# Patient Record
Sex: FEMALE | Race: WHITE | NOT HISPANIC OR LATINO | Employment: OTHER | URBAN - NONMETROPOLITAN AREA
[De-identification: names, ages, dates, MRNs, and addresses within clinical notes are randomized per-mention and may not be internally consistent; named-entity substitution may affect disease eponyms.]

---

## 2018-01-01 ENCOUNTER — OUTSIDE FACILITY SERVICE (OUTPATIENT)
Dept: PULMONOLOGY | Facility: CLINIC | Age: 48
End: 2018-01-01

## 2018-01-01 ENCOUNTER — ANCILLARY PROCEDURE (OUTPATIENT)
Dept: SPEECH THERAPY | Facility: HOSPITAL | Age: 48
End: 2018-01-01
Attending: INTERNAL MEDICINE

## 2018-01-01 ENCOUNTER — ANESTHESIA EVENT (OUTPATIENT)
Dept: PERIOP | Facility: HOSPITAL | Age: 48
End: 2018-01-01

## 2018-01-01 ENCOUNTER — APPOINTMENT (OUTPATIENT)
Dept: GENERAL RADIOLOGY | Facility: HOSPITAL | Age: 48
End: 2018-01-01

## 2018-01-01 ENCOUNTER — APPOINTMENT (OUTPATIENT)
Dept: NEPHROLOGY | Facility: HOSPITAL | Age: 48
End: 2018-01-01
Attending: INTERNAL MEDICINE

## 2018-01-01 ENCOUNTER — DOCUMENTATION (OUTPATIENT)
Dept: PULMONOLOGY | Facility: CLINIC | Age: 48
End: 2018-01-01

## 2018-01-01 ENCOUNTER — HOSPITAL ENCOUNTER (INPATIENT)
Facility: HOSPITAL | Age: 48
LOS: 1 days | End: 2018-06-26
Attending: INTERNAL MEDICINE | Admitting: INTERNAL MEDICINE

## 2018-01-01 ENCOUNTER — APPOINTMENT (OUTPATIENT)
Dept: CT IMAGING | Facility: HOSPITAL | Age: 48
End: 2018-01-01

## 2018-01-01 ENCOUNTER — ANESTHESIA (OUTPATIENT)
Dept: PERIOP | Facility: HOSPITAL | Age: 48
End: 2018-01-01

## 2018-01-01 ENCOUNTER — APPOINTMENT (OUTPATIENT)
Dept: NEUROLOGY | Facility: HOSPITAL | Age: 48
End: 2018-01-01
Attending: PSYCHIATRY & NEUROLOGY

## 2018-01-01 ENCOUNTER — HOSPITAL ENCOUNTER (OUTPATIENT)
Facility: HOSPITAL | Age: 48
Discharge: NURSING, REHAB OR LONG-TERM CARE | End: 2018-06-08
Attending: INTERNAL MEDICINE | Admitting: INTERNAL MEDICINE

## 2018-01-01 ENCOUNTER — OUTSIDE FACILITY SERVICE (OUTPATIENT)
Dept: GASTROENTEROLOGY | Facility: CLINIC | Age: 48
End: 2018-01-01

## 2018-01-01 ENCOUNTER — APPOINTMENT (OUTPATIENT)
Dept: CARDIOLOGY | Facility: HOSPITAL | Age: 48
End: 2018-01-01
Attending: INTERNAL MEDICINE

## 2018-01-01 ENCOUNTER — HOSPITAL ENCOUNTER (INPATIENT)
Facility: HOSPITAL | Age: 48
LOS: 22 days | Discharge: NURSING, REHAB OR LONG-TERM CARE | End: 2018-05-04
Attending: INTERNAL MEDICINE | Admitting: INTERNAL MEDICINE

## 2018-01-01 ENCOUNTER — APPOINTMENT (OUTPATIENT)
Dept: MRI IMAGING | Facility: HOSPITAL | Age: 48
End: 2018-01-01

## 2018-01-01 ENCOUNTER — APPOINTMENT (OUTPATIENT)
Dept: CARDIOLOGY | Facility: HOSPITAL | Age: 48
End: 2018-01-01
Attending: PSYCHIATRY & NEUROLOGY

## 2018-01-01 ENCOUNTER — APPOINTMENT (OUTPATIENT)
Dept: CARDIOLOGY | Facility: HOSPITAL | Age: 48
End: 2018-01-01
Attending: NEUROLOGICAL SURGERY

## 2018-01-01 ENCOUNTER — APPOINTMENT (OUTPATIENT)
Dept: ULTRASOUND IMAGING | Facility: HOSPITAL | Age: 48
End: 2018-01-01

## 2018-01-01 ENCOUNTER — APPOINTMENT (OUTPATIENT)
Dept: ULTRASOUND IMAGING | Facility: HOSPITAL | Age: 48
End: 2018-01-01
Attending: INTERNAL MEDICINE

## 2018-01-01 ENCOUNTER — HOSPITAL ENCOUNTER (INPATIENT)
Facility: HOSPITAL | Age: 48
LOS: 17 days | End: 2018-06-25
Attending: INTERNAL MEDICINE | Admitting: INTERNAL MEDICINE

## 2018-01-01 VITALS
HEART RATE: 82 BPM | OXYGEN SATURATION: 94 % | RESPIRATION RATE: 16 BRPM | TEMPERATURE: 97.2 F | DIASTOLIC BLOOD PRESSURE: 55 MMHG | SYSTOLIC BLOOD PRESSURE: 98 MMHG

## 2018-01-01 VITALS
OXYGEN SATURATION: 95 % | WEIGHT: 134 LBS | HEIGHT: 62 IN | RESPIRATION RATE: 18 BRPM | DIASTOLIC BLOOD PRESSURE: 55 MMHG | TEMPERATURE: 98.1 F | HEART RATE: 66 BPM | BODY MASS INDEX: 24.66 KG/M2 | SYSTOLIC BLOOD PRESSURE: 73 MMHG

## 2018-01-01 VITALS
WEIGHT: 173.5 LBS | SYSTOLIC BLOOD PRESSURE: 121 MMHG | BODY MASS INDEX: 31.93 KG/M2 | OXYGEN SATURATION: 97 % | HEART RATE: 75 BPM | TEMPERATURE: 97.8 F | RESPIRATION RATE: 14 BRPM | DIASTOLIC BLOOD PRESSURE: 66 MMHG | HEIGHT: 62 IN

## 2018-01-01 VITALS
OXYGEN SATURATION: 97 % | HEART RATE: 98 BPM | RESPIRATION RATE: 18 BRPM | SYSTOLIC BLOOD PRESSURE: 113 MMHG | TEMPERATURE: 97.4 F | DIASTOLIC BLOOD PRESSURE: 66 MMHG

## 2018-01-01 DIAGNOSIS — N18.6 ESRD (END STAGE RENAL DISEASE) ON DIALYSIS (HCC): Primary | ICD-10-CM

## 2018-01-01 DIAGNOSIS — J96.01 ACUTE RESPIRATORY FAILURE WITH HYPOXIA (HCC): ICD-10-CM

## 2018-01-01 DIAGNOSIS — J98.11 ATELECTASIS OF LEFT LUNG: ICD-10-CM

## 2018-01-01 DIAGNOSIS — Z78.9 IMPAIRED MOBILITY AND ADLS: ICD-10-CM

## 2018-01-01 DIAGNOSIS — Z74.09 IMPAIRED MOBILITY AND ADLS: ICD-10-CM

## 2018-01-01 DIAGNOSIS — Z74.09 IMPAIRED FUNCTIONAL MOBILITY, BALANCE, GAIT, AND ENDURANCE: ICD-10-CM

## 2018-01-01 DIAGNOSIS — L08.9 WOUND INFECTION: ICD-10-CM

## 2018-01-01 DIAGNOSIS — L02.91 ABSCESS: ICD-10-CM

## 2018-01-01 DIAGNOSIS — J91.8 PARAPNEUMONIC EFFUSION: ICD-10-CM

## 2018-01-01 DIAGNOSIS — J96.00 ACUTE RESPIRATORY FAILURE, UNSPECIFIED WHETHER WITH HYPOXIA OR HYPERCAPNIA (HCC): ICD-10-CM

## 2018-01-01 DIAGNOSIS — J18.9 PNEUMONIA: ICD-10-CM

## 2018-01-01 DIAGNOSIS — Z99.2 ESRD (END STAGE RENAL DISEASE) ON DIALYSIS (HCC): Primary | ICD-10-CM

## 2018-01-01 DIAGNOSIS — J18.9 PNEUMONIA DUE TO INFECTIOUS ORGANISM, UNSPECIFIED LATERALITY, UNSPECIFIED PART OF LUNG: ICD-10-CM

## 2018-01-01 DIAGNOSIS — J96.90 RESPIRATORY FAILURE (HCC): ICD-10-CM

## 2018-01-01 DIAGNOSIS — R13.10 DYSPHAGIA, UNSPECIFIED TYPE: ICD-10-CM

## 2018-01-01 DIAGNOSIS — J90 PLEURAL EFFUSION ON LEFT: Primary | ICD-10-CM

## 2018-01-01 DIAGNOSIS — G83.9 PARALYSIS (HCC): ICD-10-CM

## 2018-01-01 DIAGNOSIS — T14.8XXA WOUND INFECTION: ICD-10-CM

## 2018-01-01 DIAGNOSIS — R06.03 RESPIRATORY DISTRESS: ICD-10-CM

## 2018-01-01 DIAGNOSIS — J18.9 PARAPNEUMONIC EFFUSION: ICD-10-CM

## 2018-01-01 DIAGNOSIS — G06.1 ABSCESS IN EPIDURAL SPACE OF CERVICAL SPINE: Primary | ICD-10-CM

## 2018-01-01 LAB
027 TOXIN: NORMAL
027 TOXIN: NORMAL
25(OH)D3 SERPL-MCNC: 15 NG/ML
A-A DO2: 113.9 MMHG (ref 0–300)
A-A DO2: 116.3 MMHG (ref 0–300)
A-A DO2: 146.2 MMHG (ref 0–300)
A-A DO2: 148.2 MMHG (ref 0–300)
A-A DO2: 152.6 MMHG (ref 0–300)
A-A DO2: 157 MMHG (ref 0–300)
A-A DO2: 160.5 MMHG (ref 0–300)
A-A DO2: 176.4 MMHG (ref 0–300)
A-A DO2: 180.9 MMHG (ref 0–300)
A-A DO2: 199.3 MMHG (ref 0–300)
A-A DO2: 66.9 MMHG (ref 0–300)
A-A DO2: >300 MMHG (ref 0–300)
ABO + RH BLD: NORMAL
ABO GROUP BLD: NORMAL
ALBUMIN SERPL-MCNC: 2.4 G/DL (ref 3.2–4.8)
ALBUMIN SERPL-MCNC: 2.8 G/DL (ref 3.2–4.8)
ALBUMIN SERPL-MCNC: 2.9 G/DL (ref 3.2–4.8)
ALBUMIN SERPL-MCNC: 3 G/DL (ref 3.2–4.8)
ALBUMIN SERPL-MCNC: 3.1 G/DL (ref 3.2–4.8)
ALBUMIN SERPL-MCNC: 3.1 G/DL (ref 3.2–4.8)
ALBUMIN SERPL-MCNC: 3.1 G/DL (ref 3.5–5)
ALBUMIN SERPL-MCNC: 3.2 G/DL (ref 3.2–4.8)
ALBUMIN SERPL-MCNC: 3.2 G/DL (ref 3.5–5)
ALBUMIN SERPL-MCNC: 3.26 G/DL (ref 3.2–4.8)
ALBUMIN SERPL-MCNC: 3.3 G/DL (ref 3.2–4.8)
ALBUMIN SERPL-MCNC: 3.3 G/DL (ref 3.5–5)
ALBUMIN SERPL-MCNC: 3.3 G/DL (ref 3.5–5)
ALBUMIN SERPL-MCNC: 3.35 G/DL (ref 3.2–4.8)
ALBUMIN SERPL-MCNC: 3.37 G/DL (ref 3.2–4.8)
ALBUMIN SERPL-MCNC: 3.4 G/DL (ref 3.2–4.8)
ALBUMIN SERPL-MCNC: 3.46 G/DL (ref 3.2–4.8)
ALBUMIN SERPL-MCNC: 3.5 G/DL (ref 3.5–5)
ALBUMIN SERPL-MCNC: 3.5 G/DL (ref 3.5–5)
ALBUMIN SERPL-MCNC: 3.53 G/DL (ref 3.2–4.8)
ALBUMIN SERPL-MCNC: 3.59 G/DL (ref 3.2–4.8)
ALBUMIN SERPL-MCNC: 3.71 G/DL (ref 3.2–4.8)
ALBUMIN SERPL-MCNC: 3.71 G/DL (ref 3.2–4.8)
ALBUMIN SERPL-MCNC: 3.8 G/DL (ref 3.2–4.8)
ALBUMIN SERPL-MCNC: 3.9 G/DL (ref 3.2–4.8)
ALBUMIN SERPL-MCNC: 3.99 G/DL (ref 3.2–4.8)
ALBUMIN SERPL-MCNC: 4 G/DL (ref 3.5–5)
ALBUMIN/GLOB SERPL: 0.9 G/DL (ref 1.5–2.5)
ALBUMIN/GLOB SERPL: 1 G/DL (ref 1.5–2.5)
ALBUMIN/GLOB SERPL: 1.1 G/DL (ref 1.5–2.5)
ALBUMIN/GLOB SERPL: 1.2 G/DL (ref 1.5–2.5)
ALBUMIN/GLOB SERPL: 1.3 G/DL (ref 1.5–2.5)
ALBUMIN/GLOB SERPL: 1.4 G/DL (ref 1.5–2.5)
ALBUMIN/GLOB SERPL: 1.5 G/DL (ref 1.5–2.5)
ALBUMIN/GLOB SERPL: 1.5 G/DL (ref 1.5–2.5)
ALP SERPL-CCNC: 104 U/L (ref 25–100)
ALP SERPL-CCNC: 117 U/L (ref 25–100)
ALP SERPL-CCNC: 128 U/L (ref 25–100)
ALP SERPL-CCNC: 139 U/L (ref 25–100)
ALP SERPL-CCNC: 53 U/L (ref 25–100)
ALP SERPL-CCNC: 54 U/L (ref 35–104)
ALP SERPL-CCNC: 64 U/L (ref 25–100)
ALP SERPL-CCNC: 66 U/L (ref 25–100)
ALP SERPL-CCNC: 66 U/L (ref 35–104)
ALP SERPL-CCNC: 69 U/L (ref 35–104)
ALP SERPL-CCNC: 73 U/L (ref 35–104)
ALP SERPL-CCNC: 73 U/L (ref 35–104)
ALP SERPL-CCNC: 74 U/L (ref 35–104)
ALP SERPL-CCNC: 75 U/L (ref 35–104)
ALP SERPL-CCNC: 76 U/L (ref 35–104)
ALP SERPL-CCNC: 78 U/L (ref 25–100)
ALP SERPL-CCNC: 78 U/L (ref 25–100)
ALP SERPL-CCNC: 79 U/L (ref 25–100)
ALP SERPL-CCNC: 80 U/L (ref 25–100)
ALP SERPL-CCNC: 81 U/L (ref 25–100)
ALP SERPL-CCNC: 83 U/L (ref 25–100)
ALP SERPL-CCNC: 83 U/L (ref 25–100)
ALP SERPL-CCNC: 85 U/L (ref 25–100)
ALP SERPL-CCNC: 91 U/L (ref 25–100)
ALP SERPL-CCNC: 92 U/L (ref 35–104)
ALP SERPL-CCNC: 93 U/L (ref 25–100)
ALP SERPL-CCNC: 93 U/L (ref 25–100)
ALP SERPL-CCNC: 96 U/L (ref 35–104)
ALP SERPL-CCNC: 97 U/L (ref 25–100)
ALP SERPL-CCNC: 98 U/L (ref 25–100)
ALP SERPL-CCNC: 99 U/L (ref 25–100)
ALT SERPL W P-5'-P-CCNC: 0 U/L (ref 7–40)
ALT SERPL W P-5'-P-CCNC: 1 U/L (ref 7–40)
ALT SERPL W P-5'-P-CCNC: 10 U/L (ref 10–36)
ALT SERPL W P-5'-P-CCNC: 117 U/L (ref 7–40)
ALT SERPL W P-5'-P-CCNC: 12 U/L (ref 10–36)
ALT SERPL W P-5'-P-CCNC: 14 U/L (ref 7–40)
ALT SERPL W P-5'-P-CCNC: 2 U/L (ref 7–40)
ALT SERPL W P-5'-P-CCNC: 3 U/L (ref 7–40)
ALT SERPL W P-5'-P-CCNC: 31 U/L (ref 7–40)
ALT SERPL W P-5'-P-CCNC: 40 U/L (ref 10–36)
ALT SERPL W P-5'-P-CCNC: 5 U/L (ref 10–36)
ALT SERPL W P-5'-P-CCNC: 5 U/L (ref 7–40)
ALT SERPL W P-5'-P-CCNC: 6 U/L (ref 7–40)
ALT SERPL W P-5'-P-CCNC: 6 U/L (ref 7–40)
ALT SERPL W P-5'-P-CCNC: 608 U/L (ref 7–40)
ALT SERPL W P-5'-P-CCNC: 7 U/L (ref 10–36)
ALT SERPL W P-5'-P-CCNC: 7 U/L (ref 7–40)
ALT SERPL W P-5'-P-CCNC: 748 U/L (ref 7–40)
ALT SERPL W P-5'-P-CCNC: 8 U/L (ref 10–36)
ALT SERPL W P-5'-P-CCNC: 8 U/L (ref 10–36)
ALT SERPL W P-5'-P-CCNC: 88 U/L (ref 7–40)
ALT SERPL W P-5'-P-CCNC: 9 U/L (ref 10–36)
ALT SERPL W P-5'-P-CCNC: 9 U/L (ref 7–40)
AMMONIA BLD-SCNC: 34 UMOL/L (ref 19–60)
AMMONIA BLD-SCNC: 41 UMOL/L (ref 11–51)
ANA SER QL: POSITIVE
ANION GAP SERPL CALCULATED.3IONS-SCNC: 10 MMOL/L (ref 3–11)
ANION GAP SERPL CALCULATED.3IONS-SCNC: 10.4 MMOL/L (ref 3.6–11.2)
ANION GAP SERPL CALCULATED.3IONS-SCNC: 10.6 MMOL/L (ref 3.6–11.2)
ANION GAP SERPL CALCULATED.3IONS-SCNC: 10.9 MMOL/L (ref 3.6–11.2)
ANION GAP SERPL CALCULATED.3IONS-SCNC: 11 MMOL/L (ref 3–11)
ANION GAP SERPL CALCULATED.3IONS-SCNC: 11.1 MMOL/L (ref 3.6–11.2)
ANION GAP SERPL CALCULATED.3IONS-SCNC: 11.2 MMOL/L (ref 3.6–11.2)
ANION GAP SERPL CALCULATED.3IONS-SCNC: 12 MMOL/L (ref 3–11)
ANION GAP SERPL CALCULATED.3IONS-SCNC: 12.6 MMOL/L (ref 3.6–11.2)
ANION GAP SERPL CALCULATED.3IONS-SCNC: 12.8 MMOL/L (ref 3.6–11.2)
ANION GAP SERPL CALCULATED.3IONS-SCNC: 13 MMOL/L (ref 3–11)
ANION GAP SERPL CALCULATED.3IONS-SCNC: 13 MMOL/L (ref 3–11)
ANION GAP SERPL CALCULATED.3IONS-SCNC: 13.1 MMOL/L (ref 3.6–11.2)
ANION GAP SERPL CALCULATED.3IONS-SCNC: 13.2 MMOL/L (ref 3.6–11.2)
ANION GAP SERPL CALCULATED.3IONS-SCNC: 13.3 MMOL/L (ref 3.6–11.2)
ANION GAP SERPL CALCULATED.3IONS-SCNC: 13.5 MMOL/L (ref 3.6–11.2)
ANION GAP SERPL CALCULATED.3IONS-SCNC: 13.6 MMOL/L (ref 3.6–11.2)
ANION GAP SERPL CALCULATED.3IONS-SCNC: 13.7 MMOL/L (ref 3.6–11.2)
ANION GAP SERPL CALCULATED.3IONS-SCNC: 13.8 MMOL/L (ref 3.6–11.2)
ANION GAP SERPL CALCULATED.3IONS-SCNC: 14 MMOL/L (ref 3–11)
ANION GAP SERPL CALCULATED.3IONS-SCNC: 14.1 MMOL/L (ref 3.6–11.2)
ANION GAP SERPL CALCULATED.3IONS-SCNC: 14.5 MMOL/L (ref 3.6–11.2)
ANION GAP SERPL CALCULATED.3IONS-SCNC: 14.6 MMOL/L (ref 3.6–11.2)
ANION GAP SERPL CALCULATED.3IONS-SCNC: 14.7 MMOL/L (ref 3.6–11.2)
ANION GAP SERPL CALCULATED.3IONS-SCNC: 15 MMOL/L (ref 3–11)
ANION GAP SERPL CALCULATED.3IONS-SCNC: 15 MMOL/L (ref 3–11)
ANION GAP SERPL CALCULATED.3IONS-SCNC: 15.1 MMOL/L (ref 3.6–11.2)
ANION GAP SERPL CALCULATED.3IONS-SCNC: 15.4 MMOL/L (ref 3.6–11.2)
ANION GAP SERPL CALCULATED.3IONS-SCNC: 15.7 MMOL/L (ref 3.6–11.2)
ANION GAP SERPL CALCULATED.3IONS-SCNC: 16 MMOL/L (ref 3.6–11.2)
ANION GAP SERPL CALCULATED.3IONS-SCNC: 16 MMOL/L (ref 3–11)
ANION GAP SERPL CALCULATED.3IONS-SCNC: 16.2 MMOL/L (ref 3.6–11.2)
ANION GAP SERPL CALCULATED.3IONS-SCNC: 16.4 MMOL/L (ref 3.6–11.2)
ANION GAP SERPL CALCULATED.3IONS-SCNC: 16.5 MMOL/L (ref 3.6–11.2)
ANION GAP SERPL CALCULATED.3IONS-SCNC: 16.5 MMOL/L (ref 3.6–11.2)
ANION GAP SERPL CALCULATED.3IONS-SCNC: 16.9 MMOL/L (ref 3.6–11.2)
ANION GAP SERPL CALCULATED.3IONS-SCNC: 17 MMOL/L (ref 3–11)
ANION GAP SERPL CALCULATED.3IONS-SCNC: 17 MMOL/L (ref 3–11)
ANION GAP SERPL CALCULATED.3IONS-SCNC: 17.4 MMOL/L (ref 3.6–11.2)
ANION GAP SERPL CALCULATED.3IONS-SCNC: 17.8 MMOL/L (ref 3.6–11.2)
ANION GAP SERPL CALCULATED.3IONS-SCNC: 18 MMOL/L (ref 3–11)
ANION GAP SERPL CALCULATED.3IONS-SCNC: 18 MMOL/L (ref 3–11)
ANION GAP SERPL CALCULATED.3IONS-SCNC: 18.8 MMOL/L (ref 3.6–11.2)
ANION GAP SERPL CALCULATED.3IONS-SCNC: 19 MMOL/L (ref 3.6–11.2)
ANION GAP SERPL CALCULATED.3IONS-SCNC: 19 MMOL/L (ref 3–11)
ANION GAP SERPL CALCULATED.3IONS-SCNC: 19 MMOL/L (ref 3–11)
ANION GAP SERPL CALCULATED.3IONS-SCNC: 19.8 MMOL/L (ref 3.6–11.2)
ANION GAP SERPL CALCULATED.3IONS-SCNC: 20 MMOL/L (ref 3.6–11.2)
ANION GAP SERPL CALCULATED.3IONS-SCNC: 7 MMOL/L (ref 3–11)
ANION GAP SERPL CALCULATED.3IONS-SCNC: 9 MMOL/L (ref 3–11)
ANION GAP SERPL CALCULATED.3IONS-SCNC: 9 MMOL/L (ref 3–11)
ANION GAP SERPL CALCULATED.3IONS-SCNC: 9.1 MMOL/L (ref 3.6–11.2)
ANION GAP SERPL CALCULATED.3IONS-SCNC: 9.6 MMOL/L (ref 3.6–11.2)
ANISOCYTOSIS BLD QL: NORMAL
APPEARANCE CSF: CLEAR
APPEARANCE FLD: ABNORMAL
APPEARANCE FLD: CLEAR
APTT PPP: 27 SECONDS (ref 24–31)
APTT PPP: 35.7 SECONDS (ref 24–31)
APTT PPP: 37.6 SECONDS (ref 24–31)
APTT PPP: 40 SECONDS (ref 24–31)
APTT PPP: 44.1 SECONDS (ref 24–31)
ARTERIAL PATENCY WRIST A: ABNORMAL
ARTERIAL PATENCY WRIST A: POSITIVE
AST SERPL-CCNC: 12 U/L (ref 0–33)
AST SERPL-CCNC: 128 U/L (ref 0–33)
AST SERPL-CCNC: 14 U/L (ref 0–33)
AST SERPL-CCNC: 14 U/L (ref 10–30)
AST SERPL-CCNC: 15 U/L (ref 0–33)
AST SERPL-CCNC: 17 U/L (ref 10–30)
AST SERPL-CCNC: 18 U/L (ref 0–33)
AST SERPL-CCNC: 20 U/L (ref 0–33)
AST SERPL-CCNC: 21 U/L (ref 0–33)
AST SERPL-CCNC: 22 U/L (ref 0–33)
AST SERPL-CCNC: 22 U/L (ref 0–33)
AST SERPL-CCNC: 23 U/L (ref 10–30)
AST SERPL-CCNC: 24 U/L (ref 10–30)
AST SERPL-CCNC: 25 U/L (ref 0–33)
AST SERPL-CCNC: 256 U/L (ref 0–33)
AST SERPL-CCNC: 256 U/L (ref 0–33)
AST SERPL-CCNC: 26 U/L (ref 0–33)
AST SERPL-CCNC: 26 U/L (ref 0–33)
AST SERPL-CCNC: 26 U/L (ref 10–30)
AST SERPL-CCNC: 27 U/L (ref 10–30)
AST SERPL-CCNC: 30 U/L (ref 10–30)
AST SERPL-CCNC: 30 U/L (ref 10–30)
AST SERPL-CCNC: 3091 U/L (ref 0–33)
AST SERPL-CCNC: 31 U/L (ref 10–30)
AST SERPL-CCNC: 36 U/L (ref 0–33)
AST SERPL-CCNC: 3796 U/L (ref 0–33)
AST SERPL-CCNC: 39 U/L (ref 10–30)
AST SERPL-CCNC: 50 U/L (ref 0–33)
AST SERPL-CCNC: 691 U/L (ref 0–33)
AST SERPL-CCNC: 71 U/L (ref 0–33)
AST SERPL-CCNC: 961 U/L (ref 0–33)
ATMOSPHERIC PRESS: 724 MMHG
ATMOSPHERIC PRESS: 725 MMHG
ATMOSPHERIC PRESS: 726 MMHG
ATMOSPHERIC PRESS: 728 MMHG
ATMOSPHERIC PRESS: 728 MMHG
ATMOSPHERIC PRESS: 729 MMHG
ATMOSPHERIC PRESS: 730 MMHG
ATMOSPHERIC PRESS: 731 MMHG
ATMOSPHERIC PRESS: 731 MMHG
ATMOSPHERIC PRESS: ABNORMAL MMHG
B DERMAT AB TITR SER: NEGATIVE {TITER}
BACTERIA FLD CULT: NORMAL
BACTERIA FLD CULT: NORMAL
BACTERIA SPEC AEROBE CULT: ABNORMAL
BACTERIA SPEC AEROBE CULT: NORMAL
BACTERIA SPEC ANAEROBE CULT: NORMAL
BACTERIA SPEC ANAEROBE CULT: NORMAL
BACTERIA SPEC RESP CULT: ABNORMAL
BACTERIA SPEC RESP CULT: NO GROWTH
BACTERIA SPEC RESP CULT: NORMAL
BACTERIA SPEC RESP CULT: NORMAL
BASE EXCESS BLDA CALC-SCNC: -0.2 MMOL/L
BASE EXCESS BLDA CALC-SCNC: -0.3 MMOL/L (ref 0–2)
BASE EXCESS BLDA CALC-SCNC: -0.9 MMOL/L (ref 0–2)
BASE EXCESS BLDA CALC-SCNC: -1.3 MMOL/L (ref 0–2)
BASE EXCESS BLDA CALC-SCNC: -1.6 MMOL/L (ref 0–2)
BASE EXCESS BLDA CALC-SCNC: -1.7 MMOL/L (ref 0–2)
BASE EXCESS BLDA CALC-SCNC: -2.5 MMOL/L (ref 0–2)
BASE EXCESS BLDA CALC-SCNC: -2.6 MMOL/L (ref 0–2)
BASE EXCESS BLDA CALC-SCNC: -2.7 MMOL/L (ref 0–2)
BASE EXCESS BLDA CALC-SCNC: -3.1 MMOL/L
BASE EXCESS BLDA CALC-SCNC: -3.3 MMOL/L (ref 0–2)
BASE EXCESS BLDA CALC-SCNC: -3.6 MMOL/L (ref 0–2)
BASE EXCESS BLDA CALC-SCNC: -4.3 MMOL/L (ref 0–2)
BASE EXCESS BLDA CALC-SCNC: -5.4 MMOL/L (ref 0–2)
BASE EXCESS BLDA CALC-SCNC: -8 MMOL/L (ref 0–2)
BASE EXCESS BLDA CALC-SCNC: 0.2 MMOL/L (ref 0–2)
BASE EXCESS BLDA CALC-SCNC: 0.5 MMOL/L
BASE EXCESS BLDA CALC-SCNC: 0.6 MMOL/L (ref 0–2)
BASE EXCESS BLDA CALC-SCNC: 0.8 MMOL/L (ref 0–2)
BASE EXCESS BLDA CALC-SCNC: 1 MMOL/L (ref 0–2)
BASE EXCESS BLDA CALC-SCNC: 1.3 MMOL/L (ref 0–2)
BASE EXCESS BLDA CALC-SCNC: 1.6 MMOL/L
BASE EXCESS BLDA CALC-SCNC: 2 MMOL/L (ref 0–2)
BASE EXCESS BLDA CALC-SCNC: 2.8 MMOL/L (ref 0–2)
BASE EXCESS BLDA CALC-SCNC: 2.9 MMOL/L
BASE EXCESS BLDA CALC-SCNC: 3.4 MMOL/L (ref 0–2)
BASE EXCESS BLDA CALC-SCNC: 3.8 MMOL/L
BASE EXCESS BLDA CALC-SCNC: 3.8 MMOL/L (ref 0–2)
BASE EXCESS BLDA CALC-SCNC: 4 MMOL/L (ref 0–2)
BASE EXCESS BLDA CALC-SCNC: 4.4 MMOL/L (ref 0–2)
BASE EXCESS BLDA CALC-SCNC: 5.6 MMOL/L (ref 0–2)
BASE EXCESS BLDV CALC-SCNC: -3 MMOL/L
BASE EXCESS BLDV CALC-SCNC: -4.2 MMOL/L
BASE EXCESS BLDV CALC-SCNC: 2 MMOL/L
BASE EXCESS BLDV CALC-SCNC: 2 MMOL/L
BASE EXCESS BLDV CALC-SCNC: 2.1 MMOL/L
BASE EXCESS BLDV CALC-SCNC: 2.3 MMOL/L
BASE EXCESS BLDV CALC-SCNC: 2.6 MMOL/L
BASE EXCESS BLDV CALC-SCNC: 5.4 MMOL/L
BASOPHILS # BLD AUTO: 0.01 10*3/MM3 (ref 0–0.2)
BASOPHILS # BLD AUTO: 0.02 10*3/MM3 (ref 0–0.2)
BASOPHILS # BLD AUTO: 0.02 10*3/MM3 (ref 0–0.3)
BASOPHILS # BLD AUTO: 0.03 10*3/MM3 (ref 0–0.2)
BASOPHILS # BLD AUTO: 0.03 10*3/MM3 (ref 0–0.3)
BASOPHILS # BLD AUTO: 0.03 10*3/MM3 (ref 0–0.3)
BASOPHILS # BLD AUTO: 0.04 10*3/MM3 (ref 0–0.2)
BASOPHILS # BLD AUTO: 0.05 10*3/MM3 (ref 0–0.2)
BASOPHILS # BLD AUTO: 0.05 10*3/MM3 (ref 0–0.3)
BASOPHILS # BLD AUTO: 0.06 10*3/MM3 (ref 0–0.2)
BASOPHILS # BLD AUTO: 0.06 10*3/MM3 (ref 0–0.3)
BASOPHILS # BLD AUTO: 0.07 10*3/MM3 (ref 0–0.2)
BASOPHILS # BLD AUTO: 0.07 10*3/MM3 (ref 0–0.2)
BASOPHILS # BLD AUTO: 0.07 10*3/MM3 (ref 0–0.3)
BASOPHILS # BLD AUTO: 0.08 10*3/MM3 (ref 0–0.3)
BASOPHILS # BLD AUTO: 0.09 10*3/MM3 (ref 0–0.3)
BASOPHILS # BLD AUTO: 0.1 10*3/MM3 (ref 0–0.2)
BASOPHILS # BLD AUTO: 0.16 10*3/MM3 (ref 0–0.2)
BASOPHILS # BLD AUTO: 0.22 10*3/MM3 (ref 0–0.2)
BASOPHILS # BLD AUTO: 0.43 10*3/MM3 (ref 0–0.2)
BASOPHILS # BLD MANUAL: 0 10*3/MM3 (ref 0–0.2)
BASOPHILS # BLD MANUAL: 0 10*3/MM3 (ref 0–0.2)
BASOPHILS # BLD MANUAL: 0.48 10*3/MM3 (ref 0–0.2)
BASOPHILS NFR BLD AUTO: 0 % (ref 0–1)
BASOPHILS NFR BLD AUTO: 0 % (ref 0–1)
BASOPHILS NFR BLD AUTO: 0.1 % (ref 0–1)
BASOPHILS NFR BLD AUTO: 0.2 % (ref 0–1)
BASOPHILS NFR BLD AUTO: 0.2 % (ref 0–2)
BASOPHILS NFR BLD AUTO: 0.3 % (ref 0–1)
BASOPHILS NFR BLD AUTO: 0.3 % (ref 0–2)
BASOPHILS NFR BLD AUTO: 0.3 % (ref 0–2)
BASOPHILS NFR BLD AUTO: 0.4 % (ref 0–1)
BASOPHILS NFR BLD AUTO: 0.4 % (ref 0–1)
BASOPHILS NFR BLD AUTO: 0.4 % (ref 0–2)
BASOPHILS NFR BLD AUTO: 0.4 % (ref 0–2)
BASOPHILS NFR BLD AUTO: 0.5 % (ref 0–1)
BASOPHILS NFR BLD AUTO: 0.5 % (ref 0–1)
BASOPHILS NFR BLD AUTO: 0.5 % (ref 0–2)
BASOPHILS NFR BLD AUTO: 0.6 % (ref 0–1)
BASOPHILS NFR BLD AUTO: 0.6 % (ref 0–2)
BASOPHILS NFR BLD AUTO: 0.6 % (ref 0–2)
BASOPHILS NFR BLD AUTO: 0.7 % (ref 0–1)
BASOPHILS NFR BLD AUTO: 0.7 % (ref 0–2)
BASOPHILS NFR BLD AUTO: 0.7 % (ref 0–2)
BASOPHILS NFR BLD AUTO: 0.8 % (ref 0–1)
BASOPHILS NFR BLD AUTO: 0.8 % (ref 0–2)
BASOPHILS NFR BLD AUTO: 0.8 % (ref 0–2)
BASOPHILS NFR BLD AUTO: 0.9 % (ref 0–1)
BASOPHILS NFR BLD AUTO: 1 % (ref 0–1)
BASOPHILS NFR BLD AUTO: 2 % (ref 0–1)
BASOPHILS NFR BLD AUTO: 2.9 % (ref 0–1)
BDY SITE: ABNORMAL
BDY SITE: NORMAL
BH BB BLOOD EXPIRATION DATE: NORMAL
BH BB BLOOD TYPE BARCODE: 600
BH BB BLOOD TYPE BARCODE: 600
BH BB BLOOD TYPE BARCODE: 6200
BH BB DISPENSE STATUS: NORMAL
BH BB PRODUCT CODE: NORMAL
BH BB UNIT NUMBER: NORMAL
BH CV ECHO MEAS - AO MAX PG (FULL): 0.49 MMHG
BH CV ECHO MEAS - AO MAX PG: 4.4 MMHG
BH CV ECHO MEAS - AO ROOT AREA (BSA CORRECTED): 2
BH CV ECHO MEAS - AO ROOT AREA: 8.3 CM^2
BH CV ECHO MEAS - AO ROOT DIAM: 3.3 CM
BH CV ECHO MEAS - AO V2 MAX: 104.7 CM/SEC
BH CV ECHO MEAS - AVA(V,A): 3.1 CM^2
BH CV ECHO MEAS - AVA(V,D): 3.1 CM^2
BH CV ECHO MEAS - BSA(HAYCOCK): 1.6 M^2
BH CV ECHO MEAS - BSA(HAYCOCK): 1.7 M^2
BH CV ECHO MEAS - BSA(HAYCOCK): 1.7 M^2
BH CV ECHO MEAS - BSA: 1.6 M^2
BH CV ECHO MEAS - BZI_BMI: 25.3 KILOGRAMS/M^2
BH CV ECHO MEAS - BZI_BMI: 27 KILOGRAMS/M^2
BH CV ECHO MEAS - BZI_BMI: 27 KILOGRAMS/M^2
BH CV ECHO MEAS - BZI_METRIC_HEIGHT: 154.9 CM
BH CV ECHO MEAS - BZI_METRIC_WEIGHT: 60.8 KG
BH CV ECHO MEAS - BZI_METRIC_WEIGHT: 64.9 KG
BH CV ECHO MEAS - BZI_METRIC_WEIGHT: 64.9 KG
BH CV ECHO MEAS - EDV(CUBED): 269.9 ML
BH CV ECHO MEAS - EDV(TEICH): 213.2 ML
BH CV ECHO MEAS - EF(CUBED): 52.3 %
BH CV ECHO MEAS - EF(TEICH): 43.2 %
BH CV ECHO MEAS - ESV(CUBED): 128.8 ML
BH CV ECHO MEAS - ESV(TEICH): 121 ML
BH CV ECHO MEAS - FS: 21.9 %
BH CV ECHO MEAS - IVS/LVPW: 0.85
BH CV ECHO MEAS - IVSD: 0.71 CM
BH CV ECHO MEAS - LA DIMENSION: 3.2 CM
BH CV ECHO MEAS - LA/AO: 0.98
BH CV ECHO MEAS - LV MASS(C)D: 202.1 GRAMS
BH CV ECHO MEAS - LV MASS(C)DI: 123.4 GRAMS/M^2
BH CV ECHO MEAS - LV MAX PG: 3.9 MMHG
BH CV ECHO MEAS - LV MEAN PG: 1.8 MMHG
BH CV ECHO MEAS - LV V1 MAX: 98.7 CM/SEC
BH CV ECHO MEAS - LV V1 MEAN: 60.8 CM/SEC
BH CV ECHO MEAS - LV V1 VTI: 16.3 CM
BH CV ECHO MEAS - LVIDD: 6.5 CM
BH CV ECHO MEAS - LVIDS: 5 CM
BH CV ECHO MEAS - LVOT AREA (M): 3.5 CM^2
BH CV ECHO MEAS - LVOT AREA: 3.3 CM^2
BH CV ECHO MEAS - LVOT DIAM: 2.1 CM
BH CV ECHO MEAS - LVPWD: 0.83 CM
BH CV ECHO MEAS - MR ALIAS VEL: 29.7 CM/SEC
BH CV ECHO MEAS - MR ERO: 0.7 CM^2
BH CV ECHO MEAS - MR FLOW RATE: 316 CM^3/SEC
BH CV ECHO MEAS - MR MAX PG: 80.8 MMHG
BH CV ECHO MEAS - MR MAX VEL: 449.4 CM/SEC
BH CV ECHO MEAS - MR MEAN PG: 61.5 MMHG
BH CV ECHO MEAS - MR MEAN VEL: 374 CM/SEC
BH CV ECHO MEAS - MR PISA RADIUS: 1.3 CM
BH CV ECHO MEAS - MR PISA: 10.6 CM^2
BH CV ECHO MEAS - MR VOLUME: 112.1 ML
BH CV ECHO MEAS - MR VTI: 159.3 CM
BH CV ECHO MEAS - MV A MAX VEL: 46.9 CM/SEC
BH CV ECHO MEAS - MV AREA (1 DIAM): 7.2 CM^2
BH CV ECHO MEAS - MV DEC TIME: 0.16 SEC
BH CV ECHO MEAS - MV DIAM: 3 CM
BH CV ECHO MEAS - MV E MAX VEL: 112 CM/SEC
BH CV ECHO MEAS - MV E/A: 2.4
BH CV ECHO MEAS - MV FLOW AREA(1DIAM): 7.2 CM^2
BH CV ECHO MEAS - MV MAX PG: 5.9 MMHG
BH CV ECHO MEAS - MV MEAN PG: 2.5 MMHG
BH CV ECHO MEAS - MV V2 MAX: 121.5 CM/SEC
BH CV ECHO MEAS - MV V2 MEAN: 73.8 CM/SEC
BH CV ECHO MEAS - MV V2 VTI: 26.6 CM
BH CV ECHO MEAS - MVA(VTI): 2 CM^2
BH CV ECHO MEAS - PA ACC SLOPE: 434.9 CM/SEC^2
BH CV ECHO MEAS - PA ACC TIME: 0.11 SEC
BH CV ECHO MEAS - PA MAX PG: 2 MMHG
BH CV ECHO MEAS - PA PR(ACCEL): 28.3 MMHG
BH CV ECHO MEAS - PA V2 MAX: 70.8 CM/SEC
BH CV ECHO MEAS - RAP SYSTOLE: 3 MMHG
BH CV ECHO MEAS - RF(MV,LVOT)(1DIAM): 0.72
BH CV ECHO MEAS - RVDD: 2.2 CM
BH CV ECHO MEAS - RVSP: 26 MMHG
BH CV ECHO MEAS - SI(CUBED): 86.1 ML/M^2
BH CV ECHO MEAS - SI(LVOT): 32.8 ML/M^2
BH CV ECHO MEAS - SI(MV 1 DIAM): 117.1 ML/M^2
BH CV ECHO MEAS - SI(TEICH): 56.3 ML/M^2
BH CV ECHO MEAS - SV(CUBED): 141.1 ML
BH CV ECHO MEAS - SV(LVOT): 53.8 ML
BH CV ECHO MEAS - SV(MV 1 DIAM): 191.8 ML
BH CV ECHO MEAS - SV(TEICH): 92.2 ML
BH CV ECHO MEAS - TR MAX V: 23 MMHG
BH CV ECHO MEAS - TR MAX VEL: 242 CM/SEC
BH CV LOW VAS LEFT COMMON FEMORAL SPONT: 1
BH CV LOW VAS LEFT DISTAL FEMORAL SPONT: 1
BH CV LOW VAS LEFT MID FEMORAL SPONT: 1
BH CV LOWER VASCULAR LEFT COMMON FEMORAL AUGMENT: NORMAL
BH CV LOWER VASCULAR LEFT COMMON FEMORAL COMPETENT: NORMAL
BH CV LOWER VASCULAR LEFT COMMON FEMORAL COMPRESS: NORMAL
BH CV LOWER VASCULAR LEFT COMMON FEMORAL PHASIC: NORMAL
BH CV LOWER VASCULAR LEFT COMMON FEMORAL SPONT: NORMAL
BH CV LOWER VASCULAR LEFT COMMON FEMORAL THROMBUS: NORMAL
BH CV LOWER VASCULAR LEFT DISTAL FEMORAL AUGMENT: NORMAL
BH CV LOWER VASCULAR LEFT DISTAL FEMORAL COMPETENT: NORMAL
BH CV LOWER VASCULAR LEFT DISTAL FEMORAL COMPRESS: NORMAL
BH CV LOWER VASCULAR LEFT DISTAL FEMORAL PHASIC: NORMAL
BH CV LOWER VASCULAR LEFT DISTAL FEMORAL SPONT: NORMAL
BH CV LOWER VASCULAR LEFT DISTAL FEMORAL THROMBUS: NORMAL
BH CV LOWER VASCULAR LEFT GASTRONEMIUS COMPRESS: NORMAL
BH CV LOWER VASCULAR LEFT GREATER SAPH AK COMPRESS: NORMAL
BH CV LOWER VASCULAR LEFT GREATER SAPH BK COMPRESS: NORMAL
BH CV LOWER VASCULAR LEFT LESSER SAPH COMPRESS: NORMAL
BH CV LOWER VASCULAR LEFT MID FEMORAL AUGMENT: NORMAL
BH CV LOWER VASCULAR LEFT MID FEMORAL COMPETENT: NORMAL
BH CV LOWER VASCULAR LEFT MID FEMORAL COMPRESS: NORMAL
BH CV LOWER VASCULAR LEFT MID FEMORAL PHASIC: NORMAL
BH CV LOWER VASCULAR LEFT MID FEMORAL SPONT: NORMAL
BH CV LOWER VASCULAR LEFT MID FEMORAL THROMBUS: NORMAL
BH CV LOWER VASCULAR LEFT PERONEAL COMPRESS: NORMAL
BH CV LOWER VASCULAR LEFT POPLITEAL AUGMENT: NORMAL
BH CV LOWER VASCULAR LEFT POPLITEAL COMPRESS: NORMAL
BH CV LOWER VASCULAR LEFT POPLITEAL PHASIC: NORMAL
BH CV LOWER VASCULAR LEFT POPLITEAL SPONT: NORMAL
BH CV LOWER VASCULAR LEFT POSTERIOR TIBIAL COMPRESS: NORMAL
BH CV LOWER VASCULAR LEFT PROFUNDA FEMORAL AUGMENT: NORMAL
BH CV LOWER VASCULAR LEFT PROFUNDA FEMORAL COMPRESS: NORMAL
BH CV LOWER VASCULAR LEFT PROFUNDA FEMORAL PHASIC: NORMAL
BH CV LOWER VASCULAR LEFT PROFUNDA FEMORAL SPONT: NORMAL
BH CV LOWER VASCULAR LEFT PROXIMAL FEMORAL AUGMENT: NORMAL
BH CV LOWER VASCULAR LEFT PROXIMAL FEMORAL COMPRESS: NORMAL
BH CV LOWER VASCULAR LEFT SAPHENOFEMORAL JUNCTION AUGMENT: NORMAL
BH CV LOWER VASCULAR LEFT SAPHENOFEMORAL JUNCTION COMPRESS: NORMAL
BH CV LOWER VASCULAR LEFT SAPHENOFEMORAL JUNCTION PHASIC: NORMAL
BH CV LOWER VASCULAR LEFT SAPHENOFEMORAL JUNCTION SPONT: NORMAL
BH CV LOWER VASCULAR RIGHT COMMON FEMORAL AUGMENT: NORMAL
BH CV LOWER VASCULAR RIGHT COMMON FEMORAL COMPRESS: NORMAL
BH CV LOWER VASCULAR RIGHT COMMON FEMORAL PHASIC: NORMAL
BH CV LOWER VASCULAR RIGHT COMMON FEMORAL SPONT: NORMAL
BH CV LOWER VASCULAR RIGHT DISTAL FEMORAL AUGMENT: NORMAL
BH CV LOWER VASCULAR RIGHT DISTAL FEMORAL COMPRESS: NORMAL
BH CV LOWER VASCULAR RIGHT GASTRONEMIUS COMPRESS: NORMAL
BH CV LOWER VASCULAR RIGHT GREATER SAPH AK COMPRESS: NORMAL
BH CV LOWER VASCULAR RIGHT GREATER SAPH BK COMPRESS: NORMAL
BH CV LOWER VASCULAR RIGHT LESSER SAPH COMPRESS: NORMAL
BH CV LOWER VASCULAR RIGHT MID FEMORAL AUGMENT: NORMAL
BH CV LOWER VASCULAR RIGHT MID FEMORAL COMPRESS: NORMAL
BH CV LOWER VASCULAR RIGHT MID FEMORAL PHASIC: NORMAL
BH CV LOWER VASCULAR RIGHT MID FEMORAL SPONT: NORMAL
BH CV LOWER VASCULAR RIGHT PERONEAL COMPRESS: NORMAL
BH CV LOWER VASCULAR RIGHT POPLITEAL AUGMENT: NORMAL
BH CV LOWER VASCULAR RIGHT POPLITEAL COMPRESS: NORMAL
BH CV LOWER VASCULAR RIGHT POPLITEAL PHASIC: NORMAL
BH CV LOWER VASCULAR RIGHT POPLITEAL SPONT: NORMAL
BH CV LOWER VASCULAR RIGHT POSTERIOR TIBIAL COMPRESS: NORMAL
BH CV LOWER VASCULAR RIGHT PROFUNDA FEMORAL AUGMENT: NORMAL
BH CV LOWER VASCULAR RIGHT PROFUNDA FEMORAL COMPRESS: NORMAL
BH CV LOWER VASCULAR RIGHT PROXIMAL FEMORAL AUGMENT: NORMAL
BH CV LOWER VASCULAR RIGHT PROXIMAL FEMORAL COMPRESS: NORMAL
BH CV LOWER VASCULAR RIGHT SAPHENOFEMORAL JUNCTION AUGMENT: NORMAL
BH CV LOWER VASCULAR RIGHT SAPHENOFEMORAL JUNCTION COMPRESS: NORMAL
BH CV LOWER VASCULAR RIGHT SAPHENOFEMORAL JUNCTION PHASIC: NORMAL
BH CV LOWER VASCULAR RIGHT SAPHENOFEMORAL JUNCTION SPONT: NORMAL
BH CV VAS BP LEFT ARM: NORMAL MMHG
BH CV VAS BP LEFT ARM: NORMAL MMHG
BH CV XLRA - RV BASE: 2.8 CM
BH CV XLRA - RV LENGTH: 7.3 CM
BH CV XLRA - RV MID: 2 CM
BH CV XLRA - TDI S': 7 CM/SEC
BH CV XLRA MEAS LEFT PROX CCA EDV: 20.7 CM/SEC
BH CV XLRA MEAS LEFT PROX CCA PSV: 86.7 CM/SEC
BH CV XLRA MEAS RIGHT CCA RATIO VEL: 64.7 CM/SEC
BH CV XLRA MEAS RIGHT DIST CCA EDV: 22 CM/SEC
BH CV XLRA MEAS RIGHT DIST CCA PSV: 55.3 CM/SEC
BH CV XLRA MEAS RIGHT DIST ICA EDV: 36.5 CM/SEC
BH CV XLRA MEAS RIGHT DIST ICA PSV: 83 CM/SEC
BH CV XLRA MEAS RIGHT ICA RATIO VEL: 89.3 CM/SEC
BH CV XLRA MEAS RIGHT ICA/CCA RATIO: 1.4
BH CV XLRA MEAS RIGHT MID CCA EDV: 19.5 CM/SEC
BH CV XLRA MEAS RIGHT MID CCA PSV: 65.4 CM/SEC
BH CV XLRA MEAS RIGHT MID ICA EDV: 33.3 CM/SEC
BH CV XLRA MEAS RIGHT MID ICA PSV: 89.9 CM/SEC
BH CV XLRA MEAS RIGHT PROX CCA EDV: 20.4 CM/SEC
BH CV XLRA MEAS RIGHT PROX CCA PSV: 72.3 CM/SEC
BH CV XLRA MEAS RIGHT PROX ECA PSV: 88.6 CM/SEC
BH CV XLRA MEAS RIGHT PROX ICA EDV: 35.2 CM/SEC
BH CV XLRA MEAS RIGHT PROX ICA PSV: 64.7 CM/SEC
BH CV XLRA MEAS RIGHT PROX SCLA PSV: 97.2 CM/SEC
BH CV XLRA MEAS RIGHT VERTEBRAL A PSV: 83 CM/SEC
BILIRUB SERPL-MCNC: 0.2 MG/DL (ref 0.3–1.2)
BILIRUB SERPL-MCNC: 0.3 MG/DL (ref 0.2–1.8)
BILIRUB SERPL-MCNC: 0.3 MG/DL (ref 0.3–1.2)
BILIRUB SERPL-MCNC: 0.3 MG/DL (ref 0.3–1.2)
BILIRUB SERPL-MCNC: 0.4 MG/DL (ref 0.2–1.8)
BILIRUB SERPL-MCNC: 0.5 MG/DL (ref 0.2–1.8)
BILIRUB SERPL-MCNC: 0.5 MG/DL (ref 0.2–1.8)
BILIRUB SERPL-MCNC: 0.5 MG/DL (ref 0.3–1.2)
BILIRUB SERPL-MCNC: 0.5 MG/DL (ref 0.3–1.2)
BILIRUB SERPL-MCNC: 0.6 MG/DL (ref 0.2–1.8)
BILIRUB SERPL-MCNC: 0.7 MG/DL (ref 0.3–1.2)
BILIRUB SERPL-MCNC: 0.7 MG/DL (ref 0.3–1.2)
BILIRUB SERPL-MCNC: 0.8 MG/DL (ref 0.3–1.2)
BILIRUB SERPL-MCNC: 0.9 MG/DL (ref 0.3–1.2)
BILIRUB SERPL-MCNC: 1 MG/DL (ref 0.3–1.2)
BILIRUB SERPL-MCNC: 1.1 MG/DL (ref 0.3–1.2)
BILIRUB SERPL-MCNC: 1.1 MG/DL (ref 0.3–1.2)
BILIRUB SERPL-MCNC: 1.5 MG/DL (ref 0.3–1.2)
BILIRUB SERPL-MCNC: 1.8 MG/DL (ref 0.3–1.2)
BILIRUB SERPL-MCNC: 2.5 MG/DL (ref 0.3–1.2)
BILIRUB SERPL-MCNC: 2.8 MG/DL (ref 0.2–1.8)
BILIRUB SERPL-MCNC: 3.2 MG/DL (ref 0.3–1.2)
BILIRUB SERPL-MCNC: 3.3 MG/DL (ref 0.3–1.2)
BILIRUB SERPL-MCNC: 3.5 MG/DL (ref 0.3–1.2)
BILIRUB SERPL-MCNC: 3.7 MG/DL (ref 0.3–1.2)
BLD GP AB SCN SERPL QL: NEGATIVE
BNP SERPL-MCNC: 2472 PG/ML (ref 0–100)
BODY TEMPERATURE: 98.6 C
BODY TEMPERATURE: 98.7 C
BODY TEMPERATURE: 98.9 C
BUN BLD-MCNC: 107 MG/DL (ref 9–23)
BUN BLD-MCNC: 16 MG/DL (ref 7–21)
BUN BLD-MCNC: 18 MG/DL (ref 7–21)
BUN BLD-MCNC: 20 MG/DL (ref 7–21)
BUN BLD-MCNC: 21 MG/DL (ref 7–21)
BUN BLD-MCNC: 22 MG/DL (ref 7–21)
BUN BLD-MCNC: 24 MG/DL (ref 9–23)
BUN BLD-MCNC: 26 MG/DL (ref 7–21)
BUN BLD-MCNC: 27 MG/DL (ref 7–21)
BUN BLD-MCNC: 29 MG/DL (ref 7–21)
BUN BLD-MCNC: 30 MG/DL (ref 7–21)
BUN BLD-MCNC: 31 MG/DL (ref 7–21)
BUN BLD-MCNC: 31 MG/DL (ref 9–23)
BUN BLD-MCNC: 33 MG/DL (ref 7–21)
BUN BLD-MCNC: 36 MG/DL (ref 7–21)
BUN BLD-MCNC: 36 MG/DL (ref 9–23)
BUN BLD-MCNC: 37 MG/DL (ref 7–21)
BUN BLD-MCNC: 37 MG/DL (ref 9–23)
BUN BLD-MCNC: 38 MG/DL (ref 9–23)
BUN BLD-MCNC: 39 MG/DL (ref 7–21)
BUN BLD-MCNC: 39 MG/DL (ref 7–21)
BUN BLD-MCNC: 39 MG/DL (ref 9–23)
BUN BLD-MCNC: 40 MG/DL (ref 9–23)
BUN BLD-MCNC: 41 MG/DL (ref 7–21)
BUN BLD-MCNC: 41 MG/DL (ref 9–23)
BUN BLD-MCNC: 43 MG/DL (ref 7–21)
BUN BLD-MCNC: 43 MG/DL (ref 9–23)
BUN BLD-MCNC: 43 MG/DL (ref 9–23)
BUN BLD-MCNC: 45 MG/DL (ref 7–21)
BUN BLD-MCNC: 46 MG/DL (ref 7–21)
BUN BLD-MCNC: 49 MG/DL (ref 7–21)
BUN BLD-MCNC: 49 MG/DL (ref 9–23)
BUN BLD-MCNC: 49 MG/DL (ref 9–23)
BUN BLD-MCNC: 50 MG/DL (ref 9–23)
BUN BLD-MCNC: 50 MG/DL (ref 9–23)
BUN BLD-MCNC: 53 MG/DL (ref 7–21)
BUN BLD-MCNC: 53 MG/DL (ref 7–21)
BUN BLD-MCNC: 53 MG/DL (ref 9–23)
BUN BLD-MCNC: 56 MG/DL (ref 7–21)
BUN BLD-MCNC: 56 MG/DL (ref 9–23)
BUN BLD-MCNC: 57 MG/DL (ref 9–23)
BUN BLD-MCNC: 58 MG/DL (ref 9–23)
BUN BLD-MCNC: 59 MG/DL (ref 9–23)
BUN BLD-MCNC: 60 MG/DL (ref 7–21)
BUN BLD-MCNC: 60 MG/DL (ref 7–21)
BUN BLD-MCNC: 61 MG/DL (ref 9–23)
BUN BLD-MCNC: 62 MG/DL (ref 7–21)
BUN BLD-MCNC: 62 MG/DL (ref 9–23)
BUN BLD-MCNC: 63 MG/DL (ref 9–23)
BUN BLD-MCNC: 64 MG/DL (ref 9–23)
BUN BLD-MCNC: 66 MG/DL (ref 7–21)
BUN BLD-MCNC: 66 MG/DL (ref 9–23)
BUN BLD-MCNC: 68 MG/DL (ref 9–23)
BUN BLD-MCNC: 72 MG/DL (ref 9–23)
BUN BLD-MCNC: 73 MG/DL (ref 7–21)
BUN BLD-MCNC: 73 MG/DL (ref 7–21)
BUN BLD-MCNC: 74 MG/DL (ref 9–23)
BUN BLD-MCNC: 76 MG/DL (ref 7–21)
BUN BLD-MCNC: 80 MG/DL (ref 9–23)
BUN BLD-MCNC: 82 MG/DL (ref 9–23)
BUN BLD-MCNC: 83 MG/DL (ref 9–23)
BUN BLD-MCNC: 86 MG/DL (ref 7–21)
BUN BLD-MCNC: 88 MG/DL (ref 9–23)
BUN BLD-MCNC: 92 MG/DL (ref 9–23)
BUN BLD-MCNC: 97 MG/DL (ref 9–23)
BUN BLD-MCNC: 98 MG/DL (ref 9–23)
BUN BLD-MCNC: 98 MG/DL (ref 9–23)
BUN/CREAT SERPL: 11 (ref 7–25)
BUN/CREAT SERPL: 11.6 (ref 7–25)
BUN/CREAT SERPL: 11.7 (ref 7–25)
BUN/CREAT SERPL: 11.8 (ref 7–25)
BUN/CREAT SERPL: 12 (ref 7–25)
BUN/CREAT SERPL: 12.6 (ref 7–25)
BUN/CREAT SERPL: 12.7 (ref 7–25)
BUN/CREAT SERPL: 12.8 (ref 7–25)
BUN/CREAT SERPL: 13 (ref 7–25)
BUN/CREAT SERPL: 13.4 (ref 7–25)
BUN/CREAT SERPL: 13.5 (ref 7–25)
BUN/CREAT SERPL: 13.7 (ref 7–25)
BUN/CREAT SERPL: 13.7 (ref 7–25)
BUN/CREAT SERPL: 13.8 (ref 7–25)
BUN/CREAT SERPL: 14.1 (ref 7–25)
BUN/CREAT SERPL: 14.3 (ref 7–25)
BUN/CREAT SERPL: 14.4 (ref 7–25)
BUN/CREAT SERPL: 14.7 (ref 7–25)
BUN/CREAT SERPL: 14.9 (ref 7–25)
BUN/CREAT SERPL: 15.2 (ref 7–25)
BUN/CREAT SERPL: 15.3 (ref 7–25)
BUN/CREAT SERPL: 15.9 (ref 7–25)
BUN/CREAT SERPL: 16.3 (ref 7–25)
BUN/CREAT SERPL: 16.3 (ref 7–25)
BUN/CREAT SERPL: 17 (ref 7–25)
BUN/CREAT SERPL: 17.2 (ref 7–25)
BUN/CREAT SERPL: 17.3 (ref 7–25)
BUN/CREAT SERPL: 17.4 (ref 7–25)
BUN/CREAT SERPL: 17.6 (ref 7–25)
BUN/CREAT SERPL: 17.9 (ref 7–25)
BUN/CREAT SERPL: 18.3 (ref 7–25)
BUN/CREAT SERPL: 18.9 (ref 7–25)
BUN/CREAT SERPL: 19.5 (ref 7–25)
BUN/CREAT SERPL: 19.7 (ref 7–25)
BUN/CREAT SERPL: 19.9 (ref 7–25)
BUN/CREAT SERPL: 20 (ref 7–25)
BUN/CREAT SERPL: 20.6 (ref 7–25)
BUN/CREAT SERPL: 20.7 (ref 7–25)
BUN/CREAT SERPL: 20.9 (ref 7–25)
BUN/CREAT SERPL: 20.9 (ref 7–25)
BUN/CREAT SERPL: 21.1 (ref 7–25)
BUN/CREAT SERPL: 21.4 (ref 7–25)
BUN/CREAT SERPL: 21.5 (ref 7–25)
BUN/CREAT SERPL: 21.7 (ref 7–25)
BUN/CREAT SERPL: 21.7 (ref 7–25)
BUN/CREAT SERPL: 21.9 (ref 7–25)
BUN/CREAT SERPL: 22.9 (ref 7–25)
BUN/CREAT SERPL: 22.9 (ref 7–25)
BUN/CREAT SERPL: 23.1 (ref 7–25)
BUN/CREAT SERPL: 23.2 (ref 7–25)
BUN/CREAT SERPL: 23.3 (ref 7–25)
BUN/CREAT SERPL: 23.5 (ref 7–25)
BUN/CREAT SERPL: 23.6 (ref 7–25)
BUN/CREAT SERPL: 24 (ref 7–25)
BUN/CREAT SERPL: 24.3 (ref 7–25)
BUN/CREAT SERPL: 25.2 (ref 7–25)
BUN/CREAT SERPL: 25.6 (ref 7–25)
BUN/CREAT SERPL: 26.7 (ref 7–25)
BUN/CREAT SERPL: 26.8 (ref 7–25)
BUN/CREAT SERPL: 29.9 (ref 7–25)
BUN/CREAT SERPL: 30 (ref 7–25)
BUN/CREAT SERPL: 30.1 (ref 7–25)
BUN/CREAT SERPL: 32.3 (ref 7–25)
BUN/CREAT SERPL: 32.4 (ref 7–25)
BUN/CREAT SERPL: 35.2 (ref 7–25)
C DIFF TOX GENS STL QL NAA+PROBE: NEGATIVE
C DIFF TOX GENS STL QL NAA+PROBE: NEGATIVE
CA-I BLDA-SCNC: 1.11 MMOL/L (ref 1.12–1.3)
CA-I SERPL ISE-MCNC: 1.06 MMOL/L (ref 1.12–1.32)
CA-I SERPL ISE-MCNC: 1.23 MMOL/L (ref 1.12–1.32)
CA-I SERPL ISE-MCNC: 1.25 MMOL/L (ref 1.12–1.32)
CA-I SERPL ISE-MCNC: 1.27 MMOL/L (ref 1.12–1.32)
CA-I SERPL ISE-MCNC: 1.28 MMOL/L (ref 1.12–1.32)
CA-I SERPL ISE-MCNC: 1.28 MMOL/L (ref 1.12–1.32)
CA-I SERPL ISE-MCNC: 1.29 MMOL/L (ref 1.12–1.32)
CA-I SERPL ISE-MCNC: 1.3 MMOL/L (ref 1.12–1.32)
CA-I SERPL ISE-MCNC: 1.31 MMOL/L (ref 1.12–1.32)
CALCIUM SPEC-SCNC: 7.6 MG/DL (ref 8.7–10.4)
CALCIUM SPEC-SCNC: 7.7 MG/DL (ref 8.7–10.4)
CALCIUM SPEC-SCNC: 7.7 MG/DL (ref 8.7–10.4)
CALCIUM SPEC-SCNC: 7.8 MG/DL (ref 8.7–10.4)
CALCIUM SPEC-SCNC: 7.9 MG/DL (ref 8.7–10.4)
CALCIUM SPEC-SCNC: 8 MG/DL (ref 7.7–10)
CALCIUM SPEC-SCNC: 8 MG/DL (ref 7.7–10)
CALCIUM SPEC-SCNC: 8 MG/DL (ref 8.7–10.4)
CALCIUM SPEC-SCNC: 8.1 MG/DL (ref 8.7–10.4)
CALCIUM SPEC-SCNC: 8.1 MG/DL (ref 8.7–10.4)
CALCIUM SPEC-SCNC: 8.2 MG/DL (ref 7.7–10)
CALCIUM SPEC-SCNC: 8.2 MG/DL (ref 7.7–10)
CALCIUM SPEC-SCNC: 8.2 MG/DL (ref 8.7–10.4)
CALCIUM SPEC-SCNC: 8.2 MG/DL (ref 8.7–10.4)
CALCIUM SPEC-SCNC: 8.3 MG/DL (ref 7.7–10)
CALCIUM SPEC-SCNC: 8.3 MG/DL (ref 7.7–10)
CALCIUM SPEC-SCNC: 8.3 MG/DL (ref 8.7–10.4)
CALCIUM SPEC-SCNC: 8.4 MG/DL (ref 7.7–10)
CALCIUM SPEC-SCNC: 8.4 MG/DL (ref 8.7–10.4)
CALCIUM SPEC-SCNC: 8.4 MG/DL (ref 8.7–10.4)
CALCIUM SPEC-SCNC: 8.5 MG/DL (ref 7.7–10)
CALCIUM SPEC-SCNC: 8.5 MG/DL (ref 8.7–10.4)
CALCIUM SPEC-SCNC: 8.6 MG/DL (ref 7.7–10)
CALCIUM SPEC-SCNC: 8.6 MG/DL (ref 8.7–10.4)
CALCIUM SPEC-SCNC: 8.7 MG/DL (ref 7.7–10)
CALCIUM SPEC-SCNC: 8.7 MG/DL (ref 8.7–10.4)
CALCIUM SPEC-SCNC: 8.8 MG/DL (ref 7.7–10)
CALCIUM SPEC-SCNC: 8.8 MG/DL (ref 8.7–10.4)
CALCIUM SPEC-SCNC: 8.9 MG/DL (ref 8.7–10.4)
CALCIUM SPEC-SCNC: 9 MG/DL (ref 8.7–10.4)
CALCIUM SPEC-SCNC: 9.1 MG/DL (ref 8.7–10.4)
CALCIUM SPEC-SCNC: 9.2 MG/DL (ref 7.7–10)
CALCIUM SPEC-SCNC: 9.2 MG/DL (ref 7.7–10)
CALCIUM SPEC-SCNC: 9.3 MG/DL (ref 7.7–10)
CALCIUM SPEC-SCNC: 9.4 MG/DL (ref 7.7–10)
CALCIUM SPEC-SCNC: 9.4 MG/DL (ref 8.7–10.4)
CALCIUM SPEC-SCNC: 9.5 MG/DL (ref 7.7–10)
CALCIUM SPEC-SCNC: 9.6 MG/DL (ref 7.7–10)
CATHETER CULTURE: NORMAL
CHLORIDE BLDA-SCNC: 103 MMOL/L (ref 98–105)
CHLORIDE SERPL-SCNC: 100 MMOL/L (ref 99–109)
CHLORIDE SERPL-SCNC: 101 MMOL/L (ref 99–109)
CHLORIDE SERPL-SCNC: 101 MMOL/L (ref 99–109)
CHLORIDE SERPL-SCNC: 102 MMOL/L (ref 99–109)
CHLORIDE SERPL-SCNC: 103 MMOL/L (ref 99–109)
CHLORIDE SERPL-SCNC: 103 MMOL/L (ref 99–112)
CHLORIDE SERPL-SCNC: 104 MMOL/L (ref 99–109)
CHLORIDE SERPL-SCNC: 104 MMOL/L (ref 99–112)
CHLORIDE SERPL-SCNC: 104 MMOL/L (ref 99–112)
CHLORIDE SERPL-SCNC: 105 MMOL/L (ref 99–109)
CHLORIDE SERPL-SCNC: 106 MMOL/L (ref 99–109)
CHLORIDE SERPL-SCNC: 90 MMOL/L (ref 99–112)
CHLORIDE SERPL-SCNC: 91 MMOL/L (ref 99–112)
CHLORIDE SERPL-SCNC: 92 MMOL/L (ref 99–112)
CHLORIDE SERPL-SCNC: 93 MMOL/L (ref 99–112)
CHLORIDE SERPL-SCNC: 94 MMOL/L (ref 99–112)
CHLORIDE SERPL-SCNC: 95 MMOL/L (ref 99–109)
CHLORIDE SERPL-SCNC: 95 MMOL/L (ref 99–112)
CHLORIDE SERPL-SCNC: 96 MMOL/L (ref 99–109)
CHLORIDE SERPL-SCNC: 96 MMOL/L (ref 99–112)
CHLORIDE SERPL-SCNC: 97 MMOL/L (ref 99–112)
CHLORIDE SERPL-SCNC: 98 MMOL/L (ref 99–109)
CHLORIDE SERPL-SCNC: 98 MMOL/L (ref 99–112)
CHLORIDE SERPL-SCNC: 99 MMOL/L (ref 99–109)
CHOLEST SERPL-MCNC: 105 MG/DL (ref 0–200)
CHOLEST SERPL-MCNC: 73 MG/DL (ref 0–200)
CHOLEST SERPL-MCNC: 74 MG/DL (ref 0–200)
CHOLEST SERPL-MCNC: 83 MG/DL (ref 0–200)
CHOLEST SERPL-MCNC: 93 MG/DL (ref 0–200)
CK SERPL-CCNC: 13 U/L (ref 24–173)
CK SERPL-CCNC: 13 U/L (ref 24–173)
CK SERPL-CCNC: 14 U/L (ref 24–173)
CK SERPL-CCNC: 14 U/L (ref 24–173)
CK SERPL-CCNC: 21 U/L (ref 24–173)
CK SERPL-CCNC: 25 U/L (ref 24–173)
CK SERPL-CCNC: 61 U/L (ref 24–173)
CK SERPL-CCNC: 70 U/L (ref 26–174)
CK SERPL-CCNC: 9 U/L (ref 24–173)
CK SERPL-CCNC: 9 U/L (ref 24–173)
CLUMPED PLATELETS: PRESENT
CLUMPED PLATELETS: PRESENT
CO2 BLDA-SCNC: 17.4 MMOL/L (ref 22–33)
CO2 BLDA-SCNC: 21 MMOL/L (ref 23–27)
CO2 BLDA-SCNC: 21.1 MMOL/L (ref 22–33)
CO2 BLDA-SCNC: 21.2 MMOL/L (ref 22–33)
CO2 BLDA-SCNC: 21.4 MMOL/L (ref 22–33)
CO2 BLDA-SCNC: 22 MMOL/L (ref 22–33)
CO2 BLDA-SCNC: 22.1 MMOL/L (ref 22–33)
CO2 BLDA-SCNC: 22.6 MMOL/L (ref 22–33)
CO2 BLDA-SCNC: 22.8 MMOL/L (ref 22–33)
CO2 BLDA-SCNC: 23.4 MMOL/L (ref 22–33)
CO2 BLDA-SCNC: 23.4 MMOL/L (ref 22–33)
CO2 BLDA-SCNC: 24.3 MMOL/L (ref 22–33)
CO2 BLDA-SCNC: 24.7 MMOL/L (ref 22–33)
CO2 BLDA-SCNC: 25.2 MMOL/L (ref 22–33)
CO2 BLDA-SCNC: 25.6 MMOL/L (ref 22–33)
CO2 BLDA-SCNC: 25.8 MMOL/L (ref 22–33)
CO2 BLDA-SCNC: 26.4 MMOL/L (ref 22–33)
CO2 BLDA-SCNC: 26.6 MMOL/L (ref 22–33)
CO2 BLDA-SCNC: 26.7 MMOL/L (ref 22–33)
CO2 BLDA-SCNC: 27.4 MMOL/L (ref 22–33)
CO2 BLDA-SCNC: 27.9 MMOL/L (ref 22–33)
CO2 BLDA-SCNC: 28.4 MMOL/L (ref 22–33)
CO2 BLDA-SCNC: 28.8 MMOL/L (ref 22–33)
CO2 BLDA-SCNC: 29 MMOL/L (ref 22–33)
CO2 BLDA-SCNC: 30.4 MMOL/L (ref 22–33)
CO2 BLDA-SCNC: ABNORMAL MMOL/L (ref 23–27)
CO2 SERPL-SCNC: 18 MMOL/L (ref 20–31)
CO2 SERPL-SCNC: 18 MMOL/L (ref 20–31)
CO2 SERPL-SCNC: 20 MMOL/L (ref 20–31)
CO2 SERPL-SCNC: 20.2 MMOL/L (ref 24.3–31.9)
CO2 SERPL-SCNC: 21 MMOL/L (ref 20–31)
CO2 SERPL-SCNC: 22 MMOL/L (ref 20–31)
CO2 SERPL-SCNC: 22.2 MMOL/L (ref 24.3–31.9)
CO2 SERPL-SCNC: 22.2 MMOL/L (ref 24.3–31.9)
CO2 SERPL-SCNC: 22.6 MMOL/L (ref 24.3–31.9)
CO2 SERPL-SCNC: 22.9 MMOL/L (ref 24.3–31.9)
CO2 SERPL-SCNC: 23 MMOL/L (ref 20–31)
CO2 SERPL-SCNC: 23.9 MMOL/L (ref 24.3–31.9)
CO2 SERPL-SCNC: 24 MMOL/L (ref 20–31)
CO2 SERPL-SCNC: 24 MMOL/L (ref 24.3–31.9)
CO2 SERPL-SCNC: 24 MMOL/L (ref 24.3–31.9)
CO2 SERPL-SCNC: 24.3 MMOL/L (ref 24.3–31.9)
CO2 SERPL-SCNC: 24.5 MMOL/L (ref 24.3–31.9)
CO2 SERPL-SCNC: 24.7 MMOL/L (ref 24.3–31.9)
CO2 SERPL-SCNC: 24.8 MMOL/L (ref 24.3–31.9)
CO2 SERPL-SCNC: 25 MMOL/L (ref 20–31)
CO2 SERPL-SCNC: 25.1 MMOL/L (ref 24.3–31.9)
CO2 SERPL-SCNC: 25.4 MMOL/L (ref 24.3–31.9)
CO2 SERPL-SCNC: 25.5 MMOL/L (ref 24.3–31.9)
CO2 SERPL-SCNC: 25.5 MMOL/L (ref 24.3–31.9)
CO2 SERPL-SCNC: 25.9 MMOL/L (ref 24.3–31.9)
CO2 SERPL-SCNC: 26 MMOL/L (ref 20–31)
CO2 SERPL-SCNC: 26.2 MMOL/L (ref 24.3–31.9)
CO2 SERPL-SCNC: 26.3 MMOL/L (ref 24.3–31.9)
CO2 SERPL-SCNC: 26.4 MMOL/L (ref 24.3–31.9)
CO2 SERPL-SCNC: 26.4 MMOL/L (ref 24.3–31.9)
CO2 SERPL-SCNC: 26.6 MMOL/L (ref 24.3–31.9)
CO2 SERPL-SCNC: 27 MMOL/L (ref 20–31)
CO2 SERPL-SCNC: 27 MMOL/L (ref 24.3–31.9)
CO2 SERPL-SCNC: 27.6 MMOL/L (ref 24.3–31.9)
CO2 SERPL-SCNC: 27.8 MMOL/L (ref 24.3–31.9)
CO2 SERPL-SCNC: 28 MMOL/L (ref 20–31)
CO2 SERPL-SCNC: 28 MMOL/L (ref 20–31)
CO2 SERPL-SCNC: 28.2 MMOL/L (ref 24.3–31.9)
CO2 SERPL-SCNC: 28.4 MMOL/L (ref 24.3–31.9)
CO2 SERPL-SCNC: 28.5 MMOL/L (ref 24.3–31.9)
CO2 SERPL-SCNC: 28.8 MMOL/L (ref 24.3–31.9)
CO2 SERPL-SCNC: 29 MMOL/L (ref 20–31)
CO2 SERPL-SCNC: 29.1 MMOL/L (ref 24.3–31.9)
CO2 SERPL-SCNC: 29.4 MMOL/L (ref 24.3–31.9)
CO2 SERPL-SCNC: 29.6 MMOL/L (ref 24.3–31.9)
CO2 SERPL-SCNC: 29.9 MMOL/L (ref 24.3–31.9)
CO2 SERPL-SCNC: 30.3 MMOL/L (ref 24.3–31.9)
CO2 SERPL-SCNC: 30.9 MMOL/L (ref 24.3–31.9)
COCCIDIOIDES IGG SER QL: 0.2 IV
COCCIDIOIDES IGM SER QL: 0 IV
COHGB MFR BLD: 0.6 % (ref 0–2)
COHGB MFR BLD: 1.2 % (ref 0–2)
COHGB MFR BLD: 1.3 % (ref 0–2)
COHGB MFR BLD: 1.4 % (ref 0–2)
COHGB MFR BLD: 1.4 % (ref 0–2)
COHGB MFR BLD: 1.5 % (ref 0–2)
COHGB MFR BLD: 1.5 % (ref 0–2)
COHGB MFR BLD: 1.6 % (ref 0–2)
COHGB MFR BLD: 1.6 % (ref 0–2)
COHGB MFR BLD: 1.7 % (ref 0–2)
COHGB MFR BLD: 1.8 % (ref 0–2)
COHGB MFR BLD: 1.8 % (ref 0–5)
COHGB MFR BLD: 1.9 % (ref 0–2)
COHGB MFR BLD: 1.9 % (ref 0–2)
COHGB MFR BLD: 1.9 % (ref 0–5)
COHGB MFR BLD: 1.9 % (ref 0–5)
COHGB MFR BLD: 2 % (ref 0–5)
COHGB MFR BLD: 2.1 % (ref 0–5)
COHGB MFR BLD: 2.3 % (ref 0–2)
COHGB MFR BLD: 2.3 % (ref 0–2)
COHGB MFR BLD: 2.5 % (ref 0–5)
COHGB MFR BLD: 2.7 % (ref 0–2)
COHGB MFR BLD: 2.7 % (ref 0–2)
COHGB MFR BLD: 2.9 % (ref 0–2)
COLOR CSF: ABNORMAL
COLOR FLD: ABNORMAL
COLOR FLD: YELLOW
COLOR SPUN CSF: ABNORMAL
CORTIS SERPL-MCNC: 39.9 MCG/DL
CREAT BLD-MCNC: 1.19 MG/DL (ref 0.43–1.29)
CREAT BLD-MCNC: 1.31 MG/DL (ref 0.6–1.3)
CREAT BLD-MCNC: 1.33 MG/DL (ref 0.6–1.3)
CREAT BLD-MCNC: 1.34 MG/DL (ref 0.6–1.3)
CREAT BLD-MCNC: 1.36 MG/DL (ref 0.6–1.3)
CREAT BLD-MCNC: 1.42 MG/DL (ref 0.6–1.3)
CREAT BLD-MCNC: 1.45 MG/DL (ref 0.43–1.29)
CREAT BLD-MCNC: 1.55 MG/DL (ref 0.6–1.3)
CREAT BLD-MCNC: 1.61 MG/DL (ref 0.43–1.29)
CREAT BLD-MCNC: 1.61 MG/DL (ref 0.43–1.29)
CREAT BLD-MCNC: 1.64 MG/DL (ref 0.43–1.29)
CREAT BLD-MCNC: 1.76 MG/DL (ref 0.6–1.3)
CREAT BLD-MCNC: 1.76 MG/DL (ref 0.6–1.3)
CREAT BLD-MCNC: 1.77 MG/DL (ref 0.43–1.29)
CREAT BLD-MCNC: 1.79 MG/DL (ref 0.6–1.3)
CREAT BLD-MCNC: 1.81 MG/DL (ref 0.43–1.29)
CREAT BLD-MCNC: 1.84 MG/DL (ref 0.43–1.29)
CREAT BLD-MCNC: 1.85 MG/DL (ref 0.43–1.29)
CREAT BLD-MCNC: 1.88 MG/DL (ref 0.43–1.29)
CREAT BLD-MCNC: 1.99 MG/DL (ref 0.43–1.29)
CREAT BLD-MCNC: 2.03 MG/DL (ref 0.6–1.3)
CREAT BLD-MCNC: 2.04 MG/DL (ref 0.43–1.29)
CREAT BLD-MCNC: 2.05 MG/DL (ref 0.43–1.29)
CREAT BLD-MCNC: 2.07 MG/DL (ref 0.43–1.29)
CREAT BLD-MCNC: 2.1 MG/DL (ref 0.6–1.3)
CREAT BLD-MCNC: 2.14 MG/DL (ref 0.6–1.3)
CREAT BLD-MCNC: 2.21 MG/DL (ref 0.43–1.29)
CREAT BLD-MCNC: 2.21 MG/DL (ref 0.43–1.29)
CREAT BLD-MCNC: 2.23 MG/DL (ref 0.6–1.3)
CREAT BLD-MCNC: 2.25 MG/DL (ref 0.43–1.29)
CREAT BLD-MCNC: 2.25 MG/DL (ref 0.6–1.3)
CREAT BLD-MCNC: 2.3 MG/DL (ref 0.6–1.3)
CREAT BLD-MCNC: 2.3 MG/DL (ref 0.6–1.3)
CREAT BLD-MCNC: 2.32 MG/DL (ref 0.43–1.29)
CREAT BLD-MCNC: 2.36 MG/DL (ref 0.6–1.3)
CREAT BLD-MCNC: 2.39 MG/DL (ref 0.43–1.29)
CREAT BLD-MCNC: 2.4 MG/DL (ref 0.6–1.3)
CREAT BLD-MCNC: 2.51 MG/DL (ref 0.43–1.29)
CREAT BLD-MCNC: 2.55 MG/DL (ref 0.43–1.29)
CREAT BLD-MCNC: 2.61 MG/DL (ref 0.43–1.29)
CREAT BLD-MCNC: 2.7 MG/DL (ref 0.43–1.29)
CREAT BLD-MCNC: 2.7 MG/DL (ref 0.6–1.3)
CREAT BLD-MCNC: 2.74 MG/DL (ref 0.43–1.29)
CREAT BLD-MCNC: 2.8 MG/DL (ref 0.6–1.3)
CREAT BLD-MCNC: 2.8 MG/DL (ref 0.6–1.3)
CREAT BLD-MCNC: 2.81 MG/DL (ref 0.43–1.29)
CREAT BLD-MCNC: 2.9 MG/DL (ref 0.43–1.29)
CREAT BLD-MCNC: 2.97 MG/DL (ref 0.43–1.29)
CREAT BLD-MCNC: 3.07 MG/DL (ref 0.43–1.29)
CREAT BLD-MCNC: 3.07 MG/DL (ref 0.43–1.29)
CREAT BLD-MCNC: 3.09 MG/DL (ref 0.43–1.29)
CREAT BLD-MCNC: 3.15 MG/DL (ref 0.43–1.29)
CREAT BLD-MCNC: 3.19 MG/DL (ref 0.43–1.29)
CREAT BLD-MCNC: 3.2 MG/DL (ref 0.6–1.3)
CREAT BLD-MCNC: 3.2 MG/DL (ref 0.6–1.3)
CREAT BLD-MCNC: 3.3 MG/DL (ref 0.6–1.3)
CREAT BLD-MCNC: 3.34 MG/DL (ref 0.43–1.29)
CREAT BLD-MCNC: 3.4 MG/DL (ref 0.6–1.3)
CREAT BLD-MCNC: 3.6 MG/DL (ref 0.6–1.3)
CREAT BLD-MCNC: 3.6 MG/DL (ref 0.6–1.3)
CREAT BLD-MCNC: 3.67 MG/DL (ref 0.43–1.29)
CREAT BLD-MCNC: 3.7 MG/DL (ref 0.6–1.3)
CREAT BLD-MCNC: 4.1 MG/DL (ref 0.6–1.3)
CREAT BLD-MCNC: 4.11 MG/DL (ref 0.43–1.29)
CREAT BLD-MCNC: 4.4 MG/DL (ref 0.6–1.3)
CREAT BLD-MCNC: 4.7 MG/DL (ref 0.6–1.3)
CREAT BLD-MCNC: 5.4 MG/DL (ref 0.6–1.3)
CREAT BLD-MCNC: 6.4 MG/DL (ref 0.6–1.3)
CREAT BLD-MCNC: 6.5 MG/DL (ref 0.6–1.3)
CRP SERPL-MCNC: 10.55 MG/DL (ref 0–0.99)
CRP SERPL-MCNC: 10.85 MG/DL (ref 0–0.99)
CRP SERPL-MCNC: 11.03 MG/DL (ref 0–0.99)
CRP SERPL-MCNC: 11.77 MG/DL (ref 0–0.99)
CRP SERPL-MCNC: 13.69 MG/DL (ref 0–1)
CRP SERPL-MCNC: 15.25 MG/DL (ref 0–0.99)
CRP SERPL-MCNC: 15.43 MG/DL (ref 0–0.99)
CRP SERPL-MCNC: 15.9 MG/DL (ref 0–0.99)
CRP SERPL-MCNC: 16.16 MG/DL (ref 0–0.99)
CRP SERPL-MCNC: 16.52 MG/DL (ref 0–1)
CRP SERPL-MCNC: 2.96 MG/DL (ref 0–0.99)
CRP SERPL-MCNC: 21.96 MG/DL (ref 0–1)
CRP SERPL-MCNC: 25.77 MG/DL (ref 0–0.99)
CRP SERPL-MCNC: 3.31 MG/DL (ref 0–0.99)
CRP SERPL-MCNC: 3.65 MG/DL (ref 0–0.99)
CRP SERPL-MCNC: 4.49 MG/DL (ref 0–0.99)
CRP SERPL-MCNC: 4.66 MG/DL (ref 0–1)
CRP SERPL-MCNC: 4.82 MG/DL (ref 0–1)
CRP SERPL-MCNC: 4.91 MG/DL (ref 0–0.99)
CRP SERPL-MCNC: 5.83 MG/DL (ref 0–0.99)
CRP SERPL-MCNC: 5.83 MG/DL (ref 0–0.99)
CRP SERPL-MCNC: 6.64 MG/DL (ref 0–0.99)
CRP SERPL-MCNC: 6.74 MG/DL (ref 0–0.99)
CRP SERPL-MCNC: 6.89 MG/DL (ref 0–1)
CRP SERPL-MCNC: 7.2 MG/DL (ref 0–0.99)
CRP SERPL-MCNC: 7.33 MG/DL (ref 0–1)
CRP SERPL-MCNC: 7.38 MG/DL (ref 0–0.99)
CRP SERPL-MCNC: 7.39 MG/DL (ref 0–0.99)
CRP SERPL-MCNC: 7.67 MG/DL (ref 0–0.99)
CRP SERPL-MCNC: 7.89 MG/DL (ref 0–0.99)
CRP SERPL-MCNC: 7.98 MG/DL (ref 0–0.99)
CRP SERPL-MCNC: 8.14 MG/DL (ref 0–0.99)
CRP SERPL-MCNC: 8.18 MG/DL (ref 0–0.99)
CRP SERPL-MCNC: 8.32 MG/DL (ref 0–0.99)
CRP SERPL-MCNC: 8.41 MG/DL (ref 0–0.99)
CRP SERPL-MCNC: 8.75 MG/DL (ref 0–0.99)
CRP SERPL-MCNC: 8.79 MG/DL (ref 0–0.99)
CYTO UR: NORMAL
D-LACTATE SERPL-SCNC: 0.5 MMOL/L (ref 0.5–2)
D-LACTATE SERPL-SCNC: 0.7 MMOL/L (ref 0.5–2)
D-LACTATE SERPL-SCNC: 0.9 MMOL/L (ref 0.5–2)
D-LACTATE SERPL-SCNC: 1 MMOL/L (ref 0.5–2)
D-LACTATE SERPL-SCNC: 1.1 MMOL/L (ref 0.5–2)
D-LACTATE SERPL-SCNC: 1.1 MMOL/L (ref 0.5–2)
DEPRECATED RDW RBC AUTO: 53.8 FL (ref 37–54)
DEPRECATED RDW RBC AUTO: 55 FL (ref 37–54)
DEPRECATED RDW RBC AUTO: 55 FL (ref 37–54)
DEPRECATED RDW RBC AUTO: 55.7 FL (ref 37–54)
DEPRECATED RDW RBC AUTO: 56.2 FL (ref 37–54)
DEPRECATED RDW RBC AUTO: 56.4 FL (ref 37–54)
DEPRECATED RDW RBC AUTO: 56.5 FL (ref 37–54)
DEPRECATED RDW RBC AUTO: 56.5 FL (ref 37–54)
DEPRECATED RDW RBC AUTO: 56.6 FL (ref 37–54)
DEPRECATED RDW RBC AUTO: 57.2 FL (ref 37–54)
DEPRECATED RDW RBC AUTO: 57.6 FL (ref 37–54)
DEPRECATED RDW RBC AUTO: 57.6 FL (ref 37–54)
DEPRECATED RDW RBC AUTO: 57.7 FL (ref 37–54)
DEPRECATED RDW RBC AUTO: 58 FL (ref 37–54)
DEPRECATED RDW RBC AUTO: 58.8 FL (ref 37–54)
DEPRECATED RDW RBC AUTO: 59.1 FL (ref 37–54)
DEPRECATED RDW RBC AUTO: 59.5 FL (ref 37–54)
DEPRECATED RDW RBC AUTO: 59.6 FL (ref 37–54)
DEPRECATED RDW RBC AUTO: 59.7 FL (ref 37–54)
DEPRECATED RDW RBC AUTO: 59.8 FL (ref 37–54)
DEPRECATED RDW RBC AUTO: 59.9 FL (ref 37–54)
DEPRECATED RDW RBC AUTO: 60.4 FL (ref 37–54)
DEPRECATED RDW RBC AUTO: 61.5 FL (ref 37–54)
DEPRECATED RDW RBC AUTO: 61.6 FL (ref 37–54)
DEPRECATED RDW RBC AUTO: 61.7 FL (ref 37–54)
DEPRECATED RDW RBC AUTO: 61.8 FL (ref 37–54)
DEPRECATED RDW RBC AUTO: 62.2 FL (ref 37–54)
DEPRECATED RDW RBC AUTO: 62.5 FL (ref 37–54)
DEPRECATED RDW RBC AUTO: 62.7 FL (ref 37–54)
DEPRECATED RDW RBC AUTO: 62.8 FL (ref 37–54)
DEPRECATED RDW RBC AUTO: 62.9 FL (ref 37–54)
DEPRECATED RDW RBC AUTO: 63.4 FL (ref 37–54)
DEPRECATED RDW RBC AUTO: 63.7 FL (ref 37–54)
DEPRECATED RDW RBC AUTO: 63.9 FL (ref 37–54)
DEPRECATED RDW RBC AUTO: 64.5 FL (ref 37–54)
DEPRECATED RDW RBC AUTO: 64.6 FL (ref 37–54)
DEPRECATED RDW RBC AUTO: 64.7 FL (ref 37–54)
DEPRECATED RDW RBC AUTO: 64.9 FL (ref 37–54)
DEPRECATED RDW RBC AUTO: 66.4 FL (ref 37–54)
DEPRECATED RDW RBC AUTO: 66.7 FL (ref 37–54)
DEPRECATED RDW RBC AUTO: 67.6 FL (ref 37–54)
DEPRECATED RDW RBC AUTO: 68 FL (ref 37–54)
DEPRECATED RDW RBC AUTO: 68.1 FL (ref 37–54)
DEPRECATED RDW RBC AUTO: 68.9 FL (ref 37–54)
DEPRECATED RDW RBC AUTO: 69.2 FL (ref 37–54)
DEPRECATED RDW RBC AUTO: 69.5 FL (ref 37–54)
DEPRECATED RDW RBC AUTO: 69.9 FL (ref 37–54)
DEPRECATED RDW RBC AUTO: 70.2 FL (ref 37–54)
DEPRECATED RDW RBC AUTO: 72.4 FL (ref 37–54)
DEPRECATED RDW RBC AUTO: 74.5 FL (ref 37–54)
DEPRECATED RDW RBC AUTO: 77.1 FL (ref 37–54)
DEPRECATED RDW RBC AUTO: 87.5 FL (ref 37–54)
DEPRECATED RDW RBC AUTO: 91.6 FL (ref 37–54)
DEPRECATED RDW RBC AUTO: 93 FL (ref 37–54)
EOSINOPHIL # BLD AUTO: 0 10*3/MM3 (ref 0–0.7)
EOSINOPHIL # BLD AUTO: 0.01 10*3/MM3 (ref 0–0.3)
EOSINOPHIL # BLD AUTO: 0.02 10*3/MM3 (ref 0–0.3)
EOSINOPHIL # BLD AUTO: 0.02 10*3/MM3 (ref 0–0.3)
EOSINOPHIL # BLD AUTO: 0.03 10*3/MM3 (ref 0–0.3)
EOSINOPHIL # BLD AUTO: 0.03 10*3/MM3 (ref 0–0.7)
EOSINOPHIL # BLD AUTO: 0.05 10*3/MM3 (ref 0–0.3)
EOSINOPHIL # BLD AUTO: 0.06 10*3/MM3 (ref 0–0.3)
EOSINOPHIL # BLD AUTO: 0.06 10*3/MM3 (ref 0–0.3)
EOSINOPHIL # BLD AUTO: 0.07 10*3/MM3 (ref 0–0.3)
EOSINOPHIL # BLD AUTO: 0.08 10*3/MM3 (ref 0–0.3)
EOSINOPHIL # BLD AUTO: 0.09 10*3/MM3 (ref 0–0.3)
EOSINOPHIL # BLD AUTO: 0.1 10*3/MM3 (ref 0–0.3)
EOSINOPHIL # BLD AUTO: 0.13 10*3/MM3 (ref 0–0.3)
EOSINOPHIL # BLD AUTO: 0.13 10*3/MM3 (ref 0–0.3)
EOSINOPHIL # BLD AUTO: 0.14 10*3/MM3 (ref 0–0.3)
EOSINOPHIL # BLD AUTO: 0.15 10*3/MM3 (ref 0–0.3)
EOSINOPHIL # BLD AUTO: 0.18 10*3/MM3 (ref 0–0.7)
EOSINOPHIL # BLD AUTO: 0.22 10*3/MM3 (ref 0–0.3)
EOSINOPHIL # BLD AUTO: 0.22 10*3/MM3 (ref 0–0.3)
EOSINOPHIL # BLD AUTO: 0.23 10*3/MM3 (ref 0–0.3)
EOSINOPHIL # BLD AUTO: 0.24 10*3/MM3 (ref 0–0.3)
EOSINOPHIL # BLD AUTO: 0.27 10*3/MM3 (ref 0–0.3)
EOSINOPHIL # BLD AUTO: 0.28 10*3/MM3 (ref 0–0.3)
EOSINOPHIL # BLD AUTO: 0.29 10*3/MM3 (ref 0–0.7)
EOSINOPHIL # BLD AUTO: 0.33 10*3/MM3 (ref 0–0.7)
EOSINOPHIL # BLD AUTO: 0.36 10*3/MM3 (ref 0–0.7)
EOSINOPHIL # BLD AUTO: 0.37 10*3/MM3 (ref 0–0.7)
EOSINOPHIL # BLD AUTO: 0.43 10*3/MM3 (ref 0–0.3)
EOSINOPHIL # BLD AUTO: 0.45 10*3/MM3 (ref 0–0.3)
EOSINOPHIL # BLD AUTO: 0.5 10*3/MM3 (ref 0–0.3)
EOSINOPHIL # BLD AUTO: 0.5 10*3/MM3 (ref 0–0.7)
EOSINOPHIL # BLD AUTO: 0.53 10*3/MM3 (ref 0–0.7)
EOSINOPHIL # BLD AUTO: 0.59 10*3/MM3 (ref 0–0.3)
EOSINOPHIL # BLD AUTO: 0.64 10*3/MM3 (ref 0–0.7)
EOSINOPHIL # BLD AUTO: 0.66 10*3/MM3 (ref 0–0.7)
EOSINOPHIL # BLD AUTO: 0.67 10*3/MM3 (ref 0–0.7)
EOSINOPHIL # BLD AUTO: 0.72 10*3/MM3 (ref 0–0.7)
EOSINOPHIL # BLD AUTO: 0.82 10*3/MM3 (ref 0–0.7)
EOSINOPHIL # BLD AUTO: 1.01 10*3/MM3 (ref 0–0.3)
EOSINOPHIL # BLD MANUAL: 0 10*3/MM3 (ref 0.1–0.3)
EOSINOPHIL # BLD MANUAL: 0.26 10*3/MM3 (ref 0.1–0.3)
EOSINOPHIL # BLD MANUAL: 0.48 10*3/MM3 (ref 0.1–0.3)
EOSINOPHIL NFR BLD AUTO: 0 % (ref 0–5)
EOSINOPHIL NFR BLD AUTO: 0.1 % (ref 0–3)
EOSINOPHIL NFR BLD AUTO: 0.2 % (ref 0–3)
EOSINOPHIL NFR BLD AUTO: 0.2 % (ref 0–5)
EOSINOPHIL NFR BLD AUTO: 0.5 % (ref 0–3)
EOSINOPHIL NFR BLD AUTO: 0.6 % (ref 0–3)
EOSINOPHIL NFR BLD AUTO: 0.6 % (ref 0–3)
EOSINOPHIL NFR BLD AUTO: 0.7 % (ref 0–3)
EOSINOPHIL NFR BLD AUTO: 0.8 % (ref 0–3)
EOSINOPHIL NFR BLD AUTO: 1.1 % (ref 0–3)
EOSINOPHIL NFR BLD AUTO: 1.1 % (ref 0–3)
EOSINOPHIL NFR BLD AUTO: 1.1 % (ref 0–5)
EOSINOPHIL NFR BLD AUTO: 1.2 % (ref 0–3)
EOSINOPHIL NFR BLD AUTO: 1.5 % (ref 0–3)
EOSINOPHIL NFR BLD AUTO: 1.6 % (ref 0–5)
EOSINOPHIL NFR BLD AUTO: 2.1 % (ref 0–3)
EOSINOPHIL NFR BLD AUTO: 2.2 % (ref 0–3)
EOSINOPHIL NFR BLD AUTO: 2.3 % (ref 0–3)
EOSINOPHIL NFR BLD AUTO: 2.3 % (ref 0–3)
EOSINOPHIL NFR BLD AUTO: 2.4 % (ref 0–3)
EOSINOPHIL NFR BLD AUTO: 2.7 % (ref 0–3)
EOSINOPHIL NFR BLD AUTO: 2.8 % (ref 0–5)
EOSINOPHIL NFR BLD AUTO: 3 % (ref 0–3)
EOSINOPHIL NFR BLD AUTO: 3.2 % (ref 0–5)
EOSINOPHIL NFR BLD AUTO: 3.8 % (ref 0–3)
EOSINOPHIL NFR BLD AUTO: 4.5 % (ref 0–3)
EOSINOPHIL NFR BLD AUTO: 4.5 % (ref 0–5)
EOSINOPHIL NFR BLD AUTO: 5.8 % (ref 0–5)
EOSINOPHIL NFR BLD AUTO: 6.1 % (ref 0–5)
EOSINOPHIL NFR BLD AUTO: 6.4 % (ref 0–5)
EOSINOPHIL NFR BLD AUTO: 6.4 % (ref 0–5)
EOSINOPHIL NFR BLD AUTO: 7.6 % (ref 0–5)
EOSINOPHIL NFR BLD AUTO: 7.7 % (ref 0–5)
EOSINOPHIL NFR BLD AUTO: 7.9 % (ref 0–5)
EOSINOPHIL NFR BLD MANUAL: 0 % (ref 0–3)
EOSINOPHIL NFR BLD MANUAL: 2 % (ref 0–3)
EOSINOPHIL NFR BLD MANUAL: 3 % (ref 0–3)
EOSINOPHIL NFR FLD MANUAL: 1 %
EOSINOPHIL NFR FLD MANUAL: 3 %
ERYTHROCYTE [DISTWIDTH] IN BLOOD BY AUTOMATED COUNT: 15.3 % (ref 11.5–14.5)
ERYTHROCYTE [DISTWIDTH] IN BLOOD BY AUTOMATED COUNT: 15.5 % (ref 11.5–14.5)
ERYTHROCYTE [DISTWIDTH] IN BLOOD BY AUTOMATED COUNT: 15.7 % (ref 11.5–14.5)
ERYTHROCYTE [DISTWIDTH] IN BLOOD BY AUTOMATED COUNT: 15.8 % (ref 11.3–14.5)
ERYTHROCYTE [DISTWIDTH] IN BLOOD BY AUTOMATED COUNT: 16.2 % (ref 11.3–14.5)
ERYTHROCYTE [DISTWIDTH] IN BLOOD BY AUTOMATED COUNT: 16.3 % (ref 11.3–14.5)
ERYTHROCYTE [DISTWIDTH] IN BLOOD BY AUTOMATED COUNT: 16.3 % (ref 11.3–14.5)
ERYTHROCYTE [DISTWIDTH] IN BLOOD BY AUTOMATED COUNT: 16.4 % (ref 11.5–14.5)
ERYTHROCYTE [DISTWIDTH] IN BLOOD BY AUTOMATED COUNT: 16.4 % (ref 11.5–14.5)
ERYTHROCYTE [DISTWIDTH] IN BLOOD BY AUTOMATED COUNT: 16.5 % (ref 11.3–14.5)
ERYTHROCYTE [DISTWIDTH] IN BLOOD BY AUTOMATED COUNT: 16.5 % (ref 11.5–14.5)
ERYTHROCYTE [DISTWIDTH] IN BLOOD BY AUTOMATED COUNT: 16.7 % (ref 11.3–14.5)
ERYTHROCYTE [DISTWIDTH] IN BLOOD BY AUTOMATED COUNT: 16.8 % (ref 11.3–14.5)
ERYTHROCYTE [DISTWIDTH] IN BLOOD BY AUTOMATED COUNT: 16.8 % (ref 11.3–14.5)
ERYTHROCYTE [DISTWIDTH] IN BLOOD BY AUTOMATED COUNT: 17 % (ref 11.3–14.5)
ERYTHROCYTE [DISTWIDTH] IN BLOOD BY AUTOMATED COUNT: 17 % (ref 11.3–14.5)
ERYTHROCYTE [DISTWIDTH] IN BLOOD BY AUTOMATED COUNT: 17.1 % (ref 11.3–14.5)
ERYTHROCYTE [DISTWIDTH] IN BLOOD BY AUTOMATED COUNT: 17.2 % (ref 11.3–14.5)
ERYTHROCYTE [DISTWIDTH] IN BLOOD BY AUTOMATED COUNT: 17.4 % (ref 11.3–14.5)
ERYTHROCYTE [DISTWIDTH] IN BLOOD BY AUTOMATED COUNT: 17.5 % (ref 11.3–14.5)
ERYTHROCYTE [DISTWIDTH] IN BLOOD BY AUTOMATED COUNT: 17.5 % (ref 11.5–14.5)
ERYTHROCYTE [DISTWIDTH] IN BLOOD BY AUTOMATED COUNT: 17.6 % (ref 11.3–14.5)
ERYTHROCYTE [DISTWIDTH] IN BLOOD BY AUTOMATED COUNT: 17.7 % (ref 11.3–14.5)
ERYTHROCYTE [DISTWIDTH] IN BLOOD BY AUTOMATED COUNT: 17.7 % (ref 11.3–14.5)
ERYTHROCYTE [DISTWIDTH] IN BLOOD BY AUTOMATED COUNT: 17.7 % (ref 11.5–14.5)
ERYTHROCYTE [DISTWIDTH] IN BLOOD BY AUTOMATED COUNT: 17.9 % (ref 11.3–14.5)
ERYTHROCYTE [DISTWIDTH] IN BLOOD BY AUTOMATED COUNT: 18 % (ref 11.3–14.5)
ERYTHROCYTE [DISTWIDTH] IN BLOOD BY AUTOMATED COUNT: 18.1 % (ref 11.3–14.5)
ERYTHROCYTE [DISTWIDTH] IN BLOOD BY AUTOMATED COUNT: 18.2 % (ref 11.3–14.5)
ERYTHROCYTE [DISTWIDTH] IN BLOOD BY AUTOMATED COUNT: 18.3 % (ref 11.3–14.5)
ERYTHROCYTE [DISTWIDTH] IN BLOOD BY AUTOMATED COUNT: 18.3 % (ref 11.3–14.5)
ERYTHROCYTE [DISTWIDTH] IN BLOOD BY AUTOMATED COUNT: 18.4 % (ref 11.3–14.5)
ERYTHROCYTE [DISTWIDTH] IN BLOOD BY AUTOMATED COUNT: 18.5 % (ref 11.3–14.5)
ERYTHROCYTE [DISTWIDTH] IN BLOOD BY AUTOMATED COUNT: 18.6 % (ref 11.3–14.5)
ERYTHROCYTE [DISTWIDTH] IN BLOOD BY AUTOMATED COUNT: 18.7 % (ref 11.5–14.5)
ERYTHROCYTE [DISTWIDTH] IN BLOOD BY AUTOMATED COUNT: 18.9 % (ref 11.3–14.5)
ERYTHROCYTE [DISTWIDTH] IN BLOOD BY AUTOMATED COUNT: 19.1 % (ref 11.3–14.5)
ERYTHROCYTE [DISTWIDTH] IN BLOOD BY AUTOMATED COUNT: 19.6 % (ref 11.5–14.5)
ERYTHROCYTE [DISTWIDTH] IN BLOOD BY AUTOMATED COUNT: 19.7 % (ref 11.5–14.5)
ERYTHROCYTE [DISTWIDTH] IN BLOOD BY AUTOMATED COUNT: 19.7 % (ref 11.5–14.5)
ERYTHROCYTE [DISTWIDTH] IN BLOOD BY AUTOMATED COUNT: 19.8 % (ref 11.5–14.5)
ERYTHROCYTE [DISTWIDTH] IN BLOOD BY AUTOMATED COUNT: 19.9 % (ref 11.5–14.5)
ERYTHROCYTE [DISTWIDTH] IN BLOOD BY AUTOMATED COUNT: 20.1 % (ref 11.5–14.5)
ERYTHROCYTE [DISTWIDTH] IN BLOOD BY AUTOMATED COUNT: 20.9 % (ref 11.5–14.5)
ERYTHROCYTE [DISTWIDTH] IN BLOOD BY AUTOMATED COUNT: 21.5 % (ref 11.5–14.5)
ERYTHROCYTE [DISTWIDTH] IN BLOOD BY AUTOMATED COUNT: 23.3 % (ref 11.5–14.5)
ERYTHROCYTE [DISTWIDTH] IN BLOOD BY AUTOMATED COUNT: 24.1 % (ref 11.5–14.5)
ERYTHROCYTE [DISTWIDTH] IN BLOOD BY AUTOMATED COUNT: 25.5 % (ref 11.5–14.5)
ERYTHROCYTE [DISTWIDTH] IN BLOOD BY AUTOMATED COUNT: 26.1 % (ref 11.5–14.5)
ERYTHROCYTE [DISTWIDTH] IN BLOOD BY AUTOMATED COUNT: 26.2 % (ref 11.5–14.5)
ERYTHROCYTE [SEDIMENTATION RATE] IN BLOOD: 7 MM/HR (ref 0–20)
FOLATE SERPL-MCNC: 21.98 NG/ML (ref 5.4–20)
FUNGUS SPEC CULT: NORMAL
FUNGUS SPEC FUNGUS STN: NORMAL
FUNGUS WND CULT: NORMAL
FUNGUS WND CULT: NORMAL
GALACTOMANNAN AG SPEC IA-ACNC: 0.15 INDEX (ref 0–0.49)
GFR SERPL CREATININE-BSD FRML MDRD: 10 ML/MIN/1.73
GFR SERPL CREATININE-BSD FRML MDRD: 11 ML/MIN/1.73
GFR SERPL CREATININE-BSD FRML MDRD: 12 ML/MIN/1.73
GFR SERPL CREATININE-BSD FRML MDRD: 13 ML/MIN/1.73
GFR SERPL CREATININE-BSD FRML MDRD: 13 ML/MIN/1.73
GFR SERPL CREATININE-BSD FRML MDRD: 14 ML/MIN/1.73
GFR SERPL CREATININE-BSD FRML MDRD: 15 ML/MIN/1.73
GFR SERPL CREATININE-BSD FRML MDRD: 16 ML/MIN/1.73
GFR SERPL CREATININE-BSD FRML MDRD: 17 ML/MIN/1.73
GFR SERPL CREATININE-BSD FRML MDRD: 18 ML/MIN/1.73
GFR SERPL CREATININE-BSD FRML MDRD: 19 ML/MIN/1.73
GFR SERPL CREATININE-BSD FRML MDRD: 20 ML/MIN/1.73
GFR SERPL CREATININE-BSD FRML MDRD: 20 ML/MIN/1.73
GFR SERPL CREATININE-BSD FRML MDRD: 21 ML/MIN/1.73
GFR SERPL CREATININE-BSD FRML MDRD: 22 ML/MIN/1.73
GFR SERPL CREATININE-BSD FRML MDRD: 23 ML/MIN/1.73
GFR SERPL CREATININE-BSD FRML MDRD: 24 ML/MIN/1.73
GFR SERPL CREATININE-BSD FRML MDRD: 25 ML/MIN/1.73
GFR SERPL CREATININE-BSD FRML MDRD: 25 ML/MIN/1.73
GFR SERPL CREATININE-BSD FRML MDRD: 26 ML/MIN/1.73
GFR SERPL CREATININE-BSD FRML MDRD: 27 ML/MIN/1.73
GFR SERPL CREATININE-BSD FRML MDRD: 29 ML/MIN/1.73
GFR SERPL CREATININE-BSD FRML MDRD: 29 ML/MIN/1.73
GFR SERPL CREATININE-BSD FRML MDRD: 30 ML/MIN/1.73
GFR SERPL CREATININE-BSD FRML MDRD: 30 ML/MIN/1.73
GFR SERPL CREATININE-BSD FRML MDRD: 31 ML/MIN/1.73
GFR SERPL CREATININE-BSD FRML MDRD: 34 ML/MIN/1.73
GFR SERPL CREATININE-BSD FRML MDRD: 35 ML/MIN/1.73
GFR SERPL CREATININE-BSD FRML MDRD: 35 ML/MIN/1.73
GFR SERPL CREATININE-BSD FRML MDRD: 36 ML/MIN/1.73
GFR SERPL CREATININE-BSD FRML MDRD: 39 ML/MIN/1.73
GFR SERPL CREATININE-BSD FRML MDRD: 40 ML/MIN/1.73
GFR SERPL CREATININE-BSD FRML MDRD: 42 ML/MIN/1.73
GFR SERPL CREATININE-BSD FRML MDRD: 43 ML/MIN/1.73
GFR SERPL CREATININE-BSD FRML MDRD: 43 ML/MIN/1.73
GFR SERPL CREATININE-BSD FRML MDRD: 44 ML/MIN/1.73
GFR SERPL CREATININE-BSD FRML MDRD: 49 ML/MIN/1.73
GFR SERPL CREATININE-BSD FRML MDRD: 7 ML/MIN/1.73
GFR SERPL CREATININE-BSD FRML MDRD: 9 ML/MIN/1.73
GIE STN SPEC: NORMAL
GLOBULIN UR ELPH-MCNC: 2.1 GM/DL
GLOBULIN UR ELPH-MCNC: 2.4 GM/DL
GLOBULIN UR ELPH-MCNC: 2.5 GM/DL
GLOBULIN UR ELPH-MCNC: 2.6 GM/DL
GLOBULIN UR ELPH-MCNC: 2.7 GM/DL
GLOBULIN UR ELPH-MCNC: 2.8 GM/DL
GLOBULIN UR ELPH-MCNC: 2.9 GM/DL
GLOBULIN UR ELPH-MCNC: 3 GM/DL
GLOBULIN UR ELPH-MCNC: 3.1 GM/DL
GLUCOSE BLD-MCNC: 104 MG/DL (ref 70–100)
GLUCOSE BLD-MCNC: 108 MG/DL (ref 70–100)
GLUCOSE BLD-MCNC: 108 MG/DL (ref 70–100)
GLUCOSE BLD-MCNC: 112 MG/DL (ref 70–100)
GLUCOSE BLD-MCNC: 112 MG/DL (ref 70–110)
GLUCOSE BLD-MCNC: 112 MG/DL (ref 70–110)
GLUCOSE BLD-MCNC: 114 MG/DL (ref 70–100)
GLUCOSE BLD-MCNC: 117 MG/DL (ref 70–110)
GLUCOSE BLD-MCNC: 118 MG/DL (ref 70–110)
GLUCOSE BLD-MCNC: 120 MG/DL (ref 70–110)
GLUCOSE BLD-MCNC: 123 MG/DL (ref 70–110)
GLUCOSE BLD-MCNC: 123 MG/DL (ref 70–110)
GLUCOSE BLD-MCNC: 124 MG/DL (ref 70–100)
GLUCOSE BLD-MCNC: 127 MG/DL (ref 70–110)
GLUCOSE BLD-MCNC: 129 MG/DL (ref 70–100)
GLUCOSE BLD-MCNC: 131 MG/DL (ref 70–100)
GLUCOSE BLD-MCNC: 135 MG/DL (ref 70–100)
GLUCOSE BLD-MCNC: 141 MG/DL (ref 70–100)
GLUCOSE BLD-MCNC: 144 MG/DL (ref 70–110)
GLUCOSE BLD-MCNC: 148 MG/DL (ref 70–110)
GLUCOSE BLD-MCNC: 150 MG/DL (ref 70–110)
GLUCOSE BLD-MCNC: 154 MG/DL (ref 70–100)
GLUCOSE BLD-MCNC: 155 MG/DL (ref 70–100)
GLUCOSE BLD-MCNC: 156 MG/DL (ref 70–110)
GLUCOSE BLD-MCNC: 162 MG/DL (ref 70–110)
GLUCOSE BLD-MCNC: 164 MG/DL (ref 70–100)
GLUCOSE BLD-MCNC: 167 MG/DL (ref 70–100)
GLUCOSE BLD-MCNC: 167 MG/DL (ref 70–100)
GLUCOSE BLD-MCNC: 171 MG/DL (ref 70–110)
GLUCOSE BLD-MCNC: 172 MG/DL (ref 70–110)
GLUCOSE BLD-MCNC: 174 MG/DL (ref 70–100)
GLUCOSE BLD-MCNC: 174 MG/DL (ref 70–110)
GLUCOSE BLD-MCNC: 175 MG/DL (ref 70–100)
GLUCOSE BLD-MCNC: 178 MG/DL (ref 70–100)
GLUCOSE BLD-MCNC: 182 MG/DL (ref 70–100)
GLUCOSE BLD-MCNC: 189 MG/DL (ref 70–110)
GLUCOSE BLD-MCNC: 194 MG/DL (ref 70–110)
GLUCOSE BLD-MCNC: 196 MG/DL (ref 70–110)
GLUCOSE BLD-MCNC: 202 MG/DL (ref 70–100)
GLUCOSE BLD-MCNC: 203 MG/DL (ref 70–100)
GLUCOSE BLD-MCNC: 204 MG/DL (ref 70–110)
GLUCOSE BLD-MCNC: 204 MG/DL (ref 70–110)
GLUCOSE BLD-MCNC: 208 MG/DL (ref 70–100)
GLUCOSE BLD-MCNC: 209 MG/DL (ref 70–100)
GLUCOSE BLD-MCNC: 219 MG/DL (ref 70–110)
GLUCOSE BLD-MCNC: 227 MG/DL (ref 70–110)
GLUCOSE BLD-MCNC: 232 MG/DL (ref 70–100)
GLUCOSE BLD-MCNC: 240 MG/DL (ref 70–110)
GLUCOSE BLD-MCNC: 242 MG/DL (ref 70–100)
GLUCOSE BLD-MCNC: 258 MG/DL (ref 70–110)
GLUCOSE BLD-MCNC: 274 MG/DL (ref 70–110)
GLUCOSE BLD-MCNC: 316 MG/DL (ref 70–110)
GLUCOSE BLD-MCNC: 320 MG/DL (ref 70–100)
GLUCOSE BLD-MCNC: 322 MG/DL (ref 70–100)
GLUCOSE BLD-MCNC: 324 MG/DL (ref 70–100)
GLUCOSE BLD-MCNC: 346 MG/DL (ref 70–100)
GLUCOSE BLD-MCNC: 352 MG/DL (ref 70–100)
GLUCOSE BLD-MCNC: 60 MG/DL (ref 70–110)
GLUCOSE BLD-MCNC: 71 MG/DL (ref 70–110)
GLUCOSE BLD-MCNC: 75 MG/DL (ref 70–110)
GLUCOSE BLD-MCNC: 80 MG/DL (ref 70–100)
GLUCOSE BLD-MCNC: 82 MG/DL (ref 70–110)
GLUCOSE BLD-MCNC: 85 MG/DL (ref 70–100)
GLUCOSE BLD-MCNC: 86 MG/DL (ref 70–100)
GLUCOSE BLD-MCNC: 87 MG/DL (ref 70–110)
GLUCOSE BLD-MCNC: 88 MG/DL (ref 70–100)
GLUCOSE BLD-MCNC: 88 MG/DL (ref 70–110)
GLUCOSE BLD-MCNC: 91 MG/DL (ref 70–110)
GLUCOSE BLD-MCNC: 95 MG/DL (ref 70–110)
GLUCOSE BLDA-MCNC: 186 MG/DL (ref 67–93)
GLUCOSE BLDC GLUCOMTR-MCNC: 100 MG/DL (ref 70–130)
GLUCOSE BLDC GLUCOMTR-MCNC: 100 MG/DL (ref 70–130)
GLUCOSE BLDC GLUCOMTR-MCNC: 102 MG/DL (ref 70–130)
GLUCOSE BLDC GLUCOMTR-MCNC: 104 MG/DL (ref 70–130)
GLUCOSE BLDC GLUCOMTR-MCNC: 104 MG/DL (ref 70–130)
GLUCOSE BLDC GLUCOMTR-MCNC: 105 MG/DL (ref 70–130)
GLUCOSE BLDC GLUCOMTR-MCNC: 106 MG/DL (ref 70–130)
GLUCOSE BLDC GLUCOMTR-MCNC: 107 MG/DL (ref 70–130)
GLUCOSE BLDC GLUCOMTR-MCNC: 107 MG/DL (ref 70–130)
GLUCOSE BLDC GLUCOMTR-MCNC: 108 MG/DL (ref 70–130)
GLUCOSE BLDC GLUCOMTR-MCNC: 108 MG/DL (ref 70–130)
GLUCOSE BLDC GLUCOMTR-MCNC: 109 MG/DL (ref 70–130)
GLUCOSE BLDC GLUCOMTR-MCNC: 111 MG/DL (ref 70–130)
GLUCOSE BLDC GLUCOMTR-MCNC: 111 MG/DL (ref 70–130)
GLUCOSE BLDC GLUCOMTR-MCNC: 112 MG/DL (ref 70–130)
GLUCOSE BLDC GLUCOMTR-MCNC: 112 MG/DL (ref 70–130)
GLUCOSE BLDC GLUCOMTR-MCNC: 113 MG/DL (ref 70–130)
GLUCOSE BLDC GLUCOMTR-MCNC: 114 MG/DL (ref 70–130)
GLUCOSE BLDC GLUCOMTR-MCNC: 114 MG/DL (ref 70–130)
GLUCOSE BLDC GLUCOMTR-MCNC: 115 MG/DL (ref 70–130)
GLUCOSE BLDC GLUCOMTR-MCNC: 116 MG/DL (ref 70–130)
GLUCOSE BLDC GLUCOMTR-MCNC: 117 MG/DL (ref 70–130)
GLUCOSE BLDC GLUCOMTR-MCNC: 117 MG/DL (ref 70–130)
GLUCOSE BLDC GLUCOMTR-MCNC: 118 MG/DL (ref 70–130)
GLUCOSE BLDC GLUCOMTR-MCNC: 119 MG/DL (ref 70–130)
GLUCOSE BLDC GLUCOMTR-MCNC: 119 MG/DL (ref 70–130)
GLUCOSE BLDC GLUCOMTR-MCNC: 120 MG/DL (ref 70–130)
GLUCOSE BLDC GLUCOMTR-MCNC: 121 MG/DL (ref 70–130)
GLUCOSE BLDC GLUCOMTR-MCNC: 122 MG/DL (ref 70–130)
GLUCOSE BLDC GLUCOMTR-MCNC: 123 MG/DL (ref 70–130)
GLUCOSE BLDC GLUCOMTR-MCNC: 124 MG/DL (ref 70–130)
GLUCOSE BLDC GLUCOMTR-MCNC: 125 MG/DL (ref 70–130)
GLUCOSE BLDC GLUCOMTR-MCNC: 126 MG/DL (ref 70–130)
GLUCOSE BLDC GLUCOMTR-MCNC: 127 MG/DL (ref 70–130)
GLUCOSE BLDC GLUCOMTR-MCNC: 128 MG/DL (ref 70–130)
GLUCOSE BLDC GLUCOMTR-MCNC: 129 MG/DL (ref 70–130)
GLUCOSE BLDC GLUCOMTR-MCNC: 130 MG/DL (ref 70–130)
GLUCOSE BLDC GLUCOMTR-MCNC: 130 MG/DL (ref 70–130)
GLUCOSE BLDC GLUCOMTR-MCNC: 131 MG/DL (ref 70–130)
GLUCOSE BLDC GLUCOMTR-MCNC: 132 MG/DL (ref 70–130)
GLUCOSE BLDC GLUCOMTR-MCNC: 133 MG/DL (ref 70–130)
GLUCOSE BLDC GLUCOMTR-MCNC: 135 MG/DL (ref 70–130)
GLUCOSE BLDC GLUCOMTR-MCNC: 136 MG/DL (ref 70–130)
GLUCOSE BLDC GLUCOMTR-MCNC: 137 MG/DL (ref 70–130)
GLUCOSE BLDC GLUCOMTR-MCNC: 138 MG/DL (ref 70–130)
GLUCOSE BLDC GLUCOMTR-MCNC: 138 MG/DL (ref 70–130)
GLUCOSE BLDC GLUCOMTR-MCNC: 139 MG/DL (ref 70–130)
GLUCOSE BLDC GLUCOMTR-MCNC: 140 MG/DL (ref 70–130)
GLUCOSE BLDC GLUCOMTR-MCNC: 140 MG/DL (ref 70–130)
GLUCOSE BLDC GLUCOMTR-MCNC: 141 MG/DL (ref 70–130)
GLUCOSE BLDC GLUCOMTR-MCNC: 142 MG/DL (ref 70–130)
GLUCOSE BLDC GLUCOMTR-MCNC: 142 MG/DL (ref 70–130)
GLUCOSE BLDC GLUCOMTR-MCNC: 143 MG/DL (ref 70–130)
GLUCOSE BLDC GLUCOMTR-MCNC: 144 MG/DL (ref 70–130)
GLUCOSE BLDC GLUCOMTR-MCNC: 145 MG/DL (ref 70–130)
GLUCOSE BLDC GLUCOMTR-MCNC: 145 MG/DL (ref 70–130)
GLUCOSE BLDC GLUCOMTR-MCNC: 146 MG/DL (ref 70–130)
GLUCOSE BLDC GLUCOMTR-MCNC: 147 MG/DL (ref 70–130)
GLUCOSE BLDC GLUCOMTR-MCNC: 148 MG/DL (ref 70–130)
GLUCOSE BLDC GLUCOMTR-MCNC: 149 MG/DL (ref 70–130)
GLUCOSE BLDC GLUCOMTR-MCNC: 150 MG/DL (ref 70–130)
GLUCOSE BLDC GLUCOMTR-MCNC: 151 MG/DL (ref 70–130)
GLUCOSE BLDC GLUCOMTR-MCNC: 152 MG/DL (ref 70–130)
GLUCOSE BLDC GLUCOMTR-MCNC: 154 MG/DL (ref 70–130)
GLUCOSE BLDC GLUCOMTR-MCNC: 154 MG/DL (ref 70–130)
GLUCOSE BLDC GLUCOMTR-MCNC: 155 MG/DL (ref 70–130)
GLUCOSE BLDC GLUCOMTR-MCNC: 156 MG/DL (ref 70–130)
GLUCOSE BLDC GLUCOMTR-MCNC: 157 MG/DL (ref 70–130)
GLUCOSE BLDC GLUCOMTR-MCNC: 158 MG/DL (ref 70–130)
GLUCOSE BLDC GLUCOMTR-MCNC: 160 MG/DL (ref 70–130)
GLUCOSE BLDC GLUCOMTR-MCNC: 161 MG/DL (ref 70–130)
GLUCOSE BLDC GLUCOMTR-MCNC: 162 MG/DL (ref 70–130)
GLUCOSE BLDC GLUCOMTR-MCNC: 163 MG/DL (ref 70–130)
GLUCOSE BLDC GLUCOMTR-MCNC: 164 MG/DL (ref 70–130)
GLUCOSE BLDC GLUCOMTR-MCNC: 166 MG/DL (ref 70–130)
GLUCOSE BLDC GLUCOMTR-MCNC: 167 MG/DL (ref 70–130)
GLUCOSE BLDC GLUCOMTR-MCNC: 167 MG/DL (ref 70–130)
GLUCOSE BLDC GLUCOMTR-MCNC: 169 MG/DL (ref 70–130)
GLUCOSE BLDC GLUCOMTR-MCNC: 170 MG/DL (ref 70–130)
GLUCOSE BLDC GLUCOMTR-MCNC: 170 MG/DL (ref 70–130)
GLUCOSE BLDC GLUCOMTR-MCNC: 172 MG/DL (ref 70–130)
GLUCOSE BLDC GLUCOMTR-MCNC: 174 MG/DL (ref 70–130)
GLUCOSE BLDC GLUCOMTR-MCNC: 174 MG/DL (ref 70–130)
GLUCOSE BLDC GLUCOMTR-MCNC: 175 MG/DL (ref 70–130)
GLUCOSE BLDC GLUCOMTR-MCNC: 178 MG/DL (ref 70–130)
GLUCOSE BLDC GLUCOMTR-MCNC: 179 MG/DL (ref 70–130)
GLUCOSE BLDC GLUCOMTR-MCNC: 181 MG/DL (ref 70–130)
GLUCOSE BLDC GLUCOMTR-MCNC: 182 MG/DL (ref 70–130)
GLUCOSE BLDC GLUCOMTR-MCNC: 183 MG/DL (ref 70–130)
GLUCOSE BLDC GLUCOMTR-MCNC: 183 MG/DL (ref 70–130)
GLUCOSE BLDC GLUCOMTR-MCNC: 185 MG/DL (ref 70–130)
GLUCOSE BLDC GLUCOMTR-MCNC: 186 MG/DL (ref 70–130)
GLUCOSE BLDC GLUCOMTR-MCNC: 188 MG/DL (ref 70–130)
GLUCOSE BLDC GLUCOMTR-MCNC: 191 MG/DL (ref 70–130)
GLUCOSE BLDC GLUCOMTR-MCNC: 191 MG/DL (ref 70–130)
GLUCOSE BLDC GLUCOMTR-MCNC: 192 MG/DL (ref 70–130)
GLUCOSE BLDC GLUCOMTR-MCNC: 193 MG/DL (ref 70–130)
GLUCOSE BLDC GLUCOMTR-MCNC: 193 MG/DL (ref 70–130)
GLUCOSE BLDC GLUCOMTR-MCNC: 195 MG/DL (ref 70–130)
GLUCOSE BLDC GLUCOMTR-MCNC: 195 MG/DL (ref 70–130)
GLUCOSE BLDC GLUCOMTR-MCNC: 196 MG/DL (ref 70–130)
GLUCOSE BLDC GLUCOMTR-MCNC: 197 MG/DL (ref 70–130)
GLUCOSE BLDC GLUCOMTR-MCNC: 198 MG/DL (ref 70–130)
GLUCOSE BLDC GLUCOMTR-MCNC: 199 MG/DL (ref 70–130)
GLUCOSE BLDC GLUCOMTR-MCNC: 199 MG/DL (ref 70–130)
GLUCOSE BLDC GLUCOMTR-MCNC: 202 MG/DL (ref 70–130)
GLUCOSE BLDC GLUCOMTR-MCNC: 203 MG/DL (ref 70–130)
GLUCOSE BLDC GLUCOMTR-MCNC: 204 MG/DL (ref 70–130)
GLUCOSE BLDC GLUCOMTR-MCNC: 205 MG/DL (ref 70–130)
GLUCOSE BLDC GLUCOMTR-MCNC: 206 MG/DL (ref 70–130)
GLUCOSE BLDC GLUCOMTR-MCNC: 208 MG/DL (ref 70–130)
GLUCOSE BLDC GLUCOMTR-MCNC: 210 MG/DL (ref 70–130)
GLUCOSE BLDC GLUCOMTR-MCNC: 212 MG/DL (ref 70–130)
GLUCOSE BLDC GLUCOMTR-MCNC: 214 MG/DL (ref 70–130)
GLUCOSE BLDC GLUCOMTR-MCNC: 214 MG/DL (ref 70–130)
GLUCOSE BLDC GLUCOMTR-MCNC: 216 MG/DL (ref 70–130)
GLUCOSE BLDC GLUCOMTR-MCNC: 218 MG/DL (ref 70–130)
GLUCOSE BLDC GLUCOMTR-MCNC: 219 MG/DL (ref 70–130)
GLUCOSE BLDC GLUCOMTR-MCNC: 220 MG/DL (ref 70–130)
GLUCOSE BLDC GLUCOMTR-MCNC: 222 MG/DL (ref 70–130)
GLUCOSE BLDC GLUCOMTR-MCNC: 223 MG/DL (ref 70–130)
GLUCOSE BLDC GLUCOMTR-MCNC: 224 MG/DL (ref 70–130)
GLUCOSE BLDC GLUCOMTR-MCNC: 226 MG/DL (ref 70–130)
GLUCOSE BLDC GLUCOMTR-MCNC: 227 MG/DL (ref 70–130)
GLUCOSE BLDC GLUCOMTR-MCNC: 228 MG/DL (ref 70–130)
GLUCOSE BLDC GLUCOMTR-MCNC: 230 MG/DL (ref 70–130)
GLUCOSE BLDC GLUCOMTR-MCNC: 231 MG/DL (ref 70–130)
GLUCOSE BLDC GLUCOMTR-MCNC: 232 MG/DL (ref 70–130)
GLUCOSE BLDC GLUCOMTR-MCNC: 235 MG/DL (ref 70–130)
GLUCOSE BLDC GLUCOMTR-MCNC: 236 MG/DL (ref 70–130)
GLUCOSE BLDC GLUCOMTR-MCNC: 237 MG/DL (ref 70–130)
GLUCOSE BLDC GLUCOMTR-MCNC: 240 MG/DL (ref 70–130)
GLUCOSE BLDC GLUCOMTR-MCNC: 245 MG/DL (ref 70–130)
GLUCOSE BLDC GLUCOMTR-MCNC: 245 MG/DL (ref 70–130)
GLUCOSE BLDC GLUCOMTR-MCNC: 246 MG/DL (ref 70–130)
GLUCOSE BLDC GLUCOMTR-MCNC: 247 MG/DL (ref 70–130)
GLUCOSE BLDC GLUCOMTR-MCNC: 250 MG/DL (ref 70–130)
GLUCOSE BLDC GLUCOMTR-MCNC: 252 MG/DL (ref 70–130)
GLUCOSE BLDC GLUCOMTR-MCNC: 255 MG/DL (ref 70–130)
GLUCOSE BLDC GLUCOMTR-MCNC: 259 MG/DL (ref 70–130)
GLUCOSE BLDC GLUCOMTR-MCNC: 260 MG/DL (ref 70–130)
GLUCOSE BLDC GLUCOMTR-MCNC: 261 MG/DL (ref 70–130)
GLUCOSE BLDC GLUCOMTR-MCNC: 262 MG/DL (ref 70–130)
GLUCOSE BLDC GLUCOMTR-MCNC: 263 MG/DL (ref 70–130)
GLUCOSE BLDC GLUCOMTR-MCNC: 264 MG/DL (ref 70–130)
GLUCOSE BLDC GLUCOMTR-MCNC: 264 MG/DL (ref 70–130)
GLUCOSE BLDC GLUCOMTR-MCNC: 266 MG/DL (ref 70–130)
GLUCOSE BLDC GLUCOMTR-MCNC: 266 MG/DL (ref 70–130)
GLUCOSE BLDC GLUCOMTR-MCNC: 269 MG/DL (ref 70–130)
GLUCOSE BLDC GLUCOMTR-MCNC: 281 MG/DL (ref 70–130)
GLUCOSE BLDC GLUCOMTR-MCNC: 282 MG/DL (ref 70–130)
GLUCOSE BLDC GLUCOMTR-MCNC: 284 MG/DL (ref 70–130)
GLUCOSE BLDC GLUCOMTR-MCNC: 297 MG/DL (ref 70–130)
GLUCOSE BLDC GLUCOMTR-MCNC: 299 MG/DL (ref 70–130)
GLUCOSE BLDC GLUCOMTR-MCNC: 303 MG/DL (ref 70–130)
GLUCOSE BLDC GLUCOMTR-MCNC: 312 MG/DL (ref 70–130)
GLUCOSE BLDC GLUCOMTR-MCNC: 314 MG/DL (ref 70–130)
GLUCOSE BLDC GLUCOMTR-MCNC: 315 MG/DL (ref 70–130)
GLUCOSE BLDC GLUCOMTR-MCNC: 320 MG/DL (ref 70–130)
GLUCOSE BLDC GLUCOMTR-MCNC: 323 MG/DL (ref 70–130)
GLUCOSE BLDC GLUCOMTR-MCNC: 324 MG/DL (ref 70–130)
GLUCOSE BLDC GLUCOMTR-MCNC: 324 MG/DL (ref 70–130)
GLUCOSE BLDC GLUCOMTR-MCNC: 333 MG/DL (ref 70–130)
GLUCOSE BLDC GLUCOMTR-MCNC: 336 MG/DL (ref 70–130)
GLUCOSE BLDC GLUCOMTR-MCNC: 337 MG/DL (ref 70–130)
GLUCOSE BLDC GLUCOMTR-MCNC: 338 MG/DL (ref 70–130)
GLUCOSE BLDC GLUCOMTR-MCNC: 343 MG/DL (ref 70–130)
GLUCOSE BLDC GLUCOMTR-MCNC: 343 MG/DL (ref 70–130)
GLUCOSE BLDC GLUCOMTR-MCNC: 346 MG/DL (ref 70–130)
GLUCOSE BLDC GLUCOMTR-MCNC: 348 MG/DL (ref 70–130)
GLUCOSE BLDC GLUCOMTR-MCNC: 349 MG/DL (ref 70–130)
GLUCOSE BLDC GLUCOMTR-MCNC: 363 MG/DL (ref 70–130)
GLUCOSE BLDC GLUCOMTR-MCNC: 399 MG/DL (ref 70–130)
GLUCOSE BLDC GLUCOMTR-MCNC: 431 MG/DL (ref 70–130)
GLUCOSE BLDC GLUCOMTR-MCNC: 431 MG/DL (ref 70–130)
GLUCOSE BLDC GLUCOMTR-MCNC: 64 MG/DL (ref 70–130)
GLUCOSE BLDC GLUCOMTR-MCNC: 66 MG/DL (ref 70–130)
GLUCOSE BLDC GLUCOMTR-MCNC: 67 MG/DL (ref 70–130)
GLUCOSE BLDC GLUCOMTR-MCNC: 70 MG/DL (ref 70–130)
GLUCOSE BLDC GLUCOMTR-MCNC: 71 MG/DL (ref 70–130)
GLUCOSE BLDC GLUCOMTR-MCNC: 71 MG/DL (ref 70–130)
GLUCOSE BLDC GLUCOMTR-MCNC: 72 MG/DL (ref 70–130)
GLUCOSE BLDC GLUCOMTR-MCNC: 73 MG/DL (ref 70–130)
GLUCOSE BLDC GLUCOMTR-MCNC: 73 MG/DL (ref 70–130)
GLUCOSE BLDC GLUCOMTR-MCNC: 75 MG/DL (ref 70–130)
GLUCOSE BLDC GLUCOMTR-MCNC: 76 MG/DL (ref 70–130)
GLUCOSE BLDC GLUCOMTR-MCNC: 76 MG/DL (ref 70–130)
GLUCOSE BLDC GLUCOMTR-MCNC: 77 MG/DL (ref 70–130)
GLUCOSE BLDC GLUCOMTR-MCNC: 78 MG/DL (ref 70–130)
GLUCOSE BLDC GLUCOMTR-MCNC: 79 MG/DL (ref 70–130)
GLUCOSE BLDC GLUCOMTR-MCNC: 80 MG/DL (ref 70–130)
GLUCOSE BLDC GLUCOMTR-MCNC: 80 MG/DL (ref 70–130)
GLUCOSE BLDC GLUCOMTR-MCNC: 81 MG/DL (ref 70–130)
GLUCOSE BLDC GLUCOMTR-MCNC: 82 MG/DL (ref 70–130)
GLUCOSE BLDC GLUCOMTR-MCNC: 83 MG/DL (ref 70–130)
GLUCOSE BLDC GLUCOMTR-MCNC: 84 MG/DL (ref 70–130)
GLUCOSE BLDC GLUCOMTR-MCNC: 84 MG/DL (ref 70–130)
GLUCOSE BLDC GLUCOMTR-MCNC: 85 MG/DL (ref 70–130)
GLUCOSE BLDC GLUCOMTR-MCNC: 85 MG/DL (ref 70–130)
GLUCOSE BLDC GLUCOMTR-MCNC: 87 MG/DL (ref 70–130)
GLUCOSE BLDC GLUCOMTR-MCNC: 88 MG/DL (ref 70–130)
GLUCOSE BLDC GLUCOMTR-MCNC: 89 MG/DL (ref 70–130)
GLUCOSE BLDC GLUCOMTR-MCNC: 89 MG/DL (ref 70–130)
GLUCOSE BLDC GLUCOMTR-MCNC: 90 MG/DL (ref 70–130)
GLUCOSE BLDC GLUCOMTR-MCNC: 91 MG/DL (ref 70–130)
GLUCOSE BLDC GLUCOMTR-MCNC: 92 MG/DL (ref 70–130)
GLUCOSE BLDC GLUCOMTR-MCNC: 93 MG/DL (ref 70–130)
GLUCOSE BLDC GLUCOMTR-MCNC: 94 MG/DL (ref 70–130)
GLUCOSE BLDC GLUCOMTR-MCNC: 94 MG/DL (ref 70–130)
GLUCOSE BLDC GLUCOMTR-MCNC: 95 MG/DL (ref 70–130)
GLUCOSE BLDC GLUCOMTR-MCNC: 96 MG/DL (ref 70–130)
GLUCOSE BLDC GLUCOMTR-MCNC: 97 MG/DL (ref 70–130)
GLUCOSE BLDC GLUCOMTR-MCNC: 97 MG/DL (ref 70–130)
GLUCOSE BLDC GLUCOMTR-MCNC: 98 MG/DL (ref 70–130)
GLUCOSE BLDC GLUCOMTR-MCNC: 99 MG/DL (ref 70–130)
GLUCOSE BLDC GLUCOMTR-MCNC: 99 MG/DL (ref 70–130)
GLUCOSE CSF-MCNC: 63 MG/DL (ref 40–70)
GLUCOSE FLD-MCNC: 191 MG/DL
GLUCOSE FLD-MCNC: 98 MG/DL
GRAM STN SPEC: ABNORMAL
GRAM STN SPEC: NORMAL
H CAPSUL AB TITR SER ID: NEGATIVE {TITER}
HAV IGM SERPL QL IA: NORMAL
HBA1C MFR BLD: 4.9 % (ref 4.8–5.6)
HBA1C MFR BLD: 5.4 % (ref 4.8–5.6)
HBV CORE IGM SERPL QL IA: NORMAL
HBV SURFACE AG SERPL QL IA: NORMAL
HCG INTACT+B SERPL-ACNC: 6 MIU/ML
HCO3 BLDA-SCNC: 16.5 MMOL/L (ref 20–26)
HCO3 BLDA-SCNC: 20 MMOL/L (ref 20–26)
HCO3 BLDA-SCNC: 20 MMOL/L (ref 20–26)
HCO3 BLDA-SCNC: 20.2 MMOL/L (ref 20–26)
HCO3 BLDA-SCNC: 20.4 MMOL/L (ref 20–26)
HCO3 BLDA-SCNC: 21 MMOL/L (ref 20–26)
HCO3 BLDA-SCNC: 21.1 MMOL/L (ref 20–26)
HCO3 BLDA-SCNC: 21.6 MMOL/L (ref 20–26)
HCO3 BLDA-SCNC: 21.8 MMOL/L (ref 20–26)
HCO3 BLDA-SCNC: 22.1 MMOL/L (ref 22–26)
HCO3 BLDA-SCNC: 22.4 MMOL/L (ref 20–26)
HCO3 BLDA-SCNC: 22.4 MMOL/L (ref 20–26)
HCO3 BLDA-SCNC: 23.2 MMOL/L (ref 20–26)
HCO3 BLDA-SCNC: 23.8 MMOL/L (ref 20–26)
HCO3 BLDA-SCNC: 24 MMOL/L (ref 20–26)
HCO3 BLDA-SCNC: 24 MMOL/L (ref 22–26)
HCO3 BLDA-SCNC: 24.3 MMOL/L (ref 20–26)
HCO3 BLDA-SCNC: 24.7 MMOL/L (ref 20–26)
HCO3 BLDA-SCNC: 24.7 MMOL/L (ref 20–26)
HCO3 BLDA-SCNC: 24.8 MMOL/L (ref 22–26)
HCO3 BLDA-SCNC: 25.3 MMOL/L (ref 20–26)
HCO3 BLDA-SCNC: 25.3 MMOL/L (ref 20–26)
HCO3 BLDA-SCNC: 25.4 MMOL/L (ref 20–26)
HCO3 BLDA-SCNC: 26.4 MMOL/L (ref 22–26)
HCO3 BLDA-SCNC: 26.5 MMOL/L (ref 20–26)
HCO3 BLDA-SCNC: 26.8 MMOL/L (ref 20–26)
HCO3 BLDA-SCNC: 27.4 MMOL/L (ref 20–26)
HCO3 BLDA-SCNC: 27.6 MMOL/L (ref 22–26)
HCO3 BLDA-SCNC: 27.8 MMOL/L (ref 20–26)
HCO3 BLDA-SCNC: 27.9 MMOL/L (ref 20–26)
HCO3 BLDA-SCNC: 28.7 MMOL/L (ref 22–26)
HCO3 BLDA-SCNC: 29 MMOL/L (ref 20–26)
HCO3 BLDV-SCNC: 21.5 MMOL/L
HCO3 BLDV-SCNC: 22 MMOL/L
HCO3 BLDV-SCNC: 23.4 MMOL/L
HCO3 BLDV-SCNC: 26.6 MMOL/L
HCO3 BLDV-SCNC: 26.8 MMOL/L
HCO3 BLDV-SCNC: 27.3 MMOL/L
HCO3 BLDV-SCNC: 27.5 MMOL/L
HCO3 BLDV-SCNC: 29.1 MMOL/L
HCT VFR BLD AUTO: 20.4 % (ref 34.5–44)
HCT VFR BLD AUTO: 22.6 % (ref 34.5–44)
HCT VFR BLD AUTO: 22.8 % (ref 34.5–44)
HCT VFR BLD AUTO: 23.3 % (ref 34.5–44)
HCT VFR BLD AUTO: 23.3 % (ref 34.5–44)
HCT VFR BLD AUTO: 23.5 % (ref 34.5–44)
HCT VFR BLD AUTO: 23.5 % (ref 37–47)
HCT VFR BLD AUTO: 23.7 % (ref 34.5–44)
HCT VFR BLD AUTO: 23.8 % (ref 34.5–44)
HCT VFR BLD AUTO: 24 % (ref 34.5–44)
HCT VFR BLD AUTO: 24 % (ref 37–47)
HCT VFR BLD AUTO: 24.2 % (ref 34.5–44)
HCT VFR BLD AUTO: 24.2 % (ref 37–47)
HCT VFR BLD AUTO: 24.3 % (ref 34.5–44)
HCT VFR BLD AUTO: 24.5 % (ref 37–47)
HCT VFR BLD AUTO: 24.6 % (ref 34.5–44)
HCT VFR BLD AUTO: 24.9 % (ref 34.5–44)
HCT VFR BLD AUTO: 25.2 % (ref 34.5–44)
HCT VFR BLD AUTO: 25.5 % (ref 34.5–44)
HCT VFR BLD AUTO: 25.6 % (ref 34.5–44)
HCT VFR BLD AUTO: 25.8 % (ref 37–47)
HCT VFR BLD AUTO: 26 % (ref 34.5–44)
HCT VFR BLD AUTO: 26 % (ref 34.5–44)
HCT VFR BLD AUTO: 26.1 % (ref 37–47)
HCT VFR BLD AUTO: 26.5 % (ref 34.5–44)
HCT VFR BLD AUTO: 26.6 % (ref 37–47)
HCT VFR BLD AUTO: 26.7 % (ref 37–47)
HCT VFR BLD AUTO: 26.9 % (ref 34.5–44)
HCT VFR BLD AUTO: 26.9 % (ref 34.5–44)
HCT VFR BLD AUTO: 27.1 % (ref 34.5–44)
HCT VFR BLD AUTO: 27.3 % (ref 34.5–44)
HCT VFR BLD AUTO: 27.3 % (ref 34.5–44)
HCT VFR BLD AUTO: 27.5 % (ref 37–47)
HCT VFR BLD AUTO: 27.7 % (ref 34.5–44)
HCT VFR BLD AUTO: 27.7 % (ref 34.5–44)
HCT VFR BLD AUTO: 27.7 % (ref 37–47)
HCT VFR BLD AUTO: 27.9 % (ref 37–47)
HCT VFR BLD AUTO: 28.1 % (ref 34.5–44)
HCT VFR BLD AUTO: 28.1 % (ref 37–47)
HCT VFR BLD AUTO: 28.3 % (ref 37–47)
HCT VFR BLD AUTO: 28.7 % (ref 37–47)
HCT VFR BLD AUTO: 28.8 % (ref 34.5–44)
HCT VFR BLD AUTO: 28.8 % (ref 37–47)
HCT VFR BLD AUTO: 28.8 % (ref 37–47)
HCT VFR BLD AUTO: 28.9 % (ref 34.5–44)
HCT VFR BLD AUTO: 28.9 % (ref 37–47)
HCT VFR BLD AUTO: 29.2 % (ref 34.5–44)
HCT VFR BLD AUTO: 29.3 % (ref 37–47)
HCT VFR BLD AUTO: 29.7 % (ref 37–47)
HCT VFR BLD AUTO: 30.5 % (ref 37–47)
HCT VFR BLD AUTO: 31.1 % (ref 34.5–44)
HCT VFR BLD AUTO: 31.5 % (ref 37–47)
HCT VFR BLD AUTO: 32.1 % (ref 37–47)
HCT VFR BLD AUTO: 32.5 % (ref 34.5–44)
HCT VFR BLD AUTO: 35.9 % (ref 34.5–44)
HCT VFR BLD CALC: 20.5 %
HCT VFR BLD CALC: 20.7 %
HCT VFR BLD CALC: 21 %
HCT VFR BLD CALC: 21.1 %
HCT VFR BLD CALC: 21.6 %
HCT VFR BLD CALC: 22.3 %
HCT VFR BLD CALC: 22.8 %
HCT VFR BLD CALC: 23 % (ref 37–47)
HCT VFR BLD CALC: 23.3 %
HCT VFR BLD CALC: 23.3 %
HCT VFR BLD CALC: 23.4 %
HCT VFR BLD CALC: 23.6 %
HCT VFR BLD CALC: 23.6 %
HCT VFR BLD CALC: 24.1 %
HCT VFR BLD CALC: 24.6 %
HCT VFR BLD CALC: 24.7 %
HCT VFR BLD CALC: 25 % (ref 37–47)
HCT VFR BLD CALC: 25.2 %
HCT VFR BLD CALC: 25.2 %
HCT VFR BLD CALC: 26.1 %
HCT VFR BLD CALC: 26.2 %
HCT VFR BLD CALC: 26.2 %
HCT VFR BLD CALC: 26.6 %
HCT VFR BLD CALC: 29.2 %
HCT VFR BLD CALC: 31 % (ref 37–47)
HCT VFR BLD CALC: 31 % (ref 37–47)
HCT VFR BLD CALC: 33.1 %
HCT VFR BLD CALC: 39.3 %
HCT VFR BLDA CALC: 30 % (ref 39–51)
HCV AB SER DONR QL: NORMAL
HDLC SERPL-MCNC: 10 MG/DL (ref 60–100)
HGB BLD-MCNC: 10.5 G/DL (ref 11.5–15.5)
HGB BLD-MCNC: 10.9 G/DL (ref 11.5–15.5)
HGB BLD-MCNC: 12 G/DL (ref 11.5–15.5)
HGB BLD-MCNC: 6.4 G/DL (ref 11.5–15.5)
HGB BLD-MCNC: 7 G/DL (ref 11.5–15.5)
HGB BLD-MCNC: 7.1 G/DL (ref 11.5–15.5)
HGB BLD-MCNC: 7.2 G/DL (ref 11.5–15.5)
HGB BLD-MCNC: 7.3 G/DL (ref 11.5–15.5)
HGB BLD-MCNC: 7.4 G/DL (ref 11.5–15.5)
HGB BLD-MCNC: 7.4 G/DL (ref 11.5–15.5)
HGB BLD-MCNC: 7.4 G/DL (ref 12–16)
HGB BLD-MCNC: 7.5 G/DL (ref 11.5–15.5)
HGB BLD-MCNC: 7.6 G/DL (ref 11.5–15.5)
HGB BLD-MCNC: 7.6 G/DL (ref 12–16)
HGB BLD-MCNC: 7.7 G/DL (ref 11.5–15.5)
HGB BLD-MCNC: 7.7 G/DL (ref 11.5–15.5)
HGB BLD-MCNC: 7.7 G/DL (ref 12–16)
HGB BLD-MCNC: 7.8 G/DL (ref 11.5–15.5)
HGB BLD-MCNC: 7.8 G/DL (ref 12–16)
HGB BLD-MCNC: 7.9 G/DL (ref 11.5–15.5)
HGB BLD-MCNC: 7.9 G/DL (ref 11.5–15.5)
HGB BLD-MCNC: 7.9 G/DL (ref 12–16)
HGB BLD-MCNC: 8 G/DL (ref 12–16)
HGB BLD-MCNC: 8 G/DL (ref 12–16)
HGB BLD-MCNC: 8.1 G/DL (ref 11.5–15.5)
HGB BLD-MCNC: 8.2 G/DL (ref 11.5–15.5)
HGB BLD-MCNC: 8.2 G/DL (ref 11.5–15.5)
HGB BLD-MCNC: 8.3 G/DL (ref 11.5–15.5)
HGB BLD-MCNC: 8.3 G/DL (ref 11.5–15.5)
HGB BLD-MCNC: 8.3 G/DL (ref 12–16)
HGB BLD-MCNC: 8.4 G/DL (ref 11.5–15.5)
HGB BLD-MCNC: 8.4 G/DL (ref 11.5–15.5)
HGB BLD-MCNC: 8.5 G/DL (ref 11.5–15.5)
HGB BLD-MCNC: 8.5 G/DL (ref 12–16)
HGB BLD-MCNC: 8.6 G/DL (ref 11.5–15.5)
HGB BLD-MCNC: 8.6 G/DL (ref 11.5–15.5)
HGB BLD-MCNC: 8.6 G/DL (ref 12–16)
HGB BLD-MCNC: 8.7 G/DL (ref 12–16)
HGB BLD-MCNC: 8.7 G/DL (ref 12–16)
HGB BLD-MCNC: 8.9 G/DL (ref 11.5–15.5)
HGB BLD-MCNC: 9 G/DL (ref 12–16)
HGB BLD-MCNC: 9.1 G/DL (ref 11.5–15.5)
HGB BLD-MCNC: 9.2 G/DL (ref 11.5–15.5)
HGB BLD-MCNC: 9.2 G/DL (ref 12–16)
HGB BLD-MCNC: 9.3 G/DL (ref 12–16)
HGB BLD-MCNC: 9.3 G/DL (ref 12–16)
HGB BLD-MCNC: 9.4 G/DL (ref 12–16)
HGB BLD-MCNC: 9.6 G/DL (ref 12–16)
HGB BLD-MCNC: 9.8 G/DL (ref 11.5–15.5)
HGB BLDA-MCNC: 10.2 G/DL (ref 10–16)
HGB BLDA-MCNC: 10.5 G/DL (ref 12–16)
HGB BLDA-MCNC: 10.5 G/DL (ref 12–16)
HGB BLDA-MCNC: 10.8 G/DL (ref 14–18)
HGB BLDA-MCNC: 12.6 G/DL (ref 12–16)
HGB BLDA-MCNC: 12.8 G/DL (ref 14–18)
HGB BLDA-MCNC: 6.7 G/DL (ref 14–18)
HGB BLDA-MCNC: 6.7 G/DL (ref 14–18)
HGB BLDA-MCNC: 6.8 G/DL (ref 14–18)
HGB BLDA-MCNC: 6.9 G/DL (ref 14–18)
HGB BLDA-MCNC: 7 G/DL (ref 14–18)
HGB BLDA-MCNC: 7.3 G/DL (ref 14–18)
HGB BLDA-MCNC: 7.5 G/DL (ref 14–18)
HGB BLDA-MCNC: 7.6 G/DL (ref 14–18)
HGB BLDA-MCNC: 7.7 G/DL (ref 12–16)
HGB BLDA-MCNC: 7.7 G/DL (ref 14–18)
HGB BLDA-MCNC: 7.7 G/DL (ref 14–18)
HGB BLDA-MCNC: 7.9 G/DL (ref 14–18)
HGB BLDA-MCNC: 8 G/DL (ref 14–18)
HGB BLDA-MCNC: 8 G/DL (ref 14–18)
HGB BLDA-MCNC: 8.2 G/DL (ref 14–18)
HGB BLDA-MCNC: 8.2 G/DL (ref 14–18)
HGB BLDA-MCNC: 8.3 G/DL (ref 12–16)
HGB BLDA-MCNC: 8.4 G/DL (ref 12–16)
HGB BLDA-MCNC: 8.4 G/DL (ref 12–16)
HGB BLDA-MCNC: 8.5 G/DL (ref 12–16)
HGB BLDA-MCNC: 8.5 G/DL (ref 14–18)
HGB BLDA-MCNC: 8.6 G/DL (ref 12–16)
HGB BLDA-MCNC: 8.6 G/DL (ref 12–16)
HGB BLDA-MCNC: 8.6 G/DL (ref 14–18)
HGB BLDA-MCNC: 8.6 G/DL (ref 14–18)
HGB BLDA-MCNC: 8.7 G/DL (ref 12–16)
HGB BLDA-MCNC: 8.7 G/DL (ref 14–18)
HGB BLDA-MCNC: 9.2 G/DL (ref 12–16)
HGB BLDA-MCNC: 9.2 G/DL (ref 12–16)
HGB BLDA-MCNC: 9.5 G/DL (ref 14–18)
HGB BLDA-MCNC: 9.9 G/DL (ref 12–16)
HOROWITZ INDEX BLD+IHG-RTO: 100 %
HOROWITZ INDEX BLD+IHG-RTO: 24 %
HOROWITZ INDEX BLD+IHG-RTO: 24 %
HOROWITZ INDEX BLD+IHG-RTO: 30 %
HOROWITZ INDEX BLD+IHG-RTO: 35 %
HOROWITZ INDEX BLD+IHG-RTO: 36 %
HOROWITZ INDEX BLD+IHG-RTO: 40 %
HOROWITZ INDEX BLD+IHG-RTO: 50 %
HOROWITZ INDEX BLD+IHG-RTO: 50 %
HOROWITZ INDEX BLD+IHG-RTO: 60 %
HOROWITZ INDEX BLD+IHG-RTO: 60 %
HYPOCHROMIA BLD QL: NORMAL
IMM GRANULOCYTES # BLD: 0.02 10*3/MM3 (ref 0–0.03)
IMM GRANULOCYTES # BLD: 0.02 10*3/MM3 (ref 0–0.03)
IMM GRANULOCYTES # BLD: 0.03 10*3/MM3 (ref 0–0.03)
IMM GRANULOCYTES # BLD: 0.03 10*3/MM3 (ref 0–0.03)
IMM GRANULOCYTES # BLD: 0.04 10*3/MM3 (ref 0–0.03)
IMM GRANULOCYTES # BLD: 0.05 10*3/MM3 (ref 0–0.03)
IMM GRANULOCYTES # BLD: 0.05 10*3/MM3 (ref 0–0.03)
IMM GRANULOCYTES # BLD: 0.06 10*3/MM3 (ref 0–0.03)
IMM GRANULOCYTES # BLD: 0.09 10*3/MM3 (ref 0–0.03)
IMM GRANULOCYTES # BLD: 0.1 10*3/MM3 (ref 0–0.03)
IMM GRANULOCYTES # BLD: 0.11 10*3/MM3 (ref 0–0.03)
IMM GRANULOCYTES # BLD: 0.11 10*3/MM3 (ref 0–0.03)
IMM GRANULOCYTES # BLD: 0.12 10*3/MM3 (ref 0–0.03)
IMM GRANULOCYTES # BLD: 0.13 10*3/MM3 (ref 0–0.03)
IMM GRANULOCYTES # BLD: 0.14 10*3/MM3 (ref 0–0.03)
IMM GRANULOCYTES # BLD: 0.15 10*3/MM3 (ref 0–0.03)
IMM GRANULOCYTES # BLD: 0.15 10*3/MM3 (ref 0–0.03)
IMM GRANULOCYTES # BLD: 0.18 10*3/MM3 (ref 0–0.03)
IMM GRANULOCYTES # BLD: 0.18 10*3/MM3 (ref 0–0.03)
IMM GRANULOCYTES # BLD: 0.2 10*3/MM3 (ref 0–0.03)
IMM GRANULOCYTES # BLD: 0.21 10*3/MM3 (ref 0–0.03)
IMM GRANULOCYTES # BLD: 0.21 10*3/MM3 (ref 0–0.03)
IMM GRANULOCYTES # BLD: 0.23 10*3/MM3 (ref 0–0.03)
IMM GRANULOCYTES # BLD: 0.24 10*3/MM3 (ref 0–0.03)
IMM GRANULOCYTES # BLD: 0.26 10*3/MM3 (ref 0–0.03)
IMM GRANULOCYTES # BLD: 0.27 10*3/MM3 (ref 0–0.03)
IMM GRANULOCYTES # BLD: 0.27 10*3/MM3 (ref 0–0.03)
IMM GRANULOCYTES # BLD: 0.29 10*3/MM3 (ref 0–0.03)
IMM GRANULOCYTES # BLD: 0.3 10*3/MM3 (ref 0–0.03)
IMM GRANULOCYTES # BLD: 0.31 10*3/MM3 (ref 0–0.03)
IMM GRANULOCYTES # BLD: 0.39 10*3/MM3 (ref 0–0.03)
IMM GRANULOCYTES # BLD: 0.4 10*3/MM3 (ref 0–0.03)
IMM GRANULOCYTES # BLD: 0.42 10*3/MM3 (ref 0–0.03)
IMM GRANULOCYTES # BLD: 0.47 10*3/MM3 (ref 0–0.03)
IMM GRANULOCYTES # BLD: 0.48 10*3/MM3 (ref 0–0.03)
IMM GRANULOCYTES # BLD: 0.54 10*3/MM3 (ref 0–0.03)
IMM GRANULOCYTES # BLD: 0.92 10*3/MM3 (ref 0–0.03)
IMM GRANULOCYTES # BLD: 1.32 10*3/MM3 (ref 0–0.03)
IMM GRANULOCYTES NFR BLD: 0.2 % (ref 0–0.5)
IMM GRANULOCYTES NFR BLD: 0.2 % (ref 0–0.6)
IMM GRANULOCYTES NFR BLD: 0.3 % (ref 0–0.5)
IMM GRANULOCYTES NFR BLD: 0.4 % (ref 0–0.5)
IMM GRANULOCYTES NFR BLD: 0.5 % (ref 0–0.6)
IMM GRANULOCYTES NFR BLD: 0.7 % (ref 0–0.5)
IMM GRANULOCYTES NFR BLD: 0.7 % (ref 0–0.6)
IMM GRANULOCYTES NFR BLD: 0.7 % (ref 0–0.6)
IMM GRANULOCYTES NFR BLD: 0.8 % (ref 0–0.6)
IMM GRANULOCYTES NFR BLD: 0.8 % (ref 0–0.6)
IMM GRANULOCYTES NFR BLD: 0.9 % (ref 0–0.6)
IMM GRANULOCYTES NFR BLD: 0.9 % (ref 0–0.6)
IMM GRANULOCYTES NFR BLD: 1 % (ref 0–0.5)
IMM GRANULOCYTES NFR BLD: 1 % (ref 0–0.6)
IMM GRANULOCYTES NFR BLD: 1.1 % (ref 0–0.5)
IMM GRANULOCYTES NFR BLD: 1.1 % (ref 0–0.6)
IMM GRANULOCYTES NFR BLD: 1.1 % (ref 0–0.6)
IMM GRANULOCYTES NFR BLD: 1.4 % (ref 0–0.5)
IMM GRANULOCYTES NFR BLD: 1.5 % (ref 0–0.6)
IMM GRANULOCYTES NFR BLD: 1.6 % (ref 0–0.5)
IMM GRANULOCYTES NFR BLD: 1.6 % (ref 0–0.6)
IMM GRANULOCYTES NFR BLD: 1.7 % (ref 0–0.6)
IMM GRANULOCYTES NFR BLD: 1.8 % (ref 0–0.6)
IMM GRANULOCYTES NFR BLD: 1.8 % (ref 0–0.6)
IMM GRANULOCYTES NFR BLD: 2 % (ref 0–0.6)
IMM GRANULOCYTES NFR BLD: 2 % (ref 0–0.6)
IMM GRANULOCYTES NFR BLD: 2.1 % (ref 0–0.6)
IMM GRANULOCYTES NFR BLD: 2.2 % (ref 0–0.6)
IMM GRANULOCYTES NFR BLD: 2.3 % (ref 0–0.6)
IMM GRANULOCYTES NFR BLD: 2.6 % (ref 0–0.6)
IMM GRANULOCYTES NFR BLD: 2.7 % (ref 0–0.5)
IMM GRANULOCYTES NFR BLD: 2.8 % (ref 0–0.6)
IMM GRANULOCYTES NFR BLD: 3.1 % (ref 0–0.6)
IMM GRANULOCYTES NFR BLD: 3.2 % (ref 0–0.5)
IMM GRANULOCYTES NFR BLD: 3.4 % (ref 0–0.6)
IMM GRANULOCYTES NFR BLD: 4.1 % (ref 0–0.6)
IMM GRANULOCYTES NFR BLD: 5 % (ref 0–0.6)
IMM GRANULOCYTES NFR BLD: 5.9 % (ref 0–0.6)
INR PPP: 1.14 (ref 0.91–1.09)
INR PPP: 1.16 (ref 0.91–1.09)
INR PPP: 1.26 (ref 0.9–1.1)
INR PPP: 1.31 (ref 0.91–1.09)
INR PPP: 1.5 (ref 0.91–1.09)
INR PPP: 1.74 (ref 0.91–1.09)
INR PPP: 1.9 (ref 0.91–1.09)
IRON 24H UR-MRATE: 39 MCG/DL (ref 50–175)
IRON SATN MFR SERPL: 9 % (ref 15–50)
LAB AP CASE REPORT: NORMAL
LAB AP CLINICAL INFORMATION: NORMAL
LDH FLD-CCNC: 106 U/L
LDH FLD-CCNC: 332 U/L
LDH SERPL-CCNC: 186 U/L (ref 120–246)
LDLC SERPL CALC-MCNC: 24 MG/DL (ref 0–100)
LDLC/HDLC SERPL: 2.44 {RATIO}
LEFT ARM BP: NORMAL MMHG
LEFT ATRIUM VOLUME INDEX: 38 ML/M2
LEFT ATRIUM VOLUME: 62 CM3
LV EF 2D ECHO EST: 35 %
LV EF 2D ECHO EST: 45 %
LYMPHOCYTES # BLD AUTO: 0.69 10*3/MM3 (ref 0.6–4.8)
LYMPHOCYTES # BLD AUTO: 0.79 10*3/MM3 (ref 0.6–4.8)
LYMPHOCYTES # BLD AUTO: 0.82 10*3/MM3 (ref 1–3)
LYMPHOCYTES # BLD AUTO: 0.96 10*3/MM3 (ref 1–3)
LYMPHOCYTES # BLD AUTO: 0.97 10*3/MM3 (ref 0.6–4.8)
LYMPHOCYTES # BLD AUTO: 0.98 10*3/MM3 (ref 0.6–4.8)
LYMPHOCYTES # BLD AUTO: 1.05 10*3/MM3 (ref 0.6–4.8)
LYMPHOCYTES # BLD AUTO: 1.08 10*3/MM3 (ref 0.6–4.8)
LYMPHOCYTES # BLD AUTO: 1.08 10*3/MM3 (ref 1–3)
LYMPHOCYTES # BLD AUTO: 1.13 10*3/MM3 (ref 1–3)
LYMPHOCYTES # BLD AUTO: 1.2 10*3/MM3 (ref 0.6–4.8)
LYMPHOCYTES # BLD AUTO: 1.27 10*3/MM3 (ref 0.6–4.8)
LYMPHOCYTES # BLD AUTO: 1.29 10*3/MM3 (ref 0.6–4.8)
LYMPHOCYTES # BLD AUTO: 1.32 10*3/MM3 (ref 0.6–4.8)
LYMPHOCYTES # BLD AUTO: 1.34 10*3/MM3 (ref 0.6–4.8)
LYMPHOCYTES # BLD AUTO: 1.37 10*3/MM3 (ref 0.6–4.8)
LYMPHOCYTES # BLD AUTO: 1.39 10*3/MM3 (ref 0.6–4.8)
LYMPHOCYTES # BLD AUTO: 1.41 10*3/MM3 (ref 0.6–4.8)
LYMPHOCYTES # BLD AUTO: 1.43 10*3/MM3 (ref 1–3)
LYMPHOCYTES # BLD AUTO: 1.44 10*3/MM3 (ref 0.6–4.8)
LYMPHOCYTES # BLD AUTO: 1.49 10*3/MM3 (ref 1–3)
LYMPHOCYTES # BLD AUTO: 1.54 10*3/MM3 (ref 0.6–4.8)
LYMPHOCYTES # BLD AUTO: 1.57 10*3/MM3 (ref 0.6–4.8)
LYMPHOCYTES # BLD AUTO: 1.58 10*3/MM3 (ref 0.6–4.8)
LYMPHOCYTES # BLD AUTO: 1.63 10*3/MM3 (ref 1–3)
LYMPHOCYTES # BLD AUTO: 1.77 10*3/MM3 (ref 0.6–4.8)
LYMPHOCYTES # BLD AUTO: 1.79 10*3/MM3 (ref 1–3)
LYMPHOCYTES # BLD AUTO: 1.82 10*3/MM3 (ref 0.6–4.8)
LYMPHOCYTES # BLD AUTO: 1.83 10*3/MM3 (ref 0.6–4.8)
LYMPHOCYTES # BLD AUTO: 1.83 10*3/MM3 (ref 0.6–4.8)
LYMPHOCYTES # BLD AUTO: 1.89 10*3/MM3 (ref 0.6–4.8)
LYMPHOCYTES # BLD AUTO: 1.96 10*3/MM3 (ref 0.6–4.8)
LYMPHOCYTES # BLD AUTO: 1.98 10*3/MM3 (ref 0.6–4.8)
LYMPHOCYTES # BLD AUTO: 1.98 10*3/MM3 (ref 0.6–4.8)
LYMPHOCYTES # BLD AUTO: 2.04 10*3/MM3 (ref 0.6–4.8)
LYMPHOCYTES # BLD AUTO: 2.09 10*3/MM3 (ref 1–3)
LYMPHOCYTES # BLD AUTO: 2.13 10*3/MM3 (ref 1–3)
LYMPHOCYTES # BLD AUTO: 2.14 10*3/MM3 (ref 0.6–4.8)
LYMPHOCYTES # BLD AUTO: 2.51 10*3/MM3 (ref 1–3)
LYMPHOCYTES # BLD AUTO: 2.52 10*3/MM3 (ref 1–3)
LYMPHOCYTES # BLD AUTO: 2.74 10*3/MM3 (ref 1–3)
LYMPHOCYTES # BLD AUTO: 3.21 10*3/MM3 (ref 1–3)
LYMPHOCYTES # BLD MANUAL: 1.05 10*3/MM3 (ref 0.6–4.8)
LYMPHOCYTES # BLD MANUAL: 1.69 10*3/MM3 (ref 0.6–4.8)
LYMPHOCYTES # BLD MANUAL: 1.85 10*3/MM3 (ref 0.6–4.8)
LYMPHOCYTES NFR BLD AUTO: 10.1 % (ref 21–51)
LYMPHOCYTES NFR BLD AUTO: 10.1 % (ref 21–51)
LYMPHOCYTES NFR BLD AUTO: 10.1 % (ref 24–44)
LYMPHOCYTES NFR BLD AUTO: 10.4 % (ref 21–51)
LYMPHOCYTES NFR BLD AUTO: 10.7 % (ref 21–51)
LYMPHOCYTES NFR BLD AUTO: 10.7 % (ref 24–44)
LYMPHOCYTES NFR BLD AUTO: 10.7 % (ref 24–44)
LYMPHOCYTES NFR BLD AUTO: 11 % (ref 24–44)
LYMPHOCYTES NFR BLD AUTO: 11.5 % (ref 21–51)
LYMPHOCYTES NFR BLD AUTO: 11.5 % (ref 24–44)
LYMPHOCYTES NFR BLD AUTO: 11.6 % (ref 24–44)
LYMPHOCYTES NFR BLD AUTO: 11.7 % (ref 24–44)
LYMPHOCYTES NFR BLD AUTO: 11.8 % (ref 24–44)
LYMPHOCYTES NFR BLD AUTO: 12.3 % (ref 24–44)
LYMPHOCYTES NFR BLD AUTO: 12.4 % (ref 24–44)
LYMPHOCYTES NFR BLD AUTO: 13 % (ref 24–44)
LYMPHOCYTES NFR BLD AUTO: 14.3 % (ref 24–44)
LYMPHOCYTES NFR BLD AUTO: 15.3 % (ref 24–44)
LYMPHOCYTES NFR BLD AUTO: 15.6 % (ref 24–44)
LYMPHOCYTES NFR BLD AUTO: 16.4 % (ref 24–44)
LYMPHOCYTES NFR BLD AUTO: 17.8 % (ref 24–44)
LYMPHOCYTES NFR BLD AUTO: 18.3 % (ref 24–44)
LYMPHOCYTES NFR BLD AUTO: 22.2 % (ref 21–51)
LYMPHOCYTES NFR BLD AUTO: 22.3 % (ref 21–51)
LYMPHOCYTES NFR BLD AUTO: 22.9 % (ref 21–51)
LYMPHOCYTES NFR BLD AUTO: 23.2 % (ref 21–51)
LYMPHOCYTES NFR BLD AUTO: 25.7 % (ref 21–51)
LYMPHOCYTES NFR BLD AUTO: 26.1 % (ref 21–51)
LYMPHOCYTES NFR BLD AUTO: 34.9 % (ref 21–51)
LYMPHOCYTES NFR BLD AUTO: 4.9 % (ref 24–44)
LYMPHOCYTES NFR BLD AUTO: 4.9 % (ref 24–44)
LYMPHOCYTES NFR BLD AUTO: 5.3 % (ref 24–44)
LYMPHOCYTES NFR BLD AUTO: 6 % (ref 24–44)
LYMPHOCYTES NFR BLD AUTO: 6.1 % (ref 24–44)
LYMPHOCYTES NFR BLD AUTO: 7.2 % (ref 21–51)
LYMPHOCYTES NFR BLD AUTO: 7.5 % (ref 24–44)
LYMPHOCYTES NFR BLD AUTO: 7.5 % (ref 24–44)
LYMPHOCYTES NFR BLD AUTO: 7.7 % (ref 21–51)
LYMPHOCYTES NFR BLD AUTO: 8.1 % (ref 24–44)
LYMPHOCYTES NFR BLD AUTO: 8.6 % (ref 24–44)
LYMPHOCYTES NFR BLD AUTO: 9.6 % (ref 24–44)
LYMPHOCYTES NFR BLD AUTO: 9.7 % (ref 24–44)
LYMPHOCYTES NFR BLD MANUAL: 10 % (ref 24–44)
LYMPHOCYTES NFR BLD MANUAL: 12 % (ref 24–44)
LYMPHOCYTES NFR BLD MANUAL: 4 % (ref 0–12)
LYMPHOCYTES NFR BLD MANUAL: 4 % (ref 0–12)
LYMPHOCYTES NFR BLD MANUAL: 7 % (ref 0–12)
LYMPHOCYTES NFR BLD MANUAL: 7 % (ref 24–44)
LYMPHOCYTES NFR CSF MANUAL: 26 %
LYMPHOCYTES NFR FLD MANUAL: 20 %
LYMPHOCYTES NFR FLD MANUAL: 58 %
Lab: NORMAL
MACROCYTES BLD QL SMEAR: NORMAL
MACROPHAGE FLUID: 28 %
MACROPHAGE FLUID: 39 %
MACROPHAGES NFR FLD: 2 %
MAGNESIUM SERPL-MCNC: 2 MG/DL (ref 1.3–2.7)
MAGNESIUM SERPL-MCNC: 2 MG/DL (ref 1.7–2.6)
MAGNESIUM SERPL-MCNC: 2 MG/DL (ref 1.7–2.6)
MAGNESIUM SERPL-MCNC: 2.1 MG/DL (ref 1.3–2.7)
MAGNESIUM SERPL-MCNC: 2.1 MG/DL (ref 1.3–2.7)
MAGNESIUM SERPL-MCNC: 2.1 MG/DL (ref 1.7–2.6)
MAGNESIUM SERPL-MCNC: 2.2 MG/DL (ref 1.3–2.7)
MAGNESIUM SERPL-MCNC: 2.2 MG/DL (ref 1.7–2.6)
MAGNESIUM SERPL-MCNC: 2.3 MG/DL (ref 1.3–2.7)
MAGNESIUM SERPL-MCNC: 2.3 MG/DL (ref 1.7–2.6)
MAGNESIUM SERPL-MCNC: 2.3 MG/DL (ref 1.7–2.6)
MAGNESIUM SERPL-MCNC: 2.4 MG/DL (ref 1.3–2.7)
MAGNESIUM SERPL-MCNC: 2.4 MG/DL (ref 1.7–2.6)
MAGNESIUM SERPL-MCNC: 2.7 MG/DL (ref 1.3–2.7)
MAGNESIUM SERPL-MCNC: 2.9 MG/DL (ref 1.3–2.7)
MAXIMAL PREDICTED HEART RATE: 175 BPM
MCH RBC QN AUTO: 27.6 PG (ref 27–33)
MCH RBC QN AUTO: 27.9 PG (ref 27–33)
MCH RBC QN AUTO: 28.4 PG (ref 27–33)
MCH RBC QN AUTO: 28.5 PG (ref 27–33)
MCH RBC QN AUTO: 28.6 PG (ref 27–31)
MCH RBC QN AUTO: 28.7 PG (ref 27–31)
MCH RBC QN AUTO: 28.7 PG (ref 27–31)
MCH RBC QN AUTO: 28.7 PG (ref 27–33)
MCH RBC QN AUTO: 28.8 PG (ref 27–31)
MCH RBC QN AUTO: 28.9 PG (ref 27–33)
MCH RBC QN AUTO: 29.2 PG (ref 27–33)
MCH RBC QN AUTO: 29.5 PG (ref 27–31)
MCH RBC QN AUTO: 29.5 PG (ref 27–31)
MCH RBC QN AUTO: 29.6 PG (ref 27–31)
MCH RBC QN AUTO: 29.6 PG (ref 27–31)
MCH RBC QN AUTO: 29.7 PG (ref 27–31)
MCH RBC QN AUTO: 29.7 PG (ref 27–31)
MCH RBC QN AUTO: 29.8 PG (ref 27–31)
MCH RBC QN AUTO: 29.8 PG (ref 27–33)
MCH RBC QN AUTO: 29.8 PG (ref 27–33)
MCH RBC QN AUTO: 29.9 PG (ref 27–31)
MCH RBC QN AUTO: 29.9 PG (ref 27–33)
MCH RBC QN AUTO: 30 PG (ref 27–31)
MCH RBC QN AUTO: 30.1 PG (ref 27–31)
MCH RBC QN AUTO: 30.2 PG (ref 27–31)
MCH RBC QN AUTO: 30.2 PG (ref 27–33)
MCH RBC QN AUTO: 30.4 PG (ref 27–31)
MCH RBC QN AUTO: 30.9 PG (ref 27–33)
MCH RBC QN AUTO: 31.1 PG (ref 27–31)
MCH RBC QN AUTO: 31.2 PG (ref 27–31)
MCH RBC QN AUTO: 31.3 PG (ref 27–31)
MCH RBC QN AUTO: 31.4 PG (ref 27–31)
MCH RBC QN AUTO: 31.5 PG (ref 27–31)
MCH RBC QN AUTO: 31.6 PG (ref 27–31)
MCH RBC QN AUTO: 32 PG (ref 27–31)
MCH RBC QN AUTO: 32 PG (ref 27–31)
MCH RBC QN AUTO: 32.1 PG (ref 27–31)
MCH RBC QN AUTO: 32.3 PG (ref 27–31)
MCH RBC QN AUTO: 32.5 PG (ref 27–31)
MCH RBC QN AUTO: 32.5 PG (ref 27–33)
MCH RBC QN AUTO: 32.5 PG (ref 27–33)
MCH RBC QN AUTO: 32.6 PG (ref 27–33)
MCH RBC QN AUTO: 32.7 PG (ref 27–33)
MCH RBC QN AUTO: 33 PG (ref 27–33)
MCH RBC QN AUTO: 33.2 PG (ref 27–33)
MCH RBC QN AUTO: 33.2 PG (ref 27–33)
MCH RBC QN AUTO: 33.9 PG (ref 27–33)
MCH RBC QN AUTO: 34.2 PG (ref 27–33)
MCH RBC QN AUTO: 34.9 PG (ref 27–33)
MCHC RBC AUTO-ENTMCNC: 28.7 G/DL (ref 33–37)
MCHC RBC AUTO-ENTMCNC: 28.8 G/DL (ref 32–36)
MCHC RBC AUTO-ENTMCNC: 29.3 G/DL (ref 33–37)
MCHC RBC AUTO-ENTMCNC: 29.5 G/DL (ref 33–37)
MCHC RBC AUTO-ENTMCNC: 29.8 G/DL (ref 33–37)
MCHC RBC AUTO-ENTMCNC: 29.9 G/DL (ref 32–36)
MCHC RBC AUTO-ENTMCNC: 29.9 G/DL (ref 33–37)
MCHC RBC AUTO-ENTMCNC: 30 G/DL (ref 32–36)
MCHC RBC AUTO-ENTMCNC: 30 G/DL (ref 32–36)
MCHC RBC AUTO-ENTMCNC: 30 G/DL (ref 33–37)
MCHC RBC AUTO-ENTMCNC: 30.1 G/DL (ref 32–36)
MCHC RBC AUTO-ENTMCNC: 30.2 G/DL (ref 32–36)
MCHC RBC AUTO-ENTMCNC: 30.2 G/DL (ref 33–37)
MCHC RBC AUTO-ENTMCNC: 30.2 G/DL (ref 33–37)
MCHC RBC AUTO-ENTMCNC: 30.3 G/DL (ref 32–36)
MCHC RBC AUTO-ENTMCNC: 30.3 G/DL (ref 33–37)
MCHC RBC AUTO-ENTMCNC: 30.4 G/DL (ref 32–36)
MCHC RBC AUTO-ENTMCNC: 30.5 G/DL (ref 33–37)
MCHC RBC AUTO-ENTMCNC: 30.5 G/DL (ref 33–37)
MCHC RBC AUTO-ENTMCNC: 30.7 G/DL (ref 32–36)
MCHC RBC AUTO-ENTMCNC: 30.7 G/DL (ref 32–36)
MCHC RBC AUTO-ENTMCNC: 30.7 G/DL (ref 33–37)
MCHC RBC AUTO-ENTMCNC: 30.8 G/DL (ref 32–36)
MCHC RBC AUTO-ENTMCNC: 30.9 G/DL (ref 32–36)
MCHC RBC AUTO-ENTMCNC: 30.9 G/DL (ref 33–37)
MCHC RBC AUTO-ENTMCNC: 31 G/DL (ref 32–36)
MCHC RBC AUTO-ENTMCNC: 31 G/DL (ref 32–36)
MCHC RBC AUTO-ENTMCNC: 31.1 G/DL (ref 33–37)
MCHC RBC AUTO-ENTMCNC: 31.3 G/DL (ref 32–36)
MCHC RBC AUTO-ENTMCNC: 31.3 G/DL (ref 32–36)
MCHC RBC AUTO-ENTMCNC: 31.4 G/DL (ref 32–36)
MCHC RBC AUTO-ENTMCNC: 31.4 G/DL (ref 32–36)
MCHC RBC AUTO-ENTMCNC: 31.4 G/DL (ref 33–37)
MCHC RBC AUTO-ENTMCNC: 31.5 G/DL (ref 32–36)
MCHC RBC AUTO-ENTMCNC: 31.6 G/DL (ref 32–36)
MCHC RBC AUTO-ENTMCNC: 31.6 G/DL (ref 32–36)
MCHC RBC AUTO-ENTMCNC: 31.7 G/DL (ref 32–36)
MCHC RBC AUTO-ENTMCNC: 31.7 G/DL (ref 33–37)
MCHC RBC AUTO-ENTMCNC: 31.8 G/DL (ref 32–36)
MCHC RBC AUTO-ENTMCNC: 31.8 G/DL (ref 33–37)
MCHC RBC AUTO-ENTMCNC: 32 G/DL (ref 32–36)
MCHC RBC AUTO-ENTMCNC: 32.2 G/DL (ref 33–37)
MCHC RBC AUTO-ENTMCNC: 32.2 G/DL (ref 33–37)
MCHC RBC AUTO-ENTMCNC: 32.3 G/DL (ref 32–36)
MCHC RBC AUTO-ENTMCNC: 32.6 G/DL (ref 32–36)
MCHC RBC AUTO-ENTMCNC: 33.4 G/DL (ref 32–36)
MCHC RBC AUTO-ENTMCNC: 33.5 G/DL (ref 32–36)
MCHC RBC AUTO-ENTMCNC: 33.8 G/DL (ref 32–36)
MCHC RBC AUTO-ENTMCNC: 34 G/DL (ref 33–37)
MCHC RBC AUTO-ENTMCNC: 34.9 G/DL (ref 32–36)
MCV RBC AUTO: 101.3 FL (ref 80–94)
MCV RBC AUTO: 101.5 FL (ref 80–99)
MCV RBC AUTO: 101.8 FL (ref 80–99)
MCV RBC AUTO: 102.5 FL (ref 80–99)
MCV RBC AUTO: 102.6 FL (ref 80–94)
MCV RBC AUTO: 103.2 FL (ref 80–99)
MCV RBC AUTO: 103.7 FL (ref 80–99)
MCV RBC AUTO: 103.9 FL (ref 80–94)
MCV RBC AUTO: 104.3 FL (ref 80–94)
MCV RBC AUTO: 104.3 FL (ref 80–99)
MCV RBC AUTO: 104.4 FL (ref 80–99)
MCV RBC AUTO: 104.7 FL (ref 80–94)
MCV RBC AUTO: 104.7 FL (ref 80–94)
MCV RBC AUTO: 105.3 FL (ref 80–99)
MCV RBC AUTO: 105.8 FL (ref 80–94)
MCV RBC AUTO: 105.9 FL (ref 80–99)
MCV RBC AUTO: 106.1 FL (ref 80–94)
MCV RBC AUTO: 106.6 FL (ref 80–94)
MCV RBC AUTO: 107.1 FL (ref 80–99)
MCV RBC AUTO: 108 FL (ref 80–94)
MCV RBC AUTO: 108 FL (ref 80–99)
MCV RBC AUTO: 108.2 FL (ref 80–94)
MCV RBC AUTO: 108.3 FL (ref 80–94)
MCV RBC AUTO: 85 FL (ref 80–99)
MCV RBC AUTO: 85.5 FL (ref 80–99)
MCV RBC AUTO: 86.1 FL (ref 80–99)
MCV RBC AUTO: 89.2 FL (ref 80–94)
MCV RBC AUTO: 91.8 FL (ref 80–99)
MCV RBC AUTO: 92.6 FL (ref 80–94)
MCV RBC AUTO: 93.2 FL (ref 80–99)
MCV RBC AUTO: 93.2 FL (ref 80–99)
MCV RBC AUTO: 93.3 FL (ref 80–99)
MCV RBC AUTO: 93.4 FL (ref 80–99)
MCV RBC AUTO: 94.2 FL (ref 80–99)
MCV RBC AUTO: 94.4 FL (ref 80–94)
MCV RBC AUTO: 94.5 FL (ref 80–99)
MCV RBC AUTO: 94.9 FL (ref 80–94)
MCV RBC AUTO: 95 FL (ref 80–94)
MCV RBC AUTO: 95.1 FL (ref 80–99)
MCV RBC AUTO: 95.8 FL (ref 80–99)
MCV RBC AUTO: 96 FL (ref 80–99)
MCV RBC AUTO: 96.2 FL (ref 80–99)
MCV RBC AUTO: 96.3 FL (ref 80–94)
MCV RBC AUTO: 96.4 FL (ref 80–99)
MCV RBC AUTO: 96.6 FL (ref 80–94)
MCV RBC AUTO: 96.8 FL (ref 80–99)
MCV RBC AUTO: 97.5 FL (ref 80–99)
MCV RBC AUTO: 97.8 FL (ref 80–94)
MCV RBC AUTO: 98.7 FL (ref 80–94)
MCV RBC AUTO: 99.7 FL (ref 80–94)
MESOTHL CELL NFR FLD MANUAL: 2 %
MESOTHL CELL NFR FLD MANUAL: 7 %
METAMYELOCYTES NFR BLD MANUAL: 1 % (ref 0–0)
METAMYELOCYTES NFR BLD MANUAL: 1 % (ref 0–0)
METAMYELOCYTES NFR BLD MANUAL: 5 % (ref 0–0)
METHGB BLD QL: 0 %
METHGB BLD QL: 0 %
METHGB BLD QL: 0.2 % (ref 0–1.5)
METHGB BLD QL: 0.2 % (ref 0–1.5)
METHGB BLD QL: 0.4 % (ref 0–3)
METHGB BLD QL: 0.5 % (ref 0–3)
METHGB BLD QL: 0.6 % (ref 0–1.5)
METHGB BLD QL: 0.7 % (ref 0–1.5)
METHGB BLD QL: 0.7 % (ref 0–1.5)
METHGB BLD QL: 0.8 % (ref 0–1.5)
METHGB BLD QL: 0.8 % (ref 0–3)
METHGB BLD QL: 0.9 % (ref 0–1.5)
METHGB BLD QL: 1 % (ref 0–1.5)
METHGB BLD QL: 1 % (ref 0–1.5)
METHGB BLD QL: 1.1 % (ref 0–1.5)
METHGB BLD QL: 1.1 % (ref 0–1.5)
METHGB BLD QL: 1.2 % (ref 0–1.5)
METHGB BLD QL: 1.2 % (ref 0–1.5)
METHGB BLD QL: 1.3 % (ref 0–1.5)
METHGB BLD QL: 1.3 % (ref 0–1.5)
METHGB BLD QL: 1.4 % (ref 0–1.5)
METHGB BLD QL: 1.4 % (ref 0–1.5)
METHGB BLD QL: 1.5 % (ref 0–1.5)
METHGB BLD QL: 1.6 % (ref 0–1.5)
MODALITY: ABNORMAL
MODALITY: NORMAL
MONOCYTES # BLD AUTO: 0.42 10*3/MM3 (ref 0–1)
MONOCYTES # BLD AUTO: 0.42 10*3/MM3 (ref 0–1)
MONOCYTES # BLD AUTO: 0.48 10*3/MM3 (ref 0–1)
MONOCYTES # BLD AUTO: 0.51 10*3/MM3 (ref 0.1–0.9)
MONOCYTES # BLD AUTO: 0.52 10*3/MM3 (ref 0–1)
MONOCYTES # BLD AUTO: 0.53 10*3/MM3 (ref 0–1)
MONOCYTES # BLD AUTO: 0.57 10*3/MM3 (ref 0.1–0.9)
MONOCYTES # BLD AUTO: 0.62 10*3/MM3 (ref 0–1)
MONOCYTES # BLD AUTO: 0.67 10*3/MM3 (ref 0.1–0.9)
MONOCYTES # BLD AUTO: 0.67 10*3/MM3 (ref 0.1–0.9)
MONOCYTES # BLD AUTO: 0.69 10*3/MM3 (ref 0–1)
MONOCYTES # BLD AUTO: 0.71 10*3/MM3 (ref 0.1–0.9)
MONOCYTES # BLD AUTO: 0.72 10*3/MM3 (ref 0.1–0.9)
MONOCYTES # BLD AUTO: 0.73 10*3/MM3 (ref 0.1–0.9)
MONOCYTES # BLD AUTO: 0.73 10*3/MM3 (ref 0–1)
MONOCYTES # BLD AUTO: 0.74 10*3/MM3 (ref 0–1)
MONOCYTES # BLD AUTO: 0.75 10*3/MM3 (ref 0.1–0.9)
MONOCYTES # BLD AUTO: 0.79 10*3/MM3 (ref 0–1)
MONOCYTES # BLD AUTO: 0.82 10*3/MM3 (ref 0–1)
MONOCYTES # BLD AUTO: 0.89 10*3/MM3 (ref 0–1)
MONOCYTES # BLD AUTO: 0.93 10*3/MM3 (ref 0.1–0.9)
MONOCYTES # BLD AUTO: 0.93 10*3/MM3 (ref 0.1–0.9)
MONOCYTES # BLD AUTO: 0.93 10*3/MM3 (ref 0–1)
MONOCYTES # BLD AUTO: 0.94 10*3/MM3 (ref 0–1)
MONOCYTES # BLD AUTO: 0.95 10*3/MM3 (ref 0–1)
MONOCYTES # BLD AUTO: 0.96 10*3/MM3 (ref 0.1–0.9)
MONOCYTES # BLD AUTO: 0.97 10*3/MM3 (ref 0.1–0.9)
MONOCYTES # BLD AUTO: 0.97 10*3/MM3 (ref 0–1)
MONOCYTES # BLD AUTO: 0.97 10*3/MM3 (ref 0–1)
MONOCYTES # BLD AUTO: 1 10*3/MM3 (ref 0.1–0.9)
MONOCYTES # BLD AUTO: 1.01 10*3/MM3 (ref 0–1)
MONOCYTES # BLD AUTO: 1.09 10*3/MM3 (ref 0–1)
MONOCYTES # BLD AUTO: 1.09 10*3/MM3 (ref 0–1)
MONOCYTES # BLD AUTO: 1.14 10*3/MM3 (ref 0–1)
MONOCYTES # BLD AUTO: 1.26 10*3/MM3 (ref 0–1)
MONOCYTES # BLD AUTO: 1.27 10*3/MM3 (ref 0–1)
MONOCYTES # BLD AUTO: 1.3 10*3/MM3 (ref 0–1)
MONOCYTES # BLD AUTO: 1.31 10*3/MM3 (ref 0–1)
MONOCYTES # BLD AUTO: 1.31 10*3/MM3 (ref 0–1)
MONOCYTES # BLD AUTO: 1.36 10*3/MM3 (ref 0–1)
MONOCYTES # BLD AUTO: 1.38 10*3/MM3 (ref 0.1–0.9)
MONOCYTES # BLD AUTO: 1.4 10*3/MM3 (ref 0–1)
MONOCYTES # BLD AUTO: 1.44 10*3/MM3 (ref 0–1)
MONOCYTES # BLD AUTO: 1.48 10*3/MM3 (ref 0–1)
MONOCYTES # BLD AUTO: 1.56 10*3/MM3 (ref 0–1)
MONOCYTES NFR BLD AUTO: 10.3 % (ref 0–12)
MONOCYTES NFR BLD AUTO: 10.8 % (ref 0–12)
MONOCYTES NFR BLD AUTO: 11 % (ref 0–12)
MONOCYTES NFR BLD AUTO: 3 % (ref 0–12)
MONOCYTES NFR BLD AUTO: 3.6 % (ref 0–12)
MONOCYTES NFR BLD AUTO: 3.7 % (ref 0–12)
MONOCYTES NFR BLD AUTO: 4.2 % (ref 0–12)
MONOCYTES NFR BLD AUTO: 4.6 % (ref 0–12)
MONOCYTES NFR BLD AUTO: 4.8 % (ref 0–12)
MONOCYTES NFR BLD AUTO: 5.1 % (ref 0–12)
MONOCYTES NFR BLD AUTO: 5.4 % (ref 0–10)
MONOCYTES NFR BLD AUTO: 5.4 % (ref 0–12)
MONOCYTES NFR BLD AUTO: 5.5 % (ref 0–12)
MONOCYTES NFR BLD AUTO: 5.5 % (ref 0–12)
MONOCYTES NFR BLD AUTO: 5.6 % (ref 0–12)
MONOCYTES NFR BLD AUTO: 5.7 % (ref 0–12)
MONOCYTES NFR BLD AUTO: 5.9 % (ref 0–10)
MONOCYTES NFR BLD AUTO: 6.3 % (ref 0–10)
MONOCYTES NFR BLD AUTO: 6.5 % (ref 0–10)
MONOCYTES NFR BLD AUTO: 6.8 % (ref 0–10)
MONOCYTES NFR BLD AUTO: 6.9 % (ref 0–12)
MONOCYTES NFR BLD AUTO: 7.1 % (ref 0–10)
MONOCYTES NFR BLD AUTO: 7.1 % (ref 0–12)
MONOCYTES NFR BLD AUTO: 7.2 % (ref 0–10)
MONOCYTES NFR BLD AUTO: 7.3 % (ref 0–12)
MONOCYTES NFR BLD AUTO: 7.4 % (ref 0–12)
MONOCYTES NFR BLD AUTO: 7.5 % (ref 0–10)
MONOCYTES NFR BLD AUTO: 7.6 % (ref 0–12)
MONOCYTES NFR BLD AUTO: 7.8 % (ref 0–10)
MONOCYTES NFR BLD AUTO: 7.9 % (ref 0–10)
MONOCYTES NFR BLD AUTO: 8 % (ref 0–12)
MONOCYTES NFR BLD AUTO: 8.1 % (ref 0–10)
MONOCYTES NFR BLD AUTO: 8.3 % (ref 0–10)
MONOCYTES NFR BLD AUTO: 8.6 % (ref 0–12)
MONOCYTES NFR BLD AUTO: 8.7 % (ref 0–12)
MONOCYTES NFR BLD AUTO: 8.9 % (ref 0–10)
MONOCYTES NFR BLD AUTO: 9.1 % (ref 0–12)
MONOCYTES NFR BLD AUTO: 9.4 % (ref 0–12)
MONOCYTES NFR BLD AUTO: 9.7 % (ref 0–12)
MONOCYTES NFR BLD AUTO: 9.7 % (ref 0–12)
MONOCYTES NFR BLD AUTO: 9.8 % (ref 0–10)
MONOCYTES NFR BLD AUTO: 9.9 % (ref 0–12)
MONOCYTES NFR CSF MANUAL: 2 %
MR PISA EROA: 0.7 CM2
MV REGURGITANT FRACTION: 60 %
MYCOBACTERIUM SPEC CULT: NORMAL
MYCOBACTERIUM SPEC CULT: NORMAL
MYELOCYTES NFR BLD MANUAL: 1 % (ref 0–0)
NEUTROPHILS # BLD AUTO: 10.02 10*3/MM3 (ref 1.4–6.5)
NEUTROPHILS # BLD AUTO: 10.02 10*3/MM3 (ref 1.5–8.3)
NEUTROPHILS # BLD AUTO: 10.13 10*3/MM3 (ref 1.5–8.3)
NEUTROPHILS # BLD AUTO: 10.37 10*3/MM3 (ref 1.5–8.3)
NEUTROPHILS # BLD AUTO: 11.06 10*3/MM3 (ref 1.5–8.3)
NEUTROPHILS # BLD AUTO: 11.12 10*3/MM3 (ref 1.5–8.3)
NEUTROPHILS # BLD AUTO: 11.15 10*3/MM3 (ref 1.5–8.3)
NEUTROPHILS # BLD AUTO: 11.92 10*3/MM3 (ref 1.5–8.3)
NEUTROPHILS # BLD AUTO: 12.11 10*3/MM3 (ref 1.5–8.3)
NEUTROPHILS # BLD AUTO: 12.81 10*3/MM3 (ref 1.5–8.3)
NEUTROPHILS # BLD AUTO: 13.18 10*3/MM3 (ref 1.4–6.5)
NEUTROPHILS # BLD AUTO: 13.66 10*3/MM3 (ref 1.5–8.3)
NEUTROPHILS # BLD AUTO: 14.12 10*3/MM3 (ref 1.4–6.5)
NEUTROPHILS # BLD AUTO: 14.58 10*3/MM3 (ref 1.5–8.3)
NEUTROPHILS # BLD AUTO: 14.67 10*3/MM3 (ref 1.5–8.3)
NEUTROPHILS # BLD AUTO: 14.96 10*3/MM3 (ref 1.5–8.3)
NEUTROPHILS # BLD AUTO: 16.09 10*3/MM3 (ref 1.4–6.5)
NEUTROPHILS # BLD AUTO: 16.75 10*3/MM3 (ref 1.5–8.3)
NEUTROPHILS # BLD AUTO: 16.86 10*3/MM3 (ref 1.5–8.3)
NEUTROPHILS # BLD AUTO: 17.45 10*3/MM3 (ref 1.5–8.3)
NEUTROPHILS # BLD AUTO: 20.02 10*3/MM3 (ref 1.5–8.3)
NEUTROPHILS # BLD AUTO: 20.31 10*3/MM3 (ref 1.5–8.3)
NEUTROPHILS # BLD AUTO: 21.07 10*3/MM3 (ref 1.5–8.3)
NEUTROPHILS # BLD AUTO: 4.57 10*3/MM3 (ref 1.4–6.5)
NEUTROPHILS # BLD AUTO: 4.64 10*3/MM3 (ref 1.4–6.5)
NEUTROPHILS # BLD AUTO: 4.78 10*3/MM3 (ref 1.4–6.5)
NEUTROPHILS # BLD AUTO: 5.5 10*3/MM3 (ref 1.4–6.5)
NEUTROPHILS # BLD AUTO: 5.8 10*3/MM3 (ref 1.5–8.3)
NEUTROPHILS # BLD AUTO: 5.9 10*3/MM3 (ref 1.4–6.5)
NEUTROPHILS # BLD AUTO: 6.23 10*3/MM3 (ref 1.4–6.5)
NEUTROPHILS # BLD AUTO: 6.6 10*3/MM3 (ref 1.4–6.5)
NEUTROPHILS # BLD AUTO: 6.72 10*3/MM3 (ref 1.4–6.5)
NEUTROPHILS # BLD AUTO: 6.77 10*3/MM3 (ref 1.5–8.3)
NEUTROPHILS # BLD AUTO: 7.18 10*3/MM3 (ref 1.5–8.3)
NEUTROPHILS # BLD AUTO: 7.33 10*3/MM3 (ref 1.4–6.5)
NEUTROPHILS # BLD AUTO: 7.33 10*3/MM3 (ref 1.5–8.3)
NEUTROPHILS # BLD AUTO: 7.58 10*3/MM3 (ref 1.5–8.3)
NEUTROPHILS # BLD AUTO: 8.01 10*3/MM3 (ref 1.4–6.5)
NEUTROPHILS # BLD AUTO: 8.25 10*3/MM3 (ref 1.5–8.3)
NEUTROPHILS # BLD AUTO: 8.57 10*3/MM3 (ref 1.5–8.3)
NEUTROPHILS # BLD AUTO: 8.83 10*3/MM3 (ref 1.5–8.3)
NEUTROPHILS # BLD AUTO: 9.43 10*3/MM3 (ref 1.5–8.3)
NEUTROPHILS # BLD AUTO: 9.44 10*3/MM3 (ref 1.5–8.3)
NEUTROPHILS # BLD AUTO: 9.54 10*3/MM3 (ref 1.5–8.3)
NEUTROPHILS # BLD AUTO: 9.94 10*3/MM3 (ref 1.5–8.3)
NEUTROPHILS NFR BLD AUTO: 50.4 % (ref 30–70)
NEUTROPHILS NFR BLD AUTO: 57.7 % (ref 30–70)
NEUTROPHILS NFR BLD AUTO: 59.4 % (ref 30–70)
NEUTROPHILS NFR BLD AUTO: 60.2 % (ref 30–70)
NEUTROPHILS NFR BLD AUTO: 60.8 % (ref 30–70)
NEUTROPHILS NFR BLD AUTO: 62.4 % (ref 30–70)
NEUTROPHILS NFR BLD AUTO: 65 % (ref 30–70)
NEUTROPHILS NFR BLD AUTO: 70.9 % (ref 41–71)
NEUTROPHILS NFR BLD AUTO: 71.2 % (ref 41–71)
NEUTROPHILS NFR BLD AUTO: 72.4 % (ref 41–71)
NEUTROPHILS NFR BLD AUTO: 73.1 % (ref 41–71)
NEUTROPHILS NFR BLD AUTO: 73.1 % (ref 41–71)
NEUTROPHILS NFR BLD AUTO: 74 % (ref 41–71)
NEUTROPHILS NFR BLD AUTO: 74.1 % (ref 41–71)
NEUTROPHILS NFR BLD AUTO: 74.1 % (ref 41–71)
NEUTROPHILS NFR BLD AUTO: 74.5 % (ref 41–71)
NEUTROPHILS NFR BLD AUTO: 74.7 % (ref 30–70)
NEUTROPHILS NFR BLD AUTO: 74.7 % (ref 41–71)
NEUTROPHILS NFR BLD AUTO: 74.8 % (ref 41–71)
NEUTROPHILS NFR BLD AUTO: 74.9 % (ref 30–70)
NEUTROPHILS NFR BLD AUTO: 75.3 % (ref 30–70)
NEUTROPHILS NFR BLD AUTO: 77.2 % (ref 41–71)
NEUTROPHILS NFR BLD AUTO: 77.8 % (ref 41–71)
NEUTROPHILS NFR BLD AUTO: 78.3 % (ref 41–71)
NEUTROPHILS NFR BLD AUTO: 78.9 % (ref 41–71)
NEUTROPHILS NFR BLD AUTO: 79.8 % (ref 30–70)
NEUTROPHILS NFR BLD AUTO: 79.8 % (ref 41–71)
NEUTROPHILS NFR BLD AUTO: 80.4 % (ref 41–71)
NEUTROPHILS NFR BLD AUTO: 80.8 % (ref 30–70)
NEUTROPHILS NFR BLD AUTO: 80.8 % (ref 41–71)
NEUTROPHILS NFR BLD AUTO: 82.8 % (ref 41–71)
NEUTROPHILS NFR BLD AUTO: 83.1 % (ref 30–70)
NEUTROPHILS NFR BLD AUTO: 83.7 % (ref 41–71)
NEUTROPHILS NFR BLD AUTO: 83.9 % (ref 41–71)
NEUTROPHILS NFR BLD AUTO: 84.2 % (ref 30–70)
NEUTROPHILS NFR BLD AUTO: 85.4 % (ref 41–71)
NEUTROPHILS NFR BLD AUTO: 86.1 % (ref 41–71)
NEUTROPHILS NFR BLD AUTO: 86.5 % (ref 41–71)
NEUTROPHILS NFR BLD AUTO: 87 % (ref 41–71)
NEUTROPHILS NFR BLD AUTO: 87 % (ref 41–71)
NEUTROPHILS NFR BLD AUTO: 88.6 % (ref 41–71)
NEUTROPHILS NFR BLD AUTO: 88.8 % (ref 41–71)
NEUTROPHILS NFR BLD MANUAL: 77 % (ref 41–71)
NEUTROPHILS NFR BLD MANUAL: 77 % (ref 41–71)
NEUTROPHILS NFR BLD MANUAL: 81 % (ref 41–71)
NEUTROPHILS NFR CSF MICRO: 70 %
NEUTROPHILS NFR FLD MANUAL: 33 %
NEUTROPHILS NFR FLD MANUAL: 9 %
NEUTS BAND NFR BLD MANUAL: 2 % (ref 0–5)
NEUTS BAND NFR BLD MANUAL: 3 % (ref 0–5)
NIGHT BLUE STAIN TISS: NORMAL
NIGHT BLUE STAIN TISS: NORMAL
NRBC BLD MANUAL-RTO: 0 /100 WBC (ref 0–0)
NRBC SPEC MANUAL: 2 /100 WBC (ref 0–0)
NRBC SPEC MANUAL: 2 /100 WBC (ref 0–0)
O+P SPEC MICRO: NORMAL
OSMOLALITY SERPL CALC.SUM OF ELEC: 270.8 MOSM/KG (ref 273–305)
OSMOLALITY SERPL CALC.SUM OF ELEC: 273 MOSM/KG (ref 273–305)
OSMOLALITY SERPL CALC.SUM OF ELEC: 276 MOSM/KG (ref 273–305)
OSMOLALITY SERPL CALC.SUM OF ELEC: 276.1 MOSM/KG (ref 273–305)
OSMOLALITY SERPL CALC.SUM OF ELEC: 277.1 MOSM/KG (ref 273–305)
OSMOLALITY SERPL CALC.SUM OF ELEC: 278.5 MOSM/KG (ref 273–305)
OSMOLALITY SERPL CALC.SUM OF ELEC: 278.8 MOSM/KG (ref 273–305)
OSMOLALITY SERPL CALC.SUM OF ELEC: 279.5 MOSM/KG (ref 273–305)
OSMOLALITY SERPL CALC.SUM OF ELEC: 279.7 MOSM/KG (ref 273–305)
OSMOLALITY SERPL CALC.SUM OF ELEC: 280.4 MOSM/KG (ref 273–305)
OSMOLALITY SERPL CALC.SUM OF ELEC: 280.6 MOSM/KG (ref 273–305)
OSMOLALITY SERPL CALC.SUM OF ELEC: 280.7 MOSM/KG (ref 273–305)
OSMOLALITY SERPL CALC.SUM OF ELEC: 280.8 MOSM/KG (ref 273–305)
OSMOLALITY SERPL CALC.SUM OF ELEC: 281.1 MOSM/KG (ref 273–305)
OSMOLALITY SERPL CALC.SUM OF ELEC: 282.2 MOSM/KG (ref 273–305)
OSMOLALITY SERPL CALC.SUM OF ELEC: 282.4 MOSM/KG (ref 273–305)
OSMOLALITY SERPL CALC.SUM OF ELEC: 282.5 MOSM/KG (ref 273–305)
OSMOLALITY SERPL CALC.SUM OF ELEC: 282.7 MOSM/KG (ref 273–305)
OSMOLALITY SERPL CALC.SUM OF ELEC: 283 MOSM/KG (ref 273–305)
OSMOLALITY SERPL CALC.SUM OF ELEC: 283.8 MOSM/KG (ref 273–305)
OSMOLALITY SERPL CALC.SUM OF ELEC: 283.9 MOSM/KG (ref 273–305)
OSMOLALITY SERPL CALC.SUM OF ELEC: 285.6 MOSM/KG (ref 273–305)
OSMOLALITY SERPL CALC.SUM OF ELEC: 285.6 MOSM/KG (ref 273–305)
OSMOLALITY SERPL CALC.SUM OF ELEC: 286.3 MOSM/KG (ref 273–305)
OSMOLALITY SERPL CALC.SUM OF ELEC: 287.2 MOSM/KG (ref 273–305)
OSMOLALITY SERPL CALC.SUM OF ELEC: 288 MOSM/KG (ref 273–305)
OSMOLALITY SERPL CALC.SUM OF ELEC: 288.7 MOSM/KG (ref 273–305)
OSMOLALITY SERPL CALC.SUM OF ELEC: 289.2 MOSM/KG (ref 273–305)
OSMOLALITY SERPL CALC.SUM OF ELEC: 292.9 MOSM/KG (ref 273–305)
OSMOLALITY SERPL CALC.SUM OF ELEC: 292.9 MOSM/KG (ref 273–305)
OSMOLALITY SERPL CALC.SUM OF ELEC: 293 MOSM/KG (ref 273–305)
OSMOLALITY SERPL CALC.SUM OF ELEC: 295.9 MOSM/KG (ref 273–305)
OSMOLALITY SERPL CALC.SUM OF ELEC: 312.2 MOSM/KG (ref 273–305)
OSMOLALITY SERPL CALC.SUM OF ELEC: 315.6 MOSM/KG (ref 273–305)
OVA + PARASITE RESULT 1: NORMAL
OXYHGB MFR BLDV: 100 % (ref 94–99)
OXYHGB MFR BLDV: 65.5 % (ref 85–100)
OXYHGB MFR BLDV: 84.9 % (ref 85–100)
OXYHGB MFR BLDV: 88.3 % (ref 94–99)
OXYHGB MFR BLDV: 91.4 % (ref 85–100)
OXYHGB MFR BLDV: 91.9 % (ref 94–99)
OXYHGB MFR BLDV: 92.5 % (ref 85–100)
OXYHGB MFR BLDV: 92.8 % (ref 85–100)
OXYHGB MFR BLDV: 92.8 % (ref 94–99)
OXYHGB MFR BLDV: 93 % (ref 94–99)
OXYHGB MFR BLDV: 93.2 % (ref 94–99)
OXYHGB MFR BLDV: 93.4 % (ref 85–100)
OXYHGB MFR BLDV: 93.5 % (ref 94–99)
OXYHGB MFR BLDV: 94 % (ref 94–99)
OXYHGB MFR BLDV: 94.6 % (ref 94–99)
OXYHGB MFR BLDV: 94.6 % (ref 94–99)
OXYHGB MFR BLDV: 94.8 % (ref 94–99)
OXYHGB MFR BLDV: 95 % (ref 94–99)
OXYHGB MFR BLDV: 95.1 % (ref 94–99)
OXYHGB MFR BLDV: 95.5 % (ref 94–99)
OXYHGB MFR BLDV: 95.5 % (ref 94–99)
OXYHGB MFR BLDV: 95.7 % (ref 94–99)
OXYHGB MFR BLDV: 95.7 % (ref 94–99)
OXYHGB MFR BLDV: 96.2 % (ref 94–99)
OXYHGB MFR BLDV: 96.7 % (ref 94–99)
OXYHGB MFR BLDV: 97 % (ref 94–99)
OXYHGB MFR BLDV: 97.1 % (ref 94–99)
OXYHGB MFR BLDV: 97.6 % (ref 94–99)
OXYHGB MFR BLDV: 97.9 % (ref 94–99)
OXYHGB MFR BLDV: 98.3 % (ref 94–99)
PATH REPORT.FINAL DX SPEC: NORMAL
PATH REPORT.GROSS SPEC: NORMAL
PCO2 BLDA: 28.5 MM HG (ref 35–45)
PCO2 BLDA: 29.1 MM HG (ref 35–45)
PCO2 BLDA: 29.6 MM HG (ref 35–45)
PCO2 BLDA: 29.8 MM HG (ref 35–45)
PCO2 BLDA: 30.1 MM HG (ref 35–45)
PCO2 BLDA: 30.7 MM HG (ref 35–45)
PCO2 BLDA: 31.5 MM HG (ref 35–45)
PCO2 BLDA: 32.9 MM HG (ref 35–45)
PCO2 BLDA: 32.9 MM HG (ref 35–45)
PCO2 BLDA: 33.3 MM HG (ref 35–45)
PCO2 BLDA: 33.5 MM HG (ref 35–45)
PCO2 BLDA: 33.6 MM HG (ref 35–45)
PCO2 BLDA: 33.7 MM HG (ref 34–46)
PCO2 BLDA: 33.8 MM HG (ref 34–46)
PCO2 BLDA: 34.5 MM HG (ref 35–45)
PCO2 BLDA: 34.9 MM HG (ref 35–45)
PCO2 BLDA: 36.8 MM HG (ref 35–45)
PCO2 BLDA: 37 MM HG (ref 35–45)
PCO2 BLDA: 37.5 MM HG (ref 35–45)
PCO2 BLDA: 37.7 MM HG (ref 35–45)
PCO2 BLDA: 37.9 MM HG (ref 35–45)
PCO2 BLDA: 38.9 MM HG (ref 35–45)
PCO2 BLDA: 40.2 MM HG (ref 35–45)
PCO2 BLDA: 40.5 MM HG (ref 35–45)
PCO2 BLDA: 42.6 MM HG (ref 35–45)
PCO2 BLDA: 42.7 MM HG (ref 35–45)
PCO2 BLDA: 43 MM HG (ref 35–45)
PCO2 BLDA: 43.4 MM HG (ref 35–45)
PCO2 BLDA: 45 MM HG (ref 35–45)
PCO2 BLDA: 47.9 MM HG (ref 35–45)
PCO2 BLDV: 25.7 MM HG
PCO2 BLDV: 38.7 MM HG
PCO2 BLDV: 39.2 MM HG
PCO2 BLDV: 39.9 MM HG
PCO2 BLDV: 40.4 MM HG
PCO2 BLDV: 42 MM HG
PCO2 BLDV: 44.9 MM HG
PCO2 BLDV: 47.5 MM HG
PEEP RESPIRATORY: 5 CM[H2O]
PEEP RESPIRATORY: 8 CM[H2O]
PH BLDA: 7.33 PH UNITS (ref 7.35–7.45)
PH BLDA: 7.36 PH UNITS (ref 7.35–7.45)
PH BLDA: 7.36 PH UNITS (ref 7.35–7.45)
PH BLDA: 7.38 PH UNITS (ref 7.35–7.45)
PH BLDA: 7.38 PH UNITS (ref 7.35–7.45)
PH BLDA: 7.39 PH UNITS (ref 7.34–7.46)
PH BLDA: 7.39 PH UNITS (ref 7.35–7.45)
PH BLDA: 7.4 PH UNITS (ref 7.35–7.45)
PH BLDA: 7.41 PH UNITS (ref 7.35–7.45)
PH BLDA: 7.41 PH UNITS (ref 7.35–7.45)
PH BLDA: 7.42 PH UNITS (ref 7.35–7.45)
PH BLDA: 7.43 PH UNITS (ref 7.35–7.45)
PH BLDA: 7.44 PH UNITS (ref 7.35–7.45)
PH BLDA: 7.45 PH UNITS (ref 7.35–7.45)
PH BLDA: 7.46 PH UNITS (ref 7.35–7.45)
PH BLDA: 7.47 PH UNITS (ref 7.34–7.46)
PH BLDA: 7.5 PH UNITS (ref 7.35–7.45)
PH BLDA: 7.52 PH UNITS (ref 7.35–7.45)
PH BLDA: 7.53 PH UNITS (ref 7.35–7.45)
PH BLDA: 7.56 PH UNITS (ref 7.35–7.45)
PH BLDA: 7.58 PH UNITS (ref 7.35–7.45)
PH BLDV: 7.33 PH UNITS
PH BLDV: 7.37 PH UNITS
PH BLDV: 7.38 PH UNITS
PH BLDV: 7.4 PH UNITS
PH BLDV: 7.44 PH UNITS
PH BLDV: 7.45 PH UNITS
PH BLDV: 7.49 PH UNITS
PH BLDV: 7.58 PH UNITS
PH FLD: 8.4 [PH]
PHOSPHATE SERPL-MCNC: 1.4 MG/DL (ref 2.4–5.1)
PHOSPHATE SERPL-MCNC: 1.5 MG/DL (ref 2.4–5.1)
PHOSPHATE SERPL-MCNC: 2.1 MG/DL (ref 2.4–5.1)
PHOSPHATE SERPL-MCNC: 2.5 MG/DL (ref 2.4–5.1)
PHOSPHATE SERPL-MCNC: 2.6 MG/DL (ref 2.7–4.5)
PHOSPHATE SERPL-MCNC: 2.7 MG/DL (ref 2.7–4.5)
PHOSPHATE SERPL-MCNC: 2.8 MG/DL (ref 2.7–4.5)
PHOSPHATE SERPL-MCNC: 3 MG/DL (ref 2.4–5.1)
PHOSPHATE SERPL-MCNC: 3.4 MG/DL (ref 2.4–5.1)
PHOSPHATE SERPL-MCNC: 3.4 MG/DL (ref 2.4–5.1)
PHOSPHATE SERPL-MCNC: 3.4 MG/DL (ref 2.7–4.5)
PHOSPHATE SERPL-MCNC: 3.5 MG/DL (ref 2.4–5.1)
PHOSPHATE SERPL-MCNC: 3.6 MG/DL (ref 2.4–5.1)
PHOSPHATE SERPL-MCNC: 3.9 MG/DL (ref 2.4–5.1)
PHOSPHATE SERPL-MCNC: 4.3 MG/DL (ref 2.4–5.1)
PHOSPHATE SERPL-MCNC: 4.3 MG/DL (ref 2.4–5.1)
PHOSPHATE SERPL-MCNC: 4.4 MG/DL (ref 2.4–5.1)
PHOSPHATE SERPL-MCNC: 4.5 MG/DL (ref 2.4–5.1)
PHOSPHATE SERPL-MCNC: 4.5 MG/DL (ref 2.4–5.1)
PHOSPHATE SERPL-MCNC: 4.6 MG/DL (ref 2.4–5.1)
PHOSPHATE SERPL-MCNC: 4.6 MG/DL (ref 2.4–5.1)
PHOSPHATE SERPL-MCNC: 4.9 MG/DL (ref 2.7–4.5)
PHOSPHATE SERPL-MCNC: 5.3 MG/DL (ref 2.7–4.5)
PHOSPHATE SERPL-MCNC: 5.6 MG/DL (ref 2.4–5.1)
PHOSPHATE SERPL-MCNC: 5.8 MG/DL (ref 2.4–5.1)
PHOSPHATE SERPL-MCNC: 5.8 MG/DL (ref 2.4–5.1)
PHOSPHATE SERPL-MCNC: 6.5 MG/DL (ref 2.4–5.1)
PISA ALIASING VEL: 3 M/S
PISA RADIUS: 1.3 CM
PLAT MORPH BLD: NORMAL
PLATELET # BLD AUTO: 100 10*3/MM3 (ref 150–450)
PLATELET # BLD AUTO: 107 10*3/MM3 (ref 150–450)
PLATELET # BLD AUTO: 119 10*3/MM3 (ref 150–450)
PLATELET # BLD AUTO: 130 10*3/MM3 (ref 150–450)
PLATELET # BLD AUTO: 140 10*3/MM3 (ref 150–450)
PLATELET # BLD AUTO: 192 10*3/MM3 (ref 150–450)
PLATELET # BLD AUTO: 194 10*3/MM3 (ref 150–450)
PLATELET # BLD AUTO: 199 10*3/MM3 (ref 150–450)
PLATELET # BLD AUTO: 201 10*3/MM3 (ref 150–450)
PLATELET # BLD AUTO: 202 10*3/MM3 (ref 150–450)
PLATELET # BLD AUTO: 216 10*3/MM3 (ref 150–450)
PLATELET # BLD AUTO: 232 10*3/MM3 (ref 150–450)
PLATELET # BLD AUTO: 234 10*3/MM3 (ref 130–400)
PLATELET # BLD AUTO: 237 10*3/MM3 (ref 130–400)
PLATELET # BLD AUTO: 238 10*3/MM3 (ref 130–400)
PLATELET # BLD AUTO: 241 10*3/MM3 (ref 150–450)
PLATELET # BLD AUTO: 244 10*3/MM3 (ref 130–400)
PLATELET # BLD AUTO: 247 10*3/MM3 (ref 130–400)
PLATELET # BLD AUTO: 247 10*3/MM3 (ref 150–450)
PLATELET # BLD AUTO: 251 10*3/MM3 (ref 130–400)
PLATELET # BLD AUTO: 253 10*3/MM3 (ref 130–400)
PLATELET # BLD AUTO: 255 10*3/MM3 (ref 150–450)
PLATELET # BLD AUTO: 261 10*3/MM3 (ref 150–450)
PLATELET # BLD AUTO: 263 10*3/MM3 (ref 150–450)
PLATELET # BLD AUTO: 265 10*3/MM3 (ref 130–400)
PLATELET # BLD AUTO: 266 10*3/MM3 (ref 150–450)
PLATELET # BLD AUTO: 266 10*3/MM3 (ref 150–450)
PLATELET # BLD AUTO: 267 10*3/MM3 (ref 130–400)
PLATELET # BLD AUTO: 270 10*3/MM3 (ref 150–450)
PLATELET # BLD AUTO: 271 10*3/MM3 (ref 130–400)
PLATELET # BLD AUTO: 277 10*3/MM3 (ref 150–450)
PLATELET # BLD AUTO: 282 10*3/MM3 (ref 130–400)
PLATELET # BLD AUTO: 284 10*3/MM3 (ref 150–450)
PLATELET # BLD AUTO: 288 10*3/MM3 (ref 130–400)
PLATELET # BLD AUTO: 291 10*3/MM3 (ref 150–450)
PLATELET # BLD AUTO: 302 10*3/MM3 (ref 130–400)
PLATELET # BLD AUTO: 302 10*3/MM3 (ref 150–450)
PLATELET # BLD AUTO: 304 10*3/MM3 (ref 130–400)
PLATELET # BLD AUTO: 310 10*3/MM3 (ref 130–400)
PLATELET # BLD AUTO: 310 10*3/MM3 (ref 150–450)
PLATELET # BLD AUTO: 316 10*3/MM3 (ref 150–450)
PLATELET # BLD AUTO: 324 10*3/MM3 (ref 150–450)
PLATELET # BLD AUTO: 325 10*3/MM3 (ref 150–450)
PLATELET # BLD AUTO: 329 10*3/MM3 (ref 130–400)
PLATELET # BLD AUTO: 337 10*3/MM3 (ref 150–450)
PLATELET # BLD AUTO: 347 10*3/MM3 (ref 130–400)
PLATELET # BLD AUTO: 365 10*3/MM3 (ref 150–450)
PLATELET # BLD AUTO: 383 10*3/MM3 (ref 130–400)
PLATELET # BLD AUTO: 395 10*3/MM3 (ref 130–400)
PLATELET # BLD AUTO: 395 10*3/MM3 (ref 150–450)
PLATELET # BLD AUTO: 398 10*3/MM3 (ref 150–450)
PLATELET # BLD AUTO: 405 10*3/MM3 (ref 130–400)
PLATELET # BLD AUTO: 420 10*3/MM3 (ref 130–400)
PLATELET # BLD AUTO: 439 10*3/MM3 (ref 130–400)
PMV BLD AUTO: 10 FL (ref 6–10)
PMV BLD AUTO: 10 FL (ref 6–12)
PMV BLD AUTO: 10.1 FL (ref 6–12)
PMV BLD AUTO: 10.1 FL (ref 6–12)
PMV BLD AUTO: 10.2 FL (ref 6–10)
PMV BLD AUTO: 10.2 FL (ref 6–10)
PMV BLD AUTO: 10.3 FL (ref 6–10)
PMV BLD AUTO: 10.4 FL (ref 6–10)
PMV BLD AUTO: 10.4 FL (ref 6–12)
PMV BLD AUTO: 10.5 FL (ref 6–12)
PMV BLD AUTO: 10.6 FL (ref 6–10)
PMV BLD AUTO: 10.6 FL (ref 6–12)
PMV BLD AUTO: 10.6 FL (ref 6–12)
PMV BLD AUTO: 10.7 FL (ref 6–10)
PMV BLD AUTO: 10.7 FL (ref 6–10)
PMV BLD AUTO: 10.7 FL (ref 6–12)
PMV BLD AUTO: 10.8 FL (ref 6–10)
PMV BLD AUTO: 10.8 FL (ref 6–12)
PMV BLD AUTO: 10.8 FL (ref 6–12)
PMV BLD AUTO: 10.9 FL (ref 6–10)
PMV BLD AUTO: 10.9 FL (ref 6–12)
PMV BLD AUTO: 11.1 FL (ref 6–12)
PMV BLD AUTO: 11.1 FL (ref 6–12)
PMV BLD AUTO: 11.2 FL (ref 6–10)
PMV BLD AUTO: 11.4 FL (ref 6–10)
PMV BLD AUTO: 11.4 FL (ref 6–12)
PMV BLD AUTO: 11.5 FL (ref 6–10)
PMV BLD AUTO: 11.5 FL (ref 6–12)
PMV BLD AUTO: 11.6 FL (ref 6–10)
PMV BLD AUTO: 11.6 FL (ref 6–12)
PMV BLD AUTO: 11.8 FL (ref 6–12)
PMV BLD AUTO: 11.8 FL (ref 6–12)
PMV BLD AUTO: 12 FL (ref 6–10)
PMV BLD AUTO: 12.2 FL (ref 6–12)
PMV BLD AUTO: 12.4 FL (ref 6–12)
PMV BLD AUTO: 12.4 FL (ref 6–12)
PMV BLD AUTO: 12.5 FL (ref 6–12)
PMV BLD AUTO: 12.6 FL (ref 6–12)
PMV BLD AUTO: 12.6 FL (ref 6–12)
PMV BLD AUTO: 12.7 FL (ref 6–12)
PMV BLD AUTO: 13.1 FL (ref 6–12)
PMV BLD AUTO: 9.6 FL (ref 6–10)
PMV BLD AUTO: 9.7 FL (ref 6–12)
PMV BLD AUTO: 9.8 FL (ref 6–10)
PMV BLD AUTO: 9.9 FL (ref 6–10)
PMV BLD AUTO: 9.9 FL (ref 6–12)
PO2 BLDA: 101 MM HG (ref 83–108)
PO2 BLDA: 106 MM HG (ref 83–108)
PO2 BLDA: 112 MM HG (ref 83–108)
PO2 BLDA: 114 MM HG (ref 83–108)
PO2 BLDA: 130 MM HG (ref 83–108)
PO2 BLDA: 134 MM HG (ref 83–108)
PO2 BLDA: 171 MM HG (ref 83–108)
PO2 BLDA: 179 MM HG (ref 83–108)
PO2 BLDA: 200 MM HG (ref 83–108)
PO2 BLDA: 37.1 MM HG (ref 80–100)
PO2 BLDA: 373.3 MM HG (ref 79–99)
PO2 BLDA: 373.5 MMHG (ref 79–99)
PO2 BLDA: 46.5 MM HG (ref 83–108)
PO2 BLDA: 54 MM HG (ref 83–108)
PO2 BLDA: 61.2 MM HG (ref 83–108)
PO2 BLDA: 64.9 MM HG (ref 80–100)
PO2 BLDA: 65.4 MM HG (ref 83–108)
PO2 BLDA: 66.2 MM HG (ref 83–108)
PO2 BLDA: 71.2 MM HG (ref 83–108)
PO2 BLDA: 75.2 MM HG (ref 83–108)
PO2 BLDA: 75.6 MM HG (ref 80–100)
PO2 BLDA: 78.2 MM HG (ref 83–108)
PO2 BLDA: 79.4 MM HG (ref 80–100)
PO2 BLDA: 80.9 MM HG (ref 80–100)
PO2 BLDA: 81 MM HG (ref 83–108)
PO2 BLDA: 84.8 MM HG (ref 80–100)
PO2 BLDA: 86.2 MM HG (ref 83–108)
PO2 BLDA: 86.5 MM HG (ref 83–108)
PO2 BLDA: 86.7 MM HG (ref 83–108)
PO2 BLDA: 89.6 MM HG (ref 83–108)
PO2 BLDA: 93.8 MM HG (ref 83–108)
PO2 BLDA: 98.9 MM HG (ref 83–108)
PO2 BLDV: 163.4 MM HG
PO2 BLDV: 29.2 MM HG
PO2 BLDV: 29.5 MM HG
PO2 BLDV: 37 MM HG
PO2 BLDV: 41.1 MM HG
PO2 BLDV: 46.5 MM HG
PO2 BLDV: 46.8 MM HG
PO2 BLDV: 68.2 MM HG
POLYCHROMASIA BLD QL SMEAR: ABNORMAL
POTASSIUM BLD-SCNC: 3.2 MMOL/L (ref 3.5–5.3)
POTASSIUM BLD-SCNC: 3.2 MMOL/L (ref 3.5–5.5)
POTASSIUM BLD-SCNC: 3.3 MMOL/L (ref 3.5–5.3)
POTASSIUM BLD-SCNC: 3.3 MMOL/L (ref 3.5–5.3)
POTASSIUM BLD-SCNC: 3.3 MMOL/L (ref 3.5–5.5)
POTASSIUM BLD-SCNC: 3.4 MMOL/L (ref 3.5–5.3)
POTASSIUM BLD-SCNC: 3.4 MMOL/L (ref 3.5–5.5)
POTASSIUM BLD-SCNC: 3.5 MMOL/L (ref 3.5–5.3)
POTASSIUM BLD-SCNC: 3.5 MMOL/L (ref 3.5–5.3)
POTASSIUM BLD-SCNC: 3.5 MMOL/L (ref 3.5–5.5)
POTASSIUM BLD-SCNC: 3.6 MMOL/L (ref 3.5–5.3)
POTASSIUM BLD-SCNC: 3.6 MMOL/L (ref 3.5–5.5)
POTASSIUM BLD-SCNC: 3.7 MMOL/L (ref 3.5–5.3)
POTASSIUM BLD-SCNC: 3.7 MMOL/L (ref 3.5–5.5)
POTASSIUM BLD-SCNC: 3.8 MMOL/L (ref 3.5–5.3)
POTASSIUM BLD-SCNC: 3.8 MMOL/L (ref 3.5–5.5)
POTASSIUM BLD-SCNC: 3.9 MMOL/L (ref 3.5–5.3)
POTASSIUM BLD-SCNC: 3.9 MMOL/L (ref 3.5–5.5)
POTASSIUM BLD-SCNC: 4 MMOL/L (ref 3.5–5.3)
POTASSIUM BLD-SCNC: 4 MMOL/L (ref 3.5–5.3)
POTASSIUM BLD-SCNC: 4 MMOL/L (ref 3.5–5.5)
POTASSIUM BLD-SCNC: 4 MMOL/L (ref 3.5–5.5)
POTASSIUM BLD-SCNC: 4.1 MMOL/L (ref 3.5–5.5)
POTASSIUM BLD-SCNC: 4.1 MMOL/L (ref 3.5–5.5)
POTASSIUM BLD-SCNC: 4.2 MMOL/L (ref 3.5–5.3)
POTASSIUM BLD-SCNC: 4.3 MMOL/L (ref 3.5–5.5)
POTASSIUM BLD-SCNC: 4.3 MMOL/L (ref 3.5–5.5)
POTASSIUM BLD-SCNC: 4.4 MMOL/L (ref 3.5–5.3)
POTASSIUM BLD-SCNC: 4.4 MMOL/L (ref 3.5–5.3)
POTASSIUM BLD-SCNC: 4.4 MMOL/L (ref 3.5–5.5)
POTASSIUM BLD-SCNC: 4.4 MMOL/L (ref 3.5–5.5)
POTASSIUM BLD-SCNC: 4.5 MMOL/L (ref 3.5–5.3)
POTASSIUM BLD-SCNC: 4.5 MMOL/L (ref 3.5–5.5)
POTASSIUM BLD-SCNC: 4.6 MMOL/L (ref 3.5–5.3)
POTASSIUM BLD-SCNC: 4.6 MMOL/L (ref 3.5–5.3)
POTASSIUM BLD-SCNC: 4.6 MMOL/L (ref 3.5–5.5)
POTASSIUM BLD-SCNC: 4.7 MMOL/L (ref 3.5–5.3)
POTASSIUM BLD-SCNC: 4.8 MMOL/L (ref 3.5–5.3)
POTASSIUM BLD-SCNC: 4.8 MMOL/L (ref 3.5–5.3)
POTASSIUM BLD-SCNC: 4.8 MMOL/L (ref 3.5–5.5)
POTASSIUM BLD-SCNC: 4.9 MMOL/L (ref 3.5–5.5)
POTASSIUM BLD-SCNC: 4.9 MMOL/L (ref 3.5–5.5)
POTASSIUM BLD-SCNC: 5 MMOL/L (ref 3.5–5.3)
POTASSIUM BLD-SCNC: 5 MMOL/L (ref 3.5–5.3)
POTASSIUM BLD-SCNC: 5 MMOL/L (ref 3.5–5.5)
POTASSIUM BLD-SCNC: 5.3 MMOL/L (ref 3.5–5.5)
POTASSIUM BLD-SCNC: 5.6 MMOL/L (ref 3.5–5.3)
POTASSIUM BLD-SCNC: 5.6 MMOL/L (ref 3.5–5.5)
POTASSIUM BLD-SCNC: 5.7 MMOL/L (ref 3.5–5.3)
POTASSIUM BLD-SCNC: 5.8 MMOL/L (ref 3.5–5.5)
POTASSIUM BLD-SCNC: 5.9 MMOL/L (ref 3.5–5.3)
POTASSIUM BLD-SCNC: 6.1 MMOL/L (ref 3.5–5.3)
POTASSIUM BLDA-SCNC: 6.11 MMOL/L (ref 3.5–5.3)
PREALB SERPL-MCNC: 5.7 MG/DL (ref 10–40)
PREALB SERPL-MCNC: 6 MG/DL (ref 12–34)
PREALB SERPL-MCNC: 7.1 MG/DL (ref 10–40)
PREALB SERPL-MCNC: 8.5 MG/DL (ref 10–40)
PREALB SERPL-MCNC: <5 MG/DL (ref 10–40)
PREALB SERPL-MCNC: <5 MG/DL (ref 10–40)
PROCALCITONIN SERPL-MCNC: 0.97 NG/ML
PROCALCITONIN SERPL-MCNC: 1.53 NG/ML
PROCALCITONIN SERPL-MCNC: 12.94 NG/ML
PROCALCITONIN SERPL-MCNC: 7.43 NG/ML
PROT CSF-MCNC: 288 MG/DL (ref 15–45)
PROT FLD-MCNC: 3.2 G/DL
PROT FLD-MCNC: 3.5 G/DL
PROT SERPL-MCNC: 5.1 G/DL (ref 5.7–8.2)
PROT SERPL-MCNC: 5.2 G/DL (ref 5.7–8.2)
PROT SERPL-MCNC: 5.5 G/DL (ref 5.7–8.2)
PROT SERPL-MCNC: 5.5 G/DL (ref 5.7–8.2)
PROT SERPL-MCNC: 5.6 G/DL (ref 5.7–8.2)
PROT SERPL-MCNC: 5.7 G/DL (ref 5.7–8.2)
PROT SERPL-MCNC: 5.8 G/DL (ref 5.7–8.2)
PROT SERPL-MCNC: 5.8 G/DL (ref 6–8)
PROT SERPL-MCNC: 5.9 G/DL (ref 5.7–8.2)
PROT SERPL-MCNC: 6 G/DL (ref 5.7–8.2)
PROT SERPL-MCNC: 6 G/DL (ref 5.7–8.2)
PROT SERPL-MCNC: 6 G/DL (ref 6–8)
PROT SERPL-MCNC: 6.1 G/DL (ref 5.7–8.2)
PROT SERPL-MCNC: 6.1 G/DL (ref 6–8)
PROT SERPL-MCNC: 6.2 G/DL (ref 5.7–8.2)
PROT SERPL-MCNC: 6.2 G/DL (ref 6–8)
PROT SERPL-MCNC: 6.3 G/DL (ref 5.7–8.2)
PROT SERPL-MCNC: 6.3 G/DL (ref 6–8)
PROT SERPL-MCNC: 6.3 G/DL (ref 6–8)
PROT SERPL-MCNC: 6.5 G/DL (ref 6–8)
PROT SERPL-MCNC: 6.8 G/DL (ref 6–8)
PROT SERPL-MCNC: 6.9 G/DL (ref 5.7–8.2)
PROTHROMBIN TIME: 12 SECONDS (ref 9.6–11.5)
PROTHROMBIN TIME: 12.2 SECONDS (ref 9.6–11.5)
PROTHROMBIN TIME: 13.8 SECONDS (ref 9.6–11.5)
PROTHROMBIN TIME: 15.7 SECONDS (ref 9.6–11.5)
PROTHROMBIN TIME: 16.1 SECONDS (ref 11–15.4)
PROTHROMBIN TIME: 18.3 SECONDS (ref 9.6–11.5)
PROTHROMBIN TIME: 19.9 SECONDS (ref 9.6–11.5)
PSV: 8 CMH2O
PTH-INTACT SERPL-MCNC: 101.7 PG/ML (ref 14–72)
RBC # BLD AUTO: 2.13 10*6/MM3 (ref 3.89–5.14)
RBC # BLD AUTO: 2.19 10*6/MM3 (ref 3.89–5.14)
RBC # BLD AUTO: 2.25 10*6/MM3 (ref 3.89–5.14)
RBC # BLD AUTO: 2.26 10*6/MM3 (ref 3.89–5.14)
RBC # BLD AUTO: 2.27 10*6/MM3 (ref 4.2–5.4)
RBC # BLD AUTO: 2.29 10*6/MM3 (ref 4.2–5.4)
RBC # BLD AUTO: 2.3 10*6/MM3 (ref 4.2–5.4)
RBC # BLD AUTO: 2.31 10*6/MM3 (ref 4.2–5.4)
RBC # BLD AUTO: 2.4 10*6/MM3 (ref 3.89–5.14)
RBC # BLD AUTO: 2.41 10*6/MM3 (ref 3.89–5.14)
RBC # BLD AUTO: 2.42 10*6/MM3 (ref 3.89–5.14)
RBC # BLD AUTO: 2.45 10*6/MM3 (ref 3.89–5.14)
RBC # BLD AUTO: 2.48 10*6/MM3 (ref 3.89–5.14)
RBC # BLD AUTO: 2.49 10*6/MM3 (ref 3.89–5.14)
RBC # BLD AUTO: 2.5 10*6/MM3 (ref 3.89–5.14)
RBC # BLD AUTO: 2.54 10*6/MM3 (ref 3.89–5.14)
RBC # BLD AUTO: 2.55 10*6/MM3 (ref 3.89–5.14)
RBC # BLD AUTO: 2.55 10*6/MM3 (ref 4.2–5.4)
RBC # BLD AUTO: 2.56 10*6/MM3 (ref 3.89–5.14)
RBC # BLD AUTO: 2.56 10*6/MM3 (ref 4.2–5.4)
RBC # BLD AUTO: 2.58 10*6/MM3 (ref 3.89–5.14)
RBC # BLD AUTO: 2.6 10*6/MM3 (ref 3.89–5.14)
RBC # BLD AUTO: 2.6 10*6/MM3 (ref 4.2–5.4)
RBC # BLD AUTO: 2.62 10*6/MM3 (ref 4.2–5.4)
RBC # BLD AUTO: 2.65 10*6/MM3 (ref 3.89–5.14)
RBC # BLD AUTO: 2.65 10*6/MM3 (ref 4.2–5.4)
RBC # BLD AUTO: 2.67 10*6/MM3 (ref 3.89–5.14)
RBC # BLD AUTO: 2.68 10*6/MM3 (ref 4.2–5.4)
RBC # BLD AUTO: 2.72 10*6/MM3 (ref 3.89–5.14)
RBC # BLD AUTO: 2.72 10*6/MM3 (ref 3.89–5.14)
RBC # BLD AUTO: 2.74 10*6/MM3 (ref 3.89–5.14)
RBC # BLD AUTO: 2.75 10*6/MM3 (ref 4.2–5.4)
RBC # BLD AUTO: 2.76 10*6/MM3 (ref 3.89–5.14)
RBC # BLD AUTO: 2.79 10*6/MM3 (ref 3.89–5.14)
RBC # BLD AUTO: 2.8 10*6/MM3 (ref 4.2–5.4)
RBC # BLD AUTO: 2.82 10*6/MM3 (ref 4.2–5.4)
RBC # BLD AUTO: 2.82 10*6/MM3 (ref 4.2–5.4)
RBC # BLD AUTO: 2.85 10*6/MM3 (ref 3.89–5.14)
RBC # BLD AUTO: 2.85 10*6/MM3 (ref 3.89–5.14)
RBC # BLD AUTO: 2.91 10*6/MM3 (ref 4.2–5.4)
RBC # BLD AUTO: 2.94 10*6/MM3 (ref 4.2–5.4)
RBC # BLD AUTO: 3.01 10*6/MM3 (ref 4.2–5.4)
RBC # BLD AUTO: 3.03 10*6/MM3 (ref 3.89–5.14)
RBC # BLD AUTO: 3.05 10*6/MM3 (ref 4.2–5.4)
RBC # BLD AUTO: 3.06 10*6/MM3 (ref 3.89–5.14)
RBC # BLD AUTO: 3.11 10*6/MM3 (ref 4.2–5.4)
RBC # BLD AUTO: 3.12 10*6/MM3 (ref 4.2–5.4)
RBC # BLD AUTO: 3.22 10*6/MM3 (ref 4.2–5.4)
RBC # BLD AUTO: 3.24 10*6/MM3 (ref 4.2–5.4)
RBC # BLD AUTO: 3.66 10*6/MM3 (ref 3.89–5.14)
RBC # BLD AUTO: 3.8 10*6/MM3 (ref 3.89–5.14)
RBC # BLD AUTO: 4.17 10*6/MM3 (ref 3.89–5.14)
RBC # CSF MANUAL: 238 /MM3 (ref 0–0)
RBC # FLD AUTO: <1000 /MM3
RBC # FLD AUTO: ABNORMAL /MM3
RBC MORPH BLD: NORMAL
RH BLD: POSITIVE
SAO2 % BLD: 99.9 % (ref 92–98)
SAO2 % BLDCOA: 100 %
SAO2 % BLDCOA: 67.2 % (ref 90–100)
SAO2 % BLDCOA: 87 % (ref 90–100)
SAO2 % BLDCOA: 88.3 %
SAO2 % BLDCOA: 88.3 %
SAO2 % BLDCOA: 91.9 %
SAO2 % BLDCOA: 91.9 %
SAO2 % BLDCOA: 92.8 %
SAO2 % BLDCOA: 93.9 % (ref 90–100)
SAO2 % BLDCOA: 94 %
SAO2 % BLDCOA: 94 %
SAO2 % BLDCOA: 94.7 % (ref 90–100)
SAO2 % BLDCOA: 95 %
SAO2 % BLDCOA: 95 %
SAO2 % BLDCOA: 95 % (ref 90–100)
SAO2 % BLDCOA: 95.5 %
SAO2 % BLDCOA: 95.5 %
SAO2 % BLDCOA: 96.2 % (ref 90–100)
SAO2 % BLDCOA: 96.7 %
SAO2 % BLDCOA: 96.7 %
SAO2 % BLDCOA: 97.9 %
SAO2 % BLDCOA: 98.3 %
SAO2 % BLDCOA: 99 %
SAO2 % BLDCOV: 44.2 %
SAO2 % BLDCOV: 59.3 %
SAO2 % BLDCOV: 67.7 %
SAO2 % BLDCOV: 78.7 %
SAO2 % BLDCOV: 78.7 %
SAO2 % BLDCOV: 82.6 %
SAO2 % BLDCOV: 89.7 %
SAO2 % BLDCOV: 99.7 %
SET MECH RESP RATE: 14
SET MECH RESP RATE: 16
SET MECH RESP RATE: 16
SET MECH RESP RATE: 20
SODIUM BLD-SCNC: 130 MMOL/L (ref 135–153)
SODIUM BLD-SCNC: 131 MMOL/L (ref 135–153)
SODIUM BLD-SCNC: 132 MMOL/L (ref 135–153)
SODIUM BLD-SCNC: 133 MMOL/L (ref 132–146)
SODIUM BLD-SCNC: 133 MMOL/L (ref 135–153)
SODIUM BLD-SCNC: 134 MMOL/L (ref 132–146)
SODIUM BLD-SCNC: 134 MMOL/L (ref 135–153)
SODIUM BLD-SCNC: 135 MMOL/L (ref 132–146)
SODIUM BLD-SCNC: 135 MMOL/L (ref 132–146)
SODIUM BLD-SCNC: 135 MMOL/L (ref 135–153)
SODIUM BLD-SCNC: 136 MMOL/L (ref 132–146)
SODIUM BLD-SCNC: 136 MMOL/L (ref 135–153)
SODIUM BLD-SCNC: 137 MMOL/L (ref 132–146)
SODIUM BLD-SCNC: 137 MMOL/L (ref 135–153)
SODIUM BLD-SCNC: 138 MMOL/L (ref 132–146)
SODIUM BLD-SCNC: 139 MMOL/L (ref 132–146)
SODIUM BLD-SCNC: 139 MMOL/L (ref 135–153)
SODIUM BLD-SCNC: 140 MMOL/L (ref 132–146)
SODIUM BLD-SCNC: 142 MMOL/L (ref 132–146)
SODIUM BLD-SCNC: 143 MMOL/L (ref 135–153)
SODIUM BLD-SCNC: 143 MMOL/L (ref 135–153)
SODIUM BLD-SCNC: 144 MMOL/L (ref 132–146)
SODIUM BLD-SCNC: 146 MMOL/L (ref 135–153)
SODIUM BLD-SCNC: 147 MMOL/L (ref 132–146)
SODIUM BLDA-SCNC: 136.7 MMOL/L (ref 134–146)
SPECIMEN VOL CSF: 2 ML
STRESS TARGET HR: 149 BPM
T&S EXPIRATION DATE: NORMAL
TARGETS BLD QL SMEAR: ABNORMAL
TARGETS BLD QL SMEAR: NORMAL
TARGETS BLD QL SMEAR: NORMAL
TIBC SERPL-MCNC: 440 MCG/DL (ref 250–450)
TOTAL RATE: 18 BREATHS/MINUTE
TOXIC GRANULATION: NORMAL
TOXIC GRANULATION: NORMAL
TRIGL SERPL-MCNC: 124 MG/DL (ref 0–150)
TRIGL SERPL-MCNC: 185 MG/DL (ref 0–150)
TRIGL SERPL-MCNC: 188 MG/DL (ref 0–150)
TRIGL SERPL-MCNC: 198 MG/DL (ref 0–150)
TRIGL SERPL-MCNC: 200 MG/DL (ref 0–150)
TRIGL SERPL-MCNC: 285 MG/DL (ref 0–150)
TRIGL SERPL-MCNC: 90 MG/DL (ref 0–150)
TROPONIN I SERPL-MCNC: 0.14 NG/ML
TROPONIN I SERPL-MCNC: 0.28 NG/ML
TROPONIN I SERPL-MCNC: 1.12 NG/ML
TROPONIN I SERPL-MCNC: 1.25 NG/ML
TROPONIN I SERPL-MCNC: 1.29 NG/ML
TSH SERPL DL<=0.05 MIU/L-ACNC: 0.06 MIU/ML (ref 0.35–5.35)
TSH SERPL DL<=0.05 MIU/L-ACNC: 0.27 MIU/ML (ref 0.55–4.78)
TSH SERPL DL<=0.05 MIU/L-ACNC: 2.59 MIU/ML (ref 0.35–5.35)
TSH SERPL DL<=0.05 MIU/L-ACNC: 6.16 MIU/ML (ref 0.35–5.35)
TUBE # CSF: 1
UNIT  ABO: NORMAL
UNIT  RH: NORMAL
VALPROATE SERPL-MCNC: 41 MCG/ML (ref 50–150)
VALPROATE SERPL-MCNC: 63 MCG/ML (ref 50–150)
VANCOMYCIN SERPL-MCNC: 30.1 MCG/ML (ref 5–40)
VANCOMYCIN TROUGH SERPL-MCNC: 14.2 MCG/ML (ref 5–15)
VANCOMYCIN TROUGH SERPL-MCNC: 17.2 MCG/ML (ref 5–15)
VANCOMYCIN TROUGH SERPL-MCNC: 7.7 MCG/ML (ref 5–15)
VARIANT LYMPHS NFR BLD MANUAL: 1 % (ref 0–5)
VENTILATOR MODE: AC
VIT B12 BLD-MCNC: 806 PG/ML (ref 211–911)
VLDLC SERPL-MCNC: 39.6 MG/DL
VT ON VENT VENT: 380 ML
VT ON VENT VENT: 400 ML
VT ON VENT VENT: 450 ML
WBC # CSF MANUAL: 43 /MM3 (ref 0–5)
WBC # FLD: 205 /MM3
WBC # FLD: 297 /MM3
WBC MORPH BLD: NORMAL
WBC NRBC COR # BLD: 10.23 10*3/MM3 (ref 3.5–10.8)
WBC NRBC COR # BLD: 10.3 10*3/MM3 (ref 3.5–10.8)
WBC NRBC COR # BLD: 10.48 10*3/MM3 (ref 4.5–12.5)
WBC NRBC COR # BLD: 10.67 10*3/MM3 (ref 3.5–10.8)
WBC NRBC COR # BLD: 10.72 10*3/MM3 (ref 4.5–12.5)
WBC NRBC COR # BLD: 10.85 10*3/MM3 (ref 4.5–12.5)
WBC NRBC COR # BLD: 10.99 10*3/MM3 (ref 3.5–10.8)
WBC NRBC COR # BLD: 11.27 10*3/MM3 (ref 4.5–12.5)
WBC NRBC COR # BLD: 11.31 10*3/MM3 (ref 3.5–10.8)
WBC NRBC COR # BLD: 11.4 10*3/MM3 (ref 3.5–10.8)
WBC NRBC COR # BLD: 11.7 10*3/MM3 (ref 3.5–10.8)
WBC NRBC COR # BLD: 11.84 10*3/MM3 (ref 4.5–12.5)
WBC NRBC COR # BLD: 12.11 10*3/MM3 (ref 3.5–10.8)
WBC NRBC COR # BLD: 12.79 10*3/MM3 (ref 3.5–10.8)
WBC NRBC COR # BLD: 12.83 10*3/MM3 (ref 3.5–10.8)
WBC NRBC COR # BLD: 13.13 10*3/MM3 (ref 4.5–12.5)
WBC NRBC COR # BLD: 13.24 10*3/MM3 (ref 3.5–10.8)
WBC NRBC COR # BLD: 13.29 10*3/MM3 (ref 3.5–10.8)
WBC NRBC COR # BLD: 13.31 10*3/MM3 (ref 4.5–12.5)
WBC NRBC COR # BLD: 13.41 10*3/MM3 (ref 3.5–10.8)
WBC NRBC COR # BLD: 13.51 10*3/MM3 (ref 3.5–10.8)
WBC NRBC COR # BLD: 14.01 10*3/MM3 (ref 3.5–10.8)
WBC NRBC COR # BLD: 14.92 10*3/MM3 (ref 3.5–10.8)
WBC NRBC COR # BLD: 14.92 10*3/MM3 (ref 3.5–10.8)
WBC NRBC COR # BLD: 14.99 10*3/MM3 (ref 3.5–10.8)
WBC NRBC COR # BLD: 15.12 10*3/MM3 (ref 3.5–10.8)
WBC NRBC COR # BLD: 15.23 10*3/MM3 (ref 4.5–12.5)
WBC NRBC COR # BLD: 15.68 10*3/MM3 (ref 4.5–12.5)
WBC NRBC COR # BLD: 16.93 10*3/MM3 (ref 4.5–12.5)
WBC NRBC COR # BLD: 17.03 10*3/MM3 (ref 3.5–10.8)
WBC NRBC COR # BLD: 17.71 10*3/MM3 (ref 4.5–12.5)
WBC NRBC COR # BLD: 18.46 10*3/MM3 (ref 3.5–10.8)
WBC NRBC COR # BLD: 18.51 10*3/MM3 (ref 3.5–10.8)
WBC NRBC COR # BLD: 19.36 10*3/MM3 (ref 4.5–12.5)
WBC NRBC COR # BLD: 19.67 10*3/MM3 (ref 3.5–10.8)
WBC NRBC COR # BLD: 20.15 10*3/MM3 (ref 3.5–10.8)
WBC NRBC COR # BLD: 20.18 10*3/MM3 (ref 4.5–12.5)
WBC NRBC COR # BLD: 21.88 10*3/MM3 (ref 4.5–12.5)
WBC NRBC COR # BLD: 22.38 10*3/MM3 (ref 3.5–10.8)
WBC NRBC COR # BLD: 22.98 10*3/MM3 (ref 3.5–10.8)
WBC NRBC COR # BLD: 24.18 10*3/MM3 (ref 3.5–10.8)
WBC NRBC COR # BLD: 24.25 10*3/MM3 (ref 3.5–10.8)
WBC NRBC COR # BLD: 25.82 10*3/MM3 (ref 4.5–12.5)
WBC NRBC COR # BLD: 7.33 10*3/MM3 (ref 4.5–12.5)
WBC NRBC COR # BLD: 7.87 10*3/MM3 (ref 4.5–12.5)
WBC NRBC COR # BLD: 8.18 10*3/MM3 (ref 3.5–10.8)
WBC NRBC COR # BLD: 8.24 10*3/MM3 (ref 3.5–10.8)
WBC NRBC COR # BLD: 8.29 10*3/MM3 (ref 4.5–12.5)
WBC NRBC COR # BLD: 8.33 10*3/MM3 (ref 4.5–12.5)
WBC NRBC COR # BLD: 8.79 10*3/MM3 (ref 3.5–10.8)
WBC NRBC COR # BLD: 9.13 10*3/MM3 (ref 4.5–12.5)
WBC NRBC COR # BLD: 9.2 10*3/MM3 (ref 4.5–12.5)
WBC NRBC COR # BLD: 9.88 10*3/MM3 (ref 4.5–12.5)
WBC NRBC COR # BLD: 9.93 10*3/MM3 (ref 3.5–10.8)
WBC STL QL MICRO: NORMAL

## 2018-01-01 PROCEDURE — 94799 UNLISTED PULMONARY SVC/PX: CPT

## 2018-01-01 PROCEDURE — 85025 COMPLETE CBC W/AUTO DIFF WBC: CPT | Performed by: INTERNAL MEDICINE

## 2018-01-01 PROCEDURE — 99024 POSTOP FOLLOW-UP VISIT: CPT | Performed by: NEUROLOGICAL SURGERY

## 2018-01-01 PROCEDURE — 63710000001 INSULIN DETEMIR PER 5 UNITS: Performed by: INTERNAL MEDICINE

## 2018-01-01 PROCEDURE — 25010000003 CEFAZOLIN IN DEXTROSE 2-4 GM/100ML-% SOLUTION

## 2018-01-01 PROCEDURE — 71045 X-RAY EXAM CHEST 1 VIEW: CPT | Performed by: RADIOLOGY

## 2018-01-01 PROCEDURE — 84100 ASSAY OF PHOSPHORUS: CPT | Performed by: NURSE PRACTITIONER

## 2018-01-01 PROCEDURE — 93005 ELECTROCARDIOGRAM TRACING: CPT | Performed by: INTERNAL MEDICINE

## 2018-01-01 PROCEDURE — 87046 STOOL CULTR AEROBIC BACT EA: CPT | Performed by: PHYSICIAN ASSISTANT

## 2018-01-01 PROCEDURE — 74230 X-RAY XM SWLNG FUNCJ C+: CPT

## 2018-01-01 PROCEDURE — 80048 BASIC METABOLIC PNL TOTAL CA: CPT | Performed by: INTERNAL MEDICINE

## 2018-01-01 PROCEDURE — 71045 X-RAY EXAM CHEST 1 VIEW: CPT

## 2018-01-01 PROCEDURE — 82962 GLUCOSE BLOOD TEST: CPT

## 2018-01-01 PROCEDURE — 85007 BL SMEAR W/DIFF WBC COUNT: CPT | Performed by: INTERNAL MEDICINE

## 2018-01-01 PROCEDURE — 25010000002 PROPOFOL 10 MG/ML EMULSION: Performed by: ANESTHESIOLOGY

## 2018-01-01 PROCEDURE — 80164 ASSAY DIPROPYLACETIC ACD TOT: CPT | Performed by: PSYCHIATRY & NEUROLOGY

## 2018-01-01 PROCEDURE — 86038 ANTINUCLEAR ANTIBODIES: CPT | Performed by: PHYSICIAN ASSISTANT

## 2018-01-01 PROCEDURE — 74018 RADEX ABDOMEN 1 VIEW: CPT

## 2018-01-01 PROCEDURE — 86140 C-REACTIVE PROTEIN: CPT | Performed by: NURSE PRACTITIONER

## 2018-01-01 PROCEDURE — 36558 INSERT TUNNELED CV CATH: CPT | Performed by: SURGERY

## 2018-01-01 PROCEDURE — 36600 WITHDRAWAL OF ARTERIAL BLOOD: CPT | Performed by: INTERNAL MEDICINE

## 2018-01-01 PROCEDURE — 94668 MNPJ CHEST WALL SBSQ: CPT

## 2018-01-01 PROCEDURE — 82805 BLOOD GASES W/O2 SATURATION: CPT | Performed by: INTERNAL MEDICINE

## 2018-01-01 PROCEDURE — 94003 VENT MGMT INPAT SUBQ DAY: CPT

## 2018-01-01 PROCEDURE — 85610 PROTHROMBIN TIME: CPT | Performed by: PHYSICIAN ASSISTANT

## 2018-01-01 PROCEDURE — 87206 SMEAR FLUORESCENT/ACID STAI: CPT | Performed by: INTERNAL MEDICINE

## 2018-01-01 PROCEDURE — 99231 SBSQ HOSP IP/OBS SF/LOW 25: CPT | Performed by: INTERNAL MEDICINE

## 2018-01-01 PROCEDURE — 87070 CULTURE OTHR SPECIMN AEROBIC: CPT | Performed by: NURSE PRACTITIONER

## 2018-01-01 PROCEDURE — 80053 COMPREHEN METABOLIC PANEL: CPT | Performed by: INTERNAL MEDICINE

## 2018-01-01 PROCEDURE — 82803 BLOOD GASES ANY COMBINATION: CPT | Performed by: INTERNAL MEDICINE

## 2018-01-01 PROCEDURE — 99233 SBSQ HOSP IP/OBS HIGH 50: CPT | Performed by: INTERNAL MEDICINE

## 2018-01-01 PROCEDURE — 94760 N-INVAS EAR/PLS OXIMETRY 1: CPT

## 2018-01-01 PROCEDURE — 25010000002 MORPHINE PER 10 MG: Performed by: NURSE PRACTITIONER

## 2018-01-01 PROCEDURE — 80053 COMPREHEN METABOLIC PANEL: CPT | Performed by: NURSE PRACTITIONER

## 2018-01-01 PROCEDURE — 25810000003 DEXTROSE-NACL PER 500 ML: Performed by: NURSE PRACTITIONER

## 2018-01-01 PROCEDURE — 92611 MOTION FLUOROSCOPY/SWALLOW: CPT

## 2018-01-01 PROCEDURE — 82550 ASSAY OF CK (CPK): CPT | Performed by: PHYSICIAN ASSISTANT

## 2018-01-01 PROCEDURE — 82945 GLUCOSE OTHER FLUID: CPT | Performed by: INTERNAL MEDICINE

## 2018-01-01 PROCEDURE — 83735 ASSAY OF MAGNESIUM: CPT | Performed by: NURSE PRACTITIONER

## 2018-01-01 PROCEDURE — 97110 THERAPEUTIC EXERCISES: CPT

## 2018-01-01 PROCEDURE — 83735 ASSAY OF MAGNESIUM: CPT | Performed by: INTERNAL MEDICINE

## 2018-01-01 PROCEDURE — 80074 ACUTE HEPATITIS PANEL: CPT | Performed by: NURSE PRACTITIONER

## 2018-01-01 PROCEDURE — 87116 MYCOBACTERIA CULTURE: CPT | Performed by: INTERNAL MEDICINE

## 2018-01-01 PROCEDURE — 83880 ASSAY OF NATRIURETIC PEPTIDE: CPT | Performed by: INTERNAL MEDICINE

## 2018-01-01 PROCEDURE — 83050 HGB METHEMOGLOBIN QUAN: CPT | Performed by: INTERNAL MEDICINE

## 2018-01-01 PROCEDURE — 94640 AIRWAY INHALATION TREATMENT: CPT

## 2018-01-01 PROCEDURE — 25010000003 CEFAZOLIN IN DEXTROSE 2-4 GM/100ML-% SOLUTION: Performed by: ANESTHESIOLOGY

## 2018-01-01 PROCEDURE — 25010000002 MIDAZOLAM PER 1 MG: Performed by: INTERNAL MEDICINE

## 2018-01-01 PROCEDURE — 82550 ASSAY OF CK (CPK): CPT | Performed by: NURSE PRACTITIONER

## 2018-01-01 PROCEDURE — 99291 CRITICAL CARE FIRST HOUR: CPT | Performed by: INTERNAL MEDICINE

## 2018-01-01 PROCEDURE — 80069 RENAL FUNCTION PANEL: CPT | Performed by: INTERNAL MEDICINE

## 2018-01-01 PROCEDURE — 93970 EXTREMITY STUDY: CPT

## 2018-01-01 PROCEDURE — 88304 TISSUE EXAM BY PATHOLOGIST: CPT | Performed by: NEUROLOGICAL SURGERY

## 2018-01-01 PROCEDURE — 86140 C-REACTIVE PROTEIN: CPT | Performed by: PHYSICIAN ASSISTANT

## 2018-01-01 PROCEDURE — 86140 C-REACTIVE PROTEIN: CPT | Performed by: INTERNAL MEDICINE

## 2018-01-01 PROCEDURE — 99024 POSTOP FOLLOW-UP VISIT: CPT | Performed by: PHYSICIAN ASSISTANT

## 2018-01-01 PROCEDURE — 84100 ASSAY OF PHOSPHORUS: CPT | Performed by: INTERNAL MEDICINE

## 2018-01-01 PROCEDURE — 97610 LOW FREQUENCY NON-THERMAL US: CPT

## 2018-01-01 PROCEDURE — 25010000002 MORPHINE PER 10 MG: Performed by: FAMILY MEDICINE

## 2018-01-01 PROCEDURE — 63510000001 EPOETIN ALFA PER 1000 UNITS: Performed by: INTERNAL MEDICINE

## 2018-01-01 PROCEDURE — 87186 SC STD MICRODIL/AGAR DIL: CPT | Performed by: INTERNAL MEDICINE

## 2018-01-01 PROCEDURE — 87071 CULTURE AEROBIC QUANT OTHER: CPT | Performed by: INTERNAL MEDICINE

## 2018-01-01 PROCEDURE — 87077 CULTURE AEROBIC IDENTIFY: CPT | Performed by: NURSE PRACTITIONER

## 2018-01-01 PROCEDURE — G8998 SWALLOW D/C STATUS: HCPCS

## 2018-01-01 PROCEDURE — 32551 INSERTION OF CHEST TUBE: CPT | Performed by: INTERNAL MEDICINE

## 2018-01-01 PROCEDURE — 25010000002 HEPARIN (PORCINE) PER 1000 UNITS: Performed by: INTERNAL MEDICINE

## 2018-01-01 PROCEDURE — 99232 SBSQ HOSP IP/OBS MODERATE 35: CPT | Performed by: INTERNAL MEDICINE

## 2018-01-01 PROCEDURE — 82550 ASSAY OF CK (CPK): CPT | Performed by: INTERNAL MEDICINE

## 2018-01-01 PROCEDURE — 85610 PROTHROMBIN TIME: CPT | Performed by: INTERNAL MEDICINE

## 2018-01-01 PROCEDURE — 25010000002 PROPOFOL 10 MG/ML EMULSION: Performed by: NURSE ANESTHETIST, CERTIFIED REGISTERED

## 2018-01-01 PROCEDURE — 85610 PROTHROMBIN TIME: CPT | Performed by: NURSE PRACTITIONER

## 2018-01-01 PROCEDURE — 84145 PROCALCITONIN (PCT): CPT | Performed by: NURSE PRACTITIONER

## 2018-01-01 PROCEDURE — 76000 FLUOROSCOPY <1 HR PHYS/QHP: CPT | Performed by: RADIOLOGY

## 2018-01-01 PROCEDURE — 93320 DOPPLER ECHO COMPLETE: CPT | Performed by: INTERNAL MEDICINE

## 2018-01-01 PROCEDURE — 86900 BLOOD TYPING SEROLOGIC ABO: CPT

## 2018-01-01 PROCEDURE — 86612 BLASTOMYCES ANTIBODY: CPT | Performed by: INTERNAL MEDICINE

## 2018-01-01 PROCEDURE — 0DH63UZ INSERTION OF FEEDING DEVICE INTO STOMACH, PERCUTANEOUS APPROACH: ICD-10-PCS | Performed by: SURGERY

## 2018-01-01 PROCEDURE — 93005 ELECTROCARDIOGRAM TRACING: CPT | Performed by: PHYSICIAN ASSISTANT

## 2018-01-01 PROCEDURE — 83036 HEMOGLOBIN GLYCOSYLATED A1C: CPT | Performed by: NURSE PRACTITIONER

## 2018-01-01 PROCEDURE — 89051 BODY FLUID CELL COUNT: CPT | Performed by: INTERNAL MEDICINE

## 2018-01-01 PROCEDURE — 5A1955Z RESPIRATORY VENTILATION, GREATER THAN 96 CONSECUTIVE HOURS: ICD-10-PCS | Performed by: INTERNAL MEDICINE

## 2018-01-01 PROCEDURE — 63510000001 EPOETIN ALFA PER 1000 UNITS: Performed by: NURSE PRACTITIONER

## 2018-01-01 PROCEDURE — 70544 MR ANGIOGRAPHY HEAD W/O DYE: CPT

## 2018-01-01 PROCEDURE — 76000 FLUOROSCOPY <1 HR PHYS/QHP: CPT

## 2018-01-01 PROCEDURE — 87046 STOOL CULTR AEROBIC BACT EA: CPT | Performed by: INTERNAL MEDICINE

## 2018-01-01 PROCEDURE — 87070 CULTURE OTHR SPECIMN AEROBIC: CPT | Performed by: INTERNAL MEDICINE

## 2018-01-01 PROCEDURE — 63710000001 INSULIN REGULAR HUMAN PER 5 UNITS: Performed by: INTERNAL MEDICINE

## 2018-01-01 PROCEDURE — 85025 COMPLETE CBC W/AUTO DIFF WBC: CPT | Performed by: NURSE PRACTITIONER

## 2018-01-01 PROCEDURE — 84134 ASSAY OF PREALBUMIN: CPT | Performed by: NURSE PRACTITIONER

## 2018-01-01 PROCEDURE — 99232 SBSQ HOSP IP/OBS MODERATE 35: CPT | Performed by: PSYCHIATRY & NEUROLOGY

## 2018-01-01 PROCEDURE — 36556 INSERT NON-TUNNEL CV CATH: CPT | Performed by: NURSE PRACTITIONER

## 2018-01-01 PROCEDURE — 25010000002 MIDAZOLAM PER 1 MG

## 2018-01-01 PROCEDURE — P9016 RBC LEUKOCYTES REDUCED: HCPCS

## 2018-01-01 PROCEDURE — 25010000002 HEPARIN (PORCINE) PER 1000 UNITS: Performed by: NURSE ANESTHETIST, CERTIFIED REGISTERED

## 2018-01-01 PROCEDURE — 97597 DBRDMT OPN WND 1ST 20 CM/<: CPT

## 2018-01-01 PROCEDURE — C1769 GUIDE WIRE: HCPCS | Performed by: SURGERY

## 2018-01-01 PROCEDURE — 25010000002 ALBUMIN HUMAN 25% PER 50 ML: Performed by: INTERNAL MEDICINE

## 2018-01-01 PROCEDURE — 93010 ELECTROCARDIOGRAM REPORT: CPT | Performed by: INTERNAL MEDICINE

## 2018-01-01 PROCEDURE — 25010000002 GENTAMICIN PER 80 MG

## 2018-01-01 PROCEDURE — 84100 ASSAY OF PHOSPHORUS: CPT | Performed by: PHYSICIAN ASSISTANT

## 2018-01-01 PROCEDURE — 87102 FUNGUS ISOLATION CULTURE: CPT | Performed by: INTERNAL MEDICINE

## 2018-01-01 PROCEDURE — 84132 ASSAY OF SERUM POTASSIUM: CPT | Performed by: NEUROLOGICAL SURGERY

## 2018-01-01 PROCEDURE — 87107 FUNGI IDENTIFICATION MOLD: CPT

## 2018-01-01 PROCEDURE — 63081 REMOVE VERT BODY DCMPRN CRVL: CPT | Performed by: NEUROLOGICAL SURGERY

## 2018-01-01 PROCEDURE — 85027 COMPLETE CBC AUTOMATED: CPT | Performed by: INTERNAL MEDICINE

## 2018-01-01 PROCEDURE — 72125 CT NECK SPINE W/O DYE: CPT

## 2018-01-01 PROCEDURE — 25010000002 HYDROMORPHONE PER 4 MG: Performed by: NURSE PRACTITIONER

## 2018-01-01 PROCEDURE — 63710000001 INSULIN REGULAR HUMAN PER 5 UNITS: Performed by: NURSE PRACTITIONER

## 2018-01-01 PROCEDURE — 86901 BLOOD TYPING SEROLOGIC RH(D): CPT | Performed by: NURSE PRACTITIONER

## 2018-01-01 PROCEDURE — 85730 THROMBOPLASTIN TIME PARTIAL: CPT | Performed by: SURGERY

## 2018-01-01 PROCEDURE — 84443 ASSAY THYROID STIM HORMONE: CPT | Performed by: NURSE PRACTITIONER

## 2018-01-01 PROCEDURE — 82805 BLOOD GASES W/O2 SATURATION: CPT | Performed by: NURSE PRACTITIONER

## 2018-01-01 PROCEDURE — 87040 BLOOD CULTURE FOR BACTERIA: CPT | Performed by: NURSE PRACTITIONER

## 2018-01-01 PROCEDURE — 84478 ASSAY OF TRIGLYCERIDES: CPT | Performed by: NURSE PRACTITIONER

## 2018-01-01 PROCEDURE — 85025 COMPLETE CBC W/AUTO DIFF WBC: CPT | Performed by: PHYSICIAN ASSISTANT

## 2018-01-01 PROCEDURE — 85610 PROTHROMBIN TIME: CPT | Performed by: SURGERY

## 2018-01-01 PROCEDURE — 25010000002 ONDANSETRON PER 1 MG: Performed by: NURSE ANESTHETIST, CERTIFIED REGISTERED

## 2018-01-01 PROCEDURE — 93970 EXTREMITY STUDY: CPT | Performed by: RADIOLOGY

## 2018-01-01 PROCEDURE — 97112 NEUROMUSCULAR REEDUCATION: CPT

## 2018-01-01 PROCEDURE — 82330 ASSAY OF CALCIUM: CPT | Performed by: INTERNAL MEDICINE

## 2018-01-01 PROCEDURE — 31500 INSERT EMERGENCY AIRWAY: CPT

## 2018-01-01 PROCEDURE — 0B978ZZ DRAINAGE OF LEFT MAIN BRONCHUS, VIA NATURAL OR ARTIFICIAL OPENING ENDOSCOPIC: ICD-10-PCS | Performed by: INTERNAL MEDICINE

## 2018-01-01 PROCEDURE — 77001 FLUOROGUIDE FOR VEIN DEVICE: CPT | Performed by: SURGERY

## 2018-01-01 PROCEDURE — 83615 LACTATE (LD) (LDH) ENZYME: CPT | Performed by: INTERNAL MEDICINE

## 2018-01-01 PROCEDURE — 87045 FECES CULTURE AEROBIC BACT: CPT | Performed by: PHYSICIAN ASSISTANT

## 2018-01-01 PROCEDURE — 25010000002 NA FERRIC GLUC CPLX PER 12.5 MG: Performed by: INTERNAL MEDICINE

## 2018-01-01 PROCEDURE — 94667 MNPJ CHEST WALL 1ST: CPT

## 2018-01-01 PROCEDURE — 82805 BLOOD GASES W/O2 SATURATION: CPT | Performed by: NEUROLOGICAL SURGERY

## 2018-01-01 PROCEDURE — 84134 ASSAY OF PREALBUMIN: CPT | Performed by: INTERNAL MEDICINE

## 2018-01-01 PROCEDURE — 25010000002 VITAMIN K1 PER 1 MG: Performed by: INTERNAL MEDICINE

## 2018-01-01 PROCEDURE — 36600 WITHDRAWAL OF ARTERIAL BLOOD: CPT | Performed by: NURSE PRACTITIONER

## 2018-01-01 PROCEDURE — 99292 CRITICAL CARE ADDL 30 MIN: CPT | Performed by: INTERNAL MEDICINE

## 2018-01-01 PROCEDURE — P9017 PLASMA 1 DONOR FRZ W/IN 8 HR: HCPCS

## 2018-01-01 PROCEDURE — 94002 VENT MGMT INPAT INIT DAY: CPT

## 2018-01-01 PROCEDURE — 86850 RBC ANTIBODY SCREEN: CPT | Performed by: INTERNAL MEDICINE

## 2018-01-01 PROCEDURE — 82375 ASSAY CARBOXYHB QUANT: CPT | Performed by: INTERNAL MEDICINE

## 2018-01-01 PROCEDURE — 84702 CHORIONIC GONADOTROPIN TEST: CPT | Performed by: SURGERY

## 2018-01-01 PROCEDURE — 87205 SMEAR GRAM STAIN: CPT | Performed by: INTERNAL MEDICINE

## 2018-01-01 PROCEDURE — 70450 CT HEAD/BRAIN W/O DYE: CPT

## 2018-01-01 PROCEDURE — 80069 RENAL FUNCTION PANEL: CPT | Performed by: NURSE PRACTITIONER

## 2018-01-01 PROCEDURE — 02HV33Z INSERTION OF INFUSION DEVICE INTO SUPERIOR VENA CAVA, PERCUTANEOUS APPROACH: ICD-10-PCS | Performed by: INTERNAL MEDICINE

## 2018-01-01 PROCEDURE — 72020 X-RAY EXAM OF SPINE 1 VIEW: CPT

## 2018-01-01 PROCEDURE — 22600 ARTHRD PST TQ 1NTRSPC CRV: CPT | Performed by: NEUROLOGICAL SURGERY

## 2018-01-01 PROCEDURE — 80202 ASSAY OF VANCOMYCIN: CPT | Performed by: INTERNAL MEDICINE

## 2018-01-01 PROCEDURE — 80074 ACUTE HEPATITIS PANEL: CPT | Performed by: INTERNAL MEDICINE

## 2018-01-01 PROCEDURE — 25010000002 PIPERACILLIN SOD-TAZOBACTAM PER 1 G: Performed by: INTERNAL MEDICINE

## 2018-01-01 PROCEDURE — 25010000002 ALTEPLASE 2 MG RECONSTITUTED SOLUTION: Performed by: INTERNAL MEDICINE

## 2018-01-01 PROCEDURE — 87186 SC STD MICRODIL/AGAR DIL: CPT | Performed by: NURSE PRACTITIONER

## 2018-01-01 PROCEDURE — 25010000002 FENTANYL CITRATE (PF) 100 MCG/2ML SOLUTION: Performed by: INTERNAL MEDICINE

## 2018-01-01 PROCEDURE — P9046 ALBUMIN (HUMAN), 25%, 20 ML: HCPCS | Performed by: INTERNAL MEDICINE

## 2018-01-01 PROCEDURE — 92507 TX SP LANG VOICE COMM INDIV: CPT

## 2018-01-01 PROCEDURE — 82140 ASSAY OF AMMONIA: CPT | Performed by: INTERNAL MEDICINE

## 2018-01-01 PROCEDURE — 87186 SC STD MICRODIL/AGAR DIL: CPT | Performed by: NEUROLOGICAL SURGERY

## 2018-01-01 PROCEDURE — 25010000002 LORAZEPAM PER 2 MG: Performed by: FAMILY MEDICINE

## 2018-01-01 PROCEDURE — 83605 ASSAY OF LACTIC ACID: CPT | Performed by: NURSE PRACTITIONER

## 2018-01-01 PROCEDURE — 86635 COCCIDIOIDES ANTIBODY: CPT | Performed by: INTERNAL MEDICINE

## 2018-01-01 PROCEDURE — 93005 ELECTROCARDIOGRAM TRACING: CPT | Performed by: NURSE PRACTITIONER

## 2018-01-01 PROCEDURE — 25010000002 PHENYLEPHRINE PER 1 ML: Performed by: NURSE ANESTHETIST, CERTIFIED REGISTERED

## 2018-01-01 PROCEDURE — 84157 ASSAY OF PROTEIN OTHER: CPT | Performed by: PHYSICIAN ASSISTANT

## 2018-01-01 PROCEDURE — 25010000003 CEFAZOLIN IN DEXTROSE 2-4 GM/100ML-% SOLUTION: Performed by: PHYSICIAN ASSISTANT

## 2018-01-01 PROCEDURE — 36430 TRANSFUSION BLD/BLD COMPNT: CPT

## 2018-01-01 PROCEDURE — 85730 THROMBOPLASTIN TIME PARTIAL: CPT | Performed by: INTERNAL MEDICINE

## 2018-01-01 PROCEDURE — 80053 COMPREHEN METABOLIC PANEL: CPT | Performed by: PHYSICIAN ASSISTANT

## 2018-01-01 PROCEDURE — 25010000002 DEXAMETHASONE PER 1 MG: Performed by: ANESTHESIOLOGY

## 2018-01-01 PROCEDURE — 97162 PT EVAL MOD COMPLEX 30 MIN: CPT

## 2018-01-01 PROCEDURE — 25010000002 HEPARIN (PORCINE) PER 1000 UNITS: Performed by: NURSE PRACTITIONER

## 2018-01-01 PROCEDURE — 25010000002 FENTANYL CITRATE (PF) 100 MCG/2ML SOLUTION: Performed by: NURSE ANESTHETIST, CERTIFIED REGISTERED

## 2018-01-01 PROCEDURE — 85730 THROMBOPLASTIN TIME PARTIAL: CPT | Performed by: NURSE PRACTITIONER

## 2018-01-01 PROCEDURE — 97530 THERAPEUTIC ACTIVITIES: CPT

## 2018-01-01 PROCEDURE — 92522 EVALUATE SPEECH PRODUCTION: CPT

## 2018-01-01 PROCEDURE — 84145 PROCALCITONIN (PCT): CPT | Performed by: INTERNAL MEDICINE

## 2018-01-01 PROCEDURE — 25010000002 FENTANYL CITRATE (PF) 100 MCG/2ML SOLUTION: Performed by: ANESTHESIOLOGY

## 2018-01-01 PROCEDURE — 87899 AGENT NOS ASSAY W/OPTIC: CPT | Performed by: INTERNAL MEDICINE

## 2018-01-01 PROCEDURE — 82306 VITAMIN D 25 HYDROXY: CPT | Performed by: INTERNAL MEDICINE

## 2018-01-01 PROCEDURE — 86923 COMPATIBILITY TEST ELECTRIC: CPT

## 2018-01-01 PROCEDURE — 25010000003 CEFAZOLIN IN DEXTROSE 2-4 GM/100ML-% SOLUTION: Performed by: INTERNAL MEDICINE

## 2018-01-01 PROCEDURE — 86900 BLOOD TYPING SEROLOGIC ABO: CPT | Performed by: INTERNAL MEDICINE

## 2018-01-01 PROCEDURE — 84443 ASSAY THYROID STIM HORMONE: CPT | Performed by: PHYSICIAN ASSISTANT

## 2018-01-01 PROCEDURE — 22614 ARTHRD PST TQ 1NTRSPC EA ADD: CPT | Performed by: NEUROLOGICAL SURGERY

## 2018-01-01 PROCEDURE — 84484 ASSAY OF TROPONIN QUANT: CPT | Performed by: INTERNAL MEDICINE

## 2018-01-01 PROCEDURE — 87305 ASPERGILLUS AG IA: CPT | Performed by: INTERNAL MEDICINE

## 2018-01-01 PROCEDURE — 93306 TTE W/DOPPLER COMPLETE: CPT | Performed by: INTERNAL MEDICINE

## 2018-01-01 PROCEDURE — 70490 CT SOFT TISSUE NECK W/O DYE: CPT | Performed by: RADIOLOGY

## 2018-01-01 PROCEDURE — 93325 DOPPLER ECHO COLOR FLOW MAPG: CPT | Performed by: INTERNAL MEDICINE

## 2018-01-01 PROCEDURE — 93312 ECHO TRANSESOPHAGEAL: CPT

## 2018-01-01 PROCEDURE — 97597 DBRDMT OPN WND 1ST 20 CM/<: CPT | Performed by: PHYSICAL THERAPIST

## 2018-01-01 PROCEDURE — 97163 PT EVAL HIGH COMPLEX 45 MIN: CPT

## 2018-01-01 PROCEDURE — 87075 CULTR BACTERIA EXCEPT BLOOD: CPT | Performed by: NEUROLOGICAL SURGERY

## 2018-01-01 PROCEDURE — 84484 ASSAY OF TROPONIN QUANT: CPT | Performed by: NURSE PRACTITIONER

## 2018-01-01 PROCEDURE — C1713 ANCHOR/SCREW BN/BN,TIS/BN: HCPCS | Performed by: NEUROLOGICAL SURGERY

## 2018-01-01 PROCEDURE — 87177 OVA AND PARASITES SMEARS: CPT | Performed by: PHYSICIAN ASSISTANT

## 2018-01-01 PROCEDURE — 0W9B30Z DRAINAGE OF LEFT PLEURAL CAVITY WITH DRAINAGE DEVICE, PERCUTANEOUS APPROACH: ICD-10-PCS | Performed by: INTERNAL MEDICINE

## 2018-01-01 PROCEDURE — 83735 ASSAY OF MAGNESIUM: CPT | Performed by: PHYSICIAN ASSISTANT

## 2018-01-01 PROCEDURE — 87075 CULTR BACTERIA EXCEPT BLOOD: CPT | Performed by: INTERNAL MEDICINE

## 2018-01-01 PROCEDURE — 84132 ASSAY OF SERUM POTASSIUM: CPT | Performed by: INTERNAL MEDICINE

## 2018-01-01 PROCEDURE — 99231 SBSQ HOSP IP/OBS SF/LOW 25: CPT | Performed by: PSYCHIATRY & NEUROLOGY

## 2018-01-01 PROCEDURE — 93320 DOPPLER ECHO COMPLETE: CPT

## 2018-01-01 PROCEDURE — 97167 OT EVAL HIGH COMPLEX 60 MIN: CPT

## 2018-01-01 PROCEDURE — 87077 CULTURE AEROBIC IDENTIFY: CPT | Performed by: INTERNAL MEDICINE

## 2018-01-01 PROCEDURE — 83605 ASSAY OF LACTIC ACID: CPT | Performed by: INTERNAL MEDICINE

## 2018-01-01 PROCEDURE — 87147 CULTURE TYPE IMMUNOLOGIC: CPT | Performed by: NURSE PRACTITIONER

## 2018-01-01 PROCEDURE — 87205 SMEAR GRAM STAIN: CPT | Performed by: NEUROLOGICAL SURGERY

## 2018-01-01 PROCEDURE — 76705 ECHO EXAM OF ABDOMEN: CPT

## 2018-01-01 PROCEDURE — 25010000002 DAPTOMYCIN PER 1 MG: Performed by: INTERNAL MEDICINE

## 2018-01-01 PROCEDURE — 87493 C DIFF AMPLIFIED PROBE: CPT | Performed by: INTERNAL MEDICINE

## 2018-01-01 PROCEDURE — 71250 CT THORAX DX C-: CPT | Performed by: RADIOLOGY

## 2018-01-01 PROCEDURE — 87205 SMEAR GRAM STAIN: CPT | Performed by: NURSE PRACTITIONER

## 2018-01-01 PROCEDURE — 25010000002 HEPARIN FLUSH (PORCINE) 100 UNIT/ML SOLUTION: Performed by: SURGERY

## 2018-01-01 PROCEDURE — 87040 BLOOD CULTURE FOR BACTERIA: CPT | Performed by: INTERNAL MEDICINE

## 2018-01-01 PROCEDURE — C1750 CATH, HEMODIALYSIS,LONG-TERM: HCPCS | Performed by: SURGERY

## 2018-01-01 PROCEDURE — 3E0G76Z INTRODUCTION OF NUTRITIONAL SUBSTANCE INTO UPPER GI, VIA NATURAL OR ARTIFICIAL OPENING: ICD-10-PCS | Performed by: INTERNAL MEDICINE

## 2018-01-01 PROCEDURE — 85007 BL SMEAR W/DIFF WBC COUNT: CPT | Performed by: NURSE PRACTITIONER

## 2018-01-01 PROCEDURE — 84157 ASSAY OF PROTEIN OTHER: CPT | Performed by: INTERNAL MEDICINE

## 2018-01-01 PROCEDURE — 25010000002 HYDROMORPHONE PER 4 MG: Performed by: NEUROLOGICAL SURGERY

## 2018-01-01 PROCEDURE — 97165 OT EVAL LOW COMPLEX 30 MIN: CPT

## 2018-01-01 PROCEDURE — 0JD50ZZ EXTRACTION OF LEFT NECK SUBCUTANEOUS TISSUE AND FASCIA, OPEN APPROACH: ICD-10-PCS | Performed by: NEUROLOGICAL SURGERY

## 2018-01-01 PROCEDURE — 5A1D70Z PERFORMANCE OF URINARY FILTRATION, INTERMITTENT, LESS THAN 6 HOURS PER DAY: ICD-10-PCS | Performed by: INTERNAL MEDICINE

## 2018-01-01 PROCEDURE — 63710000001 INSULIN LISPRO (HUMAN) PER 5 UNITS: Performed by: NURSE PRACTITIONER

## 2018-01-01 PROCEDURE — 93970 EXTREMITY STUDY: CPT | Performed by: INTERNAL MEDICINE

## 2018-01-01 PROCEDURE — 80069 RENAL FUNCTION PANEL: CPT | Performed by: PHYSICIAN ASSISTANT

## 2018-01-01 PROCEDURE — 87147 CULTURE TYPE IMMUNOLOGIC: CPT | Performed by: INTERNAL MEDICINE

## 2018-01-01 PROCEDURE — 99239 HOSP IP/OBS DSCHRG MGMT >30: CPT | Performed by: NURSE PRACTITIONER

## 2018-01-01 PROCEDURE — 70450 CT HEAD/BRAIN W/O DYE: CPT | Performed by: RADIOLOGY

## 2018-01-01 PROCEDURE — 87185 SC STD ENZYME DETCJ PER NZM: CPT | Performed by: NURSE PRACTITIONER

## 2018-01-01 PROCEDURE — 86901 BLOOD TYPING SEROLOGIC RH(D): CPT | Performed by: INTERNAL MEDICINE

## 2018-01-01 PROCEDURE — 99231 SBSQ HOSP IP/OBS SF/LOW 25: CPT | Performed by: PHYSICIAN ASSISTANT

## 2018-01-01 PROCEDURE — 99222 1ST HOSP IP/OBS MODERATE 55: CPT | Performed by: PHYSICIAN ASSISTANT

## 2018-01-01 PROCEDURE — 95819 EEG AWAKE AND ASLEEP: CPT

## 2018-01-01 PROCEDURE — 0RP104Z REMOVAL OF INTERNAL FIXATION DEVICE FROM CERVICAL VERTEBRAL JOINT, OPEN APPROACH: ICD-10-PCS | Performed by: NEUROLOGICAL SURGERY

## 2018-01-01 PROCEDURE — 93312 ECHO TRANSESOPHAGEAL: CPT | Performed by: INTERNAL MEDICINE

## 2018-01-01 PROCEDURE — 76937 US GUIDE VASCULAR ACCESS: CPT | Performed by: SURGERY

## 2018-01-01 PROCEDURE — 25010000002 VANCOMYCIN PER 500 MG: Performed by: NEUROLOGICAL SURGERY

## 2018-01-01 PROCEDURE — 71250 CT THORAX DX C-: CPT

## 2018-01-01 PROCEDURE — 36831 OPEN THROMBECT AV FISTULA: CPT | Performed by: SURGERY

## 2018-01-01 PROCEDURE — 87147 CULTURE TYPE IMMUNOLOGIC: CPT | Performed by: NEUROLOGICAL SURGERY

## 2018-01-01 PROCEDURE — 63048 LAM FACETEC &FORAMOT EA ADDL: CPT | Performed by: NEUROLOGICAL SURGERY

## 2018-01-01 PROCEDURE — 25010000002 VANCOMYCIN PER 500 MG

## 2018-01-01 PROCEDURE — 25010000002 ONDANSETRON PER 1 MG: Performed by: ANESTHESIOLOGY

## 2018-01-01 PROCEDURE — 84443 ASSAY THYROID STIM HORMONE: CPT | Performed by: INTERNAL MEDICINE

## 2018-01-01 PROCEDURE — 88312 SPECIAL STAINS GROUP 1: CPT | Performed by: NEUROLOGICAL SURGERY

## 2018-01-01 PROCEDURE — 82465 ASSAY BLD/SERUM CHOLESTEROL: CPT | Performed by: NURSE PRACTITIONER

## 2018-01-01 PROCEDURE — 31645 BRNCHSC W/THER ASPIR 1ST: CPT | Performed by: INTERNAL MEDICINE

## 2018-01-01 PROCEDURE — 36600 WITHDRAWAL OF ARTERIAL BLOOD: CPT | Performed by: PHYSICIAN ASSISTANT

## 2018-01-01 PROCEDURE — 82330 ASSAY OF CALCIUM: CPT | Performed by: NURSE PRACTITIONER

## 2018-01-01 PROCEDURE — 87045 FECES CULTURE AEROBIC BACT: CPT | Performed by: INTERNAL MEDICINE

## 2018-01-01 PROCEDURE — 73020 X-RAY EXAM OF SHOULDER: CPT

## 2018-01-01 PROCEDURE — 25010000002 MIDAZOLAM PER 1 MG: Performed by: NURSE ANESTHETIST, CERTIFIED REGISTERED

## 2018-01-01 PROCEDURE — 25010000002 HEPARIN (PORCINE) PER 1000 UNITS: Performed by: SURGERY

## 2018-01-01 PROCEDURE — 87077 CULTURE AEROBIC IDENTIFY: CPT | Performed by: NEUROLOGICAL SURGERY

## 2018-01-01 PROCEDURE — 87040 BLOOD CULTURE FOR BACTERIA: CPT | Performed by: PHYSICIAN ASSISTANT

## 2018-01-01 PROCEDURE — 97164 PT RE-EVAL EST PLAN CARE: CPT

## 2018-01-01 PROCEDURE — 99232 SBSQ HOSP IP/OBS MODERATE 35: CPT | Performed by: PHYSICIAN ASSISTANT

## 2018-01-01 PROCEDURE — 82533 TOTAL CORTISOL: CPT | Performed by: NURSE PRACTITIONER

## 2018-01-01 PROCEDURE — 63710000001 INSULIN REGULAR HUMAN PER 5 UNITS: Performed by: ANESTHESIOLOGY

## 2018-01-01 PROCEDURE — 93882 EXTRACRANIAL UNI/LTD STUDY: CPT

## 2018-01-01 PROCEDURE — 72141 MRI NECK SPINE W/O DYE: CPT

## 2018-01-01 PROCEDURE — 82330 ASSAY OF CALCIUM: CPT | Performed by: NEUROLOGICAL SURGERY

## 2018-01-01 PROCEDURE — 36581 REPLACE TUNNELED CV CATH: CPT | Performed by: SURGERY

## 2018-01-01 PROCEDURE — 31624 DX BRONCHOSCOPE/LAVAGE: CPT | Performed by: INTERNAL MEDICINE

## 2018-01-01 PROCEDURE — C1751 CATH, INF, PER/CENT/MIDLINE: HCPCS | Performed by: SURGERY

## 2018-01-01 PROCEDURE — 87899 AGENT NOS ASSAY W/OPTIC: CPT | Performed by: PHYSICIAN ASSISTANT

## 2018-01-01 PROCEDURE — C1894 INTRO/SHEATH, NON-LASER: HCPCS | Performed by: SURGERY

## 2018-01-01 PROCEDURE — 86698 HISTOPLASMA ANTIBODY: CPT | Performed by: INTERNAL MEDICINE

## 2018-01-01 PROCEDURE — 82805 BLOOD GASES W/O2 SATURATION: CPT | Performed by: PHYSICIAN ASSISTANT

## 2018-01-01 PROCEDURE — 93882 EXTRACRANIAL UNI/LTD STUDY: CPT | Performed by: INTERNAL MEDICINE

## 2018-01-01 PROCEDURE — 84295 ASSAY OF SERUM SODIUM: CPT | Performed by: NEUROLOGICAL SURGERY

## 2018-01-01 PROCEDURE — 99221 1ST HOSP IP/OBS SF/LOW 40: CPT | Performed by: NEUROLOGICAL SURGERY

## 2018-01-01 PROCEDURE — G8996 SWALLOW CURRENT STATUS: HCPCS

## 2018-01-01 PROCEDURE — 85027 COMPLETE CBC AUTOMATED: CPT | Performed by: PHYSICIAN ASSISTANT

## 2018-01-01 PROCEDURE — 87070 CULTURE OTHR SPECIMN AEROBIC: CPT | Performed by: NEUROLOGICAL SURGERY

## 2018-01-01 PROCEDURE — B548ZZA ULTRASONOGRAPHY OF SUPERIOR VENA CAVA, GUIDANCE: ICD-10-PCS | Performed by: INTERNAL MEDICINE

## 2018-01-01 PROCEDURE — 93325 DOPPLER ECHO COLOR FLOW MAPG: CPT

## 2018-01-01 PROCEDURE — 82607 VITAMIN B-12: CPT | Performed by: INTERNAL MEDICINE

## 2018-01-01 PROCEDURE — 92612 ENDOSCOPY SWALLOW (FEES) VID: CPT

## 2018-01-01 PROCEDURE — 25010000002 PHENYLEPHRINE PER 1 ML: Performed by: ANESTHESIOLOGY

## 2018-01-01 PROCEDURE — C1757 CATH, THROMBECTOMY/EMBOLECT: HCPCS | Performed by: SURGERY

## 2018-01-01 PROCEDURE — 22842 INSERT SPINE FIXATION DEVICE: CPT | Performed by: NEUROLOGICAL SURGERY

## 2018-01-01 PROCEDURE — 85610 PROTHROMBIN TIME: CPT | Performed by: RADIOLOGY

## 2018-01-01 PROCEDURE — 82435 ASSAY OF BLOOD CHLORIDE: CPT | Performed by: NEUROLOGICAL SURGERY

## 2018-01-01 PROCEDURE — 86927 PLASMA FRESH FROZEN: CPT

## 2018-01-01 PROCEDURE — 97610 LOW FREQUENCY NON-THERMAL US: CPT | Performed by: PHYSICAL THERAPIST

## 2018-01-01 PROCEDURE — G8997 SWALLOW GOAL STATUS: HCPCS

## 2018-01-01 PROCEDURE — 84478 ASSAY OF TRIGLYCERIDES: CPT | Performed by: INTERNAL MEDICINE

## 2018-01-01 PROCEDURE — 88305 TISSUE EXAM BY PATHOLOGIST: CPT | Performed by: INTERNAL MEDICINE

## 2018-01-01 PROCEDURE — 36589 REMOVAL TUNNELED CV CATH: CPT | Performed by: SURGERY

## 2018-01-01 PROCEDURE — 87209 SMEAR COMPLEX STAIN: CPT | Performed by: PHYSICIAN ASSISTANT

## 2018-01-01 PROCEDURE — 86850 RBC ANTIBODY SCREEN: CPT | Performed by: NURSE PRACTITIONER

## 2018-01-01 PROCEDURE — 82945 GLUCOSE OTHER FLUID: CPT | Performed by: PHYSICIAN ASSISTANT

## 2018-01-01 PROCEDURE — 85018 HEMOGLOBIN: CPT | Performed by: NEUROLOGICAL SURGERY

## 2018-01-01 PROCEDURE — 88112 CYTOPATH CELL ENHANCE TECH: CPT | Performed by: INTERNAL MEDICINE

## 2018-01-01 PROCEDURE — 25010000002 HYDROMORPHONE PER 4 MG: Performed by: INTERNAL MEDICINE

## 2018-01-01 PROCEDURE — 74230 X-RAY XM SWLNG FUNCJ C+: CPT | Performed by: RADIOLOGY

## 2018-01-01 PROCEDURE — 74018 RADEX ABDOMEN 1 VIEW: CPT | Performed by: RADIOLOGY

## 2018-01-01 PROCEDURE — 25010000002 MIDAZOLAM PER 1 MG: Performed by: ANESTHESIOLOGY

## 2018-01-01 PROCEDURE — 25010000002 FENTANYL CITRATE (PF) 100 MCG/2ML SOLUTION

## 2018-01-01 PROCEDURE — 86901 BLOOD TYPING SEROLOGIC RH(D): CPT

## 2018-01-01 PROCEDURE — 97166 OT EVAL MOD COMPLEX 45 MIN: CPT

## 2018-01-01 PROCEDURE — 22852 REMOVE SPINE FIXATION DEVICE: CPT | Performed by: NEUROLOGICAL SURGERY

## 2018-01-01 PROCEDURE — 85018 HEMOGLOBIN: CPT | Performed by: INTERNAL MEDICINE

## 2018-01-01 PROCEDURE — 70490 CT SOFT TISSUE NECK W/O DYE: CPT

## 2018-01-01 PROCEDURE — 85652 RBC SED RATE AUTOMATED: CPT | Performed by: PSYCHIATRY & NEUROLOGY

## 2018-01-01 PROCEDURE — 13160 SEC CLSR SURG WND/DEHSN XTN: CPT | Performed by: NEUROLOGICAL SURGERY

## 2018-01-01 PROCEDURE — 25010000002 GENTAMICIN PER 80 MG: Performed by: INTERNAL MEDICINE

## 2018-01-01 PROCEDURE — 83550 IRON BINDING TEST: CPT | Performed by: INTERNAL MEDICINE

## 2018-01-01 PROCEDURE — 31622 DX BRONCHOSCOPE/WASH: CPT | Performed by: INTERNAL MEDICINE

## 2018-01-01 PROCEDURE — 11043 DBRDMT MUSC&/FSCA 1ST 20/<: CPT | Performed by: NEUROLOGICAL SURGERY

## 2018-01-01 PROCEDURE — 0B113F4 BYPASS TRACHEA TO CUTANEOUS WITH TRACHEOSTOMY DEVICE, PERCUTANEOUS APPROACH: ICD-10-PCS | Performed by: SURGERY

## 2018-01-01 PROCEDURE — 36556 INSERT NON-TUNNEL CV CATH: CPT | Performed by: SURGERY

## 2018-01-01 PROCEDURE — 83970 ASSAY OF PARATHORMONE: CPT | Performed by: INTERNAL MEDICINE

## 2018-01-01 PROCEDURE — 85014 HEMATOCRIT: CPT | Performed by: INTERNAL MEDICINE

## 2018-01-01 PROCEDURE — 86900 BLOOD TYPING SEROLOGIC ABO: CPT | Performed by: NURSE PRACTITIONER

## 2018-01-01 PROCEDURE — 83605 ASSAY OF LACTIC ACID: CPT | Performed by: PHYSICIAN ASSISTANT

## 2018-01-01 PROCEDURE — 87015 SPECIMEN INFECT AGNT CONCNTJ: CPT | Performed by: INTERNAL MEDICINE

## 2018-01-01 PROCEDURE — 93306 TTE W/DOPPLER COMPLETE: CPT

## 2018-01-01 PROCEDURE — 00NW0ZZ RELEASE CERVICAL SPINAL CORD, OPEN APPROACH: ICD-10-PCS | Performed by: NEUROLOGICAL SURGERY

## 2018-01-01 PROCEDURE — 83540 ASSAY OF IRON: CPT | Performed by: INTERNAL MEDICINE

## 2018-01-01 PROCEDURE — 25010000002 ALTEPLASE 2 MG RECONSTITUTED SOLUTION 1 EACH VIAL: Performed by: INTERNAL MEDICINE

## 2018-01-01 PROCEDURE — 89051 BODY FLUID CELL COUNT: CPT | Performed by: PHYSICIAN ASSISTANT

## 2018-01-01 PROCEDURE — 82746 ASSAY OF FOLIC ACID SERUM: CPT | Performed by: INTERNAL MEDICINE

## 2018-01-01 PROCEDURE — 63045 LAM FACETEC & FORAMOT CRV: CPT | Performed by: NEUROLOGICAL SURGERY

## 2018-01-01 PROCEDURE — 0RG20AJ FUSION OF 2 OR MORE CERVICAL VERTEBRAL JOINTS WITH INTERBODY FUSION DEVICE, POSTERIOR APPROACH, ANTERIOR COLUMN, OPEN APPROACH: ICD-10-PCS | Performed by: NEUROLOGICAL SURGERY

## 2018-01-01 PROCEDURE — 009U3ZX DRAINAGE OF SPINAL CANAL, PERCUTANEOUS APPROACH, DIAGNOSTIC: ICD-10-PCS | Performed by: NEUROLOGICAL SURGERY

## 2018-01-01 PROCEDURE — 70551 MRI BRAIN STEM W/O DYE: CPT

## 2018-01-01 PROCEDURE — 92523 SPEECH SOUND LANG COMPREHEN: CPT

## 2018-01-01 PROCEDURE — 99223 1ST HOSP IP/OBS HIGH 75: CPT | Performed by: PSYCHIATRY & NEUROLOGY

## 2018-01-01 PROCEDURE — 31646 BRNCHSC W/THER ASPIR SBSQ: CPT | Performed by: INTERNAL MEDICINE

## 2018-01-01 PROCEDURE — 80202 ASSAY OF VANCOMYCIN: CPT

## 2018-01-01 PROCEDURE — B24BZZ4 ULTRASONOGRAPHY OF HEART WITH AORTA, TRANSESOPHAGEAL: ICD-10-PCS | Performed by: INTERNAL MEDICINE

## 2018-01-01 PROCEDURE — 85730 THROMBOPLASTIN TIME PARTIAL: CPT | Performed by: RADIOLOGY

## 2018-01-01 PROCEDURE — 0B9L8ZX DRAINAGE OF LEFT LUNG, VIA NATURAL OR ARTIFICIAL OPENING ENDOSCOPIC, DIAGNOSTIC: ICD-10-PCS | Performed by: INTERNAL MEDICINE

## 2018-01-01 PROCEDURE — 83986 ASSAY PH BODY FLUID NOS: CPT | Performed by: INTERNAL MEDICINE

## 2018-01-01 PROCEDURE — 80061 LIPID PANEL: CPT | Performed by: NURSE PRACTITIONER

## 2018-01-01 PROCEDURE — 87076 CULTURE ANAEROBE IDENT EACH: CPT | Performed by: INTERNAL MEDICINE

## 2018-01-01 DEVICE — PLATE 3002033 ZEVO 33MM 2 LVL
Type: IMPLANTABLE DEVICE | Site: SPINE CERVICAL | Status: FUNCTIONAL
Brand: ZEVO™ ANTERIOR CERVICAL PLATE SYSTEM

## 2018-01-01 DEVICE — SCREW 6958714 3.5 X 14MM MAS
Type: IMPLANTABLE DEVICE | Site: SPINE CERVICAL | Status: FUNCTIONAL
Brand: VERTEX® RECONSTRUCTION SYSTEM

## 2018-01-01 DEVICE — IMPLANT 6240841 ANATOMIC 14X11X8MM
Type: IMPLANTABLE DEVICE | Site: SPINE CERVICAL | Status: FUNCTIONAL
Brand: VERTE-STACK® SPINAL SYSTEM

## 2018-01-01 DEVICE — PUTTY DBM GRAFTON 6CC: Type: IMPLANTABLE DEVICE | Site: SPINE CERVICAL | Status: FUNCTIONAL

## 2018-01-01 DEVICE — DBM T42275 8MMX1CMX10CM 2 EACH GRAFTON M
Type: IMPLANTABLE DEVICE | Site: SPINE CERVICAL | Status: FUNCTIONAL
Brand: GRAFTON®AND GRAFTON PLUS®DEMINERALIZED BONE MATRIX (DBM)

## 2018-01-01 RX ORDER — PANTOPRAZOLE SODIUM 40 MG/10ML
40 INJECTION, POWDER, LYOPHILIZED, FOR SOLUTION INTRAVENOUS
Status: DISCONTINUED | OUTPATIENT
Start: 2018-01-01 | End: 2018-01-01

## 2018-01-01 RX ORDER — TRAMADOL HYDROCHLORIDE 50 MG/1
50 TABLET ORAL EVERY 6 HOURS PRN
Status: DISCONTINUED | OUTPATIENT
Start: 2018-01-01 | End: 2018-01-01 | Stop reason: HOSPADM

## 2018-01-01 RX ORDER — MEPERIDINE HYDROCHLORIDE 50 MG/ML
12.5 INJECTION INTRAMUSCULAR; INTRAVENOUS; SUBCUTANEOUS
Status: DISCONTINUED | OUTPATIENT
Start: 2018-01-01 | End: 2018-01-01 | Stop reason: HOSPADM

## 2018-01-01 RX ORDER — MAGNESIUM HYDROXIDE 1200 MG/15ML
LIQUID ORAL AS NEEDED
Status: DISCONTINUED | OUTPATIENT
Start: 2018-01-01 | End: 2018-01-01 | Stop reason: HOSPADM

## 2018-01-01 RX ORDER — SODIUM CHLORIDE, SODIUM LACTATE, POTASSIUM CHLORIDE, CALCIUM CHLORIDE 600; 310; 30; 20 MG/100ML; MG/100ML; MG/100ML; MG/100ML
50 INJECTION, SOLUTION INTRAVENOUS CONTINUOUS
Status: DISCONTINUED | OUTPATIENT
Start: 2018-01-01 | End: 2018-01-01

## 2018-01-01 RX ORDER — GABAPENTIN 400 MG/1
400 CAPSULE ORAL
COMMUNITY

## 2018-01-01 RX ORDER — ALBUMIN (HUMAN) 12.5 G/50ML
12.5 SOLUTION INTRAVENOUS AS NEEDED
Status: DISCONTINUED | OUTPATIENT
Start: 2018-01-01 | End: 2018-01-01 | Stop reason: HOSPADM

## 2018-01-01 RX ORDER — ONDANSETRON 4 MG/1
4 TABLET, FILM COATED ORAL EVERY 6 HOURS PRN
Status: CANCELLED | OUTPATIENT
Start: 2018-01-01

## 2018-01-01 RX ORDER — BISACODYL 10 MG
10 SUPPOSITORY, RECTAL RECTAL DAILY PRN
Status: DISCONTINUED | OUTPATIENT
Start: 2018-01-01 | End: 2018-01-01 | Stop reason: HOSPADM

## 2018-01-01 RX ORDER — NALOXONE HCL 0.4 MG/ML
0.4 VIAL (ML) INJECTION AS NEEDED
Status: DISCONTINUED | OUTPATIENT
Start: 2018-01-01 | End: 2018-01-01 | Stop reason: HOSPADM

## 2018-01-01 RX ORDER — HEPARIN SODIUM 1000 [USP'U]/ML
INJECTION, SOLUTION INTRAVENOUS; SUBCUTANEOUS AS NEEDED
Status: DISCONTINUED | OUTPATIENT
Start: 2018-01-01 | End: 2018-01-01 | Stop reason: SURG

## 2018-01-01 RX ORDER — LORATADINE 10 MG/1
10 TABLET ORAL DAILY
COMMUNITY

## 2018-01-01 RX ORDER — CHLORHEXIDINE GLUCONATE 0.12 MG/ML
15 RINSE ORAL EVERY 12 HOURS SCHEDULED
Status: CANCELLED | OUTPATIENT
Start: 2018-01-01

## 2018-01-01 RX ORDER — ACETAMINOPHEN 325 MG/1
650 TABLET ORAL EVERY 4 HOURS PRN
Status: CANCELLED | OUTPATIENT
Start: 2018-01-01

## 2018-01-01 RX ORDER — MIDODRINE HYDROCHLORIDE 5 MG/1
20 TABLET ORAL ONCE
Status: DISCONTINUED | OUTPATIENT
Start: 2018-01-01 | End: 2018-01-01 | Stop reason: SDUPTHER

## 2018-01-01 RX ORDER — MIDAZOLAM HYDROCHLORIDE 1 MG/ML
INJECTION INTRAMUSCULAR; INTRAVENOUS AS NEEDED
Status: DISCONTINUED | OUTPATIENT
Start: 2018-01-01 | End: 2018-01-01 | Stop reason: SURG

## 2018-01-01 RX ORDER — OXYCODONE HYDROCHLORIDE AND ACETAMINOPHEN 5; 325 MG/1; MG/1
1 TABLET ORAL ONCE AS NEEDED
Status: COMPLETED | OUTPATIENT
Start: 2018-01-01 | End: 2018-01-01

## 2018-01-01 RX ORDER — BUPIVACAINE HYDROCHLORIDE AND EPINEPHRINE 2.5; 5 MG/ML; UG/ML
INJECTION, SOLUTION EPIDURAL; INFILTRATION; INTRACAUDAL; PERINEURAL AS NEEDED
Status: DISCONTINUED | OUTPATIENT
Start: 2018-01-01 | End: 2018-01-01 | Stop reason: HOSPADM

## 2018-01-01 RX ORDER — HEPARIN SODIUM 5000 [USP'U]/ML
5000 INJECTION, SOLUTION INTRAVENOUS; SUBCUTANEOUS EVERY 8 HOURS SCHEDULED
Status: DISCONTINUED | OUTPATIENT
Start: 2018-01-01 | End: 2018-01-01 | Stop reason: HOSPADM

## 2018-01-01 RX ORDER — LIDOCAINE HYDROCHLORIDE 10 MG/ML
INJECTION, SOLUTION INFILTRATION; PERINEURAL AS NEEDED
Status: DISCONTINUED | OUTPATIENT
Start: 2018-01-01 | End: 2018-01-01 | Stop reason: HOSPADM

## 2018-01-01 RX ORDER — MIDAZOLAM HYDROCHLORIDE 1 MG/ML
INJECTION INTRAMUSCULAR; INTRAVENOUS
Status: DISPENSED
Start: 2018-01-01 | End: 2018-01-01

## 2018-01-01 RX ORDER — SODIUM CHLORIDE 9 MG/ML
INJECTION, SOLUTION INTRAVENOUS CONTINUOUS PRN
Status: DISCONTINUED | OUTPATIENT
Start: 2018-01-01 | End: 2018-01-01 | Stop reason: SURG

## 2018-01-01 RX ORDER — ATORVASTATIN CALCIUM 20 MG/1
20 TABLET, FILM COATED ORAL NIGHTLY
Status: DISCONTINUED | OUTPATIENT
Start: 2018-01-01 | End: 2018-01-01

## 2018-01-01 RX ORDER — GLYCOPYRROLATE 0.2 MG/ML
0.4 INJECTION INTRAMUSCULAR; INTRAVENOUS EVERY 4 HOURS PRN
Status: DISCONTINUED | OUTPATIENT
Start: 2018-01-01 | End: 2018-01-01 | Stop reason: HOSPADM

## 2018-01-01 RX ORDER — FENTANYL CITRATE 50 UG/ML
INJECTION, SOLUTION INTRAMUSCULAR; INTRAVENOUS AS NEEDED
Status: DISCONTINUED | OUTPATIENT
Start: 2018-01-01 | End: 2018-01-01 | Stop reason: SURG

## 2018-01-01 RX ORDER — ONDANSETRON 4 MG/1
4 TABLET, FILM COATED ORAL EVERY 6 HOURS PRN
COMMUNITY

## 2018-01-01 RX ORDER — SODIUM CHLORIDE 0.9 % (FLUSH) 0.9 %
1-10 SYRINGE (ML) INJECTION AS NEEDED
Status: CANCELLED | OUTPATIENT
Start: 2018-01-01

## 2018-01-01 RX ORDER — DEXTROSE MONOHYDRATE 25 G/50ML
25 INJECTION, SOLUTION INTRAVENOUS
Status: CANCELLED | OUTPATIENT
Start: 2018-01-01

## 2018-01-01 RX ORDER — ALBUMIN (HUMAN) 12.5 G/50ML
12.5 SOLUTION INTRAVENOUS AS NEEDED
Status: ACTIVE | OUTPATIENT
Start: 2018-01-01 | End: 2018-01-01

## 2018-01-01 RX ORDER — SODIUM CHLORIDE, SODIUM LACTATE, POTASSIUM CHLORIDE, CALCIUM CHLORIDE 600; 310; 30; 20 MG/100ML; MG/100ML; MG/100ML; MG/100ML
125 INJECTION, SOLUTION INTRAVENOUS CONTINUOUS
Status: CANCELLED | OUTPATIENT
Start: 2018-01-01

## 2018-01-01 RX ORDER — SODIUM CHLORIDE 0.9 % (FLUSH) 0.9 %
1-10 SYRINGE (ML) INJECTION AS NEEDED
Status: DISCONTINUED | OUTPATIENT
Start: 2018-01-01 | End: 2018-01-01 | Stop reason: HOSPADM

## 2018-01-01 RX ORDER — HYDROXYZINE HYDROCHLORIDE 25 MG/1
25 TABLET, FILM COATED ORAL EVERY 8 HOURS PRN
COMMUNITY

## 2018-01-01 RX ORDER — TRAMADOL HYDROCHLORIDE 50 MG/1
50 TABLET ORAL EVERY 6 HOURS PRN
Status: CANCELLED | OUTPATIENT
Start: 2018-01-01 | End: 2018-06-30

## 2018-01-01 RX ORDER — LEVOTHYROXINE SODIUM ANHYDROUS 100 UG/5ML
50 INJECTION, POWDER, LYOPHILIZED, FOR SOLUTION INTRAVENOUS
Status: DISCONTINUED | OUTPATIENT
Start: 2018-01-01 | End: 2018-01-01

## 2018-01-01 RX ORDER — IPRATROPIUM BROMIDE AND ALBUTEROL SULFATE 2.5; .5 MG/3ML; MG/3ML
3 SOLUTION RESPIRATORY (INHALATION) EVERY 4 HOURS PRN
Status: CANCELLED | OUTPATIENT
Start: 2018-01-01

## 2018-01-01 RX ORDER — VECURONIUM BROMIDE 1 MG/ML
INJECTION, POWDER, LYOPHILIZED, FOR SOLUTION INTRAVENOUS AS NEEDED
Status: DISCONTINUED | OUTPATIENT
Start: 2018-01-01 | End: 2018-01-01 | Stop reason: SURG

## 2018-01-01 RX ORDER — LIDOCAINE HYDROCHLORIDE 10 MG/ML
0.5 INJECTION, SOLUTION EPIDURAL; INFILTRATION; INTRACAUDAL; PERINEURAL ONCE AS NEEDED
Status: DISCONTINUED | OUTPATIENT
Start: 2018-01-01 | End: 2018-01-01 | Stop reason: HOSPADM

## 2018-01-01 RX ORDER — CEFAZOLIN SODIUM 2 G/100ML
2 INJECTION, SOLUTION INTRAVENOUS EVERY 24 HOURS
Status: DISCONTINUED | OUTPATIENT
Start: 2018-01-01 | End: 2018-01-01

## 2018-01-01 RX ORDER — ALBUMIN (HUMAN) 12.5 G/50ML
12.5 SOLUTION INTRAVENOUS AS NEEDED
Status: DISPENSED | OUTPATIENT
Start: 2018-01-01 | End: 2018-01-01

## 2018-01-01 RX ORDER — NICOTINE POLACRILEX 4 MG
15 LOZENGE BUCCAL
Status: DISCONTINUED | OUTPATIENT
Start: 2018-01-01 | End: 2018-01-01

## 2018-01-01 RX ORDER — ACETAMINOPHEN 325 MG/1
650 TABLET ORAL EVERY 4 HOURS PRN
Status: DISCONTINUED | OUTPATIENT
Start: 2018-01-01 | End: 2018-01-01 | Stop reason: HOSPADM

## 2018-01-01 RX ORDER — NYSTATIN 100000 [USP'U]/G
POWDER TOPICAL AS NEEDED
Status: DISCONTINUED | OUTPATIENT
Start: 2018-01-01 | End: 2018-01-01 | Stop reason: HOSPADM

## 2018-01-01 RX ORDER — SODIUM CHLORIDE, SODIUM LACTATE, POTASSIUM CHLORIDE, CALCIUM CHLORIDE 600; 310; 30; 20 MG/100ML; MG/100ML; MG/100ML; MG/100ML
125 INJECTION, SOLUTION INTRAVENOUS CONTINUOUS
Status: DISCONTINUED | OUTPATIENT
Start: 2018-01-01 | End: 2018-01-01 | Stop reason: HOSPADM

## 2018-01-01 RX ORDER — ONDANSETRON 2 MG/ML
INJECTION INTRAMUSCULAR; INTRAVENOUS AS NEEDED
Status: DISCONTINUED | OUTPATIENT
Start: 2018-01-01 | End: 2018-01-01 | Stop reason: SURG

## 2018-01-01 RX ORDER — LEVOTHYROXINE SODIUM 0.07 MG/1
75 TABLET ORAL DAILY
COMMUNITY

## 2018-01-01 RX ORDER — MIDODRINE HYDROCHLORIDE 5 MG/1
5 TABLET ORAL
Status: DISCONTINUED | OUTPATIENT
Start: 2018-01-01 | End: 2018-01-01

## 2018-01-01 RX ORDER — CEFAZOLIN SODIUM 2 G/100ML
INJECTION, SOLUTION INTRAVENOUS AS NEEDED
Status: DISCONTINUED | OUTPATIENT
Start: 2018-01-01 | End: 2018-01-01 | Stop reason: SURG

## 2018-01-01 RX ORDER — OXYCODONE HCL 5 MG/5 ML
5 SOLUTION, ORAL ORAL EVERY 4 HOURS PRN
Status: DISCONTINUED | OUTPATIENT
Start: 2018-01-01 | End: 2018-01-01 | Stop reason: HOSPADM

## 2018-01-01 RX ORDER — LIDOCAINE 50 MG/G
1 PATCH TOPICAL EVERY 24 HOURS
Status: CANCELLED | OUTPATIENT
Start: 2018-01-01

## 2018-01-01 RX ORDER — VALPROIC ACID 250 MG/5ML
500 SOLUTION ORAL EVERY 8 HOURS SCHEDULED
Status: DISCONTINUED | OUTPATIENT
Start: 2018-01-01 | End: 2018-01-01 | Stop reason: HOSPADM

## 2018-01-01 RX ORDER — IPRATROPIUM BROMIDE AND ALBUTEROL SULFATE 2.5; .5 MG/3ML; MG/3ML
3 SOLUTION RESPIRATORY (INHALATION) EVERY 6 HOURS PRN
Status: DISCONTINUED | OUTPATIENT
Start: 2018-01-01 | End: 2018-01-01

## 2018-01-01 RX ORDER — LOPERAMIDE HYDROCHLORIDE 2 MG/1
2 CAPSULE ORAL 2 TIMES DAILY PRN
COMMUNITY

## 2018-01-01 RX ORDER — MELATONIN
1000 DAILY
Status: CANCELLED | OUTPATIENT
Start: 2018-01-01

## 2018-01-01 RX ORDER — IPRATROPIUM BROMIDE AND ALBUTEROL SULFATE 2.5; .5 MG/3ML; MG/3ML
3 SOLUTION RESPIRATORY (INHALATION)
Status: DISCONTINUED | OUTPATIENT
Start: 2018-01-01 | End: 2018-01-01 | Stop reason: HOSPADM

## 2018-01-01 RX ORDER — AMINO ACIDS/PROTEIN HYDROLYS 15G-100/30
1 LIQUID (ML) ORAL 2 TIMES DAILY
Status: DISCONTINUED | OUTPATIENT
Start: 2018-01-01 | End: 2018-01-01 | Stop reason: HOSPADM

## 2018-01-01 RX ORDER — DEXTROSE AND SODIUM CHLORIDE 5; .9 G/100ML; G/100ML
50 INJECTION, SOLUTION INTRAVENOUS CONTINUOUS
Status: DISCONTINUED | OUTPATIENT
Start: 2018-01-01 | End: 2018-01-01

## 2018-01-01 RX ORDER — PROPOFOL 10 MG/ML
VIAL (ML) INTRAVENOUS AS NEEDED
Status: DISCONTINUED | OUTPATIENT
Start: 2018-01-01 | End: 2018-01-01 | Stop reason: SURG

## 2018-01-01 RX ORDER — IPRATROPIUM BROMIDE AND ALBUTEROL SULFATE 2.5; .5 MG/3ML; MG/3ML
3 SOLUTION RESPIRATORY (INHALATION) EVERY 6 HOURS PRN
Status: DISCONTINUED | OUTPATIENT
Start: 2018-01-01 | End: 2018-01-01 | Stop reason: HOSPADM

## 2018-01-01 RX ORDER — OXYCODONE HCL 5 MG/5 ML
5 SOLUTION, ORAL ORAL EVERY 4 HOURS PRN
Status: CANCELLED | OUTPATIENT
Start: 2018-01-01 | End: 2018-07-05

## 2018-01-01 RX ORDER — PANTOPRAZOLE SODIUM 40 MG/1
40 TABLET, DELAYED RELEASE ORAL DAILY
COMMUNITY

## 2018-01-01 RX ORDER — MIDAZOLAM HYDROCHLORIDE 1 MG/ML
INJECTION INTRAMUSCULAR; INTRAVENOUS
Status: COMPLETED
Start: 2018-01-01 | End: 2018-01-01

## 2018-01-01 RX ORDER — BACLOFEN 10 MG/1
10 TABLET ORAL EVERY 12 HOURS SCHEDULED
Status: DISCONTINUED | OUTPATIENT
Start: 2018-01-01 | End: 2018-01-01

## 2018-01-01 RX ORDER — FAMOTIDINE 10 MG/ML
INJECTION, SOLUTION INTRAVENOUS AS NEEDED
Status: DISCONTINUED | OUTPATIENT
Start: 2018-01-01 | End: 2018-01-01 | Stop reason: SURG

## 2018-01-01 RX ORDER — IPRATROPIUM BROMIDE AND ALBUTEROL SULFATE 2.5; .5 MG/3ML; MG/3ML
3 SOLUTION RESPIRATORY (INHALATION) EVERY 4 HOURS
COMMUNITY

## 2018-01-01 RX ORDER — DULOXETIN HYDROCHLORIDE 20 MG/1
20 CAPSULE, DELAYED RELEASE ORAL DAILY
COMMUNITY

## 2018-01-01 RX ORDER — SERTRALINE HYDROCHLORIDE 100 MG/1
100 TABLET, FILM COATED ORAL DAILY
Status: DISCONTINUED | OUTPATIENT
Start: 2018-01-01 | End: 2018-01-01 | Stop reason: HOSPADM

## 2018-01-01 RX ORDER — CHLORHEXIDINE GLUCONATE 0.12 MG/ML
15 RINSE ORAL EVERY 12 HOURS SCHEDULED
Status: DISCONTINUED | OUTPATIENT
Start: 2018-01-01 | End: 2018-01-01 | Stop reason: HOSPADM

## 2018-01-01 RX ORDER — ALBUMIN (HUMAN) 12.5 G/50ML
25 SOLUTION INTRAVENOUS AS NEEDED
Status: DISPENSED | OUTPATIENT
Start: 2018-01-01 | End: 2018-01-01

## 2018-01-01 RX ORDER — DIPHENHYDRAMINE HCL 25 MG
25 CAPSULE ORAL EVERY 6 HOURS PRN
COMMUNITY

## 2018-01-01 RX ORDER — HALOPERIDOL 5 MG/ML
1 INJECTION INTRAMUSCULAR EVERY 6 HOURS PRN
Status: DISCONTINUED | OUTPATIENT
Start: 2018-01-01 | End: 2018-01-01 | Stop reason: HOSPADM

## 2018-01-01 RX ORDER — ALBUMIN (HUMAN) 12.5 G/50ML
25 SOLUTION INTRAVENOUS ONCE
Status: DISCONTINUED | OUTPATIENT
Start: 2018-01-01 | End: 2018-01-01

## 2018-01-01 RX ORDER — DIPHENHYDRAMINE HYDROCHLORIDE, ZINC ACETATE 2; .1 G/100G; G/100G
1 CREAM TOPICAL DAILY
Status: CANCELLED | OUTPATIENT
Start: 2018-01-01

## 2018-01-01 RX ORDER — GLYCOPYRROLATE 0.2 MG/ML
0.4 INJECTION INTRAMUSCULAR; INTRAVENOUS EVERY 4 HOURS PRN
Status: CANCELLED | OUTPATIENT
Start: 2018-01-01

## 2018-01-01 RX ORDER — SODIUM CHLORIDE 9 MG/ML
50 INJECTION, SOLUTION INTRAVENOUS CONTINUOUS
Status: DISCONTINUED | OUTPATIENT
Start: 2018-01-01 | End: 2018-01-01

## 2018-01-01 RX ORDER — FENTANYL CITRATE 50 UG/ML
INJECTION, SOLUTION INTRAMUSCULAR; INTRAVENOUS
Status: COMPLETED
Start: 2018-01-01 | End: 2018-01-01

## 2018-01-01 RX ORDER — LIDOCAINE HYDROCHLORIDE 10 MG/ML
0.5 INJECTION, SOLUTION EPIDURAL; INFILTRATION; INTRACAUDAL; PERINEURAL ONCE AS NEEDED
Status: CANCELLED | OUTPATIENT
Start: 2018-01-01

## 2018-01-01 RX ORDER — MIDODRINE HYDROCHLORIDE 5 MG/1
20 TABLET ORAL EVERY 8 HOURS
Status: DISCONTINUED | OUTPATIENT
Start: 2018-01-01 | End: 2018-01-01 | Stop reason: HOSPADM

## 2018-01-01 RX ORDER — IPRATROPIUM BROMIDE AND ALBUTEROL SULFATE 2.5; .5 MG/3ML; MG/3ML
3 SOLUTION RESPIRATORY (INHALATION)
Status: CANCELLED | OUTPATIENT
Start: 2018-01-01

## 2018-01-01 RX ORDER — IPRATROPIUM BROMIDE AND ALBUTEROL SULFATE 2.5; .5 MG/3ML; MG/3ML
3 SOLUTION RESPIRATORY (INHALATION) EVERY 4 HOURS
Status: CANCELLED | OUTPATIENT
Start: 2018-01-01

## 2018-01-01 RX ORDER — PROMETHAZINE HYDROCHLORIDE 25 MG/ML
6.25 INJECTION, SOLUTION INTRAMUSCULAR; INTRAVENOUS ONCE AS NEEDED
Status: DISCONTINUED | OUTPATIENT
Start: 2018-01-01 | End: 2018-01-01 | Stop reason: HOSPADM

## 2018-01-01 RX ORDER — DEXTROSE MONOHYDRATE 25 G/50ML
25 INJECTION, SOLUTION INTRAVENOUS
Status: DISCONTINUED | OUTPATIENT
Start: 2018-01-01 | End: 2018-01-01 | Stop reason: SDUPTHER

## 2018-01-01 RX ORDER — NYSTATIN 100000 [USP'U]/G
POWDER TOPICAL EVERY 12 HOURS SCHEDULED
Status: CANCELLED | OUTPATIENT
Start: 2018-01-01

## 2018-01-01 RX ORDER — FAMOTIDINE 20 MG/1
40 TABLET, FILM COATED ORAL 2 TIMES DAILY
COMMUNITY

## 2018-01-01 RX ORDER — NITROGLYCERIN 0.4 MG/1
0.4 TABLET SUBLINGUAL
COMMUNITY

## 2018-01-01 RX ORDER — ONDANSETRON 2 MG/ML
4 INJECTION INTRAMUSCULAR; INTRAVENOUS EVERY 6 HOURS PRN
Status: DISCONTINUED | OUTPATIENT
Start: 2018-01-01 | End: 2018-01-01

## 2018-01-01 RX ORDER — ALBUMIN (HUMAN) 12.5 G/50ML
12.5 SOLUTION INTRAVENOUS AS NEEDED
Status: DISCONTINUED | OUTPATIENT
Start: 2018-01-01 | End: 2018-01-01

## 2018-01-01 RX ORDER — CEFAZOLIN SODIUM 2 G/100ML
2 INJECTION, SOLUTION INTRAVENOUS ONCE
Status: COMPLETED | OUTPATIENT
Start: 2018-01-01 | End: 2018-01-01

## 2018-01-01 RX ORDER — LIDOCAINE HYDROCHLORIDE 10 MG/ML
INJECTION, SOLUTION EPIDURAL; INFILTRATION; INTRACAUDAL; PERINEURAL AS NEEDED
Status: DISCONTINUED | OUTPATIENT
Start: 2018-01-01 | End: 2018-01-01 | Stop reason: SURG

## 2018-01-01 RX ORDER — ONDANSETRON 4 MG/1
4 TABLET, FILM COATED ORAL EVERY 6 HOURS PRN
Status: DISCONTINUED | OUTPATIENT
Start: 2018-01-01 | End: 2018-01-01

## 2018-01-01 RX ORDER — DEXAMETHASONE SODIUM PHOSPHATE 4 MG/ML
INJECTION, SOLUTION INTRA-ARTICULAR; INTRALESIONAL; INTRAMUSCULAR; INTRAVENOUS; SOFT TISSUE AS NEEDED
Status: DISCONTINUED | OUTPATIENT
Start: 2018-01-01 | End: 2018-01-01 | Stop reason: SURG

## 2018-01-01 RX ORDER — ACETAMINOPHEN 160 MG/5ML
650 SOLUTION ORAL EVERY 4 HOURS PRN
Status: DISCONTINUED | OUTPATIENT
Start: 2018-01-01 | End: 2018-01-01 | Stop reason: HOSPADM

## 2018-01-01 RX ORDER — BACLOFEN 10 MG/1
5 TABLET ORAL EVERY 12 HOURS SCHEDULED
Status: DISCONTINUED | OUTPATIENT
Start: 2018-01-01 | End: 2018-01-01

## 2018-01-01 RX ORDER — FAMOTIDINE 20 MG/1
40 TABLET, FILM COATED ORAL DAILY
Status: CANCELLED | OUTPATIENT
Start: 2018-01-01

## 2018-01-01 RX ORDER — FENTANYL CITRATE 50 UG/ML
50 INJECTION, SOLUTION INTRAMUSCULAR; INTRAVENOUS
Status: COMPLETED | OUTPATIENT
Start: 2018-01-01 | End: 2018-01-01

## 2018-01-01 RX ORDER — LIDOCAINE AND PRILOCAINE 25; 25 MG/G; MG/G
1 CREAM TOPICAL 3 TIMES WEEKLY
COMMUNITY

## 2018-01-01 RX ORDER — SODIUM CHLORIDE 0.9 % (FLUSH) 0.9 %
1-10 SYRINGE (ML) INJECTION AS NEEDED
Status: DISCONTINUED | OUTPATIENT
Start: 2018-01-01 | End: 2018-01-01

## 2018-01-01 RX ORDER — LIDOCAINE HYDROCHLORIDE 10 MG/ML
INJECTION, SOLUTION EPIDURAL; INFILTRATION; INTRACAUDAL; PERINEURAL
Status: DISCONTINUED
Start: 2018-01-01 | End: 2018-01-01 | Stop reason: WASHOUT

## 2018-01-01 RX ORDER — LIDOCAINE AND PRILOCAINE 25; 25 MG/G; MG/G
1 CREAM TOPICAL 3 TIMES WEEKLY
Status: CANCELLED | OUTPATIENT
Start: 2018-01-01

## 2018-01-01 RX ORDER — POTASSIUM CHLORIDE 750 MG/1
40 CAPSULE, EXTENDED RELEASE ORAL DAILY PRN
COMMUNITY

## 2018-01-01 RX ORDER — MIDODRINE HYDROCHLORIDE 5 MG/1
10 TABLET ORAL DAILY
COMMUNITY

## 2018-01-01 RX ORDER — SODIUM CHLORIDE 9 MG/ML
INJECTION, SOLUTION INTRAVENOUS AS NEEDED
Status: DISCONTINUED | OUTPATIENT
Start: 2018-01-01 | End: 2018-01-01 | Stop reason: HOSPADM

## 2018-01-01 RX ORDER — ALPRAZOLAM 0.5 MG/1
0.5 TABLET ORAL 3 TIMES DAILY PRN
Status: DISCONTINUED | OUTPATIENT
Start: 2018-01-01 | End: 2018-01-01

## 2018-01-01 RX ORDER — ALPRAZOLAM 0.5 MG/1
0.5 TABLET ORAL 3 TIMES DAILY PRN
Status: CANCELLED | OUTPATIENT
Start: 2018-01-01 | End: 2018-06-30

## 2018-01-01 RX ORDER — ALBUMIN (HUMAN) 12.5 G/50ML
25 SOLUTION INTRAVENOUS AS NEEDED
Status: ACTIVE | OUTPATIENT
Start: 2018-01-01 | End: 2018-01-01

## 2018-01-01 RX ORDER — MIDAZOLAM HYDROCHLORIDE 1 MG/ML
1 INJECTION INTRAMUSCULAR; INTRAVENOUS ONCE
Status: COMPLETED | OUTPATIENT
Start: 2018-01-01 | End: 2018-01-01

## 2018-01-01 RX ORDER — MIDAZOLAM HYDROCHLORIDE 1 MG/ML
1 INJECTION INTRAMUSCULAR; INTRAVENOUS
Status: CANCELLED | OUTPATIENT
Start: 2018-01-01

## 2018-01-01 RX ORDER — PROMETHAZINE HYDROCHLORIDE 25 MG/1
25 SUPPOSITORY RECTAL ONCE AS NEEDED
Status: DISCONTINUED | OUTPATIENT
Start: 2018-01-01 | End: 2018-01-01 | Stop reason: HOSPADM

## 2018-01-01 RX ORDER — SERTRALINE HYDROCHLORIDE 100 MG/1
100 TABLET, FILM COATED ORAL DAILY
COMMUNITY

## 2018-01-01 RX ORDER — LIDOCAINE HYDROCHLORIDE 20 MG/ML
INJECTION, SOLUTION EPIDURAL; INFILTRATION; INTRACAUDAL; PERINEURAL AS NEEDED
Status: DISCONTINUED | OUTPATIENT
Start: 2018-01-01 | End: 2018-01-01 | Stop reason: SURG

## 2018-01-01 RX ORDER — DEXTROSE MONOHYDRATE 25 G/50ML
25 INJECTION, SOLUTION INTRAVENOUS
Status: DISCONTINUED | OUTPATIENT
Start: 2018-01-01 | End: 2018-01-01 | Stop reason: HOSPADM

## 2018-01-01 RX ORDER — VANCOMYCIN HYDROCHLORIDE 1 G/200ML
15 INJECTION, SOLUTION INTRAVENOUS ONCE
Status: COMPLETED | OUTPATIENT
Start: 2018-01-01 | End: 2018-01-01

## 2018-01-01 RX ORDER — DIPHENHYDRAMINE HYDROCHLORIDE, ZINC ACETATE 2; .1 G/100G; G/100G
1 CREAM TOPICAL DAILY
COMMUNITY

## 2018-01-01 RX ORDER — HYDROMORPHONE HYDROCHLORIDE 1 MG/ML
1 INJECTION, SOLUTION INTRAMUSCULAR; INTRAVENOUS; SUBCUTANEOUS ONCE
Status: COMPLETED | OUTPATIENT
Start: 2018-01-01 | End: 2018-01-01

## 2018-01-01 RX ORDER — IPRATROPIUM BROMIDE AND ALBUTEROL SULFATE 2.5; .5 MG/3ML; MG/3ML
3 SOLUTION RESPIRATORY (INHALATION) ONCE AS NEEDED
Status: DISCONTINUED | OUTPATIENT
Start: 2018-01-01 | End: 2018-01-01 | Stop reason: HOSPADM

## 2018-01-01 RX ORDER — PHENYLEPHRINE HCL IN 0.9% NACL 0.5 MG/5ML
.5-3 SYRINGE (ML) INTRAVENOUS
Status: DISCONTINUED | OUTPATIENT
Start: 2018-01-01 | End: 2018-01-01 | Stop reason: HOSPADM

## 2018-01-01 RX ORDER — HEPARIN SODIUM 5000 [USP'U]/ML
5000 INJECTION, SOLUTION INTRAVENOUS; SUBCUTANEOUS EVERY 8 HOURS SCHEDULED
Status: DISCONTINUED | OUTPATIENT
Start: 2018-01-01 | End: 2018-01-01

## 2018-01-01 RX ORDER — SENNA AND DOCUSATE SODIUM 50; 8.6 MG/1; MG/1
2 TABLET, FILM COATED ORAL 2 TIMES DAILY PRN
COMMUNITY

## 2018-01-01 RX ORDER — DIPHENHYDRAMINE HCL 25 MG
25 CAPSULE ORAL EVERY 6 HOURS PRN
Status: CANCELLED | OUTPATIENT
Start: 2018-01-01

## 2018-01-01 RX ORDER — LEVOTHYROXINE SODIUM 0.07 MG/1
75 TABLET ORAL
Status: DISCONTINUED | OUTPATIENT
Start: 2018-01-01 | End: 2018-01-01

## 2018-01-01 RX ORDER — LIDOCAINE 50 MG/G
1 PATCH TOPICAL EVERY 24 HOURS
COMMUNITY

## 2018-01-01 RX ORDER — OXYCODONE AND ACETAMINOPHEN 7.5; 325 MG/1; MG/1
1 TABLET ORAL ONCE
Status: COMPLETED | OUTPATIENT
Start: 2018-01-01 | End: 2018-01-01

## 2018-01-01 RX ORDER — ALPRAZOLAM 0.5 MG/1
0.5 TABLET ORAL 3 TIMES DAILY PRN
Status: DISPENSED | OUTPATIENT
Start: 2018-01-01 | End: 2018-01-01

## 2018-01-01 RX ORDER — DEXTROSE MONOHYDRATE 25 G/50ML
50 INJECTION, SOLUTION INTRAVENOUS ONCE
Status: COMPLETED | OUTPATIENT
Start: 2018-01-01 | End: 2018-01-01

## 2018-01-01 RX ORDER — ALBUMIN (HUMAN) 12.5 G/50ML
25 SOLUTION INTRAVENOUS AS NEEDED
Status: COMPLETED | OUTPATIENT
Start: 2018-01-01 | End: 2018-01-01

## 2018-01-01 RX ORDER — LIDOCAINE 50 MG/G
1 PATCH TOPICAL EVERY 24 HOURS
Status: DISCONTINUED | OUTPATIENT
Start: 2018-01-01 | End: 2018-01-01 | Stop reason: HOSPADM

## 2018-01-01 RX ORDER — BUPIVACAINE HYDROCHLORIDE AND EPINEPHRINE 5; 5 MG/ML; UG/ML
INJECTION, SOLUTION EPIDURAL; INTRACAUDAL; PERINEURAL AS NEEDED
Status: DISCONTINUED | OUTPATIENT
Start: 2018-01-01 | End: 2018-01-01 | Stop reason: HOSPADM

## 2018-01-01 RX ORDER — SENNA AND DOCUSATE SODIUM 50; 8.6 MG/1; MG/1
2 TABLET, FILM COATED ORAL 2 TIMES DAILY PRN
Status: CANCELLED | OUTPATIENT
Start: 2018-01-01

## 2018-01-01 RX ORDER — ACETAMINOPHEN 160 MG/5ML
650 SOLUTION ORAL EVERY 6 HOURS PRN
Status: DISCONTINUED | OUTPATIENT
Start: 2018-01-01 | End: 2018-01-01

## 2018-01-01 RX ORDER — ACETAMINOPHEN 650 MG/1
650 SUPPOSITORY RECTAL EVERY 4 HOURS PRN
Status: DISCONTINUED | OUTPATIENT
Start: 2018-01-01 | End: 2018-01-01

## 2018-01-01 RX ORDER — SODIUM CHLORIDE, SODIUM LACTATE, POTASSIUM CHLORIDE, CALCIUM CHLORIDE 600; 310; 30; 20 MG/100ML; MG/100ML; MG/100ML; MG/100ML
9 INJECTION, SOLUTION INTRAVENOUS CONTINUOUS
Status: DISCONTINUED | OUTPATIENT
Start: 2018-01-01 | End: 2018-01-01

## 2018-01-01 RX ORDER — AMINO ACIDS/PROTEIN HYDROLYS 15G-100/30
1 LIQUID (ML) ORAL DAILY
Status: DISCONTINUED | OUTPATIENT
Start: 2018-01-01 | End: 2018-01-01

## 2018-01-01 RX ORDER — FAMOTIDINE 10 MG/ML
20 INJECTION, SOLUTION INTRAVENOUS DAILY
Status: DISCONTINUED | OUTPATIENT
Start: 2018-01-01 | End: 2018-01-01 | Stop reason: CLARIF

## 2018-01-01 RX ORDER — LOPERAMIDE HYDROCHLORIDE 2 MG/1
2 CAPSULE ORAL 2 TIMES DAILY PRN
Status: CANCELLED | OUTPATIENT
Start: 2018-01-01

## 2018-01-01 RX ORDER — NYSTATIN 100000 [USP'U]/G
POWDER TOPICAL EVERY 12 HOURS SCHEDULED
Status: DISCONTINUED | OUTPATIENT
Start: 2018-01-01 | End: 2018-01-01 | Stop reason: HOSPADM

## 2018-01-01 RX ORDER — ONDANSETRON 2 MG/ML
4 INJECTION INTRAMUSCULAR; INTRAVENOUS ONCE AS NEEDED
Status: DISCONTINUED | OUTPATIENT
Start: 2018-01-01 | End: 2018-01-01 | Stop reason: HOSPADM

## 2018-01-01 RX ORDER — AMINO ACIDS/PROTEIN HYDROLYS 15G-100/30
1 LIQUID (ML) ORAL 2 TIMES DAILY
Start: 2018-01-01

## 2018-01-01 RX ORDER — INSULIN GLARGINE 100 [IU]/ML
10 INJECTION, SOLUTION SUBCUTANEOUS DAILY
COMMUNITY

## 2018-01-01 RX ORDER — GENTAMICIN SULFATE 60 MG/50ML
120 INJECTION, SOLUTION INTRAVENOUS ONCE
Status: COMPLETED | OUTPATIENT
Start: 2018-01-01 | End: 2018-01-01

## 2018-01-01 RX ORDER — DULOXETIN HYDROCHLORIDE 20 MG/1
20 CAPSULE, DELAYED RELEASE ORAL DAILY
Status: DISCONTINUED | OUTPATIENT
Start: 2018-01-01 | End: 2018-01-01

## 2018-01-01 RX ORDER — NICOTINE POLACRILEX 4 MG
15 LOZENGE BUCCAL
Status: DISCONTINUED | OUTPATIENT
Start: 2018-01-01 | End: 2018-01-01 | Stop reason: SDUPTHER

## 2018-01-01 RX ORDER — AMINO ACIDS/PROTEIN HYDROLYS 15G-100/30
1 LIQUID (ML) ORAL 2 TIMES DAILY
Status: DISCONTINUED | OUTPATIENT
Start: 2018-01-01 | End: 2018-01-01

## 2018-01-01 RX ORDER — HYDROXYZINE HYDROCHLORIDE 25 MG/1
25 TABLET, FILM COATED ORAL EVERY 8 HOURS PRN
Status: CANCELLED | OUTPATIENT
Start: 2018-01-01

## 2018-01-01 RX ORDER — HYOSCYAMINE SULFATE 16 OZ
SOLUTION MISCELLANEOUS DAILY PRN
COMMUNITY

## 2018-01-01 RX ORDER — SODIUM CHLORIDE 9 MG/ML
INJECTION, SOLUTION INTRAVENOUS CONTINUOUS PRN
Status: COMPLETED | OUTPATIENT
Start: 2018-01-01 | End: 2018-01-01

## 2018-01-01 RX ORDER — FENTANYL CITRATE 50 UG/ML
100 INJECTION, SOLUTION INTRAMUSCULAR; INTRAVENOUS ONCE
Status: COMPLETED | OUTPATIENT
Start: 2018-01-01 | End: 2018-01-01

## 2018-01-01 RX ORDER — CALCIUM CHLORIDE 100 MG/ML
INJECTION INTRAVENOUS; INTRAVENTRICULAR AS NEEDED
Status: DISCONTINUED | OUTPATIENT
Start: 2018-01-01 | End: 2018-01-01 | Stop reason: SURG

## 2018-01-01 RX ORDER — AMINO ACIDS/PROTEIN HYDROLYS 15G-100/30
2 LIQUID (ML) ORAL DAILY
Status: DISCONTINUED | OUTPATIENT
Start: 2018-01-01 | End: 2018-01-01

## 2018-01-01 RX ORDER — LACTULOSE 10 G/15ML
10 SOLUTION ORAL DAILY
Status: CANCELLED | OUTPATIENT
Start: 2018-01-01

## 2018-01-01 RX ORDER — FAMOTIDINE 20 MG/1
20 TABLET, FILM COATED ORAL ONCE
Status: COMPLETED | OUTPATIENT
Start: 2018-01-01 | End: 2018-01-01

## 2018-01-01 RX ORDER — ASPIRIN 81 MG/1
324 TABLET, CHEWABLE ORAL ONCE
Status: COMPLETED | OUTPATIENT
Start: 2018-01-01 | End: 2018-01-01

## 2018-01-01 RX ORDER — HYDROMORPHONE HYDROCHLORIDE 1 MG/ML
0.5 INJECTION, SOLUTION INTRAMUSCULAR; INTRAVENOUS; SUBCUTANEOUS
Status: DISPENSED | OUTPATIENT
Start: 2018-01-01 | End: 2018-01-01

## 2018-01-01 RX ORDER — ACETAMINOPHEN 650 MG
TABLET, EXTENDED RELEASE ORAL AS NEEDED
Status: DISCONTINUED | OUTPATIENT
Start: 2018-01-01 | End: 2018-01-01 | Stop reason: HOSPADM

## 2018-01-01 RX ORDER — LIDOCAINE HYDROCHLORIDE 10 MG/ML
INJECTION, SOLUTION INFILTRATION; PERINEURAL AS NEEDED
Status: DISCONTINUED | OUTPATIENT
Start: 2018-01-01 | End: 2018-01-01 | Stop reason: SURG

## 2018-01-01 RX ORDER — POTASSIUM CHLORIDE 750 MG/1
40 CAPSULE, EXTENDED RELEASE ORAL DAILY PRN
Status: CANCELLED | OUTPATIENT
Start: 2018-01-01

## 2018-01-01 RX ORDER — SODIUM CHLORIDE, SODIUM LACTATE, POTASSIUM CHLORIDE, CALCIUM CHLORIDE 600; 310; 30; 20 MG/100ML; MG/100ML; MG/100ML; MG/100ML
250 INJECTION, SOLUTION INTRAVENOUS CONTINUOUS
Status: ACTIVE | OUTPATIENT
Start: 2018-01-01 | End: 2018-01-01

## 2018-01-01 RX ORDER — MIDODRINE HYDROCHLORIDE 5 MG/1
5 TABLET ORAL 3 TIMES DAILY
Status: DISCONTINUED | OUTPATIENT
Start: 2018-01-01 | End: 2018-01-01

## 2018-01-01 RX ORDER — GABAPENTIN 400 MG/1
400 CAPSULE ORAL NIGHTLY
Status: DISCONTINUED | OUTPATIENT
Start: 2018-01-01 | End: 2018-01-01

## 2018-01-01 RX ORDER — ATRACURIUM BESYLATE 10 MG/ML
INJECTION, SOLUTION INTRAVENOUS AS NEEDED
Status: DISCONTINUED | OUTPATIENT
Start: 2018-01-01 | End: 2018-01-01 | Stop reason: SURG

## 2018-01-01 RX ORDER — MIDAZOLAM HYDROCHLORIDE 1 MG/ML
2 INJECTION INTRAMUSCULAR; INTRAVENOUS ONCE
Status: COMPLETED | OUTPATIENT
Start: 2018-01-01 | End: 2018-01-01

## 2018-01-01 RX ORDER — LORAZEPAM 2 MG/ML
0.5 INJECTION INTRAMUSCULAR EVERY 4 HOURS PRN
Status: CANCELLED | OUTPATIENT
Start: 2018-01-01 | End: 2018-07-05

## 2018-01-01 RX ORDER — ALBUMIN, HUMAN INJ 5% 5 %
SOLUTION INTRAVENOUS
Status: DISCONTINUED
Start: 2018-01-01 | End: 2018-01-01 | Stop reason: HOSPADM

## 2018-01-01 RX ORDER — MIDODRINE HYDROCHLORIDE 5 MG/1
2.5 TABLET ORAL 2 TIMES DAILY
Status: DISCONTINUED | OUTPATIENT
Start: 2018-01-01 | End: 2018-01-01 | Stop reason: SDUPTHER

## 2018-01-01 RX ORDER — NALOXONE HCL 0.4 MG/ML
0.4 VIAL (ML) INJECTION
Status: CANCELLED | OUTPATIENT
Start: 2018-01-01

## 2018-01-01 RX ORDER — BUPIVACAINE HYDROCHLORIDE AND EPINEPHRINE 5; 5 MG/ML; UG/ML
INJECTION, SOLUTION PERINEURAL AS NEEDED
Status: DISCONTINUED | OUTPATIENT
Start: 2018-01-01 | End: 2018-01-01 | Stop reason: HOSPADM

## 2018-01-01 RX ORDER — FAMOTIDINE 20 MG/1
20 TABLET, FILM COATED ORAL ONCE
Status: CANCELLED | OUTPATIENT
Start: 2018-01-01 | End: 2018-01-01

## 2018-01-01 RX ORDER — FENTANYL CITRATE 50 UG/ML
50 INJECTION, SOLUTION INTRAMUSCULAR; INTRAVENOUS
Status: DISCONTINUED | OUTPATIENT
Start: 2018-01-01 | End: 2018-01-01 | Stop reason: HOSPADM

## 2018-01-01 RX ORDER — LEVOTHYROXINE SODIUM 0.07 MG/1
75 TABLET ORAL
Status: DISCONTINUED | OUTPATIENT
Start: 2018-01-01 | End: 2018-01-01 | Stop reason: HOSPADM

## 2018-01-01 RX ORDER — SERTRALINE HYDROCHLORIDE 100 MG/1
100 TABLET, FILM COATED ORAL DAILY
Status: DISCONTINUED | OUTPATIENT
Start: 2018-01-01 | End: 2018-01-01

## 2018-01-01 RX ORDER — PANTOPRAZOLE SODIUM 40 MG/1
40 TABLET, DELAYED RELEASE ORAL
Status: DISCONTINUED | OUTPATIENT
Start: 2018-01-01 | End: 2018-01-01 | Stop reason: SDUPTHER

## 2018-01-01 RX ORDER — ALPRAZOLAM 0.5 MG/1
0.5 TABLET ORAL 3 TIMES DAILY PRN
Status: DISCONTINUED | OUTPATIENT
Start: 2018-01-01 | End: 2018-01-01 | Stop reason: HOSPADM

## 2018-01-01 RX ORDER — ALBUMIN (HUMAN) 12.5 G/50ML
12.5 SOLUTION INTRAVENOUS ONCE
Status: DISCONTINUED | OUTPATIENT
Start: 2018-01-01 | End: 2018-01-01

## 2018-01-01 RX ORDER — ACETAMINOPHEN 650 MG/1
650 SUPPOSITORY RECTAL EVERY 4 HOURS PRN
COMMUNITY

## 2018-01-01 RX ORDER — HEPARIN SODIUM 1000 [USP'U]/ML
1000 INJECTION, SOLUTION INTRAVENOUS; SUBCUTANEOUS AS NEEDED
Status: DISCONTINUED | OUTPATIENT
Start: 2018-01-01 | End: 2018-01-01

## 2018-01-01 RX ORDER — VALPROIC ACID 250 MG/5ML
500 SOLUTION ORAL EVERY 8 HOURS SCHEDULED
Status: CANCELLED | OUTPATIENT
Start: 2018-01-01

## 2018-01-01 RX ORDER — FUROSEMIDE 80 MG
80 TABLET ORAL DAILY
COMMUNITY
End: 2018-01-01 | Stop reason: HOSPADM

## 2018-01-01 RX ORDER — PHYTONADIONE 10 MG/ML
10 INJECTION, EMULSION INTRAMUSCULAR; INTRAVENOUS; SUBCUTANEOUS DAILY
Status: COMPLETED | OUTPATIENT
Start: 2018-01-01 | End: 2018-01-01

## 2018-01-01 RX ORDER — OXYCODONE AND ACETAMINOPHEN 7.5; 325 MG/1; MG/1
1 TABLET ORAL ONCE AS NEEDED
Status: DISCONTINUED | OUTPATIENT
Start: 2018-01-01 | End: 2018-01-01

## 2018-01-01 RX ORDER — LACTULOSE 10 G/15ML
10 SOLUTION ORAL DAILY
COMMUNITY

## 2018-01-01 RX ORDER — MORPHINE SULFATE 1 MG/ML
2 INJECTION, SOLUTION EPIDURAL; INTRATHECAL; INTRAVENOUS EVERY 4 HOURS PRN
Status: CANCELLED | OUTPATIENT
Start: 2018-01-01 | End: 2018-07-02

## 2018-01-01 RX ORDER — DEXMEDETOMIDINE HYDROCHLORIDE 4 UG/ML
.2-1.5 INJECTION, SOLUTION INTRAVENOUS
Status: DISCONTINUED | OUTPATIENT
Start: 2018-01-01 | End: 2018-01-01 | Stop reason: HOSPADM

## 2018-01-01 RX ORDER — ERGOCALCIFEROL 1.25 MG/1
50000 CAPSULE ORAL WEEKLY
COMMUNITY
Start: 2018-01-01 | End: 2018-07-01

## 2018-01-01 RX ORDER — DEXTROSE MONOHYDRATE 25 G/50ML
INJECTION, SOLUTION INTRAVENOUS
Status: COMPLETED
Start: 2018-01-01 | End: 2018-01-01

## 2018-01-01 RX ORDER — IPRATROPIUM BROMIDE AND ALBUTEROL SULFATE 2.5; .5 MG/3ML; MG/3ML
3 SOLUTION RESPIRATORY (INHALATION) EVERY 4 HOURS PRN
COMMUNITY

## 2018-01-01 RX ORDER — LORAZEPAM 2 MG/ML
0.5 INJECTION INTRAMUSCULAR EVERY 4 HOURS PRN
Status: DISCONTINUED | OUTPATIENT
Start: 2018-01-01 | End: 2018-01-01 | Stop reason: HOSPADM

## 2018-01-01 RX ORDER — MEPERIDINE HYDROCHLORIDE 25 MG/ML
12.5 INJECTION INTRAMUSCULAR; INTRAVENOUS; SUBCUTANEOUS
Status: ACTIVE | OUTPATIENT
Start: 2018-01-01 | End: 2018-01-01

## 2018-01-01 RX ORDER — OXYCODONE HYDROCHLORIDE AND ACETAMINOPHEN 5; 325 MG/1; MG/1
1 TABLET ORAL ONCE AS NEEDED
Status: DISCONTINUED | OUTPATIENT
Start: 2018-01-01 | End: 2018-01-01 | Stop reason: HOSPADM

## 2018-01-01 RX ORDER — FENTANYL CITRATE 50 UG/ML
50 INJECTION, SOLUTION INTRAMUSCULAR; INTRAVENOUS ONCE
Status: COMPLETED | OUTPATIENT
Start: 2018-01-01 | End: 2018-01-01

## 2018-01-01 RX ORDER — DULOXETIN HYDROCHLORIDE 20 MG/1
20 CAPSULE, DELAYED RELEASE ORAL DAILY
Status: DISCONTINUED | OUTPATIENT
Start: 2018-01-01 | End: 2018-01-01 | Stop reason: ALTCHOICE

## 2018-01-01 RX ORDER — HYDRALAZINE HYDROCHLORIDE 20 MG/ML
5 INJECTION INTRAMUSCULAR; INTRAVENOUS
Status: DISCONTINUED | OUTPATIENT
Start: 2018-01-01 | End: 2018-01-01

## 2018-01-01 RX ORDER — NALOXONE HCL 0.4 MG/ML
0.4 VIAL (ML) INJECTION
Status: DISCONTINUED | OUTPATIENT
Start: 2018-01-01 | End: 2018-01-01

## 2018-01-01 RX ORDER — ASPIRIN 81 MG/1
81 TABLET, CHEWABLE ORAL DAILY
Status: DISCONTINUED | OUTPATIENT
Start: 2018-01-01 | End: 2018-01-01

## 2018-01-01 RX ORDER — ROCURONIUM BROMIDE 10 MG/ML
INJECTION, SOLUTION INTRAVENOUS AS NEEDED
Status: DISCONTINUED | OUTPATIENT
Start: 2018-01-01 | End: 2018-01-01 | Stop reason: SURG

## 2018-01-01 RX ORDER — ALBUMIN (HUMAN) 12.5 G/50ML
12.5 SOLUTION INTRAVENOUS AS NEEDED
Status: CANCELLED | OUTPATIENT
Start: 2018-01-01 | End: 2018-01-01

## 2018-01-01 RX ORDER — CEPHALEXIN 250 MG/5ML
500 POWDER, FOR SUSPENSION ORAL EVERY 12 HOURS SCHEDULED
Status: DISCONTINUED | OUTPATIENT
Start: 2018-01-01 | End: 2018-01-01

## 2018-01-01 RX ORDER — MORPHINE SULFATE 1 MG/ML
2 INJECTION, SOLUTION EPIDURAL; INTRATHECAL; INTRAVENOUS EVERY 4 HOURS PRN
Status: DISCONTINUED | OUTPATIENT
Start: 2018-01-01 | End: 2018-01-01 | Stop reason: HOSPADM

## 2018-01-01 RX ORDER — ONDANSETRON 2 MG/ML
4 INJECTION INTRAMUSCULAR; INTRAVENOUS EVERY 6 HOURS PRN
Status: CANCELLED | OUTPATIENT
Start: 2018-01-01

## 2018-01-01 RX ORDER — ONDANSETRON 2 MG/ML
4 INJECTION INTRAMUSCULAR; INTRAVENOUS EVERY 6 HOURS PRN
Status: DISCONTINUED | OUTPATIENT
Start: 2018-01-01 | End: 2018-01-01 | Stop reason: HOSPADM

## 2018-01-01 RX ORDER — SODIUM CHLORIDE, SODIUM LACTATE, POTASSIUM CHLORIDE, CALCIUM CHLORIDE 600; 310; 30; 20 MG/100ML; MG/100ML; MG/100ML; MG/100ML
INJECTION, SOLUTION INTRAVENOUS CONTINUOUS PRN
Status: DISCONTINUED | OUTPATIENT
Start: 2018-01-01 | End: 2018-01-01 | Stop reason: SURG

## 2018-01-01 RX ORDER — DEXTROSE MONOHYDRATE 25 G/50ML
25 INJECTION, SOLUTION INTRAVENOUS
Status: DISCONTINUED | OUTPATIENT
Start: 2018-01-01 | End: 2018-01-01

## 2018-01-01 RX ORDER — NICOTINE POLACRILEX 4 MG
15 LOZENGE BUCCAL
Status: CANCELLED | OUTPATIENT
Start: 2018-01-01

## 2018-01-01 RX ORDER — MIDODRINE HYDROCHLORIDE 5 MG/1
10 TABLET ORAL
Status: DISCONTINUED | OUTPATIENT
Start: 2018-01-01 | End: 2018-01-01

## 2018-01-01 RX ORDER — HEPARIN SODIUM 5000 [USP'U]/ML
INJECTION, SOLUTION INTRAVENOUS; SUBCUTANEOUS AS NEEDED
Status: DISCONTINUED | OUTPATIENT
Start: 2018-01-01 | End: 2018-01-01 | Stop reason: HOSPADM

## 2018-01-01 RX ORDER — LABETALOL HYDROCHLORIDE 5 MG/ML
5 INJECTION, SOLUTION INTRAVENOUS
Status: DISCONTINUED | OUTPATIENT
Start: 2018-01-01 | End: 2018-01-01

## 2018-01-01 RX ORDER — ACETAMINOPHEN 160 MG
TABLET,DISINTEGRATING ORAL AS NEEDED
Status: DISCONTINUED | OUTPATIENT
Start: 2018-01-01 | End: 2018-01-01 | Stop reason: HOSPADM

## 2018-01-01 RX ORDER — FENTANYL CITRATE 50 UG/ML
INJECTION, SOLUTION INTRAMUSCULAR; INTRAVENOUS
Status: DISPENSED
Start: 2018-01-01 | End: 2018-01-01

## 2018-01-01 RX ORDER — HEPARIN SODIUM 5000 [USP'U]/ML
5000 INJECTION, SOLUTION INTRAVENOUS; SUBCUTANEOUS EVERY 8 HOURS SCHEDULED
Start: 2018-01-01

## 2018-01-01 RX ORDER — NITROGLYCERIN 0.4 MG/1
0.4 TABLET SUBLINGUAL
Status: CANCELLED | OUTPATIENT
Start: 2018-01-01

## 2018-01-01 RX ORDER — MIDAZOLAM HYDROCHLORIDE 1 MG/ML
2 INJECTION INTRAMUSCULAR; INTRAVENOUS
Status: CANCELLED | OUTPATIENT
Start: 2018-01-01

## 2018-01-01 RX ORDER — LEVETIRACETAM 100 MG/ML
250 SOLUTION ORAL 2 TIMES DAILY
COMMUNITY

## 2018-01-01 RX ORDER — LEVETIRACETAM 100 MG/ML
250 SOLUTION ORAL EVERY 12 HOURS SCHEDULED
Status: CANCELLED | OUTPATIENT
Start: 2018-01-01

## 2018-01-01 RX ORDER — TRAMADOL HYDROCHLORIDE 50 MG/1
50 TABLET ORAL 2 TIMES DAILY
COMMUNITY

## 2018-01-01 RX ORDER — PROMETHAZINE HYDROCHLORIDE 25 MG/1
25 TABLET ORAL ONCE AS NEEDED
Status: DISCONTINUED | OUTPATIENT
Start: 2018-01-01 | End: 2018-01-01 | Stop reason: HOSPADM

## 2018-01-01 RX ORDER — TRIAMCINOLONE ACETONIDE 1 MG/G
CREAM TOPICAL 2 TIMES DAILY
COMMUNITY
End: 2018-01-01 | Stop reason: HOSPADM

## 2018-01-01 RX ORDER — LIDOCAINE HYDROCHLORIDE 40 MG/ML
4 SOLUTION TOPICAL ONCE
Status: CANCELLED | OUTPATIENT
Start: 2018-01-01 | End: 2018-01-01

## 2018-01-01 RX ORDER — TRAMADOL HYDROCHLORIDE 50 MG/1
50 TABLET ORAL EVERY 6 HOURS PRN
Status: DISPENSED | OUTPATIENT
Start: 2018-01-01 | End: 2018-01-01

## 2018-01-01 RX ORDER — LANSOPRAZOLE
30 KIT
Status: DISCONTINUED | OUTPATIENT
Start: 2018-01-01 | End: 2018-01-01 | Stop reason: HOSPADM

## 2018-01-01 RX ORDER — SODIUM CHLORIDE, SODIUM LACTATE, POTASSIUM CHLORIDE, CALCIUM CHLORIDE 600; 310; 30; 20 MG/100ML; MG/100ML; MG/100ML; MG/100ML
9 INJECTION, SOLUTION INTRAVENOUS CONTINUOUS
Status: CANCELLED | OUTPATIENT
Start: 2018-01-01

## 2018-01-01 RX ORDER — CEPHALEXIN 250 MG/5ML
500 POWDER, FOR SUSPENSION ORAL EVERY 12 HOURS SCHEDULED
Status: DISCONTINUED | OUTPATIENT
Start: 2018-01-01 | End: 2018-01-01 | Stop reason: CLARIF

## 2018-01-01 RX ADMIN — PANTOPRAZOLE SODIUM 40 MG: 40 INJECTION, POWDER, FOR SOLUTION INTRAVENOUS at 06:39

## 2018-01-01 RX ADMIN — SERTRALINE HYDROCHLORIDE 100 MG: 100 TABLET ORAL at 09:30

## 2018-01-01 RX ADMIN — Medication 1 PACKET: at 20:19

## 2018-01-01 RX ADMIN — MIDODRINE HYDROCHLORIDE 20 MG: 5 TABLET ORAL at 00:33

## 2018-01-01 RX ADMIN — DORNASE ALFA 2.5 MG: 1 SOLUTION RESPIRATORY (INHALATION) at 07:38

## 2018-01-01 RX ADMIN — MUPIROCIN: 20 OINTMENT TOPICAL at 20:45

## 2018-01-01 RX ADMIN — DORNASE ALFA 2.5 MG: 1 SOLUTION RESPIRATORY (INHALATION) at 07:53

## 2018-01-01 RX ADMIN — NYSTATIN: 100000 POWDER TOPICAL at 23:02

## 2018-01-01 RX ADMIN — VALPROIC ACID 500 MG: 250 SOLUTION ORAL at 14:05

## 2018-01-01 RX ADMIN — CHLORHEXIDINE GLUCONATE 15 ML: 1.2 RINSE ORAL at 20:42

## 2018-01-01 RX ADMIN — LEVOTHYROXINE SODIUM ANHYDROUS 50 MCG: 100 INJECTION, POWDER, LYOPHILIZED, FOR SOLUTION INTRAVENOUS at 10:55

## 2018-01-01 RX ADMIN — INSULIN DETEMIR 40 UNITS: 100 INJECTION, SOLUTION SUBCUTANEOUS at 12:32

## 2018-01-01 RX ADMIN — Medication 0.3 MCG/KG/MIN: at 06:04

## 2018-01-01 RX ADMIN — NYSTATIN: 100000 POWDER TOPICAL at 08:06

## 2018-01-01 RX ADMIN — VALPROIC ACID 500 MG: 250 SOLUTION ORAL at 22:56

## 2018-01-01 RX ADMIN — PANTOPRAZOLE SODIUM 40 MG: 40 INJECTION, POWDER, FOR SOLUTION INTRAVENOUS at 05:19

## 2018-01-01 RX ADMIN — SERTRALINE HYDROCHLORIDE 100 MG: 100 TABLET ORAL at 09:57

## 2018-01-01 RX ADMIN — SERTRALINE HYDROCHLORIDE 100 MG: 100 TABLET ORAL at 08:49

## 2018-01-01 RX ADMIN — VALPROIC ACID 500 MG: 250 SOLUTION ORAL at 06:46

## 2018-01-01 RX ADMIN — NYSTATIN 1 APPLICATION: 100000 POWDER TOPICAL at 20:38

## 2018-01-01 RX ADMIN — OXYCODONE HYDROCHLORIDE 5 MG: 5 SOLUTION ORAL at 23:55

## 2018-01-01 RX ADMIN — Medication 1 PACKET: at 20:21

## 2018-01-01 RX ADMIN — Medication 1 PACKET: at 10:37

## 2018-01-01 RX ADMIN — TAZOBACTAM SODIUM AND PIPERACILLIN SODIUM 2.25 G: 250; 2 INJECTION, SOLUTION INTRAVENOUS at 13:45

## 2018-01-01 RX ADMIN — LIDOCAINE 1 PATCH: 50 PATCH CUTANEOUS at 08:32

## 2018-01-01 RX ADMIN — NYSTATIN: 100000 POWDER TOPICAL at 20:14

## 2018-01-01 RX ADMIN — ALPRAZOLAM 0.5 MG: 0.5 TABLET ORAL at 09:57

## 2018-01-01 RX ADMIN — Medication 0.5 MCG/KG/MIN: at 04:02

## 2018-01-01 RX ADMIN — FENTANYL CITRATE 50 MCG: 50 INJECTION, SOLUTION INTRAMUSCULAR; INTRAVENOUS at 16:27

## 2018-01-01 RX ADMIN — ACETAMINOPHEN 650 MG: 325 TABLET ORAL at 03:59

## 2018-01-01 RX ADMIN — PHENYLEPHRINE HYDROCHLORIDE 100 MCG: 10 INJECTION INTRAVENOUS at 01:53

## 2018-01-01 RX ADMIN — ATRACURIUM BESYLATE 20 MG: 10 INJECTION, SOLUTION INTRAVENOUS at 14:57

## 2018-01-01 RX ADMIN — GABAPENTIN 400 MG: 400 CAPSULE ORAL at 23:56

## 2018-01-01 RX ADMIN — MUPIROCIN: 20 OINTMENT TOPICAL at 09:21

## 2018-01-01 RX ADMIN — OXYCODONE HYDROCHLORIDE 5 MG: 5 SOLUTION ORAL at 13:51

## 2018-01-01 RX ADMIN — CHLORHEXIDINE GLUCONATE 15 ML: 1.2 RINSE ORAL at 08:02

## 2018-01-01 RX ADMIN — LEVOTHYROXINE SODIUM ANHYDROUS 50 MCG: 100 INJECTION, POWDER, LYOPHILIZED, FOR SOLUTION INTRAVENOUS at 12:03

## 2018-01-01 RX ADMIN — Medication 1 PACKET: at 12:10

## 2018-01-01 RX ADMIN — Medication 0.3 MCG/KG/MIN: at 23:21

## 2018-01-01 RX ADMIN — PHENYLEPHRINE HYDROCHLORIDE 160 MCG: 10 INJECTION INTRAVENOUS at 15:15

## 2018-01-01 RX ADMIN — CEFAZOLIN SODIUM 2 G: 2 INJECTION, SOLUTION INTRAVENOUS at 16:00

## 2018-01-01 RX ADMIN — BACLOFEN 10 MG: 10 TABLET ORAL at 20:57

## 2018-01-01 RX ADMIN — HEPARIN SODIUM 1000 UNITS: 1000 INJECTION, SOLUTION INTRAVENOUS; SUBCUTANEOUS at 09:45

## 2018-01-01 RX ADMIN — MIDODRINE HYDROCHLORIDE 5 MG: 5 TABLET ORAL at 17:13

## 2018-01-01 RX ADMIN — INSULIN HUMAN 3 UNITS: 100 INJECTION, SOLUTION PARENTERAL at 23:51

## 2018-01-01 RX ADMIN — MIDAZOLAM HYDROCHLORIDE 2 MG: 1 INJECTION, SOLUTION INTRAMUSCULAR; INTRAVENOUS at 13:13

## 2018-01-01 RX ADMIN — INSULIN HUMAN 8 UNITS: 100 INJECTION, SOLUTION PARENTERAL at 18:16

## 2018-01-01 RX ADMIN — MIDAZOLAM HYDROCHLORIDE 3 MG: 1 INJECTION, SOLUTION INTRAMUSCULAR; INTRAVENOUS at 13:19

## 2018-01-01 RX ADMIN — VALPROIC ACID 500 MG: 250 SOLUTION ORAL at 14:45

## 2018-01-01 RX ADMIN — INSULIN HUMAN 12 UNITS: 100 INJECTION, SOLUTION PARENTERAL at 18:50

## 2018-01-01 RX ADMIN — ACETAMINOPHEN 650 MG: 650 SOLUTION ORAL at 14:34

## 2018-01-01 RX ADMIN — Medication 2 MG: at 22:53

## 2018-01-01 RX ADMIN — Medication 1 PACKET: at 08:18

## 2018-01-01 RX ADMIN — MIDODRINE HYDROCHLORIDE 5 MG: 5 TABLET ORAL at 17:52

## 2018-01-01 RX ADMIN — TRAMADOL HYDROCHLORIDE 50 MG: 50 TABLET, COATED ORAL at 08:16

## 2018-01-01 RX ADMIN — IPRATROPIUM BROMIDE AND ALBUTEROL SULFATE 3 ML: 2.5; .5 SOLUTION RESPIRATORY (INHALATION) at 16:46

## 2018-01-01 RX ADMIN — LORAZEPAM 0.5 MG: 2 INJECTION INTRAMUSCULAR; INTRAVENOUS at 11:44

## 2018-01-01 RX ADMIN — INSULIN HUMAN 4 UNITS: 100 INJECTION, SOLUTION PARENTERAL at 00:58

## 2018-01-01 RX ADMIN — MICAFUNGIN SODIUM 100 MG: 20 INJECTION, POWDER, LYOPHILIZED, FOR SOLUTION INTRAVENOUS at 13:40

## 2018-01-01 RX ADMIN — ALBUMIN HUMAN 50 G: 0.25 SOLUTION INTRAVENOUS at 06:45

## 2018-01-01 RX ADMIN — INSULIN HUMAN 3 UNITS: 100 INJECTION, SOLUTION PARENTERAL at 17:15

## 2018-01-01 RX ADMIN — HEPARIN SODIUM 5000 UNITS: 5000 INJECTION, SOLUTION INTRAVENOUS; SUBCUTANEOUS at 05:49

## 2018-01-01 RX ADMIN — IPRATROPIUM BROMIDE AND ALBUTEROL SULFATE 3 ML: 2.5; .5 SOLUTION RESPIRATORY (INHALATION) at 07:30

## 2018-01-01 RX ADMIN — Medication 1 PACKET: at 08:45

## 2018-01-01 RX ADMIN — VALPROIC ACID 500 MG: 250 SOLUTION ORAL at 23:55

## 2018-01-01 RX ADMIN — IPRATROPIUM BROMIDE AND ALBUTEROL SULFATE 3 ML: 2.5; .5 SOLUTION RESPIRATORY (INHALATION) at 12:29

## 2018-01-01 RX ADMIN — MIDODRINE HYDROCHLORIDE 5 MG: 5 TABLET ORAL at 08:28

## 2018-01-01 RX ADMIN — PANTOPRAZOLE SODIUM 40 MG: 40 INJECTION, POWDER, FOR SOLUTION INTRAVENOUS at 06:51

## 2018-01-01 RX ADMIN — PANTOPRAZOLE SODIUM 40 MG: 40 INJECTION, POWDER, FOR SOLUTION INTRAVENOUS at 05:48

## 2018-01-01 RX ADMIN — NYSTATIN 1 APPLICATION: 100000 POWDER TOPICAL at 08:32

## 2018-01-01 RX ADMIN — ATRACURIUM BESYLATE 50 MG: 10 INJECTION, SOLUTION INTRAVENOUS at 03:00

## 2018-01-01 RX ADMIN — PANTOPRAZOLE SODIUM 40 MG: 40 INJECTION, POWDER, FOR SOLUTION INTRAVENOUS at 05:43

## 2018-01-01 RX ADMIN — MIDODRINE HYDROCHLORIDE 10 MG: 5 TABLET ORAL at 18:06

## 2018-01-01 RX ADMIN — IPRATROPIUM BROMIDE AND ALBUTEROL SULFATE 3 ML: 2.5; .5 SOLUTION RESPIRATORY (INHALATION) at 16:10

## 2018-01-01 RX ADMIN — APIXABAN 2.5 MG: 2.5 TABLET, FILM COATED ORAL at 08:21

## 2018-01-01 RX ADMIN — IPRATROPIUM BROMIDE AND ALBUTEROL SULFATE 3 ML: 2.5; .5 SOLUTION RESPIRATORY (INHALATION) at 11:54

## 2018-01-01 RX ADMIN — NYSTATIN: 100000 POWDER TOPICAL at 08:41

## 2018-01-01 RX ADMIN — CEFAZOLIN SODIUM 2 G: 2 INJECTION, SOLUTION INTRAVENOUS at 15:35

## 2018-01-01 RX ADMIN — PANTOPRAZOLE SODIUM 40 MG: 40 INJECTION, POWDER, FOR SOLUTION INTRAVENOUS at 06:08

## 2018-01-01 RX ADMIN — DULOXETINE HYDROCHLORIDE 20 MG: 20 CAPSULE, DELAYED RELEASE ORAL at 09:45

## 2018-01-01 RX ADMIN — IPRATROPIUM BROMIDE AND ALBUTEROL SULFATE 3 ML: 2.5; .5 SOLUTION RESPIRATORY (INHALATION) at 13:13

## 2018-01-01 RX ADMIN — IPRATROPIUM BROMIDE AND ALBUTEROL SULFATE 3 ML: 2.5; .5 SOLUTION RESPIRATORY (INHALATION) at 20:03

## 2018-01-01 RX ADMIN — FENTANYL CITRATE 50 MCG: 50 INJECTION INTRAMUSCULAR; INTRAVENOUS at 00:05

## 2018-01-01 RX ADMIN — DORNASE ALFA 2.5 MG: 1 SOLUTION RESPIRATORY (INHALATION) at 07:33

## 2018-01-01 RX ADMIN — ACETAMINOPHEN 650 MG: 650 SOLUTION ORAL at 09:00

## 2018-01-01 RX ADMIN — MIDODRINE HYDROCHLORIDE 5 MG: 5 TABLET ORAL at 17:29

## 2018-01-01 RX ADMIN — ACETAMINOPHEN 650 MG: 650 SOLUTION ORAL at 04:08

## 2018-01-01 RX ADMIN — MIDODRINE HYDROCHLORIDE 5 MG: 5 TABLET ORAL at 16:48

## 2018-01-01 RX ADMIN — VALPROIC ACID 500 MG: 250 SOLUTION ORAL at 05:21

## 2018-01-01 RX ADMIN — CHLORHEXIDINE GLUCONATE 15 ML: 1.2 RINSE ORAL at 20:14

## 2018-01-01 RX ADMIN — BACLOFEN 10 MG: 10 TABLET ORAL at 20:05

## 2018-01-01 RX ADMIN — CEFAZOLIN SODIUM 2 G: 2 INJECTION, SOLUTION INTRAVENOUS at 16:37

## 2018-01-01 RX ADMIN — LEVOTHYROXINE SODIUM 75 MCG: 75 TABLET ORAL at 05:43

## 2018-01-01 RX ADMIN — INSULIN HUMAN 4 UNITS: 100 INJECTION, SOLUTION PARENTERAL at 00:25

## 2018-01-01 RX ADMIN — DORNASE ALFA 2.5 MG: 1 SOLUTION RESPIRATORY (INHALATION) at 22:05

## 2018-01-01 RX ADMIN — ONDANSETRON 4 MG: 2 INJECTION, SOLUTION INTRAMUSCULAR; INTRAVENOUS at 13:23

## 2018-01-01 RX ADMIN — INSULIN HUMAN 8 UNITS: 100 INJECTION, SOLUTION PARENTERAL at 05:49

## 2018-01-01 RX ADMIN — ALBUMIN HUMAN 25 G: 0.25 SOLUTION INTRAVENOUS at 11:00

## 2018-01-01 RX ADMIN — VALPROIC ACID 500 MG: 250 SOLUTION ORAL at 21:03

## 2018-01-01 RX ADMIN — MUPIROCIN: 20 OINTMENT TOPICAL at 20:22

## 2018-01-01 RX ADMIN — MIDODRINE HYDROCHLORIDE 5 MG: 5 TABLET ORAL at 16:56

## 2018-01-01 RX ADMIN — TAZOBACTAM SODIUM AND PIPERACILLIN SODIUM 2.25 G: 250; 2 INJECTION, SOLUTION INTRAVENOUS at 05:45

## 2018-01-01 RX ADMIN — PANTOPRAZOLE SODIUM 40 MG: 40 INJECTION, POWDER, FOR SOLUTION INTRAVENOUS at 05:50

## 2018-01-01 RX ADMIN — ALBUMIN HUMAN 25 G: 0.25 SOLUTION INTRAVENOUS at 08:35

## 2018-01-01 RX ADMIN — SODIUM FERRIC GLUCONATE COMPLEX 125 MG: 12.5 INJECTION INTRAVENOUS at 11:51

## 2018-01-01 RX ADMIN — ACETAMINOPHEN 650 MG: 650 SOLUTION ORAL at 15:59

## 2018-01-01 RX ADMIN — ALBUMIN HUMAN 25 G: 0.25 SOLUTION INTRAVENOUS at 08:22

## 2018-01-01 RX ADMIN — VALPROIC ACID 500 MG: 250 SOLUTION ORAL at 06:18

## 2018-01-01 RX ADMIN — PHENYLEPHRINE HYDROCHLORIDE 80 MCG: 10 INJECTION INTRAVENOUS at 15:24

## 2018-01-01 RX ADMIN — INSULIN HUMAN 5 UNITS: 100 INJECTION, SOLUTION PARENTERAL at 12:02

## 2018-01-01 RX ADMIN — INSULIN HUMAN 5 UNITS: 100 INJECTION, SOLUTION PARENTERAL at 06:13

## 2018-01-01 RX ADMIN — BACLOFEN 10 MG: 10 TABLET ORAL at 20:47

## 2018-01-01 RX ADMIN — NOREPINEPHRINE BITARTRATE 0.05 MCG/KG/MIN: 8 SOLUTION at 01:50

## 2018-01-01 RX ADMIN — ASPIRIN 81 MG CHEWABLE TABLET 81 MG: 81 TABLET CHEWABLE at 08:32

## 2018-01-01 RX ADMIN — ALPRAZOLAM 0.5 MG: 0.5 TABLET ORAL at 15:54

## 2018-01-01 RX ADMIN — INSULIN HUMAN 7 UNITS: 100 INJECTION, SOLUTION PARENTERAL at 17:58

## 2018-01-01 RX ADMIN — IPRATROPIUM BROMIDE AND ALBUTEROL SULFATE 3 ML: 2.5; .5 SOLUTION RESPIRATORY (INHALATION) at 08:17

## 2018-01-01 RX ADMIN — MUPIROCIN: 20 OINTMENT TOPICAL at 08:04

## 2018-01-01 RX ADMIN — HEPARIN SODIUM 5000 UNITS: 5000 INJECTION, SOLUTION INTRAVENOUS; SUBCUTANEOUS at 22:20

## 2018-01-01 RX ADMIN — Medication 2 MG: at 11:15

## 2018-01-01 RX ADMIN — LEVOTHYROXINE SODIUM 75 MCG: 75 TABLET ORAL at 05:45

## 2018-01-01 RX ADMIN — ASPIRIN 81 MG CHEWABLE TABLET 81 MG: 81 TABLET CHEWABLE at 10:27

## 2018-01-01 RX ADMIN — PANTOPRAZOLE SODIUM 40 MG: 40 INJECTION, POWDER, FOR SOLUTION INTRAVENOUS at 05:25

## 2018-01-01 RX ADMIN — PANTOPRAZOLE SODIUM 40 MG: 40 INJECTION, POWDER, FOR SOLUTION INTRAVENOUS at 05:46

## 2018-01-01 RX ADMIN — ALBUMIN HUMAN 25 G: 0.25 SOLUTION INTRAVENOUS at 08:19

## 2018-01-01 RX ADMIN — MIDAZOLAM HYDROCHLORIDE 2 MG: 1 INJECTION, SOLUTION INTRAMUSCULAR; INTRAVENOUS at 16:36

## 2018-01-01 RX ADMIN — Medication 2 MG: at 13:52

## 2018-01-01 RX ADMIN — MUPIROCIN: 20 OINTMENT TOPICAL at 21:36

## 2018-01-01 RX ADMIN — MIDODRINE HYDROCHLORIDE 20 MG: 5 TABLET ORAL at 12:31

## 2018-01-01 RX ADMIN — IPRATROPIUM BROMIDE AND ALBUTEROL SULFATE 3 ML: 2.5; .5 SOLUTION RESPIRATORY (INHALATION) at 13:11

## 2018-01-01 RX ADMIN — SERTRALINE HYDROCHLORIDE 100 MG: 100 TABLET ORAL at 08:39

## 2018-01-01 RX ADMIN — SODIUM CHLORIDE: 9 INJECTION, SOLUTION INTRAVENOUS at 12:23

## 2018-01-01 RX ADMIN — ALPRAZOLAM 0.5 MG: 0.5 TABLET ORAL at 08:45

## 2018-01-01 RX ADMIN — Medication 2 MG: at 22:57

## 2018-01-01 RX ADMIN — CHLORHEXIDINE GLUCONATE 15 ML: 1.2 RINSE ORAL at 08:18

## 2018-01-01 RX ADMIN — ATRACURIUM BESYLATE 30 MG: 10 INJECTION, SOLUTION INTRAVENOUS at 15:46

## 2018-01-01 RX ADMIN — CEFAZOLIN SODIUM 2 G: 2 INJECTION, SOLUTION INTRAVENOUS at 16:30

## 2018-01-01 RX ADMIN — CHLORHEXIDINE GLUCONATE 15 ML: 1.2 RINSE ORAL at 08:32

## 2018-01-01 RX ADMIN — Medication 1 PACKET: at 10:42

## 2018-01-01 RX ADMIN — INSULIN DETEMIR 30 UNITS: 100 INJECTION, SOLUTION SUBCUTANEOUS at 09:48

## 2018-01-01 RX ADMIN — MIDODRINE HYDROCHLORIDE 20 MG: 5 TABLET ORAL at 16:14

## 2018-01-01 RX ADMIN — Medication 1 PACKET: at 10:32

## 2018-01-01 RX ADMIN — MICAFUNGIN SODIUM 100 MG: 20 INJECTION, POWDER, LYOPHILIZED, FOR SOLUTION INTRAVENOUS at 11:05

## 2018-01-01 RX ADMIN — PANTOPRAZOLE SODIUM 40 MG: 40 INJECTION, POWDER, FOR SOLUTION INTRAVENOUS at 06:16

## 2018-01-01 RX ADMIN — INSULIN HUMAN 12 UNITS: 100 INJECTION, SOLUTION PARENTERAL at 12:04

## 2018-01-01 RX ADMIN — LIDOCAINE 1 PATCH: 50 PATCH CUTANEOUS at 08:16

## 2018-01-01 RX ADMIN — PANTOPRAZOLE SODIUM 40 MG: 40 INJECTION, POWDER, FOR SOLUTION INTRAVENOUS at 05:09

## 2018-01-01 RX ADMIN — HEPARIN SODIUM 2000 UNITS: 1000 INJECTION, SOLUTION INTRAVENOUS; SUBCUTANEOUS at 13:50

## 2018-01-01 RX ADMIN — FENTANYL CITRATE 50 MCG: 50 INJECTION, SOLUTION INTRAMUSCULAR; INTRAVENOUS at 13:51

## 2018-01-01 RX ADMIN — OXYCODONE HYDROCHLORIDE AND ACETAMINOPHEN 1 TABLET: 7.5; 325 TABLET ORAL at 22:20

## 2018-01-01 RX ADMIN — BACLOFEN 10 MG: 10 TABLET ORAL at 10:00

## 2018-01-01 RX ADMIN — PANTOPRAZOLE SODIUM 40 MG: 40 INJECTION, POWDER, FOR SOLUTION INTRAVENOUS at 06:04

## 2018-01-01 RX ADMIN — NAFCILLIN 6 G: 10 INJECTION, POWDER, FOR SOLUTION INTRAVENOUS at 03:52

## 2018-01-01 RX ADMIN — TRAMADOL HYDROCHLORIDE 50 MG: 50 TABLET, COATED ORAL at 21:58

## 2018-01-01 RX ADMIN — ERYTHROPOIETIN 10000 UNITS: 10000 INJECTION, SOLUTION INTRAVENOUS; SUBCUTANEOUS at 09:56

## 2018-01-01 RX ADMIN — MICAFUNGIN SODIUM 100 MG: 20 INJECTION, POWDER, LYOPHILIZED, FOR SOLUTION INTRAVENOUS at 14:28

## 2018-01-01 RX ADMIN — VALPROIC ACID 500 MG: 250 SOLUTION ORAL at 05:32

## 2018-01-01 RX ADMIN — MICAFUNGIN SODIUM 100 MG: 20 INJECTION, POWDER, LYOPHILIZED, FOR SOLUTION INTRAVENOUS at 11:25

## 2018-01-01 RX ADMIN — NYSTATIN: 100000 POWDER TOPICAL at 20:25

## 2018-01-01 RX ADMIN — GABAPENTIN 400 MG: 400 CAPSULE ORAL at 20:13

## 2018-01-01 RX ADMIN — IPRATROPIUM BROMIDE AND ALBUTEROL SULFATE 3 ML: 2.5; .5 SOLUTION RESPIRATORY (INHALATION) at 07:14

## 2018-01-01 RX ADMIN — MUPIROCIN: 20 OINTMENT TOPICAL at 20:33

## 2018-01-01 RX ADMIN — INSULIN HUMAN 7 UNITS: 100 INJECTION, SOLUTION PARENTERAL at 18:24

## 2018-01-01 RX ADMIN — APIXABAN 2.5 MG: 2.5 TABLET, FILM COATED ORAL at 20:19

## 2018-01-01 RX ADMIN — INSULIN HUMAN 16 UNITS: 100 INJECTION, SOLUTION PARENTERAL at 06:41

## 2018-01-01 RX ADMIN — ACETAMINOPHEN 650 MG: 650 SOLUTION ORAL at 11:52

## 2018-01-01 RX ADMIN — NYSTATIN 1 APPLICATION: 100000 POWDER TOPICAL at 20:09

## 2018-01-01 RX ADMIN — CHLORHEXIDINE GLUCONATE 15 ML: 1.2 RINSE ORAL at 08:57

## 2018-01-01 RX ADMIN — IPRATROPIUM BROMIDE AND ALBUTEROL SULFATE 3 ML: 2.5; .5 SOLUTION RESPIRATORY (INHALATION) at 07:57

## 2018-01-01 RX ADMIN — CHLORHEXIDINE GLUCONATE 15 ML: 1.2 RINSE ORAL at 21:18

## 2018-01-01 RX ADMIN — MIDODRINE HYDROCHLORIDE 20 MG: 5 TABLET ORAL at 23:52

## 2018-01-01 RX ADMIN — FENTANYL CITRATE 100 MCG: 50 INJECTION INTRAMUSCULAR; INTRAVENOUS at 12:50

## 2018-01-01 RX ADMIN — HEPARIN SODIUM 5000 UNITS: 5000 INJECTION, SOLUTION INTRAVENOUS; SUBCUTANEOUS at 00:19

## 2018-01-01 RX ADMIN — ACETAMINOPHEN 650 MG: 325 TABLET ORAL at 05:13

## 2018-01-01 RX ADMIN — HYDROMORPHONE HYDROCHLORIDE 1 MG: 1 INJECTION, SOLUTION INTRAMUSCULAR; INTRAVENOUS; SUBCUTANEOUS at 16:40

## 2018-01-01 RX ADMIN — CHLORHEXIDINE GLUCONATE 15 ML: 1.2 RINSE ORAL at 21:50

## 2018-01-01 RX ADMIN — SERTRALINE HYDROCHLORIDE 100 MG: 100 TABLET ORAL at 08:41

## 2018-01-01 RX ADMIN — IPRATROPIUM BROMIDE AND ALBUTEROL SULFATE 3 ML: 2.5; .5 SOLUTION RESPIRATORY (INHALATION) at 19:58

## 2018-01-01 RX ADMIN — OXYCODONE HYDROCHLORIDE 5 MG: 5 SOLUTION ORAL at 17:28

## 2018-01-01 RX ADMIN — MIDODRINE HYDROCHLORIDE 5 MG: 5 TABLET ORAL at 20:09

## 2018-01-01 RX ADMIN — VALPROIC ACID 500 MG: 250 SOLUTION ORAL at 05:25

## 2018-01-01 RX ADMIN — ALBUMIN HUMAN 25 G: 0.25 SOLUTION INTRAVENOUS at 07:35

## 2018-01-01 RX ADMIN — FENTANYL CITRATE 50 MCG: 50 INJECTION INTRAMUSCULAR; INTRAVENOUS at 13:13

## 2018-01-01 RX ADMIN — Medication 1 PACKET: at 23:02

## 2018-01-01 RX ADMIN — GABAPENTIN 400 MG: 400 CAPSULE ORAL at 20:09

## 2018-01-01 RX ADMIN — OXYCODONE HYDROCHLORIDE AND ACETAMINOPHEN 1 TABLET: 7.5; 325 TABLET ORAL at 05:29

## 2018-01-01 RX ADMIN — ALBUMIN HUMAN 25 G: 0.25 SOLUTION INTRAVENOUS at 07:11

## 2018-01-01 RX ADMIN — MUPIROCIN: 20 OINTMENT TOPICAL at 20:57

## 2018-01-01 RX ADMIN — LIDOCAINE 1 PATCH: 50 PATCH CUTANEOUS at 08:27

## 2018-01-01 RX ADMIN — DEXTROSE MONOHYDRATE 50 ML: 25 INJECTION, SOLUTION INTRAVENOUS at 23:27

## 2018-01-01 RX ADMIN — INSULIN HUMAN 3 UNITS: 100 INJECTION, SOLUTION PARENTERAL at 23:59

## 2018-01-01 RX ADMIN — ASPIRIN 81 MG CHEWABLE TABLET 81 MG: 81 TABLET CHEWABLE at 08:38

## 2018-01-01 RX ADMIN — VALPROIC ACID 500 MG: 250 SOLUTION ORAL at 14:08

## 2018-01-01 RX ADMIN — IPRATROPIUM BROMIDE AND ALBUTEROL SULFATE 3 ML: 2.5; .5 SOLUTION RESPIRATORY (INHALATION) at 12:37

## 2018-01-01 RX ADMIN — PANTOPRAZOLE SODIUM 40 MG: 40 INJECTION, POWDER, FOR SOLUTION INTRAVENOUS at 06:03

## 2018-01-01 RX ADMIN — NAFCILLIN 6 G: 10 INJECTION, POWDER, FOR SOLUTION INTRAVENOUS at 16:07

## 2018-01-01 RX ADMIN — HEPARIN SODIUM 5000 UNITS: 5000 INJECTION, SOLUTION INTRAVENOUS; SUBCUTANEOUS at 16:19

## 2018-01-01 RX ADMIN — Medication 2 MG: at 06:11

## 2018-01-01 RX ADMIN — GABAPENTIN 400 MG: 400 CAPSULE ORAL at 20:24

## 2018-01-01 RX ADMIN — CHLORHEXIDINE GLUCONATE 15 ML: 1.2 RINSE ORAL at 08:41

## 2018-01-01 RX ADMIN — LEVOTHYROXINE SODIUM ANHYDROUS 50 MCG: 100 INJECTION, POWDER, LYOPHILIZED, FOR SOLUTION INTRAVENOUS at 13:22

## 2018-01-01 RX ADMIN — MUPIROCIN: 20 OINTMENT TOPICAL at 20:24

## 2018-01-01 RX ADMIN — ALPRAZOLAM 0.5 MG: 0.5 TABLET ORAL at 22:54

## 2018-01-01 RX ADMIN — INSULIN HUMAN 5 UNITS: 100 INJECTION, SOLUTION PARENTERAL at 12:24

## 2018-01-01 RX ADMIN — OXYCODONE HYDROCHLORIDE 5 MG: 5 SOLUTION ORAL at 13:10

## 2018-01-01 RX ADMIN — GENTAMICIN SULFATE 50 MG: 40 INJECTION, SOLUTION INTRAMUSCULAR; INTRAVENOUS at 16:30

## 2018-01-01 RX ADMIN — INSULIN HUMAN 7 UNITS: 100 INJECTION, SOLUTION PARENTERAL at 12:08

## 2018-01-01 RX ADMIN — INSULIN HUMAN 3 UNITS: 100 INJECTION, SOLUTION PARENTERAL at 00:29

## 2018-01-01 RX ADMIN — MUPIROCIN: 20 OINTMENT TOPICAL at 20:38

## 2018-01-01 RX ADMIN — MIDODRINE HYDROCHLORIDE 5 MG: 5 TABLET ORAL at 18:26

## 2018-01-01 RX ADMIN — INSULIN HUMAN 7 UNITS: 100 INJECTION, SOLUTION PARENTERAL at 18:15

## 2018-01-01 RX ADMIN — HEPARIN SODIUM 5000 UNITS: 5000 INJECTION, SOLUTION INTRAVENOUS; SUBCUTANEOUS at 13:25

## 2018-01-01 RX ADMIN — ACETAMINOPHEN 650 MG: 325 TABLET ORAL at 12:30

## 2018-01-01 RX ADMIN — DORNASE ALFA 2.5 MG: 1 SOLUTION RESPIRATORY (INHALATION) at 20:47

## 2018-01-01 RX ADMIN — INSULIN HUMAN 8 UNITS: 100 INJECTION, SOLUTION PARENTERAL at 12:54

## 2018-01-01 RX ADMIN — MIDODRINE HYDROCHLORIDE 5 MG: 5 TABLET ORAL at 12:24

## 2018-01-01 RX ADMIN — CHLORHEXIDINE GLUCONATE 15 ML: 1.2 RINSE ORAL at 08:30

## 2018-01-01 RX ADMIN — INSULIN HUMAN 5 UNITS: 100 INJECTION, SOLUTION PARENTERAL at 23:27

## 2018-01-01 RX ADMIN — APIXABAN 2.5 MG: 2.5 TABLET, FILM COATED ORAL at 22:56

## 2018-01-01 RX ADMIN — VALPROATE SODIUM 300 MG: 100 INJECTION, SOLUTION INTRAVENOUS at 23:40

## 2018-01-01 RX ADMIN — PANTOPRAZOLE SODIUM 40 MG: 40 INJECTION, POWDER, FOR SOLUTION INTRAVENOUS at 05:44

## 2018-01-01 RX ADMIN — BACLOFEN 10 MG: 10 TABLET ORAL at 21:36

## 2018-01-01 RX ADMIN — CEFAZOLIN SODIUM 2 G: 2 INJECTION, SOLUTION INTRAVENOUS at 16:23

## 2018-01-01 RX ADMIN — LEVOTHYROXINE SODIUM ANHYDROUS 50 MCG: 100 INJECTION, POWDER, LYOPHILIZED, FOR SOLUTION INTRAVENOUS at 10:14

## 2018-01-01 RX ADMIN — MUPIROCIN: 20 OINTMENT TOPICAL at 21:05

## 2018-01-01 RX ADMIN — PROPOFOL 100 MG: 10 INJECTION, EMULSION INTRAVENOUS at 11:19

## 2018-01-01 RX ADMIN — INSULIN HUMAN 8 UNITS: 100 INJECTION, SOLUTION PARENTERAL at 18:45

## 2018-01-01 RX ADMIN — BACLOFEN 5 MG: 10 TABLET ORAL at 08:03

## 2018-01-01 RX ADMIN — NAFCILLIN 6 G: 10 INJECTION, POWDER, FOR SOLUTION INTRAVENOUS at 16:54

## 2018-01-01 RX ADMIN — ACETAMINOPHEN 650 MG: 325 TABLET ORAL at 20:46

## 2018-01-01 RX ADMIN — Medication 2 MG: at 11:08

## 2018-01-01 RX ADMIN — MUPIROCIN: 20 OINTMENT TOPICAL at 21:39

## 2018-01-01 RX ADMIN — Medication 1 PACKET: at 09:07

## 2018-01-01 RX ADMIN — INSULIN HUMAN 8 UNITS: 100 INJECTION, SOLUTION PARENTERAL at 05:51

## 2018-01-01 RX ADMIN — MIDODRINE HYDROCHLORIDE 20 MG: 5 TABLET ORAL at 09:30

## 2018-01-01 RX ADMIN — NAFCILLIN 6 G: 10 INJECTION, POWDER, FOR SOLUTION INTRAVENOUS at 15:50

## 2018-01-01 RX ADMIN — VECURONIUM BROMIDE 10 MG: 1 INJECTION, POWDER, LYOPHILIZED, FOR SOLUTION INTRAVENOUS at 06:22

## 2018-01-01 RX ADMIN — GENTAMICIN SULFATE 50 MG: 40 INJECTION, SOLUTION INTRAMUSCULAR; INTRAVENOUS at 16:55

## 2018-01-01 RX ADMIN — ALPRAZOLAM 0.5 MG: 0.5 TABLET ORAL at 13:52

## 2018-01-01 RX ADMIN — SODIUM CHLORIDE, POTASSIUM CHLORIDE, SODIUM LACTATE AND CALCIUM CHLORIDE: 600; 310; 30; 20 INJECTION, SOLUTION INTRAVENOUS at 13:12

## 2018-01-01 RX ADMIN — INSULIN HUMAN 5 UNITS: 100 INJECTION, SOLUTION PARENTERAL at 13:39

## 2018-01-01 RX ADMIN — BACLOFEN 10 MG: 10 TABLET ORAL at 09:55

## 2018-01-01 RX ADMIN — ALTEPLASE 2 MG: 2.2 INJECTION, POWDER, LYOPHILIZED, FOR SOLUTION INTRAVENOUS at 19:44

## 2018-01-01 RX ADMIN — INSULIN HUMAN 24 UNITS: 100 INJECTION, SOLUTION PARENTERAL at 00:14

## 2018-01-01 RX ADMIN — CHLORHEXIDINE GLUCONATE 15 ML: 1.2 RINSE ORAL at 08:05

## 2018-01-01 RX ADMIN — GABAPENTIN 400 MG: 400 CAPSULE ORAL at 21:02

## 2018-01-01 RX ADMIN — NYSTATIN: 100000 POWDER TOPICAL at 08:42

## 2018-01-01 RX ADMIN — VALPROATE SODIUM 1000 MG: 100 INJECTION, SOLUTION INTRAVENOUS at 13:45

## 2018-01-01 RX ADMIN — MIDODRINE HYDROCHLORIDE 10 MG: 5 TABLET ORAL at 17:58

## 2018-01-01 RX ADMIN — INSULIN HUMAN 4 UNITS: 100 INJECTION, SOLUTION PARENTERAL at 05:24

## 2018-01-01 RX ADMIN — CHLORHEXIDINE GLUCONATE 15 ML: 1.2 RINSE ORAL at 20:39

## 2018-01-01 RX ADMIN — Medication 2 MG: at 10:26

## 2018-01-01 RX ADMIN — NYSTATIN 1 APPLICATION: 100000 POWDER TOPICAL at 09:00

## 2018-01-01 RX ADMIN — MUPIROCIN: 20 OINTMENT TOPICAL at 08:17

## 2018-01-01 RX ADMIN — ALBUMIN HUMAN 25 G: 0.25 SOLUTION INTRAVENOUS at 08:33

## 2018-01-01 RX ADMIN — MIDODRINE HYDROCHLORIDE 5 MG: 5 TABLET ORAL at 08:18

## 2018-01-01 RX ADMIN — ALBUMIN HUMAN 25 G: 0.25 SOLUTION INTRAVENOUS at 08:00

## 2018-01-01 RX ADMIN — Medication 1 PACKET: at 20:40

## 2018-01-01 RX ADMIN — CHLORHEXIDINE GLUCONATE 15 ML: 1.2 RINSE ORAL at 20:21

## 2018-01-01 RX ADMIN — ACETAMINOPHEN 650 MG: 650 SOLUTION ORAL at 06:22

## 2018-01-01 RX ADMIN — FAMOTIDINE 20 MG: 10 INJECTION, SOLUTION INTRAVENOUS at 13:45

## 2018-01-01 RX ADMIN — OXYCODONE HYDROCHLORIDE 5 MG: 5 SOLUTION ORAL at 22:53

## 2018-01-01 RX ADMIN — INSULIN HUMAN 8 UNITS: 100 INJECTION, SOLUTION PARENTERAL at 17:58

## 2018-01-01 RX ADMIN — IPRATROPIUM BROMIDE AND ALBUTEROL SULFATE 3 ML: 2.5; .5 SOLUTION RESPIRATORY (INHALATION) at 12:27

## 2018-01-01 RX ADMIN — DEXTROSE MONOHYDRATE 5 G: 25 INJECTION, SOLUTION INTRAVENOUS at 21:17

## 2018-01-01 RX ADMIN — MIDODRINE HYDROCHLORIDE 5 MG: 5 TABLET ORAL at 20:51

## 2018-01-01 RX ADMIN — HEPARIN SODIUM 5000 UNITS: 5000 INJECTION, SOLUTION INTRAVENOUS; SUBCUTANEOUS at 05:44

## 2018-01-01 RX ADMIN — NAFCILLIN 6 G: 10 INJECTION, POWDER, FOR SOLUTION INTRAVENOUS at 15:32

## 2018-01-01 RX ADMIN — INSULIN HUMAN 16 UNITS: 100 INJECTION, SOLUTION PARENTERAL at 00:09

## 2018-01-01 RX ADMIN — Medication 2 MG: at 06:20

## 2018-01-01 RX ADMIN — CEFAZOLIN SODIUM 2 G: 2 INJECTION, SOLUTION INTRAVENOUS at 15:46

## 2018-01-01 RX ADMIN — LEVOTHYROXINE SODIUM 75 MCG: 75 TABLET ORAL at 05:50

## 2018-01-01 RX ADMIN — VALPROIC ACID 500 MG: 250 SOLUTION ORAL at 21:36

## 2018-01-01 RX ADMIN — VALPROIC ACID 500 MG: 250 SOLUTION ORAL at 14:30

## 2018-01-01 RX ADMIN — IPRATROPIUM BROMIDE AND ALBUTEROL SULFATE 3 ML: 2.5; .5 SOLUTION RESPIRATORY (INHALATION) at 12:42

## 2018-01-01 RX ADMIN — INSULIN HUMAN 4 UNITS: 100 INJECTION, SOLUTION PARENTERAL at 12:18

## 2018-01-01 RX ADMIN — INSULIN HUMAN 4 UNITS: 100 INJECTION, SOLUTION PARENTERAL at 06:51

## 2018-01-01 RX ADMIN — PANTOPRAZOLE SODIUM 40 MG: 40 INJECTION, POWDER, FOR SOLUTION INTRAVENOUS at 06:05

## 2018-01-01 RX ADMIN — CHLORHEXIDINE GLUCONATE 15 ML: 1.2 RINSE ORAL at 08:20

## 2018-01-01 RX ADMIN — NYSTATIN 1 APPLICATION: 100000 POWDER TOPICAL at 08:00

## 2018-01-01 RX ADMIN — CEFAZOLIN SODIUM 2 G: 2 INJECTION, SOLUTION INTRAVENOUS at 16:42

## 2018-01-01 RX ADMIN — LIDOCAINE 1 PATCH: 50 PATCH CUTANEOUS at 08:00

## 2018-01-01 RX ADMIN — ATRACURIUM BESYLATE 30 MG: 10 INJECTION, SOLUTION INTRAVENOUS at 14:18

## 2018-01-01 RX ADMIN — SERTRALINE HYDROCHLORIDE 100 MG: 100 TABLET ORAL at 08:24

## 2018-01-01 RX ADMIN — TRAMADOL HYDROCHLORIDE 50 MG: 50 TABLET, COATED ORAL at 04:01

## 2018-01-01 RX ADMIN — INSULIN HUMAN 8 UNITS: 100 INJECTION, SOLUTION PARENTERAL at 23:56

## 2018-01-01 RX ADMIN — TRAMADOL HYDROCHLORIDE 50 MG: 50 TABLET, COATED ORAL at 13:52

## 2018-01-01 RX ADMIN — HEPARIN SODIUM 5000 UNITS: 5000 INJECTION, SOLUTION INTRAVENOUS; SUBCUTANEOUS at 14:35

## 2018-01-01 RX ADMIN — ALBUMIN HUMAN 25 G: 0.25 SOLUTION INTRAVENOUS at 09:45

## 2018-01-01 RX ADMIN — INSULIN HUMAN 7 UNITS: 100 INJECTION, SOLUTION PARENTERAL at 14:06

## 2018-01-01 RX ADMIN — CHLORHEXIDINE GLUCONATE 15 ML: 1.2 RINSE ORAL at 20:25

## 2018-01-01 RX ADMIN — INSULIN HUMAN 4 UNITS: 100 INJECTION, SOLUTION PARENTERAL at 06:03

## 2018-01-01 RX ADMIN — IPRATROPIUM BROMIDE AND ALBUTEROL SULFATE 3 ML: 2.5; .5 SOLUTION RESPIRATORY (INHALATION) at 12:52

## 2018-01-01 RX ADMIN — LIDOCAINE 1 PATCH: 50 PATCH CUTANEOUS at 08:42

## 2018-01-01 RX ADMIN — Medication 1 PACKET: at 09:54

## 2018-01-01 RX ADMIN — CEPHALEXIN 500 MG: 250 POWDER, FOR SUSPENSION ORAL at 20:42

## 2018-01-01 RX ADMIN — MIDODRINE HYDROCHLORIDE 20 MG: 5 TABLET ORAL at 00:14

## 2018-01-01 RX ADMIN — FENTANYL CITRATE 25 MCG: 50 INJECTION INTRAMUSCULAR; INTRAVENOUS at 13:40

## 2018-01-01 RX ADMIN — Medication 1 PACKET: at 20:42

## 2018-01-01 RX ADMIN — HEPARIN SODIUM 5000 UNITS: 5000 INJECTION, SOLUTION INTRAVENOUS; SUBCUTANEOUS at 06:05

## 2018-01-01 RX ADMIN — INSULIN HUMAN 7 UNITS: 100 INJECTION, SOLUTION PARENTERAL at 18:07

## 2018-01-01 RX ADMIN — INSULIN DETEMIR 40 UNITS: 100 INJECTION, SOLUTION SUBCUTANEOUS at 08:31

## 2018-01-01 RX ADMIN — CHLORHEXIDINE GLUCONATE 15 ML: 1.2 RINSE ORAL at 21:04

## 2018-01-01 RX ADMIN — MUPIROCIN: 20 OINTMENT TOPICAL at 20:30

## 2018-01-01 RX ADMIN — BACLOFEN 10 MG: 10 TABLET ORAL at 20:06

## 2018-01-01 RX ADMIN — DORNASE ALFA 2.5 MG: 1 SOLUTION RESPIRATORY (INHALATION) at 21:06

## 2018-01-01 RX ADMIN — NYSTATIN: 100000 POWDER TOPICAL at 20:19

## 2018-01-01 RX ADMIN — GENTAMICIN SULFATE 120 MG: 60 INJECTION, SOLUTION INTRAVENOUS at 13:58

## 2018-01-01 RX ADMIN — DULOXETINE HYDROCHLORIDE 20 MG: 20 CAPSULE, DELAYED RELEASE ORAL at 08:41

## 2018-01-01 RX ADMIN — ATORVASTATIN CALCIUM 20 MG: 20 TABLET, FILM COATED ORAL at 22:57

## 2018-01-01 RX ADMIN — HEPARIN SODIUM 5000 UNITS: 5000 INJECTION, SOLUTION INTRAVENOUS; SUBCUTANEOUS at 16:45

## 2018-01-01 RX ADMIN — HEPARIN SODIUM 5000 UNITS: 5000 INJECTION, SOLUTION INTRAVENOUS; SUBCUTANEOUS at 06:02

## 2018-01-01 RX ADMIN — LANSOPRAZOLE 30 MG: KIT at 06:40

## 2018-01-01 RX ADMIN — ROCURONIUM BROMIDE 50 MG: 10 SOLUTION INTRAVENOUS at 01:38

## 2018-01-01 RX ADMIN — CHLORHEXIDINE GLUCONATE 15 ML: 1.2 RINSE ORAL at 20:38

## 2018-01-01 RX ADMIN — Medication 0.6 MCG/KG/MIN: at 16:07

## 2018-01-01 RX ADMIN — SERTRALINE HYDROCHLORIDE 100 MG: 100 TABLET ORAL at 08:28

## 2018-01-01 RX ADMIN — CHLORHEXIDINE GLUCONATE 15 ML: 1.2 RINSE ORAL at 08:16

## 2018-01-01 RX ADMIN — MIDODRINE HYDROCHLORIDE 5 MG: 5 TABLET ORAL at 12:03

## 2018-01-01 RX ADMIN — MICAFUNGIN SODIUM 100 MG: 20 INJECTION, POWDER, LYOPHILIZED, FOR SOLUTION INTRAVENOUS at 12:51

## 2018-01-01 RX ADMIN — CHLORHEXIDINE GLUCONATE 15 ML: 1.2 RINSE ORAL at 08:28

## 2018-01-01 RX ADMIN — ALBUMIN HUMAN 25 G: 0.25 SOLUTION INTRAVENOUS at 07:06

## 2018-01-01 RX ADMIN — MUPIROCIN 1 APPLICATION: 20 OINTMENT TOPICAL at 08:29

## 2018-01-01 RX ADMIN — INSULIN HUMAN 12 UNITS: 100 INJECTION, SOLUTION PARENTERAL at 12:10

## 2018-01-01 RX ADMIN — LIDOCAINE 1 PATCH: 50 PATCH CUTANEOUS at 08:09

## 2018-01-01 RX ADMIN — LIDOCAINE HYDROCHLORIDE 50 MG: 10 INJECTION, SOLUTION INFILTRATION; PERINEURAL at 01:38

## 2018-01-01 RX ADMIN — MICAFUNGIN SODIUM 100 MG: 20 INJECTION, POWDER, LYOPHILIZED, FOR SOLUTION INTRAVENOUS at 14:30

## 2018-01-01 RX ADMIN — INSULIN HUMAN 8 UNITS: 100 INJECTION, SOLUTION PARENTERAL at 20:30

## 2018-01-01 RX ADMIN — BACLOFEN 10 MG: 10 TABLET ORAL at 21:39

## 2018-01-01 RX ADMIN — OXYCODONE HYDROCHLORIDE 5 MG: 5 SOLUTION ORAL at 02:40

## 2018-01-01 RX ADMIN — OXYCODONE HYDROCHLORIDE 5 MG: 5 SOLUTION ORAL at 22:56

## 2018-01-01 RX ADMIN — MIDODRINE HYDROCHLORIDE 5 MG: 5 TABLET ORAL at 02:05

## 2018-01-01 RX ADMIN — NAFCILLIN SODIUM 6 G: 2 INJECTION, POWDER, LYOPHILIZED, FOR SOLUTION INTRAMUSCULAR; INTRAVENOUS at 17:07

## 2018-01-01 RX ADMIN — IPRATROPIUM BROMIDE AND ALBUTEROL SULFATE 3 ML: 2.5; .5 SOLUTION RESPIRATORY (INHALATION) at 16:43

## 2018-01-01 RX ADMIN — ALBUMIN HUMAN 25 G: 0.25 SOLUTION INTRAVENOUS at 14:35

## 2018-01-01 RX ADMIN — IPRATROPIUM BROMIDE AND ALBUTEROL SULFATE 3 ML: 2.5; .5 SOLUTION RESPIRATORY (INHALATION) at 07:23

## 2018-01-01 RX ADMIN — CHLORHEXIDINE GLUCONATE 15 ML: 1.2 RINSE ORAL at 10:37

## 2018-01-01 RX ADMIN — CHLORHEXIDINE GLUCONATE 15 ML: 1.2 RINSE ORAL at 20:18

## 2018-01-01 RX ADMIN — SERTRALINE HYDROCHLORIDE 100 MG: 100 TABLET ORAL at 08:02

## 2018-01-01 RX ADMIN — INSULIN HUMAN 7 UNITS: 100 INJECTION, SOLUTION PARENTERAL at 05:50

## 2018-01-01 RX ADMIN — INSULIN HUMAN 4 UNITS: 100 INJECTION, SOLUTION PARENTERAL at 18:27

## 2018-01-01 RX ADMIN — SERTRALINE HYDROCHLORIDE 100 MG: 100 TABLET ORAL at 10:35

## 2018-01-01 RX ADMIN — INSULIN HUMAN 4 UNITS: 100 INJECTION, SOLUTION PARENTERAL at 05:47

## 2018-01-01 RX ADMIN — CHLORHEXIDINE GLUCONATE 15 ML: 1.2 RINSE ORAL at 08:50

## 2018-01-01 RX ADMIN — TRAMADOL HYDROCHLORIDE 50 MG: 50 TABLET, COATED ORAL at 11:08

## 2018-01-01 RX ADMIN — PHENYLEPHRINE HYDROCHLORIDE 100 MCG: 10 INJECTION INTRAVENOUS at 01:40

## 2018-01-01 RX ADMIN — INSULIN HUMAN 7 UNITS: 100 INJECTION, SOLUTION PARENTERAL at 00:00

## 2018-01-01 RX ADMIN — ALPRAZOLAM 0.5 MG: 0.5 TABLET ORAL at 21:52

## 2018-01-01 RX ADMIN — Medication 1 PACKET: at 21:04

## 2018-01-01 RX ADMIN — NYSTATIN: 100000 POWDER TOPICAL at 11:45

## 2018-01-01 RX ADMIN — LEVOTHYROXINE SODIUM 75 MCG: 75 TABLET ORAL at 06:20

## 2018-01-01 RX ADMIN — OXYCODONE HYDROCHLORIDE AND ACETAMINOPHEN 1 TABLET: 7.5; 325 TABLET ORAL at 20:24

## 2018-01-01 RX ADMIN — INSULIN HUMAN 7 UNITS: 100 INJECTION, SOLUTION PARENTERAL at 12:17

## 2018-01-01 RX ADMIN — CEFAZOLIN SODIUM 2 G: 2 INJECTION, SOLUTION INTRAVENOUS at 16:49

## 2018-01-01 RX ADMIN — OXYCODONE HYDROCHLORIDE 5 MG: 5 SOLUTION ORAL at 21:59

## 2018-01-01 RX ADMIN — HEPARIN SODIUM 5000 UNITS: 5000 INJECTION, SOLUTION INTRAVENOUS; SUBCUTANEOUS at 22:10

## 2018-01-01 RX ADMIN — NAFCILLIN 6 G: 10 INJECTION, POWDER, FOR SOLUTION INTRAVENOUS at 16:30

## 2018-01-01 RX ADMIN — MUPIROCIN: 20 OINTMENT TOPICAL at 08:57

## 2018-01-01 RX ADMIN — ERYTHROPOIETIN 10000 UNITS: 10000 INJECTION, SOLUTION INTRAVENOUS; SUBCUTANEOUS at 10:18

## 2018-01-01 RX ADMIN — BACLOFEN 10 MG: 10 TABLET ORAL at 20:38

## 2018-01-01 RX ADMIN — ALPRAZOLAM 0.5 MG: 0.5 TABLET ORAL at 23:56

## 2018-01-01 RX ADMIN — CHLORHEXIDINE GLUCONATE 15 ML: 1.2 RINSE ORAL at 09:18

## 2018-01-01 RX ADMIN — MIDODRINE HYDROCHLORIDE 20 MG: 5 TABLET ORAL at 08:31

## 2018-01-01 RX ADMIN — FENTANYL CITRATE 25 MCG: 50 INJECTION INTRAMUSCULAR; INTRAVENOUS at 15:28

## 2018-01-01 RX ADMIN — HEPARIN SODIUM 5000 UNITS: 5000 INJECTION, SOLUTION INTRAVENOUS; SUBCUTANEOUS at 06:06

## 2018-01-01 RX ADMIN — ATORVASTATIN CALCIUM 20 MG: 20 TABLET, FILM COATED ORAL at 23:55

## 2018-01-01 RX ADMIN — SODIUM CHLORIDE 5 UNITS/HR: 9 INJECTION, SOLUTION INTRAVENOUS at 05:52

## 2018-01-01 RX ADMIN — ERYTHROPOIETIN 10000 UNITS: 10000 INJECTION, SOLUTION INTRAVENOUS; SUBCUTANEOUS at 08:17

## 2018-01-01 RX ADMIN — LEVOTHYROXINE SODIUM 75 MCG: 75 TABLET ORAL at 05:44

## 2018-01-01 RX ADMIN — NYSTATIN: 100000 POWDER TOPICAL at 10:42

## 2018-01-01 RX ADMIN — PANTOPRAZOLE SODIUM 40 MG: 40 INJECTION, POWDER, FOR SOLUTION INTRAVENOUS at 05:45

## 2018-01-01 RX ADMIN — ALBUMIN HUMAN 25 G: 0.25 SOLUTION INTRAVENOUS at 10:18

## 2018-01-01 RX ADMIN — MIDODRINE HYDROCHLORIDE 20 MG: 5 TABLET ORAL at 16:30

## 2018-01-01 RX ADMIN — MUPIROCIN: 20 OINTMENT TOPICAL at 08:02

## 2018-01-01 RX ADMIN — HEPARIN SODIUM 5000 UNITS: 5000 INJECTION, SOLUTION INTRAVENOUS; SUBCUTANEOUS at 13:36

## 2018-01-01 RX ADMIN — MICAFUNGIN SODIUM 100 MG: 20 INJECTION, POWDER, LYOPHILIZED, FOR SOLUTION INTRAVENOUS at 13:41

## 2018-01-01 RX ADMIN — IPRATROPIUM BROMIDE AND ALBUTEROL SULFATE 3 ML: 2.5; .5 SOLUTION RESPIRATORY (INHALATION) at 16:30

## 2018-01-01 RX ADMIN — INSULIN HUMAN 7 UNITS: 100 INJECTION, SOLUTION PARENTERAL at 12:46

## 2018-01-01 RX ADMIN — MUPIROCIN: 20 OINTMENT TOPICAL at 09:30

## 2018-01-01 RX ADMIN — BACLOFEN 10 MG: 10 TABLET ORAL at 20:30

## 2018-01-01 RX ADMIN — CEFAZOLIN SODIUM 2 G: 2 INJECTION, SOLUTION INTRAVENOUS at 18:22

## 2018-01-01 RX ADMIN — CHLORHEXIDINE GLUCONATE 15 ML: 1.2 RINSE ORAL at 08:43

## 2018-01-01 RX ADMIN — MIDODRINE HYDROCHLORIDE 20 MG: 5 TABLET ORAL at 16:46

## 2018-01-01 RX ADMIN — NYSTATIN: 100000 POWDER TOPICAL at 23:54

## 2018-01-01 RX ADMIN — Medication 1 PACKET: at 08:57

## 2018-01-01 RX ADMIN — MIDODRINE HYDROCHLORIDE 10 MG: 5 TABLET ORAL at 12:04

## 2018-01-01 RX ADMIN — APIXABAN 2.5 MG: 2.5 TABLET, FILM COATED ORAL at 09:57

## 2018-01-01 RX ADMIN — MIDODRINE HYDROCHLORIDE 5 MG: 5 TABLET ORAL at 18:02

## 2018-01-01 RX ADMIN — IPRATROPIUM BROMIDE AND ALBUTEROL SULFATE 3 ML: 2.5; .5 SOLUTION RESPIRATORY (INHALATION) at 19:23

## 2018-01-01 RX ADMIN — MUPIROCIN: 20 OINTMENT TOPICAL at 14:50

## 2018-01-01 RX ADMIN — TRAMADOL HYDROCHLORIDE 50 MG: 50 TABLET, COATED ORAL at 13:35

## 2018-01-01 RX ADMIN — PANTOPRAZOLE SODIUM 40 MG: 40 INJECTION, POWDER, FOR SOLUTION INTRAVENOUS at 06:46

## 2018-01-01 RX ADMIN — Medication 1.5 MCG/KG/MIN: at 14:31

## 2018-01-01 RX ADMIN — ALBUMIN HUMAN 25 G: 0.25 SOLUTION INTRAVENOUS at 17:17

## 2018-01-01 RX ADMIN — INSULIN HUMAN 3 UNITS: 100 INJECTION, SOLUTION PARENTERAL at 06:09

## 2018-01-01 RX ADMIN — OXYCODONE HYDROCHLORIDE 5 MG: 5 SOLUTION ORAL at 20:19

## 2018-01-01 RX ADMIN — CALCIUM CHLORIDE 0.5 G: 100 INJECTION INTRAVENOUS; INTRAVENTRICULAR at 02:29

## 2018-01-01 RX ADMIN — MUPIROCIN: 20 OINTMENT TOPICAL at 20:05

## 2018-01-01 RX ADMIN — NAFCILLIN 6 G: 10 INJECTION, POWDER, FOR SOLUTION INTRAVENOUS at 05:45

## 2018-01-01 RX ADMIN — MICAFUNGIN SODIUM 100 MG: 20 INJECTION, POWDER, LYOPHILIZED, FOR SOLUTION INTRAVENOUS at 15:43

## 2018-01-01 RX ADMIN — NYSTATIN: 100000 POWDER TOPICAL at 08:27

## 2018-01-01 RX ADMIN — DAPTOMYCIN 550 MG: 500 INJECTION, POWDER, LYOPHILIZED, FOR SOLUTION INTRAVENOUS at 14:33

## 2018-01-01 RX ADMIN — CHLORHEXIDINE GLUCONATE 15 ML: 1.2 RINSE ORAL at 21:00

## 2018-01-01 RX ADMIN — GABAPENTIN 400 MG: 400 CAPSULE ORAL at 22:57

## 2018-01-01 RX ADMIN — Medication 2 MG: at 09:03

## 2018-01-01 RX ADMIN — INSULIN HUMAN 12 UNITS: 100 INJECTION, SOLUTION PARENTERAL at 00:01

## 2018-01-01 RX ADMIN — HEPARIN SODIUM 5000 UNITS: 5000 INJECTION, SOLUTION INTRAVENOUS; SUBCUTANEOUS at 05:42

## 2018-01-01 RX ADMIN — Medication 1 PACKET: at 08:34

## 2018-01-01 RX ADMIN — SODIUM CHLORIDE 50 ML/HR: 9 INJECTION, SOLUTION INTRAVENOUS at 21:23

## 2018-01-01 RX ADMIN — Medication 9.38 PACKET: at 12:45

## 2018-01-01 RX ADMIN — OXYCODONE HYDROCHLORIDE 5 MG: 5 SOLUTION ORAL at 14:15

## 2018-01-01 RX ADMIN — ASPIRIN 81 MG CHEWABLE TABLET 324 MG: 81 TABLET CHEWABLE at 21:18

## 2018-01-01 RX ADMIN — OXYCODONE HYDROCHLORIDE 5 MG: 5 SOLUTION ORAL at 06:24

## 2018-01-01 RX ADMIN — IPRATROPIUM BROMIDE AND ALBUTEROL SULFATE 3 ML: 2.5; .5 SOLUTION RESPIRATORY (INHALATION) at 16:41

## 2018-01-01 RX ADMIN — IPRATROPIUM BROMIDE AND ALBUTEROL SULFATE 3 ML: 2.5; .5 SOLUTION RESPIRATORY (INHALATION) at 08:06

## 2018-01-01 RX ADMIN — INSULIN HUMAN 14 UNITS: 100 INJECTION, SOLUTION PARENTERAL at 13:22

## 2018-01-01 RX ADMIN — NAFCILLIN SODIUM 6 G: 2 INJECTION, POWDER, LYOPHILIZED, FOR SOLUTION INTRAMUSCULAR; INTRAVENOUS at 05:45

## 2018-01-01 RX ADMIN — LIDOCAINE 1 PATCH: 50 PATCH CUTANEOUS at 08:45

## 2018-01-01 RX ADMIN — CHLORHEXIDINE GLUCONATE 15 ML: 1.2 RINSE ORAL at 21:35

## 2018-01-01 RX ADMIN — IPRATROPIUM BROMIDE AND ALBUTEROL SULFATE 3 ML: 2.5; .5 SOLUTION RESPIRATORY (INHALATION) at 07:35

## 2018-01-01 RX ADMIN — LEVOTHYROXINE SODIUM 75 MCG: 75 TABLET ORAL at 05:09

## 2018-01-01 RX ADMIN — LEVOTHYROXINE SODIUM 75 MCG: 75 TABLET ORAL at 05:25

## 2018-01-01 RX ADMIN — IPRATROPIUM BROMIDE AND ALBUTEROL SULFATE 3 ML: 2.5; .5 SOLUTION RESPIRATORY (INHALATION) at 16:40

## 2018-01-01 RX ADMIN — Medication 1 PACKET: at 08:42

## 2018-01-01 RX ADMIN — VALPROIC ACID 500 MG: 250 SOLUTION ORAL at 06:45

## 2018-01-01 RX ADMIN — IPRATROPIUM BROMIDE AND ALBUTEROL SULFATE 3 ML: 2.5; .5 SOLUTION RESPIRATORY (INHALATION) at 15:58

## 2018-01-01 RX ADMIN — ATORVASTATIN CALCIUM 20 MG: 20 TABLET, FILM COATED ORAL at 20:36

## 2018-01-01 RX ADMIN — PROPOFOL 100 MG: 10 INJECTION, EMULSION INTRAVENOUS at 11:25

## 2018-01-01 RX ADMIN — INSULIN DETEMIR 15 UNITS: 100 INJECTION, SOLUTION SUBCUTANEOUS at 11:51

## 2018-01-01 RX ADMIN — ALPRAZOLAM 0.5 MG: 0.5 TABLET ORAL at 02:39

## 2018-01-01 RX ADMIN — HEPARIN SODIUM 5000 UNITS: 5000 INJECTION, SOLUTION INTRAVENOUS; SUBCUTANEOUS at 18:14

## 2018-01-01 RX ADMIN — LIDOCAINE 1 PATCH: 50 PATCH CUTANEOUS at 14:00

## 2018-01-01 RX ADMIN — LEVOTHYROXINE SODIUM ANHYDROUS 50 MCG: 100 INJECTION, POWDER, LYOPHILIZED, FOR SOLUTION INTRAVENOUS at 11:31

## 2018-01-01 RX ADMIN — GENTAMICIN SULFATE 50 MG: 40 INJECTION, SOLUTION INTRAMUSCULAR; INTRAVENOUS at 13:51

## 2018-01-01 RX ADMIN — LEVOTHYROXINE SODIUM ANHYDROUS 50 MCG: 100 INJECTION, POWDER, LYOPHILIZED, FOR SOLUTION INTRAVENOUS at 12:45

## 2018-01-01 RX ADMIN — ALBUMIN HUMAN 25 G: 0.25 SOLUTION INTRAVENOUS at 08:18

## 2018-01-01 RX ADMIN — CHLORHEXIDINE GLUCONATE 15 ML: 1.2 RINSE ORAL at 21:03

## 2018-01-01 RX ADMIN — INSULIN DETEMIR 30 UNITS: 100 INJECTION, SOLUTION SUBCUTANEOUS at 08:04

## 2018-01-01 RX ADMIN — INSULIN HUMAN 7 UNITS: 100 INJECTION, SOLUTION PARENTERAL at 18:45

## 2018-01-01 RX ADMIN — NAFCILLIN SODIUM 6 G: 2 INJECTION, POWDER, LYOPHILIZED, FOR SOLUTION INTRAMUSCULAR; INTRAVENOUS at 06:20

## 2018-01-01 RX ADMIN — TRAMADOL HYDROCHLORIDE 50 MG: 50 TABLET, COATED ORAL at 21:28

## 2018-01-01 RX ADMIN — DULOXETINE HYDROCHLORIDE 20 MG: 20 CAPSULE, DELAYED RELEASE ORAL at 08:17

## 2018-01-01 RX ADMIN — MIDODRINE HYDROCHLORIDE 5 MG: 5 TABLET ORAL at 22:57

## 2018-01-01 RX ADMIN — CHLORHEXIDINE GLUCONATE 15 ML: 1.2 RINSE ORAL at 08:21

## 2018-01-01 RX ADMIN — Medication 1 PACKET: at 20:51

## 2018-01-01 RX ADMIN — SERTRALINE HYDROCHLORIDE 100 MG: 100 TABLET ORAL at 12:30

## 2018-01-01 RX ADMIN — ALPRAZOLAM 0.5 MG: 0.5 TABLET ORAL at 11:37

## 2018-01-01 RX ADMIN — IPRATROPIUM BROMIDE AND ALBUTEROL SULFATE 3 ML: 2.5; .5 SOLUTION RESPIRATORY (INHALATION) at 11:48

## 2018-01-01 RX ADMIN — ERYTHROPOIETIN 10000 UNITS: 10000 INJECTION, SOLUTION INTRAVENOUS; SUBCUTANEOUS at 12:28

## 2018-01-01 RX ADMIN — Medication 1 MCG/KG/MIN: at 07:07

## 2018-01-01 RX ADMIN — INSULIN DETEMIR 30 UNITS: 100 INJECTION, SOLUTION SUBCUTANEOUS at 08:20

## 2018-01-01 RX ADMIN — CHLORHEXIDINE GLUCONATE 15 ML: 1.2 RINSE ORAL at 12:33

## 2018-01-01 RX ADMIN — Medication 2 MG: at 00:39

## 2018-01-01 RX ADMIN — MIDODRINE HYDROCHLORIDE 20 MG: 5 TABLET ORAL at 08:49

## 2018-01-01 RX ADMIN — DORNASE ALFA 2.5 MG: 1 SOLUTION RESPIRATORY (INHALATION) at 07:47

## 2018-01-01 RX ADMIN — LEVOTHYROXINE SODIUM 75 MCG: 75 TABLET ORAL at 06:02

## 2018-01-01 RX ADMIN — LEVOTHYROXINE SODIUM 75 MCG: 75 TABLET ORAL at 06:18

## 2018-01-01 RX ADMIN — APIXABAN 2.5 MG: 2.5 TABLET, FILM COATED ORAL at 09:04

## 2018-01-01 RX ADMIN — INSULIN DETEMIR 40 UNITS: 100 INJECTION, SOLUTION SUBCUTANEOUS at 08:37

## 2018-01-01 RX ADMIN — DEXTROSE MONOHYDRATE 25 G: 25 INJECTION, SOLUTION INTRAVENOUS at 17:51

## 2018-01-01 RX ADMIN — LIDOCAINE 1 PATCH: 50 PATCH CUTANEOUS at 11:32

## 2018-01-01 RX ADMIN — SERTRALINE HYDROCHLORIDE 100 MG: 100 TABLET ORAL at 08:31

## 2018-01-01 RX ADMIN — IPRATROPIUM BROMIDE AND ALBUTEROL SULFATE 3 ML: 2.5; .5 SOLUTION RESPIRATORY (INHALATION) at 09:27

## 2018-01-01 RX ADMIN — IPRATROPIUM BROMIDE AND ALBUTEROL SULFATE 3 ML: 2.5; .5 SOLUTION RESPIRATORY (INHALATION) at 16:22

## 2018-01-01 RX ADMIN — FENTANYL CITRATE 50 MCG: 50 INJECTION, SOLUTION INTRAMUSCULAR; INTRAVENOUS at 08:43

## 2018-01-01 RX ADMIN — MUPIROCIN: 20 OINTMENT TOPICAL at 09:54

## 2018-01-01 RX ADMIN — ALPRAZOLAM 0.5 MG: 0.5 TABLET ORAL at 04:08

## 2018-01-01 RX ADMIN — FENTANYL CITRATE 100 MCG: 50 INJECTION INTRAMUSCULAR; INTRAVENOUS at 15:47

## 2018-01-01 RX ADMIN — ACETAMINOPHEN 650 MG: 325 TABLET ORAL at 01:09

## 2018-01-01 RX ADMIN — APIXABAN 2.5 MG: 2.5 TABLET, FILM COATED ORAL at 10:27

## 2018-01-01 RX ADMIN — MIDAZOLAM HYDROCHLORIDE 2 MG: 1 INJECTION INTRAMUSCULAR; INTRAVENOUS at 16:36

## 2018-01-01 RX ADMIN — NYSTATIN: 100000 POWDER TOPICAL at 20:30

## 2018-01-01 RX ADMIN — GABAPENTIN 400 MG: 400 CAPSULE ORAL at 22:54

## 2018-01-01 RX ADMIN — GABAPENTIN 400 MG: 400 CAPSULE ORAL at 20:30

## 2018-01-01 RX ADMIN — DEXTROSE MONOHYDRATE 25 G: 25 INJECTION, SOLUTION INTRAVENOUS at 22:27

## 2018-01-01 RX ADMIN — SERTRALINE HYDROCHLORIDE 100 MG: 100 TABLET ORAL at 13:25

## 2018-01-01 RX ADMIN — DULOXETINE HYDROCHLORIDE 20 MG: 20 CAPSULE, DELAYED RELEASE ORAL at 08:42

## 2018-01-01 RX ADMIN — HEPARIN SODIUM 5000 UNITS: 5000 INJECTION, SOLUTION INTRAVENOUS; SUBCUTANEOUS at 15:38

## 2018-01-01 RX ADMIN — NYSTATIN: 100000 POWDER TOPICAL at 08:16

## 2018-01-01 RX ADMIN — HEPARIN SODIUM 1000 UNITS: 1000 INJECTION, SOLUTION INTRAVENOUS; SUBCUTANEOUS at 14:21

## 2018-01-01 RX ADMIN — ALBUMIN HUMAN 25 G: 0.25 SOLUTION INTRAVENOUS at 08:26

## 2018-01-01 RX ADMIN — PHENYLEPHRINE HYDROCHLORIDE 160 MCG: 10 INJECTION INTRAVENOUS at 14:42

## 2018-01-01 RX ADMIN — CHLORHEXIDINE GLUCONATE 15 ML: 1.2 RINSE ORAL at 08:03

## 2018-01-01 RX ADMIN — MUPIROCIN: 20 OINTMENT TOPICAL at 10:37

## 2018-01-01 RX ADMIN — FENTANYL CITRATE 50 MCG: 50 INJECTION, SOLUTION INTRAMUSCULAR; INTRAVENOUS at 02:30

## 2018-01-01 RX ADMIN — IPRATROPIUM BROMIDE AND ALBUTEROL SULFATE 3 ML: 2.5; .5 SOLUTION RESPIRATORY (INHALATION) at 20:34

## 2018-01-01 RX ADMIN — NAFCILLIN 6 G: 10 INJECTION, POWDER, FOR SOLUTION INTRAVENOUS at 02:22

## 2018-01-01 RX ADMIN — INSULIN DETEMIR 30 UNITS: 100 INJECTION, SOLUTION SUBCUTANEOUS at 09:11

## 2018-01-01 RX ADMIN — Medication 2 MG: at 23:33

## 2018-01-01 RX ADMIN — LEVOTHYROXINE SODIUM 75 MCG: 75 TABLET ORAL at 06:09

## 2018-01-01 RX ADMIN — DULOXETINE HYDROCHLORIDE 20 MG: 20 CAPSULE, DELAYED RELEASE ORAL at 08:32

## 2018-01-01 RX ADMIN — VALPROIC ACID 500 MG: 250 SOLUTION ORAL at 13:35

## 2018-01-01 RX ADMIN — SODIUM FERRIC GLUCONATE COMPLEX 125 MG: 12.5 INJECTION INTRAVENOUS at 10:42

## 2018-01-01 RX ADMIN — VALPROIC ACID 500 MG: 250 SOLUTION ORAL at 22:08

## 2018-01-01 RX ADMIN — INSULIN HUMAN 12 UNITS: 100 INJECTION, SOLUTION PARENTERAL at 05:47

## 2018-01-01 RX ADMIN — CEFAZOLIN SODIUM 2 G: 2 INJECTION, SOLUTION INTRAVENOUS at 16:19

## 2018-01-01 RX ADMIN — MIDODRINE HYDROCHLORIDE 5 MG: 5 TABLET ORAL at 20:36

## 2018-01-01 RX ADMIN — ERYTHROPOIETIN 10000 UNITS: 10000 INJECTION, SOLUTION INTRAVENOUS; SUBCUTANEOUS at 12:57

## 2018-01-01 RX ADMIN — LIDOCAINE HYDROCHLORIDE 50 MG: 10 INJECTION, SOLUTION EPIDURAL; INFILTRATION; INTRACAUDAL; PERINEURAL at 12:32

## 2018-01-01 RX ADMIN — FENTANYL CITRATE 50 MCG: 50 INJECTION, SOLUTION INTRAMUSCULAR; INTRAVENOUS at 15:19

## 2018-01-01 RX ADMIN — NYSTATIN: 100000 POWDER TOPICAL at 20:37

## 2018-01-01 RX ADMIN — LIDOCAINE HYDROCHLORIDE 40 MG: 20 INJECTION, SOLUTION EPIDURAL; INFILTRATION; INTRACAUDAL; PERINEURAL at 13:13

## 2018-01-01 RX ADMIN — INSULIN HUMAN 3 UNITS: 100 INJECTION, SOLUTION PARENTERAL at 23:29

## 2018-01-01 RX ADMIN — LANSOPRAZOLE 30 MG: KIT at 05:49

## 2018-01-01 RX ADMIN — ALPRAZOLAM 0.5 MG: 0.5 TABLET ORAL at 08:24

## 2018-01-01 RX ADMIN — MIDODRINE HYDROCHLORIDE 20 MG: 5 TABLET ORAL at 17:22

## 2018-01-01 RX ADMIN — SODIUM FERRIC GLUCONATE COMPLEX 125 MG: 12.5 INJECTION INTRAVENOUS at 10:26

## 2018-01-01 RX ADMIN — OXYCODONE HYDROCHLORIDE 5 MG: 5 SOLUTION ORAL at 05:21

## 2018-01-01 RX ADMIN — ACETAMINOPHEN 650 MG: 650 SOLUTION ORAL at 15:49

## 2018-01-01 RX ADMIN — Medication 0.4 MCG/KG/MIN: at 02:30

## 2018-01-01 RX ADMIN — VALPROIC ACID 500 MG: 250 SOLUTION ORAL at 16:48

## 2018-01-01 RX ADMIN — INSULIN LISPRO 7 UNITS: 100 INJECTION, SOLUTION INTRAVENOUS; SUBCUTANEOUS at 06:39

## 2018-01-01 RX ADMIN — MICAFUNGIN SODIUM 100 MG: 20 INJECTION, POWDER, LYOPHILIZED, FOR SOLUTION INTRAVENOUS at 11:46

## 2018-01-01 RX ADMIN — CHLORHEXIDINE GLUCONATE 15 ML: 1.2 RINSE ORAL at 08:39

## 2018-01-01 RX ADMIN — DAPTOMYCIN 550 MG: 500 INJECTION, POWDER, LYOPHILIZED, FOR SOLUTION INTRAVENOUS at 16:41

## 2018-01-01 RX ADMIN — ERYTHROPOIETIN 10000 UNITS: 10000 INJECTION, SOLUTION INTRAVENOUS; SUBCUTANEOUS at 12:10

## 2018-01-01 RX ADMIN — PROPOFOL 50 MG: 10 INJECTION, EMULSION INTRAVENOUS at 12:32

## 2018-01-01 RX ADMIN — HEPARIN SODIUM 5000 UNITS: 5000 INJECTION, SOLUTION INTRAVENOUS; SUBCUTANEOUS at 22:18

## 2018-01-01 RX ADMIN — INSULIN HUMAN 4 UNITS: 100 INJECTION, SOLUTION PARENTERAL at 18:44

## 2018-01-01 RX ADMIN — NYSTATIN: 100000 POWDER TOPICAL at 21:03

## 2018-01-01 RX ADMIN — TRAMADOL HYDROCHLORIDE 50 MG: 50 TABLET, COATED ORAL at 09:57

## 2018-01-01 RX ADMIN — OXYCODONE HYDROCHLORIDE 5 MG: 5 SOLUTION ORAL at 05:37

## 2018-01-01 RX ADMIN — Medication 1 PACKET: at 09:10

## 2018-01-01 RX ADMIN — IPRATROPIUM BROMIDE AND ALBUTEROL SULFATE 3 ML: 2.5; .5 SOLUTION RESPIRATORY (INHALATION) at 13:37

## 2018-01-01 RX ADMIN — LEVOTHYROXINE SODIUM 75 MCG: 75 TABLET ORAL at 05:32

## 2018-01-01 RX ADMIN — MIDODRINE HYDROCHLORIDE 20 MG: 5 TABLET ORAL at 15:50

## 2018-01-01 RX ADMIN — Medication 1 PACKET: at 09:00

## 2018-01-01 RX ADMIN — ACETAMINOPHEN 650 MG: 325 TABLET ORAL at 00:41

## 2018-01-01 RX ADMIN — MIDODRINE HYDROCHLORIDE 5 MG: 5 TABLET ORAL at 09:03

## 2018-01-01 RX ADMIN — PANTOPRAZOLE SODIUM 40 MG: 40 INJECTION, POWDER, FOR SOLUTION INTRAVENOUS at 06:27

## 2018-01-01 RX ADMIN — ATORVASTATIN CALCIUM 20 MG: 20 TABLET, FILM COATED ORAL at 20:09

## 2018-01-01 RX ADMIN — DEXAMETHASONE SODIUM PHOSPHATE 8 MG: 4 INJECTION, SOLUTION INTRAMUSCULAR; INTRAVENOUS at 01:55

## 2018-01-01 RX ADMIN — INSULIN HUMAN 10 UNITS: 100 INJECTION, SOLUTION PARENTERAL at 18:15

## 2018-01-01 RX ADMIN — MIDAZOLAM HYDROCHLORIDE 2 MG: 1 INJECTION, SOLUTION INTRAMUSCULAR; INTRAVENOUS at 12:32

## 2018-01-01 RX ADMIN — OXYCODONE HYDROCHLORIDE 5 MG: 5 SOLUTION ORAL at 10:27

## 2018-01-01 RX ADMIN — APIXABAN 2.5 MG: 2.5 TABLET, FILM COATED ORAL at 23:55

## 2018-01-01 RX ADMIN — INSULIN HUMAN 7 UNITS: 100 INJECTION, SOLUTION PARENTERAL at 05:46

## 2018-01-01 RX ADMIN — BACLOFEN 10 MG: 10 TABLET ORAL at 08:29

## 2018-01-01 RX ADMIN — CHLORHEXIDINE GLUCONATE 15 ML: 1.2 RINSE ORAL at 08:42

## 2018-01-01 RX ADMIN — Medication 0.7 MCG/KG/MIN: at 10:55

## 2018-01-01 RX ADMIN — CEFAZOLIN SODIUM 2 G: 2 INJECTION, SOLUTION INTRAVENOUS at 16:45

## 2018-01-01 RX ADMIN — Medication 2 MG: at 23:02

## 2018-01-01 RX ADMIN — CHLORHEXIDINE GLUCONATE 15 ML: 1.2 RINSE ORAL at 08:26

## 2018-01-01 RX ADMIN — IPRATROPIUM BROMIDE AND ALBUTEROL SULFATE 3 ML: 2.5; .5 SOLUTION RESPIRATORY (INHALATION) at 20:08

## 2018-01-01 RX ADMIN — DEXTROSE AND SODIUM CHLORIDE 50 ML/HR: 5; 900 INJECTION, SOLUTION INTRAVENOUS at 05:30

## 2018-01-01 RX ADMIN — INSULIN HUMAN 10 UNITS: 100 INJECTION, SOLUTION PARENTERAL at 00:03

## 2018-01-01 RX ADMIN — MIDAZOLAM HYDROCHLORIDE 2 MG: 1 INJECTION, SOLUTION INTRAMUSCULAR; INTRAVENOUS at 12:49

## 2018-01-01 RX ADMIN — ACETAMINOPHEN 650 MG: 650 SOLUTION ORAL at 13:52

## 2018-01-01 RX ADMIN — ATORVASTATIN CALCIUM 20 MG: 20 TABLET, FILM COATED ORAL at 21:02

## 2018-01-01 RX ADMIN — ALBUMIN HUMAN 25 G: 0.25 SOLUTION INTRAVENOUS at 17:15

## 2018-01-01 RX ADMIN — LEVOTHYROXINE SODIUM ANHYDROUS 50 MCG: 100 INJECTION, POWDER, LYOPHILIZED, FOR SOLUTION INTRAVENOUS at 12:48

## 2018-01-01 RX ADMIN — PHENYLEPHRINE HYDROCHLORIDE 80 MCG: 10 INJECTION INTRAVENOUS at 15:26

## 2018-01-01 RX ADMIN — ALPRAZOLAM 0.5 MG: 0.5 TABLET ORAL at 18:04

## 2018-01-01 RX ADMIN — NYSTATIN 1 APPLICATION: 100000 POWDER TOPICAL at 21:18

## 2018-01-01 RX ADMIN — PROPOFOL 50 MG: 10 INJECTION, EMULSION INTRAVENOUS at 01:38

## 2018-01-01 RX ADMIN — MUPIROCIN: 20 OINTMENT TOPICAL at 20:39

## 2018-01-01 RX ADMIN — VALPROIC ACID 500 MG: 250 SOLUTION ORAL at 21:59

## 2018-01-01 RX ADMIN — CHLORHEXIDINE GLUCONATE 15 ML: 1.2 RINSE ORAL at 20:06

## 2018-01-01 RX ADMIN — CHLORHEXIDINE GLUCONATE 15 ML: 1.2 RINSE ORAL at 21:37

## 2018-01-01 RX ADMIN — IPRATROPIUM BROMIDE AND ALBUTEROL SULFATE 3 ML: 2.5; .5 SOLUTION RESPIRATORY (INHALATION) at 14:51

## 2018-01-01 RX ADMIN — INSULIN HUMAN 8 UNITS: 100 INJECTION, SOLUTION PARENTERAL at 17:52

## 2018-01-01 RX ADMIN — MIDODRINE HYDROCHLORIDE 5 MG: 5 TABLET ORAL at 10:55

## 2018-01-01 RX ADMIN — OXYCODONE HYDROCHLORIDE 5 MG: 5 SOLUTION ORAL at 06:46

## 2018-01-01 RX ADMIN — IPRATROPIUM BROMIDE AND ALBUTEROL SULFATE 3 ML: 2.5; .5 SOLUTION RESPIRATORY (INHALATION) at 06:48

## 2018-01-01 RX ADMIN — INSULIN HUMAN 4 UNITS: 100 INJECTION, SOLUTION PARENTERAL at 18:08

## 2018-01-01 RX ADMIN — SERTRALINE HYDROCHLORIDE 100 MG: 100 TABLET ORAL at 08:16

## 2018-01-01 RX ADMIN — Medication 2 MG: at 04:31

## 2018-01-01 RX ADMIN — NAFCILLIN 6 G: 10 INJECTION, POWDER, FOR SOLUTION INTRAVENOUS at 15:15

## 2018-01-01 RX ADMIN — MIDODRINE HYDROCHLORIDE 5 MG: 5 TABLET ORAL at 17:32

## 2018-01-01 RX ADMIN — INSULIN DETEMIR 40 UNITS: 100 INJECTION, SOLUTION SUBCUTANEOUS at 09:22

## 2018-01-01 RX ADMIN — CHLORHEXIDINE GLUCONATE 15 ML: 1.2 RINSE ORAL at 20:37

## 2018-01-01 RX ADMIN — PHYTONADIONE 10 MG: 10 INJECTION, EMULSION INTRAMUSCULAR; INTRAVENOUS; SUBCUTANEOUS at 08:16

## 2018-01-01 RX ADMIN — Medication 0.6 MCG/KG/MIN: at 15:01

## 2018-01-01 RX ADMIN — FENTANYL CITRATE 100 MCG: 50 INJECTION, SOLUTION INTRAMUSCULAR; INTRAVENOUS at 12:32

## 2018-01-01 RX ADMIN — VANCOMYCIN HYDROCHLORIDE 1000 MG: 1 INJECTION, SOLUTION INTRAVENOUS at 10:35

## 2018-01-01 RX ADMIN — IPRATROPIUM BROMIDE AND ALBUTEROL SULFATE 3 ML: 2.5; .5 SOLUTION RESPIRATORY (INHALATION) at 20:15

## 2018-01-01 RX ADMIN — CHLORHEXIDINE GLUCONATE 15 ML: 1.2 RINSE ORAL at 09:47

## 2018-01-01 RX ADMIN — MIDODRINE HYDROCHLORIDE 5 MG: 5 TABLET ORAL at 10:40

## 2018-01-01 RX ADMIN — HEPARIN SODIUM 5000 UNITS: 5000 INJECTION, SOLUTION INTRAVENOUS; SUBCUTANEOUS at 05:48

## 2018-01-01 RX ADMIN — APIXABAN 2.5 MG: 2.5 TABLET, FILM COATED ORAL at 10:41

## 2018-01-01 RX ADMIN — VALPROIC ACID 500 MG: 250 SOLUTION ORAL at 13:51

## 2018-01-01 RX ADMIN — INSULIN HUMAN 10 UNITS: 100 INJECTION, SOLUTION PARENTERAL at 23:42

## 2018-01-01 RX ADMIN — INSULIN HUMAN 3 UNITS: 100 INJECTION, SOLUTION PARENTERAL at 18:31

## 2018-01-01 RX ADMIN — ACETAMINOPHEN 650 MG: 650 SOLUTION ORAL at 07:47

## 2018-01-01 RX ADMIN — CHLORHEXIDINE GLUCONATE 15 ML: 1.2 RINSE ORAL at 08:09

## 2018-01-01 RX ADMIN — CHLORHEXIDINE GLUCONATE 15 ML: 1.2 RINSE ORAL at 09:00

## 2018-01-01 RX ADMIN — INSULIN HUMAN 8 UNITS: 100 INJECTION, SOLUTION PARENTERAL at 11:46

## 2018-01-01 RX ADMIN — ALPRAZOLAM 0.5 MG: 0.5 TABLET ORAL at 21:28

## 2018-01-01 RX ADMIN — SODIUM CHLORIDE: 9 INJECTION, SOLUTION INTRAVENOUS at 04:29

## 2018-01-01 RX ADMIN — LEVOTHYROXINE SODIUM 75 MCG: 75 TABLET ORAL at 06:11

## 2018-01-01 RX ADMIN — IPRATROPIUM BROMIDE AND ALBUTEROL SULFATE 3 ML: 2.5; .5 SOLUTION RESPIRATORY (INHALATION) at 07:40

## 2018-01-01 RX ADMIN — IPRATROPIUM BROMIDE AND ALBUTEROL SULFATE 3 ML: 2.5; .5 SOLUTION RESPIRATORY (INHALATION) at 07:25

## 2018-01-01 RX ADMIN — INSULIN DETEMIR 30 UNITS: 100 INJECTION, SOLUTION SUBCUTANEOUS at 08:57

## 2018-01-01 RX ADMIN — PANTOPRAZOLE SODIUM 40 MG: 40 INJECTION, POWDER, FOR SOLUTION INTRAVENOUS at 17:25

## 2018-01-01 RX ADMIN — INSULIN HUMAN 7 UNITS: 100 INJECTION, SOLUTION PARENTERAL at 00:27

## 2018-01-01 RX ADMIN — BACLOFEN 10 MG: 10 TABLET ORAL at 22:55

## 2018-01-01 RX ADMIN — VALPROIC ACID 500 MG: 250 SOLUTION ORAL at 13:10

## 2018-01-01 RX ADMIN — MUPIROCIN: 20 OINTMENT TOPICAL at 20:44

## 2018-01-01 RX ADMIN — PANTOPRAZOLE SODIUM 40 MG: 40 INJECTION, POWDER, FOR SOLUTION INTRAVENOUS at 10:00

## 2018-01-01 RX ADMIN — ALBUMIN HUMAN 25 G: 0.25 SOLUTION INTRAVENOUS at 09:10

## 2018-01-01 RX ADMIN — LEVOTHYROXINE SODIUM ANHYDROUS 50 MCG: 100 INJECTION, POWDER, LYOPHILIZED, FOR SOLUTION INTRAVENOUS at 12:42

## 2018-01-01 RX ADMIN — INSULIN DETEMIR 30 UNITS: 100 INJECTION, SOLUTION SUBCUTANEOUS at 10:01

## 2018-01-01 RX ADMIN — ALBUMIN HUMAN 50 G: 0.25 SOLUTION INTRAVENOUS at 12:27

## 2018-01-01 RX ADMIN — DORNASE ALFA 2.5 MG: 1 SOLUTION RESPIRATORY (INHALATION) at 21:12

## 2018-01-01 RX ADMIN — IPRATROPIUM BROMIDE AND ALBUTEROL SULFATE 3 ML: 2.5; .5 SOLUTION RESPIRATORY (INHALATION) at 19:51

## 2018-01-01 RX ADMIN — OXYCODONE AND ACETAMINOPHEN 1 TABLET: 5; 325 TABLET ORAL at 21:36

## 2018-01-01 RX ADMIN — INSULIN HUMAN 12 UNITS: 100 INJECTION, SOLUTION PARENTERAL at 18:07

## 2018-01-01 RX ADMIN — Medication 1 PACKET: at 08:38

## 2018-01-01 RX ADMIN — LEVOTHYROXINE SODIUM 75 MCG: 75 TABLET ORAL at 06:04

## 2018-01-01 RX ADMIN — INSULIN HUMAN 3 UNITS: 100 INJECTION, SOLUTION PARENTERAL at 23:40

## 2018-01-01 RX ADMIN — CEFAZOLIN SODIUM 2 G: 2 INJECTION, SOLUTION INTRAVENOUS at 16:25

## 2018-01-01 RX ADMIN — NAFCILLIN 6 G: 10 INJECTION, POWDER, FOR SOLUTION INTRAVENOUS at 03:53

## 2018-01-01 RX ADMIN — MIDODRINE HYDROCHLORIDE 5 MG: 5 TABLET ORAL at 16:45

## 2018-01-01 RX ADMIN — Medication 2 MG: at 16:48

## 2018-01-01 RX ADMIN — HEPARIN SODIUM 5000 UNITS: 5000 INJECTION, SOLUTION INTRAVENOUS; SUBCUTANEOUS at 06:27

## 2018-01-01 RX ADMIN — INSULIN HUMAN 8 UNITS: 100 INJECTION, SOLUTION PARENTERAL at 12:48

## 2018-01-01 RX ADMIN — FENTANYL CITRATE 50 MCG: 50 INJECTION INTRAMUSCULAR; INTRAVENOUS at 10:27

## 2018-01-01 RX ADMIN — ONDANSETRON 4 MG: 2 INJECTION, SOLUTION INTRAMUSCULAR; INTRAVENOUS at 11:32

## 2018-01-01 RX ADMIN — Medication 1 PACKET: at 21:17

## 2018-01-01 RX ADMIN — MIDODRINE HYDROCHLORIDE 20 MG: 5 TABLET ORAL at 23:30

## 2018-01-01 RX ADMIN — BACLOFEN 10 MG: 10 TABLET ORAL at 21:38

## 2018-01-01 RX ADMIN — MIDODRINE HYDROCHLORIDE 20 MG: 5 TABLET ORAL at 08:16

## 2018-01-01 RX ADMIN — NAFCILLIN 6 G: 10 INJECTION, POWDER, FOR SOLUTION INTRAVENOUS at 03:58

## 2018-01-01 RX ADMIN — MIDODRINE HYDROCHLORIDE 20 MG: 5 TABLET ORAL at 17:07

## 2018-01-01 RX ADMIN — MICAFUNGIN SODIUM 100 MG: 20 INJECTION, POWDER, LYOPHILIZED, FOR SOLUTION INTRAVENOUS at 12:42

## 2018-01-01 RX ADMIN — LIDOCAINE 1 PATCH: 50 PATCH CUTANEOUS at 09:05

## 2018-01-01 RX ADMIN — Medication 2 MG: at 15:54

## 2018-01-01 RX ADMIN — INSULIN HUMAN 7 UNITS: 100 INJECTION, SOLUTION PARENTERAL at 11:59

## 2018-01-01 RX ADMIN — INSULIN HUMAN 12 UNITS: 100 INJECTION, SOLUTION PARENTERAL at 17:39

## 2018-01-01 RX ADMIN — INSULIN HUMAN 3 UNITS: 100 INJECTION, SOLUTION PARENTERAL at 18:14

## 2018-01-01 RX ADMIN — FENTANYL CITRATE 50 MCG: 50 INJECTION INTRAMUSCULAR; INTRAVENOUS at 00:04

## 2018-01-01 RX ADMIN — ATORVASTATIN CALCIUM 20 MG: 20 TABLET, FILM COATED ORAL at 20:19

## 2018-01-01 RX ADMIN — VALPROIC ACID 500 MG: 250 SOLUTION ORAL at 15:05

## 2018-01-01 RX ADMIN — INSULIN HUMAN 7 UNITS: 100 INJECTION, SOLUTION PARENTERAL at 06:17

## 2018-01-01 RX ADMIN — CEPHALEXIN 500 MG: 250 POWDER, FOR SUSPENSION ORAL at 12:10

## 2018-01-01 RX ADMIN — GABAPENTIN 400 MG: 400 CAPSULE ORAL at 20:36

## 2018-01-01 RX ADMIN — IPRATROPIUM BROMIDE AND ALBUTEROL SULFATE 3 ML: 2.5; .5 SOLUTION RESPIRATORY (INHALATION) at 08:09

## 2018-01-01 RX ADMIN — Medication 2 MG: at 12:08

## 2018-01-01 RX ADMIN — POTASSIUM PHOSPHATE, MONOBASIC AND POTASSIUM PHOSPHATE, DIBASIC 15 MMOL: 224; 236 INJECTION, SOLUTION INTRAVENOUS at 13:48

## 2018-01-01 RX ADMIN — INSULIN DETEMIR 20 UNITS: 100 INJECTION, SOLUTION SUBCUTANEOUS at 09:22

## 2018-01-01 RX ADMIN — MIDODRINE HYDROCHLORIDE 10 MG: 5 TABLET ORAL at 13:12

## 2018-01-01 RX ADMIN — VALPROIC ACID 500 MG: 250 SOLUTION ORAL at 14:11

## 2018-01-01 RX ADMIN — IPRATROPIUM BROMIDE AND ALBUTEROL SULFATE 3 ML: 2.5; .5 SOLUTION RESPIRATORY (INHALATION) at 15:50

## 2018-01-01 RX ADMIN — IPRATROPIUM BROMIDE AND ALBUTEROL SULFATE 3 ML: 2.5; .5 SOLUTION RESPIRATORY (INHALATION) at 11:11

## 2018-01-01 RX ADMIN — MIDAZOLAM HYDROCHLORIDE 1 MG: 1 INJECTION, SOLUTION INTRAMUSCULAR; INTRAVENOUS at 10:40

## 2018-01-01 RX ADMIN — NYSTATIN: 100000 POWDER TOPICAL at 06:21

## 2018-01-01 RX ADMIN — CHLORHEXIDINE GLUCONATE 15 ML: 1.2 RINSE ORAL at 20:24

## 2018-01-01 RX ADMIN — MIDODRINE HYDROCHLORIDE 2.5 MG: 5 TABLET ORAL at 08:41

## 2018-01-01 RX ADMIN — PANTOPRAZOLE SODIUM 40 MG: 40 INJECTION, POWDER, FOR SOLUTION INTRAVENOUS at 05:24

## 2018-01-01 RX ADMIN — TAZOBACTAM SODIUM AND PIPERACILLIN SODIUM 2.25 G: 250; 2 INJECTION, SOLUTION INTRAVENOUS at 22:06

## 2018-01-01 RX ADMIN — PANTOPRAZOLE SODIUM 40 MG: 40 INJECTION, POWDER, FOR SOLUTION INTRAVENOUS at 06:34

## 2018-01-01 RX ADMIN — LEVOTHYROXINE SODIUM 75 MCG: 75 TABLET ORAL at 05:49

## 2018-01-01 RX ADMIN — PANTOPRAZOLE SODIUM 40 MG: 40 INJECTION, POWDER, FOR SOLUTION INTRAVENOUS at 06:02

## 2018-01-01 RX ADMIN — Medication 0.5 MCG/KG/MIN: at 22:30

## 2018-01-01 RX ADMIN — BACLOFEN 10 MG: 10 TABLET ORAL at 08:02

## 2018-01-01 RX ADMIN — Medication 2 MG: at 12:43

## 2018-01-01 RX ADMIN — DULOXETINE HYDROCHLORIDE 20 MG: 20 CAPSULE, DELAYED RELEASE ORAL at 08:28

## 2018-01-01 RX ADMIN — VALPROIC ACID 500 MG: 250 SOLUTION ORAL at 22:53

## 2018-01-01 RX ADMIN — INSULIN HUMAN 8 UNITS: 100 INJECTION, SOLUTION PARENTERAL at 00:04

## 2018-01-01 RX ADMIN — CHLORHEXIDINE GLUCONATE 15 ML: 1.2 RINSE ORAL at 08:46

## 2018-01-01 RX ADMIN — CHLORHEXIDINE GLUCONATE 15 ML: 1.2 RINSE ORAL at 09:05

## 2018-01-01 RX ADMIN — INSULIN DETEMIR 10 UNITS: 100 INJECTION, SOLUTION SUBCUTANEOUS at 14:04

## 2018-01-01 RX ADMIN — NYSTATIN: 100000 POWDER TOPICAL at 09:03

## 2018-01-01 RX ADMIN — MUPIROCIN: 20 OINTMENT TOPICAL at 08:18

## 2018-01-01 RX ADMIN — DULOXETINE HYDROCHLORIDE 20 MG: 20 CAPSULE, DELAYED RELEASE ORAL at 10:27

## 2018-01-01 RX ADMIN — SERTRALINE HYDROCHLORIDE 100 MG: 100 TABLET ORAL at 09:04

## 2018-01-01 RX ADMIN — Medication 1 PACKET: at 09:33

## 2018-01-01 RX ADMIN — INSULIN HUMAN 4 UNITS: 100 INJECTION, SOLUTION PARENTERAL at 12:03

## 2018-01-01 RX ADMIN — HYDROMORPHONE HYDROCHLORIDE 0.5 MG: 1 INJECTION, SOLUTION INTRAMUSCULAR; INTRAVENOUS; SUBCUTANEOUS at 16:35

## 2018-01-01 RX ADMIN — PROPOFOL 50 MG: 10 INJECTION, EMULSION INTRAVENOUS at 13:13

## 2018-01-01 RX ADMIN — Medication 2 MG: at 11:34

## 2018-01-01 RX ADMIN — FENTANYL CITRATE 50 MCG: 50 INJECTION, SOLUTION INTRAMUSCULAR; INTRAVENOUS at 07:04

## 2018-01-01 RX ADMIN — FENTANYL CITRATE 50 MCG: 50 INJECTION INTRAMUSCULAR; INTRAVENOUS at 19:31

## 2018-01-01 RX ADMIN — MIDODRINE HYDROCHLORIDE 5 MG: 5 TABLET ORAL at 15:45

## 2018-01-01 RX ADMIN — MIDODRINE HYDROCHLORIDE 10 MG: 5 TABLET ORAL at 06:51

## 2018-01-01 RX ADMIN — ALBUMIN HUMAN 25 G: 0.25 SOLUTION INTRAVENOUS at 10:31

## 2018-01-01 RX ADMIN — CHLORHEXIDINE GLUCONATE 15 ML: 1.2 RINSE ORAL at 09:50

## 2018-01-01 RX ADMIN — TRAMADOL HYDROCHLORIDE 50 MG: 50 TABLET, COATED ORAL at 08:26

## 2018-01-01 RX ADMIN — PHENYLEPHRINE HYDROCHLORIDE 100 MCG: 10 INJECTION INTRAVENOUS at 01:45

## 2018-01-01 RX ADMIN — Medication 2 MG: at 23:55

## 2018-01-01 RX ADMIN — INSULIN HUMAN 3 UNITS: 100 INJECTION, SOLUTION PARENTERAL at 12:01

## 2018-01-01 RX ADMIN — MICAFUNGIN SODIUM 100 MG: 20 INJECTION, POWDER, LYOPHILIZED, FOR SOLUTION INTRAVENOUS at 12:03

## 2018-01-01 RX ADMIN — INSULIN HUMAN 4 UNITS: 100 INJECTION, SOLUTION PARENTERAL at 01:24

## 2018-01-01 RX ADMIN — SODIUM CHLORIDE: 9 INJECTION, SOLUTION INTRAVENOUS at 14:14

## 2018-01-01 RX ADMIN — HEPARIN SODIUM 5000 UNITS: 5000 INJECTION, SOLUTION INTRAVENOUS; SUBCUTANEOUS at 21:17

## 2018-01-01 RX ADMIN — CHLORHEXIDINE GLUCONATE 15 ML: 1.2 RINSE ORAL at 22:57

## 2018-01-01 RX ADMIN — GENTAMICIN SULFATE 50 MG: 40 INJECTION, SOLUTION INTRAMUSCULAR; INTRAVENOUS at 16:06

## 2018-01-01 RX ADMIN — IPRATROPIUM BROMIDE AND ALBUTEROL SULFATE 3 ML: 2.5; .5 SOLUTION RESPIRATORY (INHALATION) at 12:07

## 2018-01-01 RX ADMIN — IPRATROPIUM BROMIDE AND ALBUTEROL SULFATE 3 ML: 2.5; .5 SOLUTION RESPIRATORY (INHALATION) at 16:53

## 2018-01-01 RX ADMIN — INSULIN HUMAN 3 UNITS: 100 INJECTION, SOLUTION PARENTERAL at 06:05

## 2018-01-01 RX ADMIN — Medication 1 PACKET: at 09:50

## 2018-01-01 RX ADMIN — IPRATROPIUM BROMIDE AND ALBUTEROL SULFATE 3 ML: 2.5; .5 SOLUTION RESPIRATORY (INHALATION) at 08:01

## 2018-01-01 RX ADMIN — APIXABAN 2.5 MG: 2.5 TABLET, FILM COATED ORAL at 22:54

## 2018-01-01 RX ADMIN — INSULIN DETEMIR 40 UNITS: 100 INJECTION, SOLUTION SUBCUTANEOUS at 09:31

## 2018-01-01 RX ADMIN — INSULIN DETEMIR 30 UNITS: 100 INJECTION, SOLUTION SUBCUTANEOUS at 10:00

## 2018-01-01 RX ADMIN — LIDOCAINE 1 PATCH: 50 PATCH CUTANEOUS at 09:57

## 2018-01-01 RX ADMIN — Medication 0.5 MCG/KG/MIN: at 14:15

## 2018-01-01 RX ADMIN — MICAFUNGIN SODIUM 100 MG: 20 INJECTION, POWDER, LYOPHILIZED, FOR SOLUTION INTRAVENOUS at 11:38

## 2018-01-01 RX ADMIN — HEPARIN SODIUM 5000 UNITS: 5000 INJECTION, SOLUTION INTRAVENOUS; SUBCUTANEOUS at 06:08

## 2018-01-01 RX ADMIN — PANTOPRAZOLE SODIUM 40 MG: 40 TABLET, DELAYED RELEASE ORAL at 06:06

## 2018-01-01 RX ADMIN — CHLORHEXIDINE GLUCONATE 15 ML: 1.2 RINSE ORAL at 21:39

## 2018-01-01 RX ADMIN — NYSTATIN: 100000 POWDER TOPICAL at 20:33

## 2018-01-01 RX ADMIN — IPRATROPIUM BROMIDE AND ALBUTEROL SULFATE 3 ML: 2.5; .5 SOLUTION RESPIRATORY (INHALATION) at 15:46

## 2018-01-01 RX ADMIN — NOREPINEPHRINE BITARTRATE 0.02 MCG/KG/MIN: 8 SOLUTION at 10:21

## 2018-01-01 RX ADMIN — LIDOCAINE 1 PATCH: 50 PATCH CUTANEOUS at 10:27

## 2018-01-01 RX ADMIN — INSULIN HUMAN 7 UNITS: 100 INJECTION, SOLUTION PARENTERAL at 06:21

## 2018-01-01 RX ADMIN — NAFCILLIN 6 G: 10 INJECTION, POWDER, FOR SOLUTION INTRAVENOUS at 16:56

## 2018-01-01 RX ADMIN — TRAMADOL HYDROCHLORIDE 50 MG: 50 TABLET, COATED ORAL at 06:01

## 2018-01-01 RX ADMIN — INSULIN HUMAN 7 UNITS: 100 INJECTION, SOLUTION PARENTERAL at 00:06

## 2018-01-01 RX ADMIN — NYSTATIN: 100000 POWDER TOPICAL at 08:46

## 2018-01-01 RX ADMIN — Medication 1 PACKET: at 08:30

## 2018-01-01 RX ADMIN — NAFCILLIN 6 G: 10 INJECTION, POWDER, FOR SOLUTION INTRAVENOUS at 03:22

## 2018-01-01 RX ADMIN — PHENYLEPHRINE HYDROCHLORIDE 80 MCG: 10 INJECTION INTRAVENOUS at 15:03

## 2018-01-01 RX ADMIN — NAFCILLIN 6 G: 10 INJECTION, POWDER, FOR SOLUTION INTRAVENOUS at 04:04

## 2018-01-01 RX ADMIN — GABAPENTIN 400 MG: 400 CAPSULE ORAL at 20:19

## 2018-01-01 RX ADMIN — IPRATROPIUM BROMIDE AND ALBUTEROL SULFATE 3 ML: 2.5; .5 SOLUTION RESPIRATORY (INHALATION) at 12:34

## 2018-01-01 RX ADMIN — MIDODRINE HYDROCHLORIDE 5 MG: 5 TABLET ORAL at 06:18

## 2018-01-01 RX ADMIN — SERTRALINE HYDROCHLORIDE 100 MG: 100 TABLET ORAL at 10:41

## 2018-01-01 RX ADMIN — DORNASE ALFA 2.5 MG: 1 SOLUTION RESPIRATORY (INHALATION) at 20:44

## 2018-01-01 RX ADMIN — INSULIN HUMAN 8 UNITS: 100 INJECTION, SOLUTION PARENTERAL at 00:53

## 2018-01-01 RX ADMIN — PANTOPRAZOLE SODIUM 40 MG: 40 INJECTION, POWDER, FOR SOLUTION INTRAVENOUS at 05:59

## 2018-01-01 RX ADMIN — INSULIN HUMAN 2 UNITS: 100 INJECTION, SOLUTION PARENTERAL at 12:29

## 2018-01-01 RX ADMIN — INSULIN HUMAN 5 UNITS: 100 INJECTION, SOLUTION PARENTERAL at 05:58

## 2018-01-01 RX ADMIN — OXYCODONE HYDROCHLORIDE 5 MG: 5 SOLUTION ORAL at 08:24

## 2018-01-01 RX ADMIN — SODIUM FERRIC GLUCONATE COMPLEX 125 MG: 12.5 INJECTION INTRAVENOUS at 10:00

## 2018-01-01 RX ADMIN — HEPARIN SODIUM 5000 UNITS: 5000 INJECTION, SOLUTION INTRAVENOUS; SUBCUTANEOUS at 21:50

## 2018-01-01 RX ADMIN — MIDODRINE HYDROCHLORIDE 5 MG: 5 TABLET ORAL at 18:22

## 2018-01-01 RX ADMIN — PANTOPRAZOLE SODIUM 40 MG: 40 INJECTION, POWDER, FOR SOLUTION INTRAVENOUS at 05:49

## 2018-01-01 RX ADMIN — BACLOFEN 10 MG: 10 TABLET ORAL at 09:21

## 2018-01-01 RX ADMIN — NYSTATIN 1 APPLICATION: 100000 POWDER TOPICAL at 20:18

## 2018-01-01 RX ADMIN — CEFAZOLIN SODIUM 2 G: 2 INJECTION, SOLUTION INTRAVENOUS at 15:14

## 2018-01-01 RX ADMIN — Medication 0.7 MCG/KG/MIN: at 11:22

## 2018-01-01 RX ADMIN — NYSTATIN: 100000 POWDER TOPICAL at 21:37

## 2018-01-01 RX ADMIN — MIDODRINE HYDROCHLORIDE 5 MG: 5 TABLET ORAL at 07:09

## 2018-01-01 RX ADMIN — LEVOTHYROXINE SODIUM 75 MCG: 75 TABLET ORAL at 06:06

## 2018-01-01 RX ADMIN — ASPIRIN 81 MG CHEWABLE TABLET 81 MG: 81 TABLET CHEWABLE at 10:41

## 2018-01-01 RX ADMIN — ACETAMINOPHEN 650 MG: 650 SOLUTION ORAL at 20:06

## 2018-01-01 RX ADMIN — PHENYLEPHRINE HYDROCHLORIDE 160 MCG: 10 INJECTION INTRAVENOUS at 14:57

## 2018-01-01 RX ADMIN — NAFCILLIN 6 G: 10 INJECTION, POWDER, FOR SOLUTION INTRAVENOUS at 14:01

## 2018-01-01 RX ADMIN — INSULIN LISPRO 7 UNITS: 100 INJECTION, SOLUTION INTRAVENOUS; SUBCUTANEOUS at 00:24

## 2018-01-01 RX ADMIN — DULOXETINE HYDROCHLORIDE 20 MG: 20 CAPSULE, DELAYED RELEASE ORAL at 08:24

## 2018-01-01 RX ADMIN — MICAFUNGIN SODIUM 100 MG: 20 INJECTION, POWDER, LYOPHILIZED, FOR SOLUTION INTRAVENOUS at 12:27

## 2018-01-01 RX ADMIN — PHYTONADIONE 10 MG: 10 INJECTION, EMULSION INTRAMUSCULAR; INTRAVENOUS; SUBCUTANEOUS at 12:16

## 2018-01-01 RX ADMIN — MIDODRINE HYDROCHLORIDE 2.5 MG: 5 TABLET ORAL at 08:24

## 2018-01-01 RX ADMIN — DULOXETINE HYDROCHLORIDE 20 MG: 20 CAPSULE, DELAYED RELEASE ORAL at 08:38

## 2018-01-01 RX ADMIN — INSULIN HUMAN 8 UNITS: 100 INJECTION, SOLUTION PARENTERAL at 00:29

## 2018-01-01 RX ADMIN — LEVOTHYROXINE SODIUM 75 MCG: 75 TABLET ORAL at 13:23

## 2018-01-01 RX ADMIN — MIDODRINE HYDROCHLORIDE 5 MG: 5 TABLET ORAL at 15:31

## 2018-01-01 RX ADMIN — INSULIN HUMAN 3 UNITS: 100 INJECTION, SOLUTION PARENTERAL at 12:51

## 2018-01-01 RX ADMIN — VALPROIC ACID 500 MG: 250 SOLUTION ORAL at 21:02

## 2018-01-01 RX ADMIN — GABAPENTIN 400 MG: 400 CAPSULE ORAL at 21:17

## 2018-01-01 RX ADMIN — FENTANYL CITRATE 50 MCG: 50 INJECTION, SOLUTION INTRAMUSCULAR; INTRAVENOUS at 03:30

## 2018-01-01 RX ADMIN — VALPROIC ACID 500 MG: 250 SOLUTION ORAL at 21:15

## 2018-01-01 RX ADMIN — ERYTHROPOIETIN 10000 UNITS: 10000 INJECTION, SOLUTION INTRAVENOUS; SUBCUTANEOUS at 09:45

## 2018-01-01 RX ADMIN — CHLORHEXIDINE GLUCONATE 15 ML: 1.2 RINSE ORAL at 20:10

## 2018-01-01 RX ADMIN — LEVOTHYROXINE SODIUM ANHYDROUS 50 MCG: 100 INJECTION, POWDER, LYOPHILIZED, FOR SOLUTION INTRAVENOUS at 12:28

## 2018-01-01 RX ADMIN — INSULIN HUMAN 3 UNITS: 100 INJECTION, SOLUTION PARENTERAL at 20:36

## 2018-01-01 RX ADMIN — CHLORHEXIDINE GLUCONATE 15 ML: 1.2 RINSE ORAL at 20:50

## 2018-01-01 RX ADMIN — FENTANYL CITRATE 100 MCG: 50 INJECTION, SOLUTION INTRAMUSCULAR; INTRAVENOUS at 14:23

## 2018-01-01 RX ADMIN — TRAMADOL HYDROCHLORIDE 50 MG: 50 TABLET, COATED ORAL at 10:12

## 2018-01-01 RX ADMIN — PANTOPRAZOLE SODIUM 40 MG: 40 INJECTION, POWDER, FOR SOLUTION INTRAVENOUS at 05:13

## 2018-01-01 RX ADMIN — CEFAZOLIN SODIUM 2 G: 2 INJECTION, SOLUTION INTRAVENOUS at 16:15

## 2018-01-01 RX ADMIN — OXYCODONE HYDROCHLORIDE 5 MG: 5 SOLUTION ORAL at 08:16

## 2018-01-01 RX ADMIN — MICAFUNGIN SODIUM 100 MG: 20 INJECTION, POWDER, LYOPHILIZED, FOR SOLUTION INTRAVENOUS at 13:34

## 2018-01-01 RX ADMIN — ALBUMIN HUMAN 50 G: 0.25 SOLUTION INTRAVENOUS at 06:40

## 2018-01-01 RX ADMIN — DORNASE ALFA 2.5 MG: 1 SOLUTION RESPIRATORY (INHALATION) at 08:59

## 2018-01-01 RX ADMIN — IPRATROPIUM BROMIDE AND ALBUTEROL SULFATE 3 ML: 2.5; .5 SOLUTION RESPIRATORY (INHALATION) at 19:05

## 2018-01-01 RX ADMIN — DULOXETINE HYDROCHLORIDE 20 MG: 20 CAPSULE, DELAYED RELEASE ORAL at 09:57

## 2018-01-01 RX ADMIN — DORNASE ALFA 2.5 MG: 1 SOLUTION RESPIRATORY (INHALATION) at 07:58

## 2018-01-01 RX ADMIN — SERTRALINE HYDROCHLORIDE 100 MG: 100 TABLET ORAL at 08:32

## 2018-01-01 RX ADMIN — Medication 1 PACKET: at 09:23

## 2018-01-01 RX ADMIN — ALPRAZOLAM 0.5 MG: 0.5 TABLET ORAL at 22:57

## 2018-01-01 RX ADMIN — ASPIRIN 81 MG CHEWABLE TABLET 81 MG: 81 TABLET CHEWABLE at 08:42

## 2018-01-01 RX ADMIN — PANTOPRAZOLE SODIUM 40 MG: 40 INJECTION, POWDER, FOR SOLUTION INTRAVENOUS at 06:13

## 2018-01-01 RX ADMIN — INSULIN HUMAN 4 UNITS: 100 INJECTION, SOLUTION PARENTERAL at 06:16

## 2018-01-01 RX ADMIN — MIDODRINE HYDROCHLORIDE 5 MG: 5 TABLET ORAL at 08:02

## 2018-01-01 RX ADMIN — CHLORHEXIDINE GLUCONATE 15 ML: 1.2 RINSE ORAL at 11:32

## 2018-01-01 RX ADMIN — HEPARIN SODIUM 5000 UNITS: 5000 INJECTION, SOLUTION INTRAVENOUS; SUBCUTANEOUS at 22:06

## 2018-01-01 RX ADMIN — IPRATROPIUM BROMIDE AND ALBUTEROL SULFATE 3 ML: 2.5; .5 SOLUTION RESPIRATORY (INHALATION) at 20:59

## 2018-01-01 RX ADMIN — MICAFUNGIN SODIUM 100 MG: 20 INJECTION, POWDER, LYOPHILIZED, FOR SOLUTION INTRAVENOUS at 11:52

## 2018-01-01 RX ADMIN — ALPRAZOLAM 0.5 MG: 0.5 TABLET ORAL at 10:12

## 2018-01-01 RX ADMIN — ATRACURIUM BESYLATE 50 MG: 10 INJECTION, SOLUTION INTRAVENOUS at 05:01

## 2018-01-01 RX ADMIN — VALPROATE SODIUM 300 MG: 100 INJECTION, SOLUTION INTRAVENOUS at 08:50

## 2018-01-01 RX ADMIN — SERTRALINE HYDROCHLORIDE 100 MG: 100 TABLET ORAL at 13:13

## 2018-01-01 RX ADMIN — IPRATROPIUM BROMIDE AND ALBUTEROL SULFATE 3 ML: 2.5; .5 SOLUTION RESPIRATORY (INHALATION) at 07:13

## 2018-01-01 RX ADMIN — INSULIN HUMAN 5 UNITS: 100 INJECTION, SOLUTION PARENTERAL at 19:26

## 2018-01-01 RX ADMIN — MUPIROCIN: 20 OINTMENT TOPICAL at 20:37

## 2018-01-01 RX ADMIN — PANTOPRAZOLE SODIUM 40 MG: 40 INJECTION, POWDER, FOR SOLUTION INTRAVENOUS at 05:32

## 2018-01-01 RX ADMIN — LEVOTHYROXINE SODIUM 75 MCG: 75 TABLET ORAL at 05:33

## 2018-01-01 RX ADMIN — CHLORHEXIDINE GLUCONATE 15 ML: 1.2 RINSE ORAL at 09:21

## 2018-01-01 RX ADMIN — Medication 1 PACKET: at 22:55

## 2018-01-01 RX ADMIN — NAFCILLIN SODIUM 6 G: 2 INJECTION, POWDER, LYOPHILIZED, FOR SOLUTION INTRAMUSCULAR; INTRAVENOUS at 18:03

## 2018-01-01 RX ADMIN — MUPIROCIN: 20 OINTMENT TOPICAL at 12:32

## 2018-01-01 RX ADMIN — HEPARIN SODIUM 5000 UNITS: 5000 INJECTION, SOLUTION INTRAVENOUS; SUBCUTANEOUS at 05:45

## 2018-01-01 RX ADMIN — CHLORHEXIDINE GLUCONATE 15 ML: 1.2 RINSE ORAL at 08:33

## 2018-01-01 RX ADMIN — INSULIN HUMAN 10 UNITS: 100 INJECTION, SOLUTION PARENTERAL at 06:03

## 2018-01-01 RX ADMIN — INSULIN HUMAN 5 UNITS: 100 INJECTION, SOLUTION PARENTERAL at 12:12

## 2018-01-01 RX ADMIN — NYSTATIN: 100000 POWDER TOPICAL at 09:05

## 2018-01-01 RX ADMIN — ALBUMIN HUMAN 25 G: 0.25 SOLUTION INTRAVENOUS at 22:10

## 2018-01-01 RX ADMIN — LEVOTHYROXINE SODIUM ANHYDROUS 50 MCG: 100 INJECTION, POWDER, LYOPHILIZED, FOR SOLUTION INTRAVENOUS at 11:23

## 2018-01-01 RX ADMIN — INSULIN HUMAN 20 UNITS: 100 INJECTION, SOLUTION PARENTERAL at 06:16

## 2018-01-01 RX ADMIN — MIDODRINE HYDROCHLORIDE 5 MG: 5 TABLET ORAL at 20:13

## 2018-01-01 RX ADMIN — Medication 2 MG: at 06:46

## 2018-01-01 RX ADMIN — Medication 1 PACKET: at 20:09

## 2018-01-01 RX ADMIN — SODIUM CHLORIDE, POTASSIUM CHLORIDE, SODIUM LACTATE AND CALCIUM CHLORIDE 250 ML/HR: 600; 310; 30; 20 INJECTION, SOLUTION INTRAVENOUS at 10:47

## 2018-01-01 RX ADMIN — INSULIN HUMAN 8 UNITS: 100 INJECTION, SOLUTION PARENTERAL at 00:26

## 2018-01-01 RX ADMIN — MIDODRINE HYDROCHLORIDE 5 MG: 5 TABLET ORAL at 08:32

## 2018-01-01 RX ADMIN — MIDAZOLAM HYDROCHLORIDE 1 MG: 1 INJECTION, SOLUTION INTRAMUSCULAR; INTRAVENOUS at 15:46

## 2018-01-01 RX ADMIN — INSULIN HUMAN 3 UNITS: 100 INJECTION, SOLUTION PARENTERAL at 05:51

## 2018-01-01 RX ADMIN — CHLORHEXIDINE GLUCONATE 15 ML: 1.2 RINSE ORAL at 08:06

## 2018-01-01 RX ADMIN — CHLORHEXIDINE GLUCONATE 15 ML: 1.2 RINSE ORAL at 20:05

## 2018-01-01 RX ADMIN — BACLOFEN 10 MG: 10 TABLET ORAL at 08:04

## 2018-01-01 RX ADMIN — LANSOPRAZOLE 30 MG: KIT at 05:45

## 2018-01-01 RX ADMIN — CHLORHEXIDINE GLUCONATE 15 ML: 1.2 RINSE ORAL at 20:33

## 2018-01-01 RX ADMIN — ERYTHROPOIETIN 10000 UNITS: 10000 INJECTION, SOLUTION INTRAVENOUS; SUBCUTANEOUS at 11:34

## 2018-01-01 RX ADMIN — IPRATROPIUM BROMIDE AND ALBUTEROL SULFATE 3 ML: 2.5; .5 SOLUTION RESPIRATORY (INHALATION) at 15:54

## 2018-01-01 RX ADMIN — ALBUMIN HUMAN 25 G: 0.25 SOLUTION INTRAVENOUS at 08:37

## 2018-01-01 RX ADMIN — NAFCILLIN 6 G: 10 INJECTION, POWDER, FOR SOLUTION INTRAVENOUS at 03:59

## 2018-01-01 RX ADMIN — Medication 1 PACKET: at 08:03

## 2018-01-01 RX ADMIN — ALPRAZOLAM 0.5 MG: 0.5 TABLET ORAL at 20:49

## 2018-01-01 RX ADMIN — Medication 0.5 MCG/KG/MIN: at 02:22

## 2018-01-01 RX ADMIN — CHLORHEXIDINE GLUCONATE 15 ML: 1.2 RINSE ORAL at 20:17

## 2018-01-01 RX ADMIN — ASPIRIN 81 MG CHEWABLE TABLET 81 MG: 81 TABLET CHEWABLE at 09:04

## 2018-01-01 RX ADMIN — ACETAMINOPHEN 650 MG: 650 SOLUTION ORAL at 18:04

## 2018-01-01 RX ADMIN — TRAMADOL HYDROCHLORIDE 50 MG: 50 TABLET, COATED ORAL at 20:13

## 2018-01-01 RX ADMIN — MIDODRINE HYDROCHLORIDE 20 MG: 5 TABLET ORAL at 23:05

## 2018-01-01 RX ADMIN — Medication 1 PACKET: at 20:30

## 2018-01-01 RX ADMIN — ALBUMIN HUMAN 25 G: 0.25 SOLUTION INTRAVENOUS at 11:02

## 2018-01-01 RX ADMIN — MUPIROCIN: 20 OINTMENT TOPICAL at 20:06

## 2018-01-01 RX ADMIN — SERTRALINE HYDROCHLORIDE 100 MG: 100 TABLET ORAL at 08:42

## 2018-01-01 RX ADMIN — TRAMADOL HYDROCHLORIDE 50 MG: 50 TABLET, COATED ORAL at 08:45

## 2018-01-01 RX ADMIN — CHLORHEXIDINE GLUCONATE 15 ML: 1.2 RINSE ORAL at 08:14

## 2018-01-01 RX ADMIN — MIDODRINE HYDROCHLORIDE 5 MG: 5 TABLET ORAL at 16:19

## 2018-01-01 RX ADMIN — ASPIRIN 81 MG CHEWABLE TABLET 81 MG: 81 TABLET CHEWABLE at 09:57

## 2018-01-01 RX ADMIN — CHLORHEXIDINE GLUCONATE 15 ML: 1.2 RINSE ORAL at 20:11

## 2018-01-01 RX ADMIN — LEVOTHYROXINE SODIUM ANHYDROUS 50 MCG: 100 INJECTION, POWDER, LYOPHILIZED, FOR SOLUTION INTRAVENOUS at 12:11

## 2018-01-01 RX ADMIN — MIDODRINE HYDROCHLORIDE 5 MG: 5 TABLET ORAL at 10:33

## 2018-01-01 RX ADMIN — GABAPENTIN 400 MG: 400 CAPSULE ORAL at 20:50

## 2018-01-01 RX ADMIN — APIXABAN 2.5 MG: 2.5 TABLET, FILM COATED ORAL at 20:36

## 2018-01-01 RX ADMIN — DEXTROSE AND SODIUM CHLORIDE 50 ML/HR: 5; 900 INJECTION, SOLUTION INTRAVENOUS at 06:07

## 2018-01-01 RX ADMIN — INSULIN HUMAN 7 UNITS: 100 INJECTION, SOLUTION PARENTERAL at 05:45

## 2018-01-01 RX ADMIN — IPRATROPIUM BROMIDE AND ALBUTEROL SULFATE 3 ML: 2.5; .5 SOLUTION RESPIRATORY (INHALATION) at 20:49

## 2018-01-01 RX ADMIN — NAFCILLIN 6 G: 10 INJECTION, POWDER, FOR SOLUTION INTRAVENOUS at 14:02

## 2018-01-01 RX ADMIN — MUPIROCIN 1 APPLICATION: 20 OINTMENT TOPICAL at 08:59

## 2018-01-01 RX ADMIN — MICAFUNGIN SODIUM 100 MG: 20 INJECTION, POWDER, LYOPHILIZED, FOR SOLUTION INTRAVENOUS at 12:10

## 2018-01-01 RX ADMIN — VALPROIC ACID 500 MG: 250 SOLUTION ORAL at 21:21

## 2018-01-01 RX ADMIN — MIDODRINE HYDROCHLORIDE 5 MG: 5 TABLET ORAL at 08:17

## 2018-01-01 RX ADMIN — LEVOTHYROXINE SODIUM 75 MCG: 75 TABLET ORAL at 05:19

## 2018-01-01 RX ADMIN — CEFAZOLIN SODIUM 2 G: 2 INJECTION, SOLUTION INTRAVENOUS at 01:45

## 2018-01-01 RX ADMIN — HEPARIN SODIUM 5000 UNITS: 5000 INJECTION, SOLUTION INTRAVENOUS; SUBCUTANEOUS at 14:00

## 2018-01-01 RX ADMIN — LANSOPRAZOLE 30 MG: KIT at 05:10

## 2018-01-01 RX ADMIN — LEVOTHYROXINE SODIUM ANHYDROUS 50 MCG: 100 INJECTION, POWDER, LYOPHILIZED, FOR SOLUTION INTRAVENOUS at 10:01

## 2018-01-01 RX ADMIN — BACLOFEN 10 MG: 10 TABLET ORAL at 09:30

## 2018-01-01 RX ADMIN — TRAMADOL HYDROCHLORIDE 50 MG: 50 TABLET, COATED ORAL at 20:46

## 2018-01-01 RX ADMIN — INSULIN HUMAN 8 UNITS: 100 INJECTION, SOLUTION PARENTERAL at 00:13

## 2018-01-01 RX ADMIN — ALBUMIN HUMAN 25 G: 0.25 SOLUTION INTRAVENOUS at 08:05

## 2018-01-01 RX ADMIN — Medication 1 PACKET: at 08:22

## 2018-01-01 RX ADMIN — GLYCOPYRROLATE 0.4 MG: 0.2 INJECTION, SOLUTION INTRAMUSCULAR; INTRAVENOUS at 16:35

## 2018-01-01 RX ADMIN — Medication 1 PACKET: at 08:31

## 2018-01-01 RX ADMIN — CHLORHEXIDINE GLUCONATE 15 ML: 1.2 RINSE ORAL at 20:58

## 2018-01-01 RX ADMIN — VALPROIC ACID 500 MG: 250 SOLUTION ORAL at 06:20

## 2018-01-01 RX ADMIN — Medication 2 MG: at 04:17

## 2018-01-01 RX ADMIN — ACETAMINOPHEN 650 MG: 325 TABLET ORAL at 13:12

## 2018-01-01 RX ADMIN — IPRATROPIUM BROMIDE AND ALBUTEROL SULFATE 3 ML: 2.5; .5 SOLUTION RESPIRATORY (INHALATION) at 19:33

## 2018-01-01 RX ADMIN — INSULIN HUMAN 3 UNITS: 100 INJECTION, SOLUTION PARENTERAL at 23:05

## 2018-01-01 RX ADMIN — ATORVASTATIN CALCIUM 20 MG: 20 TABLET, FILM COATED ORAL at 22:54

## 2018-01-01 RX ADMIN — FAMOTIDINE 20 MG: 20 TABLET ORAL at 21:36

## 2018-01-01 RX ADMIN — SERTRALINE HYDROCHLORIDE 100 MG: 100 TABLET ORAL at 10:55

## 2018-01-01 RX ADMIN — MIDODRINE HYDROCHLORIDE 20 MG: 5 TABLET ORAL at 23:59

## 2018-01-01 RX ADMIN — LANSOPRAZOLE 30 MG: KIT at 05:33

## 2018-01-01 RX ADMIN — MIDODRINE HYDROCHLORIDE 10 MG: 5 TABLET ORAL at 08:03

## 2018-01-01 RX ADMIN — MIDODRINE HYDROCHLORIDE 5 MG: 5 TABLET ORAL at 08:09

## 2018-01-01 RX ADMIN — ALPRAZOLAM 0.5 MG: 0.5 TABLET ORAL at 13:51

## 2018-01-01 RX ADMIN — IPRATROPIUM BROMIDE AND ALBUTEROL SULFATE 3 ML: 2.5; .5 SOLUTION RESPIRATORY (INHALATION) at 19:45

## 2018-01-01 RX ADMIN — INSULIN HUMAN 16 UNITS: 100 INJECTION, SOLUTION PARENTERAL at 14:06

## 2018-01-01 RX ADMIN — CEFAZOLIN SODIUM 2 G: 2 INJECTION, SOLUTION INTRAVENOUS at 16:58

## 2018-01-01 RX ADMIN — LEVOTHYROXINE SODIUM 75 MCG: 75 TABLET ORAL at 05:46

## 2018-01-01 RX ADMIN — IPRATROPIUM BROMIDE AND ALBUTEROL SULFATE 3 ML: 2.5; .5 SOLUTION RESPIRATORY (INHALATION) at 20:44

## 2018-01-01 RX ADMIN — MUPIROCIN: 20 OINTMENT TOPICAL at 20:11

## 2018-01-01 RX ADMIN — LEVOTHYROXINE SODIUM 75 MCG: 75 TABLET ORAL at 05:48

## 2018-01-01 RX ADMIN — LANSOPRAZOLE 30 MG: KIT at 13:23

## 2018-01-01 RX ADMIN — IPRATROPIUM BROMIDE AND ALBUTEROL SULFATE 3 ML: 2.5; .5 SOLUTION RESPIRATORY (INHALATION) at 19:57

## 2018-01-01 RX ADMIN — NYSTATIN: 100000 POWDER TOPICAL at 20:51

## 2018-01-01 RX ADMIN — MUPIROCIN 1 APPLICATION: 20 OINTMENT TOPICAL at 09:19

## 2018-01-01 RX ADMIN — CHLORHEXIDINE GLUCONATE 15 ML: 1.2 RINSE ORAL at 20:30

## 2018-01-01 RX ADMIN — LEVOTHYROXINE SODIUM 75 MCG: 75 TABLET ORAL at 06:40

## 2018-01-01 RX ADMIN — NYSTATIN: 100000 POWDER TOPICAL at 22:53

## 2018-01-01 RX ADMIN — WATER 4 MG: 1 INJECTION INTRAMUSCULAR; INTRAVENOUS; SUBCUTANEOUS at 11:27

## 2018-01-01 RX ADMIN — VALPROIC ACID 500 MG: 250 SOLUTION ORAL at 13:52

## 2018-01-01 RX ADMIN — CHLORHEXIDINE GLUCONATE 15 ML: 1.2 RINSE ORAL at 20:19

## 2018-01-01 RX ADMIN — Medication 1 PACKET: at 10:00

## 2018-01-01 RX ADMIN — VALPROIC ACID 500 MG: 250 SOLUTION ORAL at 06:24

## 2018-01-01 RX ADMIN — ATRACURIUM BESYLATE 50 MG: 10 INJECTION, SOLUTION INTRAVENOUS at 12:32

## 2018-01-01 RX ADMIN — INSULIN HUMAN 4 UNITS: 100 INJECTION, SOLUTION PARENTERAL at 06:27

## 2018-01-01 RX ADMIN — DORNASE ALFA 2.5 MG: 1 SOLUTION RESPIRATORY (INHALATION) at 21:15

## 2018-01-01 RX ADMIN — APIXABAN 2.5 MG: 2.5 TABLET, FILM COATED ORAL at 08:39

## 2018-01-01 RX ADMIN — CHLORHEXIDINE GLUCONATE 15 ML: 1.2 RINSE ORAL at 22:53

## 2018-01-01 RX ADMIN — IPRATROPIUM BROMIDE AND ALBUTEROL SULFATE 3 ML: 2.5; .5 SOLUTION RESPIRATORY (INHALATION) at 07:06

## 2018-01-01 RX ADMIN — INSULIN HUMAN 4 UNITS: 100 INJECTION, SOLUTION PARENTERAL at 12:28

## 2018-01-01 RX ADMIN — LIDOCAINE 1 PATCH: 50 PATCH CUTANEOUS at 08:25

## 2018-01-01 RX ADMIN — MUPIROCIN: 20 OINTMENT TOPICAL at 08:32

## 2018-01-01 RX ADMIN — ERYTHROPOIETIN 10000 UNITS: 10000 INJECTION, SOLUTION INTRAVENOUS; SUBCUTANEOUS at 08:08

## 2018-01-01 RX ADMIN — LANSOPRAZOLE 30 MG: KIT at 05:44

## 2018-01-01 RX ADMIN — NAFCILLIN 6 G: 10 INJECTION, POWDER, FOR SOLUTION INTRAVENOUS at 02:29

## 2018-01-01 RX ADMIN — APIXABAN 2.5 MG: 2.5 TABLET, FILM COATED ORAL at 21:02

## 2018-01-01 RX ADMIN — IPRATROPIUM BROMIDE AND ALBUTEROL SULFATE 3 ML: 2.5; .5 SOLUTION RESPIRATORY (INHALATION) at 22:05

## 2018-01-01 RX ADMIN — INSULIN HUMAN 16 UNITS: 100 INJECTION, SOLUTION PARENTERAL at 18:24

## 2018-01-01 RX ADMIN — INSULIN HUMAN 3 UNITS: 100 INJECTION, SOLUTION PARENTERAL at 19:27

## 2018-01-01 RX ADMIN — SERTRALINE HYDROCHLORIDE 100 MG: 100 TABLET ORAL at 10:26

## 2018-01-01 RX ADMIN — INSULIN DETEMIR 15 UNITS: 100 INJECTION, SOLUTION SUBCUTANEOUS at 10:09

## 2018-01-01 RX ADMIN — INSULIN HUMAN 7 UNITS: 100 INJECTION, SOLUTION PARENTERAL at 00:14

## 2018-01-01 RX ADMIN — FENTANYL CITRATE 25 MCG: 50 INJECTION, SOLUTION INTRAMUSCULAR; INTRAVENOUS at 14:43

## 2018-01-01 RX ADMIN — HEPARIN SODIUM 5000 UNITS: 5000 INJECTION, SOLUTION INTRAVENOUS; SUBCUTANEOUS at 14:30

## 2018-01-01 RX ADMIN — NYSTATIN: 100000 POWDER TOPICAL at 21:36

## 2018-01-01 RX ADMIN — GABAPENTIN 400 MG: 400 CAPSULE ORAL at 21:38

## 2018-01-01 RX ADMIN — INSULIN DETEMIR 20 UNITS: 100 INJECTION, SOLUTION SUBCUTANEOUS at 09:00

## 2018-01-01 RX ADMIN — OXYCODONE HYDROCHLORIDE 5 MG: 5 SOLUTION ORAL at 06:11

## 2018-01-01 RX ADMIN — ACETAMINOPHEN 650 MG: 650 SOLUTION ORAL at 04:01

## 2018-01-01 RX ADMIN — VALPROIC ACID 500 MG: 250 SOLUTION ORAL at 22:43

## 2018-01-01 RX ADMIN — FENTANYL CITRATE 50 MCG: 50 INJECTION, SOLUTION INTRAMUSCULAR; INTRAVENOUS at 02:10

## 2018-01-01 RX ADMIN — MICAFUNGIN SODIUM 100 MG: 20 INJECTION, POWDER, LYOPHILIZED, FOR SOLUTION INTRAVENOUS at 11:45

## 2018-01-01 RX ADMIN — FENTANYL CITRATE 50 MCG: 50 INJECTION INTRAMUSCULAR; INTRAVENOUS at 13:49

## 2018-01-01 RX ADMIN — MIDODRINE HYDROCHLORIDE 5 MG: 5 TABLET ORAL at 08:42

## 2018-01-01 RX ADMIN — ALBUMIN HUMAN 50 G: 0.25 SOLUTION INTRAVENOUS at 06:30

## 2018-01-01 RX ADMIN — CEFAZOLIN SODIUM 2 G: 2 INJECTION, SOLUTION INTRAVENOUS at 15:21

## 2018-01-01 RX ADMIN — IPRATROPIUM BROMIDE AND ALBUTEROL SULFATE 3 ML: 2.5; .5 SOLUTION RESPIRATORY (INHALATION) at 13:18

## 2018-01-01 RX ADMIN — CHLORHEXIDINE GLUCONATE 15 ML: 1.2 RINSE ORAL at 21:36

## 2018-01-01 RX ADMIN — IPRATROPIUM BROMIDE AND ALBUTEROL SULFATE 3 ML: 2.5; .5 SOLUTION RESPIRATORY (INHALATION) at 12:41

## 2018-01-01 RX ADMIN — IPRATROPIUM BROMIDE AND ALBUTEROL SULFATE 3 ML: 2.5; .5 SOLUTION RESPIRATORY (INHALATION) at 07:36

## 2018-01-01 RX ADMIN — TRAMADOL HYDROCHLORIDE 50 MG: 50 TABLET, COATED ORAL at 00:32

## 2018-01-01 RX ADMIN — OXYCODONE HYDROCHLORIDE 5 MG: 5 SOLUTION ORAL at 13:35

## 2018-01-01 RX ADMIN — Medication 2 MG: at 09:12

## 2018-01-01 RX ADMIN — IPRATROPIUM BROMIDE AND ALBUTEROL SULFATE 3 ML: 2.5; .5 SOLUTION RESPIRATORY (INHALATION) at 12:40

## 2018-01-01 RX ADMIN — PHENYLEPHRINE HYDROCHLORIDE 80 MCG: 10 INJECTION INTRAVENOUS at 14:40

## 2018-01-01 RX ADMIN — LANSOPRAZOLE 30 MG: KIT at 06:20

## 2018-01-01 RX ADMIN — NAFCILLIN 6 G: 10 INJECTION, POWDER, FOR SOLUTION INTRAVENOUS at 04:35

## 2018-01-01 RX ADMIN — BACLOFEN 10 MG: 10 TABLET ORAL at 20:24

## 2018-01-01 RX ADMIN — MICAFUNGIN SODIUM 100 MG: 20 INJECTION, POWDER, LYOPHILIZED, FOR SOLUTION INTRAVENOUS at 15:00

## 2018-01-01 RX ADMIN — MIDODRINE HYDROCHLORIDE 20 MG: 5 TABLET ORAL at 14:36

## 2018-01-01 RX ADMIN — OXYCODONE HYDROCHLORIDE 5 MG: 5 SOLUTION ORAL at 19:19

## 2018-01-01 RX ADMIN — SERTRALINE HYDROCHLORIDE 100 MG: 100 TABLET ORAL at 08:04

## 2018-01-01 RX ADMIN — LIDOCAINE 1 PATCH: 50 PATCH CUTANEOUS at 08:18

## 2018-01-01 RX ADMIN — INSULIN HUMAN 12 UNITS: 100 INJECTION, SOLUTION PARENTERAL at 12:58

## 2018-01-01 RX ADMIN — VALPROIC ACID 500 MG: 250 SOLUTION ORAL at 06:25

## 2018-01-01 RX ADMIN — HEPARIN SODIUM 5000 UNITS: 5000 INJECTION, SOLUTION INTRAVENOUS; SUBCUTANEOUS at 22:23

## 2018-01-01 RX ADMIN — FENTANYL CITRATE 100 MCG: 50 INJECTION, SOLUTION INTRAMUSCULAR; INTRAVENOUS at 01:38

## 2018-01-01 RX ADMIN — CEFAZOLIN SODIUM 2 G: 2 INJECTION, SOLUTION INTRAVENOUS at 05:38

## 2018-01-01 RX ADMIN — LEVOTHYROXINE SODIUM 75 MCG: 75 TABLET ORAL at 06:46

## 2018-01-01 RX ADMIN — IPRATROPIUM BROMIDE AND ALBUTEROL SULFATE 3 ML: 2.5; .5 SOLUTION RESPIRATORY (INHALATION) at 17:03

## 2018-01-01 RX ADMIN — MIDAZOLAM HYDROCHLORIDE 4 MG: 1 INJECTION, SOLUTION INTRAMUSCULAR; INTRAVENOUS at 14:22

## 2018-01-01 RX ADMIN — INSULIN DETEMIR 30 UNITS: 100 INJECTION, SOLUTION SUBCUTANEOUS at 10:23

## 2018-01-01 RX ADMIN — APIXABAN 5 MG: 5 TABLET, FILM COATED ORAL at 20:09

## 2018-01-01 RX ADMIN — SERTRALINE HYDROCHLORIDE 100 MG: 100 TABLET ORAL at 09:45

## 2018-01-01 RX ADMIN — MIDODRINE HYDROCHLORIDE 5 MG: 5 TABLET ORAL at 20:30

## 2018-01-01 RX ADMIN — PHENYLEPHRINE HYDROCHLORIDE 0.5 MCG/KG/MIN: 10 INJECTION INTRAVENOUS at 12:23

## 2018-01-01 RX ADMIN — CHLORHEXIDINE GLUCONATE 15 ML: 1.2 RINSE ORAL at 23:53

## 2018-01-01 RX ADMIN — EPHEDRINE SULFATE 12.5 MG: 50 INJECTION INTRAMUSCULAR; INTRAVENOUS; SUBCUTANEOUS at 13:12

## 2018-01-01 RX ADMIN — BACLOFEN 5 MG: 10 TABLET ORAL at 11:31

## 2018-01-01 RX ADMIN — SERTRALINE HYDROCHLORIDE 100 MG: 100 TABLET ORAL at 08:18

## 2018-01-01 RX ADMIN — Medication 2 MG: at 15:17

## 2018-01-01 RX ADMIN — OXYCODONE HYDROCHLORIDE 5 MG: 5 SOLUTION ORAL at 18:04

## 2018-01-01 RX ADMIN — Medication 1 PACKET: at 09:11

## 2018-01-01 RX ADMIN — Medication 2 MG: at 18:14

## 2018-01-01 RX ADMIN — PHENYLEPHRINE HYDROCHLORIDE 160 MCG: 10 INJECTION INTRAVENOUS at 12:57

## 2018-01-01 RX ADMIN — ERYTHROPOIETIN 10000 UNITS: 10000 INJECTION, SOLUTION INTRAVENOUS; SUBCUTANEOUS at 09:12

## 2018-01-01 RX ADMIN — OXYCODONE HYDROCHLORIDE 5 MG: 5 SOLUTION ORAL at 11:44

## 2018-01-01 RX ADMIN — INSULIN HUMAN 10 UNITS: 100 INJECTION, SOLUTION PARENTERAL at 18:29

## 2018-01-01 RX ADMIN — DEXTROSE MONOHYDRATE 5 G: 25 INJECTION, SOLUTION INTRAVENOUS at 23:06

## 2018-01-01 RX ADMIN — IPRATROPIUM BROMIDE AND ALBUTEROL SULFATE 3 ML: 2.5; .5 SOLUTION RESPIRATORY (INHALATION) at 16:06

## 2018-01-01 RX ADMIN — CHLORHEXIDINE GLUCONATE 15 ML: 1.2 RINSE ORAL at 09:54

## 2018-01-01 RX ADMIN — PHYTONADIONE 10 MG: 10 INJECTION, EMULSION INTRAMUSCULAR; INTRAVENOUS; SUBCUTANEOUS at 12:31

## 2018-01-01 RX ADMIN — LEVOTHYROXINE SODIUM ANHYDROUS 50 MCG: 100 INJECTION, POWDER, LYOPHILIZED, FOR SOLUTION INTRAVENOUS at 17:25

## 2018-01-01 RX ADMIN — ALPRAZOLAM 0.5 MG: 0.5 TABLET ORAL at 12:10

## 2018-01-01 RX ADMIN — CHLORHEXIDINE GLUCONATE 15 ML: 1.2 RINSE ORAL at 08:00

## 2018-01-01 RX ADMIN — BACLOFEN 10 MG: 10 TABLET ORAL at 08:57

## 2018-01-01 RX ADMIN — BACLOFEN 10 MG: 10 TABLET ORAL at 13:14

## 2018-01-01 RX ADMIN — IPRATROPIUM BROMIDE AND ALBUTEROL SULFATE 3 ML: 2.5; .5 SOLUTION RESPIRATORY (INHALATION) at 19:48

## 2018-01-01 RX ADMIN — IPRATROPIUM BROMIDE AND ALBUTEROL SULFATE 3 ML: 2.5; .5 SOLUTION RESPIRATORY (INHALATION) at 19:54

## 2018-01-01 RX ADMIN — SODIUM CHLORIDE: 9 INJECTION, SOLUTION INTRAVENOUS at 13:05

## 2018-01-01 RX ADMIN — Medication 1 PACKET: at 11:32

## 2018-01-01 RX ADMIN — INSULIN HUMAN 8 UNITS: 100 INJECTION, SOLUTION PARENTERAL at 12:00

## 2018-01-01 RX ADMIN — MIDAZOLAM HYDROCHLORIDE 2 MG: 1 INJECTION, SOLUTION INTRAMUSCULAR; INTRAVENOUS at 13:14

## 2018-01-01 RX ADMIN — INSULIN HUMAN 10 UNITS: 100 INJECTION, SOLUTION PARENTERAL at 17:43

## 2018-01-01 RX ADMIN — IPRATROPIUM BROMIDE AND ALBUTEROL SULFATE 3 ML: 2.5; .5 SOLUTION RESPIRATORY (INHALATION) at 17:18

## 2018-01-01 RX ADMIN — CALCIUM CHLORIDE 0.5 G: 100 INJECTION INTRAVENOUS; INTRAVENTRICULAR at 05:35

## 2018-01-01 RX ADMIN — Medication 2 MG: at 04:09

## 2018-01-01 RX ADMIN — INSULIN HUMAN 8 UNITS: 100 INJECTION, SOLUTION PARENTERAL at 00:16

## 2018-01-01 RX ADMIN — INSULIN HUMAN 7 UNITS: 100 INJECTION, SOLUTION PARENTERAL at 23:31

## 2018-01-01 RX ADMIN — INSULIN HUMAN 5 UNITS: 100 INJECTION, SOLUTION PARENTERAL at 18:09

## 2018-01-01 RX ADMIN — GLYCOPYRROLATE 0.4 MG: 0.2 INJECTION, SOLUTION INTRAMUSCULAR; INTRAVENOUS at 11:44

## 2018-01-01 RX ADMIN — ONDANSETRON 4 MG: 2 INJECTION, SOLUTION INTRAMUSCULAR; INTRAVENOUS at 13:45

## 2018-01-01 RX ADMIN — APIXABAN 2.5 MG: 2.5 TABLET, FILM COATED ORAL at 08:32

## 2018-01-01 RX ADMIN — BACLOFEN 10 MG: 10 TABLET ORAL at 08:18

## 2018-01-01 RX ADMIN — NYSTATIN: 100000 POWDER TOPICAL at 09:49

## 2018-01-01 RX ADMIN — CHLORHEXIDINE GLUCONATE 15 ML: 1.2 RINSE ORAL at 20:43

## 2018-01-01 RX ADMIN — Medication 2 MG: at 20:43

## 2018-01-01 RX ADMIN — INSULIN HUMAN 4 UNITS: 100 INJECTION, SOLUTION PARENTERAL at 13:16

## 2018-01-01 RX ADMIN — IPRATROPIUM BROMIDE AND ALBUTEROL SULFATE 3 ML: 2.5; .5 SOLUTION RESPIRATORY (INHALATION) at 19:22

## 2018-01-01 RX ADMIN — Medication 1 PACKET: at 23:55

## 2018-01-01 RX ADMIN — NYSTATIN: 100000 POWDER TOPICAL at 08:21

## 2018-01-01 RX ADMIN — MIDODRINE HYDROCHLORIDE 5 MG: 5 TABLET ORAL at 14:35

## 2018-01-01 RX ADMIN — INSULIN DETEMIR 15 UNITS: 100 INJECTION, SOLUTION SUBCUTANEOUS at 08:30

## 2018-01-01 RX ADMIN — CHLORHEXIDINE GLUCONATE 15 ML: 1.2 RINSE ORAL at 11:34

## 2018-01-01 RX ADMIN — IPRATROPIUM BROMIDE AND ALBUTEROL SULFATE 3 ML: 2.5; .5 SOLUTION RESPIRATORY (INHALATION) at 16:20

## 2018-01-01 RX ADMIN — NAFCILLIN 6 G: 10 INJECTION, POWDER, FOR SOLUTION INTRAVENOUS at 14:31

## 2018-01-01 RX ADMIN — NAFCILLIN 6 G: 10 INJECTION, POWDER, FOR SOLUTION INTRAVENOUS at 17:08

## 2018-01-01 RX ADMIN — SODIUM CHLORIDE: 9 INJECTION, SOLUTION INTRAVENOUS at 01:29

## 2018-01-01 RX ADMIN — BACLOFEN 5 MG: 10 TABLET ORAL at 20:39

## 2018-01-01 RX ADMIN — ASPIRIN 81 MG CHEWABLE TABLET 81 MG: 81 TABLET CHEWABLE at 08:17

## 2018-01-01 RX ADMIN — INSULIN HUMAN 12 UNITS: 100 INJECTION, SOLUTION PARENTERAL at 12:42

## 2018-01-01 RX ADMIN — PHENYLEPHRINE HYDROCHLORIDE 160 MCG: 10 INJECTION INTRAVENOUS at 12:39

## 2018-01-01 RX ADMIN — VALPROATE SODIUM 1800 MG: 100 INJECTION, SOLUTION INTRAVENOUS at 11:38

## 2018-01-01 RX ADMIN — Medication 2 MG: at 17:39

## 2018-01-01 RX ADMIN — Medication 1 PACKET: at 20:39

## 2018-01-01 RX ADMIN — MIDODRINE HYDROCHLORIDE 5 MG: 5 TABLET ORAL at 23:56

## 2018-01-01 RX ADMIN — CHLORHEXIDINE GLUCONATE 15 ML: 1.2 RINSE ORAL at 09:33

## 2018-01-01 RX ADMIN — INSULIN LISPRO 7 UNITS: 100 INJECTION, SOLUTION INTRAVENOUS; SUBCUTANEOUS at 18:40

## 2018-01-01 RX ADMIN — CHLORHEXIDINE GLUCONATE 15 ML: 1.2 RINSE ORAL at 08:17

## 2018-01-01 RX ADMIN — IPRATROPIUM BROMIDE AND ALBUTEROL SULFATE 3 ML: 2.5; .5 SOLUTION RESPIRATORY (INHALATION) at 07:17

## 2018-01-01 RX ADMIN — LIDOCAINE 1 PATCH: 50 PATCH CUTANEOUS at 08:19

## 2018-04-12 PROBLEM — G93.40 ENCEPHALOPATHY: Status: ACTIVE | Noted: 2018-01-01

## 2018-04-12 PROBLEM — G93.41 METABOLIC ENCEPHALOPATHY: Status: ACTIVE | Noted: 2018-01-01

## 2018-04-12 PROBLEM — G06.1 ABSCESS IN EPIDURAL SPACE OF CERVICAL SPINE: Status: ACTIVE | Noted: 2018-01-01

## 2018-04-12 PROBLEM — M46.40 SPONDYLODISCITIS: Status: ACTIVE | Noted: 2018-01-01

## 2018-04-12 PROBLEM — Z99.2 ESRD (END STAGE RENAL DISEASE) ON DIALYSIS (HCC): Status: ACTIVE | Noted: 2018-01-01

## 2018-04-12 PROBLEM — R78.81 BACTEREMIA: Status: ACTIVE | Noted: 2018-01-01

## 2018-04-12 PROBLEM — N18.6 ESRD (END STAGE RENAL DISEASE) ON DIALYSIS (HCC): Status: ACTIVE | Noted: 2018-01-01

## 2018-04-12 PROBLEM — E03.9 HYPOTHYROIDISM: Status: ACTIVE | Noted: 2018-01-01

## 2018-04-13 PROBLEM — G62.9 NEUROPATHY: Status: ACTIVE | Noted: 2018-01-01

## 2018-04-13 PROBLEM — E11.29 TYPE 2 DIABETES MELLITUS WITH RENAL COMPLICATION (HCC): Status: ACTIVE | Noted: 2018-01-01

## 2018-04-13 PROBLEM — G83.9 PARALYSIS (HCC): Status: ACTIVE | Noted: 2018-01-01

## 2018-04-13 PROBLEM — I50.22 CHRONIC SYSTOLIC HEART FAILURE (HCC): Status: ACTIVE | Noted: 2018-01-01

## 2018-04-13 PROBLEM — I50.20 SYSTOLIC HEART FAILURE (HCC): Status: ACTIVE | Noted: 2018-01-01

## 2018-04-13 NOTE — ANESTHESIA PROCEDURE NOTES
Airway  Urgency: elective    Airway not difficult    General Information and Staff    Patient location during procedure: OR    Indications and Patient Condition  Indications for airway management: airway protection    Preoxygenated: yes  MILS not maintained throughout  Mask difficulty assessment: 1 - vent by mask    Final Airway Details  Final airway type: endotracheal airway      Successful airway: ETT  Cuffed: yes   Successful intubation technique: video laryngoscopy  Endotracheal tube insertion site: oral  Blade: Arcadio  Blade size: #3  ETT size: 7.5 (Hi-Lo) mm  Cormack-Lehane Classification: grade I - full view of glottis  Placement verified by: chest auscultation and capnometry   Measured from: lips  ETT to lips (cm): 20  Number of attempts at approach: 1    Additional Comments  Negative epigastric sounds, Breath sound equal bilaterally with symmetric chest rise and fall, in-line stabilization held

## 2018-04-13 NOTE — ANESTHESIA PROCEDURE NOTES
Arterial Line    Patient location during procedure: pre-op  Start time: 4/13/2018 1:10 AM  Stop Time:4/13/2018 1:15 AM       Line placed for hemodynamic monitoring.  Performed By   Anesthesiologist: BART ALEXANDER  Preanesthetic Checklist  Completed: patient identified, site marked, surgical consent, pre-op evaluation, timeout performed, IV checked, risks and benefits discussed and monitors and equipment checked  Arterial Line Prep   Sterile Tech: cap, gloves and sterile barriers  Prep: ChloraPrep  Patient monitoring: blood pressure monitoring, continuous pulse oximetry and EKG  Arterial Line Procedure   Laterality:right  Location:  radial artery  Catheter size: 20 G   Guidance: palpation technique  Number of attempts: 1  Successful placement: yes          Post Assessment   Dressing Type: line sutured, occlusive dressing applied, secured with tape and wrist guard applied.   Complications no  Circ/Move/Sens Assessment: normal and unchanged.   Patient Tolerance: patient tolerated the procedure well with no apparent complications

## 2018-04-13 NOTE — ANESTHESIA POSTPROCEDURE EVALUATION
Patient: Lizet Webster    Procedure Summary     Date:  04/13/18 Room / Location:   [facility]    Anesthesia Start:  0129 Anesthesia Stop:  0719    Procedures:       CERVICAL LAMINECTOMY DECOMPRESSION POSTERIOR (N/A Spine Cervical)      ANTERIOR CERVICAL CORPECTOMY (N/A Spine Cervical) Diagnosis:      Surgeon:  Aryan Reed MD Provider:  Natalio Castillo MD    Anesthesia Type:  general ASA Status:  4 - Emergent          Anesthesia Type: general  Last vitals  BP   154/75 (04/13/18 0718)   Temp   100.3 °F (37.9 °C) (04/13/18 0718)   Pulse   86 (04/13/18 0718)   Resp   14 (04/13/18 0100)     SpO2   100 % (04/13/18 0718)     Post Anesthesia Care and Evaluation    Patient location during evaluation: ICU  Patient participation: complete - patient cannot participate  Post-procedure mental status: Sedated.  Pain score: 0  Pain management: adequate  Airway patency: Intubated.  Anesthetic complications: No anesthetic complications  PONV Status: none  Cardiovascular status: hemodynamically stable and acceptable  Respiratory status: intubated and ventilator  Hydration status: acceptable

## 2018-04-13 NOTE — ANESTHESIA PREPROCEDURE EVALUATION
Anesthesia Evaluation     Patient summary reviewed and Nursing notes reviewed   no history of anesthetic complications:  NPO Solid Status: Waived due to emergency  NPO Liquid Status: Waived due to emergency           Airway   Mallampati: II  TM distance: >3 FB  Neck ROM: full  No difficulty expected  Dental - normal exam     Pulmonary - normal exam    breath sounds clear to auscultation  (+) COPD moderate, sleep apnea on CPAP,   Cardiovascular - normal exam    ECG reviewed  Rhythm: regular  Rate: normal    (+) CAD, cardiac stents CHF (EF 40%),       Neuro/Psych  (+) CVA,       ROS Comment: Cervical epidural abscess, quadraplegia, obtundation  GI/Hepatic/Renal/Endo    (+)  GERD,  renal disease (Last dialyzed 4/10) ESRD and dialysis, diabetes mellitus type 2, hypothyroidism,     Musculoskeletal     (+) arthralgias,   Abdominal    Substance History      OB/GYN          Other   (+) arthritis     ROS/Med Hx Other: Sepsis                Anesthesia Plan    ASA 4 - emergent     general   (A-line, Glide scope intubation with in-line stabilization, ICU/Vent post-op)  intravenous induction   Anesthetic plan and risks discussed with patient.

## 2018-04-14 PROBLEM — J96.00 ACUTE RESPIRATORY FAILURE (HCC): Status: ACTIVE | Noted: 2018-01-01

## 2018-04-21 PROBLEM — J98.11 ATELECTASIS OF LEFT LUNG: Status: ACTIVE | Noted: 2018-01-01

## 2018-04-24 NOTE — ANESTHESIA PREPROCEDURE EVALUATION
Anesthesia Evaluation     Patient summary reviewed and Nursing notes reviewed   no history of anesthetic complications:  NPO Solid Status: > 8 hours  NPO Liquid Status: > 8 hours           Airway   Comment: intubated  Dental      Pulmonary - normal exam    breath sounds clear to auscultation    ROS comment: Intubated, respiratory failure  Cardiovascular - normal exam    ECG reviewed  Rhythm: regular  Rate: normal    (+) CAD, cardiac stents     ROS comment: EF 40%    Neuro/Psych    ROS Comment: S/p anterior and posterior cervical fusion; epidural abscess; quadraplegia  GI/Hepatic/Renal/Endo    (+)   renal disease ESRD and dialysis, diabetes mellitus type 2, hypothyroidism,     Musculoskeletal     Abdominal    Substance History      OB/GYN          Other                        Anesthesia Plan    ASA 4     general     intravenous induction   Anesthetic plan and risks discussed with patient.    Plan discussed with CRNA.

## 2018-04-24 NOTE — ANESTHESIA POSTPROCEDURE EVALUATION
Patient: Lizet Webster    Procedure Summary     Date:  04/24/18 Room / Location:   [facility]    Anesthesia Start:  1223 Anesthesia Stop:      Procedure:  TRACHEOSTOMY AND PERCUTANEOUS ENDOSCOPIC GASTROSTOMY TUBE INSERTION (N/A ) Diagnosis:      Surgeon:  Denver Alves MD Provider:  Natalio Castillo MD    Anesthesia Type:  general ASA Status:  4          Anesthesia Type: general  Last vitals  BP   144/74   Temp   98.6   Pulse   80   Resp   12   SpO2   98%     Post Anesthesia Care and Evaluation    Patient location during evaluation: ICU  Patient participation: complete - patient cannot participate  Level of consciousness: sleepy but conscious  Pain score: 0  Pain management: adequate  Airway patency: patent  Anesthetic complications: No anesthetic complications  PONV Status: none  Cardiovascular status: hemodynamically stable, acceptable and stable  Respiratory status: acceptable, trach and ventilator  Hydration status: acceptable    Comments: Report given to ICU staff, VSS

## 2018-04-27 PROBLEM — G82.50 QUADRIPLEGIA (HCC): Status: ACTIVE | Noted: 2018-01-01

## 2018-04-27 PROBLEM — G83.9 PARALYSIS (HCC): Status: RESOLVED | Noted: 2018-01-01 | Resolved: 2018-01-01

## 2018-04-30 PROBLEM — J90 PLEURAL EFFUSION ON LEFT: Status: ACTIVE | Noted: 2018-01-01

## 2018-05-01 NOTE — PROGRESS NOTES
Continued Stay Note  [facility]     Patient Name: Lizet Webster  MRN: [id number]  Today's Date: 5/1/2018    Admit Date: 4/12/2018          Discharge Plan     Row Name 05/01/18 1026       Plan    Plan L/TACH    Patient/Family in Agreement with Plan yes    Plan Comments Referral made to Continue Care L/TACH @[facility] to assess for possible transfer.                Discharge Codes    No documentation.       Expected Discharge Date and Time     Expected Discharge Date Expected Discharge Time    May 4, 2018             Ronald Jansen RN

## 2018-05-01 NOTE — PROGRESS NOTES
Critical Care Note     LOS: 19 days   Patient Care Team:  Luan Gandara MD as PCP - General (Internal Medicine)    Chief Complaint/Reason for visit:  MSSA epidural abscess with paralysis, respiratory failure      Subjective   47-year-old female with long-standing diabetes mellitus, came by peripheral neuropathy and end-stage renal disease on hemodialysis for the last year.  She presented on 4/6 with arm and neck pain.  She was treated as an outpatient initially and subsequently she developed decreased LOC.  She was found to have MRSA bacteremia and an MRI of the brain and cervical spine revealed a C5-6 osteomyelitis, discitis, and epidural mass with cord compression.  On 4/12 shunt underwent emergency surgery with anterior cervical corpectomy and posterior cervical laminectomy for epidural abscess.  She has had persistent postoperative quadriplegia.  Bronchoscopy on 4/21 for left lung atelectasis.  Tracheostomy and PEG tube placement 4/24. CT scan was done revealing a large left pleural effusion and chest tube was placed.  Interval History:   Antibiotics were changed to daptomycin and gentamicin 4/29/18. Left pleural drain, 1170 output 4/29; 340ml 4/30. . She remains afebrile. She is tolerating tube feeding. Attempts at pressure support trials resulted in rapid shallow respirations and attempts aborted. On dialysis today      Review of Systems:    All systems were reviewed and negative except as noted in subjective.    Medical history, surgical history, social history, family history reviewed    Objective     Intake/Output:    Intake/Output Summary (Last 24 hours) at 05/01/18 1156  Last data filed at 05/01/18 1031   Gross per 24 hour   Intake             1443 ml   Output             9380 ml   Net            -7937 ml       Nutrition:  NPO Diet    Infusions:    dexmedetomidine 0.2-1.5 mcg/kg/hr    phenylephrine 0.5-3 mcg/kg/min Last Rate: Stopped (04/30/18 0028)       Mechanical Ventilator Settings:         Vt (Set,  "L): 0.355 L  Resp Rate (Set): 8  Pressure Support (cm H2O): 0 cm H20  FiO2 (%): 40 %  PEEP/CPAP (cm H2O): 6 cm H20    Minute Ventilation (L/min) (Obs): 5.81 L/min  Resp Rate (Observed) Vent: 15  I:E Ratio (Set): 1:2.00  I:E Ratio (Obs): 1.80:1    PIP Observed (cm H2O): 15 cm H2O  Plateau Pressure (cm H2O): 16 cm H2O    Telemetry:Sinus rhythm, sinus tachycardia             Vital Signs  Blood pressure 109/53, pulse 87, temperature (!) 102.9 °F (39.4 °C), temperature source Oral, resp. rate 17, height 157 cm (61.81\"), weight 88.5 kg (195 lb 1.7 oz), SpO2 98 %, not currently breastfeeding.    Physical Exam:  General Appearance:  Middle-aged white woman in no distress    Head:  Normocephalic, atraumatic    Eyes:          Pupils equal reactive to light, no jaundice    Ears:     Throat: Oral mucosa moist    Neck: Incisions with some surrounding ecchymosis but no erythema. Right IJ deep line.    Back:      Lungs:   Left pleural tube now with yellow output. Markedly improved breath sounds left chest compared to last week. Right lung is clear.     Heart:  Regular rhythm, normal S1, S2, no murmur    Abdomen:   Bowel sounds present, PEG tube in place. Nondistended, nontender.    Rectal:   Deferred   Extremities: Improving edema. Left upper arm AV fistula with palpable thrill    Pulses:    Skin:    Lymph nodes:    Neurologic: Quadriplegia. Awake and will blink to command       Results Review:     I reviewed the patient's new clinical results.     Results from last 7 days  Lab Units 05/01/18  0432 04/30/18  0424 04/29/18  0825  04/25/18  0323   SODIUM mmol/L 136 137 136  < > 142   POTASSIUM mmol/L 3.5 3.6 4.1  < > 3.5   CHLORIDE mmol/L 98* 99 99  < > 105   CO2 mmol/L 27.0 22.0 23.0  < > 25.0   BUN mg/dL 58* 97* 83*  < > 64*   CREATININE mg/dL 2.70* 3.60* 3.30*  < > 3.20*   CALCIUM mg/dL 8.1* 7.8* 8.0*  < > 8.3*   BILIRUBIN mg/dL  --  3.5* 3.7*  --  1.5*   ALK PHOS U/L  --  91 93  --  64   ALT (SGPT) U/L  --  748* 608*  --  1* "   AST (SGOT) U/L  --  3,796* 3,091*  --  14   GLUCOSE mg/dL 208* 131* 178*  < > 167*   < > = values in this interval not displayed.    Results from last 7 days  Lab Units 05/01/18  0432 04/30/18  0424 04/29/18  0825   WBC 10*3/mm3 18.46* 22.38* 24.18*   HEMOGLOBIN g/dL 10.5* 10.9* 12.0   HEMATOCRIT % 31.1* 32.5* 35.9   PLATELETS 10*3/mm3 277 270 325   MONOCYTES % % 4.0  --  4.0       Results from last 7 days  Lab Units 04/30/18  0338   PH, ARTERIAL pH units 7.459*   PO2 ART mm Hg 200.0*   PCO2, ARTERIAL mm Hg 28.5*   HCO3 ART mmol/L 20.2     Lab Results   Component Value Date    BLOODCX No growth at 4 days 04/26/2018     No results found for: URINECX    I reviewed the patient's new imaging including images and reports.     FINDINGS: Chest tube identified on the left with small left pleural  effusion. No pneumothorax identified. Mild increased markings seen  within the right mid lung. Improvement seen in the aeration of the left  lung base.         IMPRESSION:  Chest tube identified on the left with no pneumothorax. Mild  increased markings seen within the right mid lung stable and unchanged.     D:  04/30/2018    All medications reviewed.     hold 1 each Does not apply Daily   chlorhexidine 15 mL Mouth/Throat Q12H   DAPTOmycin 6 mg/kg Intravenous Q48H   epoetin mini 10,000 Units Subcutaneous Once per day on Mon Wed Fri   gentamicin 50 mg Intravenous Once per day on Mon Wed Fri   insulin detemir 40 Units Subcutaneous Daily   insulin regular 0-24 Units Subcutaneous Q6H   insulin regular 7 Units Subcutaneous Q6H   lansoprazole 30 mg Per PEG Tube Q AM   levothyroxine 75 mcg Oral Q AM   midodrine 20 mg Oral Q8H   mupirocin  Topical Q12H   phytonadione 10 mg Subcutaneous Daily   PRO-STAT 1 packet Per G Tube BID   sertraline 100 mg Oral Daily         Assessment/Plan     Principal Problem:    Epidural abscess - cervical spine  Active Problems:    Bacteremia, MSSA    Acute respiratory failure    Atelectasis of left lung     Quadriplegia    ESRD (end stage renal disease) on dialysis    Type 2 diabetes mellitus with renal complication    Pleural effusion on left    Metabolic encephalopathy    Hypothyroidism    Chronic systolic heart failure    Neuropathy    47-year-old woman with a cervical epidural abscess resulting in paralysis. Abscess was drained and is growing methicillin sensitive staph aureus. She continues to require mechanical ventilation. She did undergo tracheostomy and PEG tube placement. Attemts at PST result in RR 65, Vt<100. She has persistent atelectasis of her left lung despite 3 bronchoscopies and CT 4/29 reveals a large left effusion, and left lateral tube placed. Cultures remain negative. She received 2 units of packed red cells 4/28 when Hg 7.1 on 4/27      PLAN:  Daptomycin, gentamicin, per infectious disease  Shift to IMV/PS and try to wean rate  Long-acting and sliding scale insulin  Shifts tube feedings to long-term, intermittent bolus feedings as tolerates  Discussed possible extra hemodialysis for edema with nephrology and they are dialyzing today.  Continue midodrine  Baclofen for spasm  Leave chest tube to drainage, til <100mls/24hr  Thyroid replacement  Zoloft for depression    VTE Prophylaxis: subcutaneous heparin    Stress Ulcer Prophylaxis: Protonix pump inhibitor    Della Ca MD  05/01/18  11:56 AM      Time:35min  I personally provided care to this critically ill patient as documented above.  Critical care time does not include time spent on separately billed procedures.  Non of my critical care time was concurrent with other critical care providers.

## 2018-05-01 NOTE — PROGRESS NOTES
Clinical Nutrition     Nutrition Support Assessment  Reason for Visit:   Follow-up protocol, EN/PN      Patient Name: Lizet Webster  YOB: 1970  MRN: [id number]  Date of Encounter: 05/01/18 4:01 PM  Admission date: 4/12/2018      Comments:  Pt transitioning to bolus feedings per vo given during MDR.  Daily goal of Novasource Renal will be 900 ml w/ 1 Pro-Stat daily.  Provide 5 feedings of 180 ml between 0618-5765 approx.  3 hrs apart infuse formula over 30 mins. Enteral order set modified. This will provide 1900 kcal, 96 gm Pro and 645 ml water w/ flushes 945 ml total water.  Once fever is resolved and nutritional markers trend positive caloric delivery may be reduced.    Nutrition Assessment   Assessment       Hospital Problem List  Principal Problem:    Epidural abscess - cervical spine  Active Problems:    Bacteremia, MSSA    Metabolic encephalopathy    ESRD (end stage renal disease) on dialysis    Hypothyroidism    Chronic systolic heart failure    Type 2 diabetes mellitus with renal complication    Neuropathy    Acute respiratory failure    Atelectasis of left lung    Quadriplegia    Pleural effusion on left      PMH: She  has a past medical history of CAD (coronary artery disease); Diabetes mellitus; ESRD (end stage renal disease) on dialysis (4/12/2018); hepatitis; Hypothyroidism (4/12/2018); and Systolic heart failure (04/12/2018).   PSxH: She  has a past surgical history that includes Coronary angioplasty with stent (2011); Hysterectomy; AV fistula placement (Left); cervical laminectomy decompression posterior (N/A, 4/13/2018); Anterior cervical discectomy w/ fusion (N/A, 4/13/2018); Bronchoscopy (N/A, 4/21/2018); tracheostomy and peg tube insertion (N/A, 4/24/2018); Bronchoscopy (N/A, 4/24/2018); and Bronchoscopy (N/A, 4/26/2018).     Other nutrition related factors:          Reported/Observed/Food/Nutrition Related History:     RN reports pt tolerating TF; pt receiving UF  "today; 5.3 L removed.      Anthropometrics       Height 62 in   Weight 195 lb    BMI 65 kg   BMI classification  N/A d/t quadriplegia   IBW adjusted for quadriplegia  45 kg   Adm wt 143 lb    195 lb w/ edema    UBW adjusted for quadraplegia (less 15%) =55 kg       Nutrition Focused Physical Exam Findings : edema    Needs Assessment       Height used 62 \"   Weight used 55 kg     Estimated daily need Method Used Result   Calories      kcals/day    Kcal/kg for HD - 30 /kg 1650    [facility] Equation - modified  Ve= 6.91 and t max = 39.5 1732        Protein          g/day 1.2-1.5 gm/kg  66-83        Fiber     Fluid            Labs reviewed       Results from last 7 days  Lab Units 05/01/18  0432 04/30/18  0424   SODIUM mmol/L 136 137   POTASSIUM mmol/L 3.5 3.6   CHLORIDE mmol/L 98* 99   CO2 mmol/L 27.0 22.0   BUN mg/dL 58* 97*   CREATININE mg/dL 2.70* 3.60*   CALCIUM mg/dL 8.1* 7.8*   BILIRUBIN mg/dL  --  3.5*   ALK PHOS U/L  --  91   ALT (SGPT) U/L  --  748*   AST (SGOT) U/L  --  3,796*   GLUCOSE mg/dL 208* 131*         Results from last 7 days  Lab Units 05/01/18  1159 05/01/18  0618 04/30/18  2355 04/30/18  1756 04/30/18  1222 04/30/18  0544   GLUCOSE mg/dL 205* 195* 202* 155* 162* 117       Lab Results  Lab Value Date/Time   HGBA1C 5.40 04/13/2018 0043       Medications reviewed   Pertinent:    Applicable medical tests/Procedures since admission:    Intake/Ouptut 24 hrs (7:00AM - 6:59 AM)     Intake & Output (last day)       04/30 0701 - 05/01 0700 05/01 0701 - 05/02 0700    I.V. (mL/kg) 11 (0.1) 60 (0.7)    Other 363 80    NG/     IV Piggyback 250 300    Total Intake(mL/kg) 1332 (15.1) 440 (5)    Urine (mL/kg/hr)  0 (0)    Other 3350 (1.6) 5330 (6.7)    Stool 400 (0.2)     Chest Tube 340 (0.2) 95 (0.1)    Total Output 4090 5446    Net -3534 -5994            Net since 4/17/18       +10,024.7        +5.039.7    Current Nutrition Prescription     PO: NPO Diet    Active Supplement Orders      Diet, Tube Feeding " Tube Feeding Formula: Novasource Renal (Electrolyte Restricted)    EN: Diet, Tube Feeding Tube Feeding Formula: Novasource Renal (Electrolyte Restricted)  At 40 ml/hr w/ 1 Pro-Stat twice daily    Intake: 708 ml - 89% goal volume    Evaluation of Received Nutrient/Fluid Intake last day:     At  Goal Volume  With added  Modulars Total    % Est needs   Volume  708 ml        Modulars   Pro-Stat 1     Energy/kcals 1416 kcals 1516 kcals 1516 kcals 89%   Protein  64 g pro 79 g pro 79 g pro 95%   Fiber 0 0                       0    Fluid   W/Free water 508 ml  363 ml NA             871 ml              Nutrition Diagnosis       Problem Increased nutrient needs   Etiology Metabolic status   Signs/Symptoms Fever, HD, recent surgical incisions ( trach and PEG)     Problem Decreased nutrient needs   Etiology  quadriplegia    Signs/Symptoms  total lift required; wt adjustment       Nutrition Intervention   1.  Follow treatment progress, Care plan reviewed, Nutrition support order placed     At Target Goal Volume     % Est needs   Volume   900 ml     Energy/kcals   1900 Kcals %   Protein  96 g pro %   Fiber   0 g         Fluid   W/Free water  645 ml   945 ml         Goal:   General: Nutrition support treatment    EN/PN: Adjust EN/PN   Bolus feedings 5x day (0141-0140) Novasource renal  Additional goals:      Monitoring/Evaluation:   Per protocol, I&O, Pertinent labs, EN delivery/tolerance, Skin status, Symptoms            Saima Nolen RD  Time Spent: 45 min

## 2018-05-01 NOTE — PROGRESS NOTES
[facility] Progress Note    Admission Date: 4/12/2018    [facility]  1970  [id number]    Date: 5/1/2018    Meds:    Anti-Infectives     Ordered     Dose/Rate Route Frequency Start Stop    04/29/18 1239  DAPTOmycin (CUBICIN) 550 mg in sodium chloride 0.9 % 50 mL IVPB     Ordering Provider:  Blaise Wheeler MD    6 mg/kg × 89.2 kg  100 mL/hr over 30 Minutes Intravenous Every 48 Hours 04/29/18 1400 05/29/18 1359    04/26/18 1405  gentamicin (GARAMYCIN) 50 mg in sodium chloride 0.9 % 50 mL IVPB     Ordering Provider:  Brittnee Pfeiffer RP    50 mg  over 30 Minutes Intravenous Once per day on Mon Wed Fri 04/27/18 1600 05/01/18 1421    04/26/18 1331  gentamicin (GARAMYCIN) 50 mg in sodium chloride 0.9 % 50 mL IVPB     Ordering Provider:  Jericho Marsh MD    50 mg Intravenous Once 04/26/18 1600 04/26/18 1655    04/21/18 1256  gentamicin (GARAMYCIN) IVPB 120 mg     Ordering Provider:  Juan Antonio Barclay MD    120 mg Intravenous Once 04/21/18 1330 04/21/18 1358    04/13/18 1420  ceFAZolin in dextrose (ANCEF) IVPB solution 2 g     Ordering Provider:  MANDY Hernandez    2 g  over 30 Minutes Intravenous Once 04/13/18 1500 04/13/18 1700    04/13/18 0054  vancomycin (VANCOCIN) in iso-osmotic dextrose IVPB 1 g (premix) 200 mL     Ordering Provider:  Eulalio Prajapati RP    15 mg/kg × 65 kg  over 1 Hours Intravenous Once 04/13/18 0130 04/13/18 1135          CC:  MSSA bacteremia, C3-C7 cervical epidural abscess and C5-C6 discitis    SUBJECTIVE:  Patient is a 47 y.o. female with h/o T2DM, ESRD/HD, severe peripheral neuropathy/wheelchair bound/stand for transfers, CAD/stents, chronic systolic CHF, and hypothyroidism who presented to [facility] with progressive neck pain and confusion that started about a week ago.  She had blood cultures positive for MSSA on 4/9 and 4/11.  She also has a blood culture on 4/11 positive for CNStaph.  She was given Nafcillin and Vancomycin.  She was diagnosed with an epidural cervical  spine abscess and transferred to [facility] for higher level of care. She has been unable to move her 4 extremities since her transfer to [facility] ICU. The MRI from the OSH showed an epidural abscess that extended from C3 to C7 with most compression around C5-C6.  She was taken to OR by Dr. Reed for C3-C7 laminectomy, facet screw fixation and posterior lateral fusion of C5-C7, naterior cervical corpectomy C6, abscess drainage and vertebral body replacement.  Her PCT 7.43.  She has a chronic LLE DVT.  She is currently sedated on mechanical ventilation in ICU. She was started on Vancomycin.  ID was asked to evaluate and manage her antibiotic therapy.   4/14/18: Vancomycin powder was placed at vertebral body replacement site during surgery. Patient opens eyes and follows commands; can move arms; no movement in legs;  sticks tongue out   4/15/18; on vent sleeping; wound care working on leg wounds; no event overnight  Ros unobtainable; having fevers    Subjective:  4/19/18; remains on pressors, intubated, gen surgery to offer trach/peg; still on cooling blanket;   Sedated; ros unobtainable  4/20/18; sedationi off at moment; following commands can only move left leg; no movement of arms, tracking with eyes.; ros unobtainable, has dialysis today; was going off of pressors but had large amount of volume removed back on shakira.    4/21/18:  Patient once again with return of hectic fever this morning up to 105°F remains on low dose pressors but has been hemodynamically stable this morning.  Some loose stools some respiratory secretions with sample sent for culture.  No blood cultures were ordered at time of fever no new rash.    4/22/18:  Patient more alert today hemodynamically improved decrease FiO2 still on pressors fever curve has improved but still 102°F.  Loose stool noted.  4/23/18; no events overnight; fevers better; on neosyephrine, opens eyes during exam; ros unobtainable  4/24/18; no events overnight; doing the same on pressors,  no movement of legs, on vent, had trach today; ros unobtianable  18; on vent, sleeping quietly, on pressors; fevers better  18; doing about the same; no events overnight; febrile, on vent, laying of left side; neck with secretions  On cooling blanket    18; on vent, sedated, on pressors, remains febrile; deep line changed to right IJ.  18 hx rev.  Remains critically ill on the ventilator sedated on pressors.  Still having some fevers up as high as 103°.  This despite antibiotics.  Minimum sputum suctioned.  Unable to obtain additional review of systems secondary to mental status on the ventilator.    18; no fevers, overnight; off pressors, on vents,  in room; had chest tube put in left side for pleural effusion    18; has fevers, on vent, normotensive, on vent but not following commands; ros unobtainable    PE:   Vital Signs  Temp (24hrs), Av.5 °F (38.6 °C), Min:98.9 °F (37.2 °C), Max:103.1 °F (39.5 °C)    Temp  Min: 98.9 °F (37.2 °C)  Max: 103.1 °F (39.5 °C)  BP  Min: 99/58  Max: 135/72  Pulse  Min: 79  Max: 95  Resp  Min: 12  Max: 24  SpO2  Min: 96 %  Max: 99 %    GENERAL: eyes open turns head to voice; on  vent, appears  confused  HEENT:  Hncat, trach present, no ext oral lesions  right IJ CVL present  HEART: RRR; no JVD, pulses 1+ symmetric  LUNGS: benjamin breath sounds with rales at the bases  ABDOMEN: Soft, nontender, nondistended.  Mild obesity.  EXT:  No cyanosis, clubbing or 1+ bilateral edema lower extremities.  MSK: no movement of arms, legs  SKIN: Warm and dry without cutaneous eruptions on Inspection/palpation.  Few scattered ecchymoses.  Neurologically: Quadriplegia no movement of arms or legs    Laboratory Data      Results from last 7 days  Lab Units 18  0432 18  0424 18  0825   WBC 10*3/mm3 18.46* 22.38* 24.18*   HEMOGLOBIN g/dL 10.5* 10.9* 12.0   HEMATOCRIT % 31.1* 32.5* 35.9   PLATELETS 10*3/mm3 277 270 325       Results from last 7 days  Lab  Units 05/01/18  0432   SODIUM mmol/L 136   POTASSIUM mmol/L 3.5   CHLORIDE mmol/L 98*   CO2 mmol/L 27.0   BUN mg/dL 58*   CREATININE mg/dL 2.70*   GLUCOSE mg/dL 208*   CALCIUM mg/dL 8.1*       Results from last 7 days  Lab Units 04/30/18  0424   ALK PHOS U/L 91   BILIRUBIN mg/dL 3.5*   ALT (SGPT) U/L 748*   AST (SGOT) U/L 3,796*           Results from last 7 days  Lab Units 04/30/18  0424   CRP mg/dL 13.69*       Results from last 7 days  Lab Units 04/30/18  0424   LACTATE mmol/L 1.1       Results from last 7 days  Lab Units 04/29/18  0825   CK TOTAL U/L 70         Estimated Creatinine Clearance: 27.1 mL/min (by C-G formula based on SCr of 2.7 mg/dL (H)).    Microbiology:  Blood Culture   Date Value Ref Range Status   04/13/2018 No growth at 24 hours  Preliminary   04/13/2018 No growth at 24 hours  Preliminary     Microbiology Results Abnormal     Procedure Component Value - Date/Time    Blood Culture - Blood, Blood, Venous Line [[id number]]  (Normal) Collected:  04/26/18 1518    Lab Status:  Final result Specimen:  Blood from Blood, Venous Line Updated:  05/01/18 1545     Blood Culture No growth at 5 days    Fungus Culture - Wash, Bronchus [[id number]] Collected:  04/26/18 1440    Lab Status:  Preliminary result Specimen:  Wash from Bronchus Updated:  05/01/18 1500     Fungus Culture No fungus isolated at less than 1 week    AFB Culture - Wash, Bronchus [[id number]]  (Normal) Collected:  04/26/18 1440    Lab Status:  Preliminary result Specimen:  Wash from Bronchus Updated:  05/01/18 1500     AFB Culture No AFB isolated at less than 1 week     AFB Stain No acid fast bacilli seen on concentrated smear    Fungus Smear - Sputum, Pleural Cavity [[id number]] Collected:  04/29/18 1707    Lab Status:  Final result Specimen:  Body Fluid from Pleural Cavity Updated:  05/01/18 1007     GMS Stain No fungal elements seen    Wound Culture - Wound, Trachea [[id number]] Collected:  05/01/18 0400    Lab Status:  Preliminary result  Specimen:  Wound from Trachea Updated:  05/01/18 0833     Gram Stain Result Rare (1+) WBCs per low power field      Many (4+) Gram negative bacilli    Body Fluid Culture - Body Fluid, Pleural Cavity [[id number]]  (Normal) Collected:  04/29/18 1707    Lab Status:  Preliminary result Specimen:  Body Fluid from Pleural Cavity Updated:  05/01/18 0635     BF Culture No growth at 2 days     Gram Stain Result Many (4+) Red blood cells      Few (2+) WBCs seen      No organisms seen    Anaerobic Culture - Pleural Fluid, Pleural Cavity [[id number]]  (Normal) Collected:  04/29/18 1707    Lab Status:  Preliminary result Specimen:  Body Fluid from Pleural Cavity Updated:  04/30/18 1405     Culture No anaerobes isolated    AFB Culture - Body Fluid, Pleural Cavity [[id number]] Collected:  04/29/18 1707    Lab Status:  Preliminary result Specimen:  Body Fluid from Pleural Cavity Updated:  04/30/18 1304     AFB Stain No acid fast bacilli seen on concentrated smear    Fungus Smear - Wash, Bronchus [[id number]] Collected:  04/26/18 1440    Lab Status:  Final result Specimen:  Wash from Bronchus Updated:  04/29/18 1435     GMS Stain No fungal elements seen      No Pneumocystis jirovecci (formerly Pneumocystis carinii) noted on smear.    Catheter Culture - Cath Tip, Neck [[id number]] Collected:  04/26/18 1522    Lab Status:  Final result Specimen:  Cath Tip from Neck Updated:  04/29/18 0615     CATHETER CULTURE No growth at 3 days    Respiratory Culture - Wash, Bronchus [[id number]] Collected:  04/26/18 1440    Lab Status:  Preliminary result Specimen:  Wash from Bronchus Updated:  04/27/18 0656     Respiratory Culture No growth     Gram Stain Result Occasional WBCs per low power field      No organisms seen    Blood Culture - Blood, Arm, Right [[id number]]  (Normal) Collected:  04/21/18 1318    Lab Status:  Final result Specimen:  Blood from Arm, Right Updated:  04/26/18 1345     Blood Culture No growth at 5 days    Blood Culture - Blood,  Blood, Central Line [[id number]]  (Normal) Collected:  04/21/18 1318    Lab Status:  Final result Specimen:  Blood from Blood, Central Line Updated:  04/26/18 1345     Blood Culture No growth at 5 days    Respiratory Culture - Aspirate, ET Suction [[id number]]  (Abnormal)  (Susceptibility) Collected:  04/21/18 1204    Lab Status:  Final result Specimen:  Aspirate from ET Suction Updated:  04/25/18 1154     Respiratory Culture --      Scant growth (1+) Staphylococcus aureus (A)      Scant growth (1+) Normal Respiratory Patricia     Comment: This isolate does not demonstrate inducible clindamycin resistance in vitro.          Gram Stain Result Moderate (3+) WBCs per low power field      No epithelial cells seen      Rare (1+) Gram positive cocci in pairs    Susceptibility      Staphylococcus aureus     ELLIOT     Ceftriaxone <=8 ug/ml Susceptible     Ciprofloxacin >2 ug/ml Resistant     Clindamycin <=0.5 ug/ml Susceptible     Daptomycin <=0.5 ug/ml Susceptible     Erythromycin >4 ug/ml Resistant     Gentamicin <=4 ug/ml Susceptible     Levofloxacin 4 ug/ml Intermediate  [1]      Linezolid 2 ug/ml Susceptible     Oxacillin 1 ug/ml Susceptible     Penicillin G >8 ug/ml Resistant     Quinupristin + Dalfopristin <=0.5 ug/ml Susceptible     Rifampin <=1 ug/ml Susceptible     Tetracycline <=4 ug/ml Susceptible     Trimethoprim + Sulfamethoxazole <=0.5/9.5 ug/ml Susceptible     Vancomycin 2 ug/ml Susceptible            [1]   Staphylococcus species may develop resistance during prolonged therapy with quinolones.  Isolates that are initially susceptible may become resistant within three to four days after initiation of therapy. Testing of repeat isolates may be warranted.                 Respiratory Culture - Wash, Bronchus [[id number]] Collected:  04/21/18 1814    Lab Status:  Final result Specimen:  Wash from Bronchus Updated:  04/23/18 1321     Respiratory Culture --      Moderate growth (3+) Normal Respiratory Patricia     Gram Stain  Result Many (4+) WBCs seen      Few (2+) Epithelial cells seen      Few (2+) Gram positive cocci in pairs and chains      Occasional Gram positive bacilli    BAL Culture, Quantitative - Wash, Bronchus [[id number]] Collected:  04/21/18 1814    Lab Status:  Final result Specimen:  Wash from Bronchus Updated:  04/23/18 1239     BAL Culture --      Improper specimen for quantitative culture    Blood Culture - Blood, [[id number]] Collected:  04/17/18 1432    Lab Status:  Final result Specimen:  Blood from Hand, Right Updated:  04/22/18 1500     Blood Culture No growth at 5 days      Aerobic bottle only    Blood Culture - Blood, [[id number]] Collected:  04/17/18 1439    Lab Status:  Final result Specimen:  Blood from Arm, Right Updated:  04/22/18 1500     Blood Culture No growth at 5 days      Aerobic bottle only    Anaerobic Culture - Wound, Neck [[id number]]  (Normal) Collected:  04/13/18 0650    Lab Status:  Final result Specimen:  Wound from Neck Updated:  04/20/18 1427     Culture No anaerobes isolated    Blood Culture - Blood, [[id number]]  (Normal) Collected:  04/13/18 0020    Lab Status:  Final result Specimen:  Blood from Arm, Right Updated:  04/18/18 0115     Blood Culture No growth at 5 days    Blood Culture - Blood, [[id number]]  (Normal) Collected:  04/13/18 0030    Lab Status:  Final result Specimen:  Blood from Arm, Right Updated:  04/18/18 0115     Blood Culture No growth at 5 days    Respiratory Culture - Sputum, Cough [[id number]]  (Abnormal)  (Susceptibility) Collected:  04/13/18 1645    Lab Status:  Final result Specimen:  Sputum from Cough Updated:  04/15/18 1107     Respiratory Culture --      Heavy growth (4+) Staphylococcus aureus (A)     Comment: This isolate does not demonstrate inducible clindamycin resistance in vitro.           No Normal Respiratory Patricia (A)     Gram Stain Result Many (4+) WBCs seen      Many (4+) Epithelial cells seen      Many (4+) Gram positive cocci in pairs and clusters     Susceptibility      Staphylococcus aureus     ELLIOT     Ceftriaxone <=8 ug/ml Susceptible     Ciprofloxacin >2 ug/ml Resistant     Clindamycin <=0.5 ug/ml Susceptible     Erythromycin >4 ug/ml Resistant     Gentamicin <=4 ug/ml Susceptible     Levofloxacin >4 ug/ml Resistant     Linezolid 2 ug/ml Susceptible     Oxacillin 0.5 ug/ml Susceptible     Penicillin G >8 ug/ml Resistant     Quinupristin + Dalfopristin <=0.5 ug/ml Susceptible     Rifampin <=1 ug/ml Susceptible     Tetracycline <=4 ug/ml Susceptible     Trimethoprim + Sulfamethoxazole <=0.5/9.5 ug/ml Susceptible     Vancomycin 2 ug/ml Susceptible                    Wound Culture - Wound, Neck [[id number]]  (Abnormal)  (Susceptibility) Collected:  04/13/18 0650    Lab Status:  Final result Specimen:  Wound from Neck Updated:  04/15/18 1106     Wound Culture --      Scant growth (1+) Staphylococcus aureus (A)     Comment: This isolate does not demonstrate inducible clindamycin resistance in vitro.          Gram Stain Result No WBCs or organisms seen    Susceptibility      Staphylococcus aureus     ELLIOT     Ceftriaxone <=8 ug/ml Susceptible     Ciprofloxacin >2 ug/ml Resistant     Clindamycin <=0.5 ug/ml Susceptible     Daptomycin <=0.5 ug/ml Susceptible     Erythromycin >4 ug/ml Resistant     Gentamicin <=4 ug/ml Susceptible     Levofloxacin >4 ug/ml Resistant     Linezolid 2 ug/ml Susceptible     Oxacillin 1 ug/ml Susceptible     Penicillin G >8 ug/ml Resistant     Quinupristin + Dalfopristin <=0.5 ug/ml Susceptible     Rifampin <=1 ug/ml Susceptible     Tetracycline <=4 ug/ml Susceptible     Trimethoprim + Sulfamethoxazole <=0.5/9.5 ug/ml Susceptible     Vancomycin 1 ug/ml Susceptible                        Sputum culture with MSSA     Radiology:  CXR from 4/30/18 Chest tube identified on the left with no pneumothorax. Mild  increased markings seen within the right mid lung stable and unchanged.  Impression:   --Severe Sepsis POA with elevated PCT, leukocytosis,  fever with respiratory failure and end-stage renal disease, thought to be related to cervical abscess, vs other.  --Acute C3-C7 cervical spine epidural abscess/C5-C6 discitis s/p I and D with laminectomies and fusions of C5-C7 4/13, culture revealing MSSA  --MSSA bacteremia at OSH likely from cervical epidural abscess.  --Fever, hectic, noninfectious ie drug, vs other (last bld cx 4/26, cath tip 4/26 neg).  --Leukocytosis/neutrophilia, worse.  --Hyperkalema, resolved.  --Quadraplegia pre-surgical  --T2DM/severe peripheral neuropathy/debilitated/wheel chair bound  --ESRD/HD  --CAD/stents  --Hypothyroidism  --Chronic systolic CHF  --left lung consolidation , pleural effusion, doubt empyema.  --Hypocalcemia, 7.6, worse.  --Mild cholestasis, 1.5.  --Acute hypoxic respiratory failure.  --Encephalopathy, metabolic.    Assessment:  47-year-old with end-stage renal disease chronic medical problems of type 2 diabetes mellitus coronary artery disease peripheral arterial disease hypothyroidism CHF who admitted with quadriplegia with C5-C6 cervical spine epidural abscess with persistent MSSA bacteremia had laminectomy with drainage of epidural abscess and fusion of C5 to C7 had some improvement with decreased fever but with persistent leukocytosis was switched to nafcillin.  Given micafungin without change in fevers.  Gentamicin added last weekend with some brief improvement of fevers; Gent restarted 4/26/18 and left IJ catheter removed    PLAN/RECOMMENDATIONS:   Follow exam, cbc, cmp, blood cx from 4/26, cath tip cx, lactic acid, PCT, and radiographic studies.  F/u new blood cultures  Stop  gentamicin   Restart   nafcillin 12 g/ day  D/c daptomycin     At this point rather that covering for infections that we have not proven we should cover and address what we know we have.    Lack of new positive cultures may suggest that previous high fevers were from drug fever.  Patient having hectic fevers before changing cefazolin to  nafcillin    D/w neurosurgery; check MRI, CTof cervical spine to look for evolving abscess;   D/w Dr. Bartlett    D/w family  Cont ventilator support.   follow esr    Patient is criticaly ill  Critical care time 35 minutes

## 2018-05-01 NOTE — PROGRESS NOTES
"   Subjective    Patient seen on dialysis  Patient remained critically ill, on ventilator later.   Patient condition remains the same, opens her eyes, trach has been done..  Review of system: Cannot be evaluated at this time.  Objective     Vital Signs:  Blood pressure 121/60, pulse 89, temperature 98.9 °F (37.2 °C), temperature source Axillary, resp. rate 17, height 157 cm (61.81\"), weight 88.5 kg (195 lb 1.7 oz), SpO2 98 %, not currently breastfeeding.    Flowsheet Rows    Flowsheet Row First Filed Value   Admission Height 157 cm (61.81\") Documented at 04/12/2018 2300   Admission Weight 65 kg (143 lb 4.8 oz) Documented at 04/12/2018 2300          04/30 0701 - 05/01 0700  In: 1332 [I.V.:11]  Out: 4090     Physical Exam:  General Appearance: On ventilator no obvious distress. .  Neck: Supple no JVD.  Oral intubation.  Lungs: No rales or rhonchi's, equal chest movement.  Heart: No gallop, murmur, rub, RRR.  Abdomen: Soft, nontender, positive bowel sounds, no organomegaly.  Morbid obesity.  Extremities:Positive dependent edema is noted.  No cyanosis.  Skin: Warm and dry.   AVF left upper arm.  Functional    Labs:    Results from last 7 days  Lab Units 05/01/18  0432 04/30/18  0424 04/29/18  0825   WBC 10*3/mm3 18.46* 22.38* 24.18*   HEMOGLOBIN g/dL 10.5* 10.9* 12.0   PLATELETS 10*3/mm3 277 270 325       Results from last 7 days  Lab Units 05/01/18  0432 04/30/18  0424 04/29/18  0825 04/27/18  0451   SODIUM mmol/L 136 137 136 144   POTASSIUM mmol/L 3.5 3.6 4.1 3.7   CHLORIDE mmol/L 98* 99 99 106   CO2 mmol/L 27.0 22.0 23.0 25.0   BUN mg/dL 58* 97* 83* 66*   CREATININE mg/dL 2.70* 3.60* 3.30* 3.20*   CALCIUM mg/dL 8.1* 7.8* 8.0* 7.6*   PHOSPHORUS mg/dL 2.1* 4.6  4.6 4.4 4.5   MAGNESIUM mg/dL 2.0 2.0  2.0 2.0  --    ALBUMIN g/dL 3.10* 2.80* 3.30 2.90*       Results from last 7 days  Lab Units 04/30/18  0424   ALK PHOS U/L 91   BILIRUBIN mg/dL 3.5*   ALT (SGPT) U/L 748*   AST (SGOT) U/L 3,796*       Results from last 7 " days  Lab Units 04/30/18  0338   PH, ARTERIAL pH units 7.459*   PO2 ART mm Hg 200.0*   PCO2, ARTERIAL mm Hg 28.5*   HCO3 ART mmol/L 20.2      A/P:       1.ESRD:  HD MWF.Next dialysisDay.Continue with the current.    2.  Hypotension: better..  Give albumin as needed     3.  Hyperkalemia:  corrected with HD      4.  Anemia:   monitor hemoglobin, use epo and iron as needed.  transfusion for Hgb < 7.     5.  Epidural abscess:  emergent cord decompression.   CNS condition remains the same.    6.  Resp failure: Remain on ventilator.     Plan:  Dialysis in progress  Epogen started at 10,000 units 3 times a week, monitor H&H  Electrolytes and volume will be managed.   High-risk patient with multiple medical problem critically ill.     Monitor phosphorus level  Patient will require ultrafiltration tomorrow to volume overload.  Case discussed with DEVYN Hwang MD  05/01/18  10:08 AM

## 2018-05-02 NOTE — THERAPY TREATMENT NOTE
Acute Care - Physical Therapy Treatment Note  [facility]     Patient Name: Lizet Webster  : 1970  MRN: [id number]  Today's Date: 2018  Onset of Illness/Injury or Date of Surgery: 18  Date of Referral to PT: 18  Referring Physician: MD Liborio    Admit Date: 2018    Visit Dx:    ICD-10-CM ICD-9-CM   1. Abscess in epidural space of cervical spine G06.1 324.1   2. Abscess L02.91 682.9   3. Atelectasis of left lung J98.11 518.0   4. Acute respiratory failure, unspecified whether with hypoxia or hypercapnia J96.00 518.81   5. Paralysis G83.9 344.9   6. Impaired mobility and ADLs Z74.09 799.89   7. Impaired functional mobility, balance, gait, and endurance Z74.09 V49.89   8. Respiratory distress R06.00 786.09   9. Parapneumonic effusion J18.9 511.89    J91.8      Patient Active Problem List   Diagnosis   • Bacteremia, MSSA   • Metabolic encephalopathy   • Epidural abscess - cervical spine   • ESRD (end stage renal disease) on dialysis   • Hypothyroidism   • Chronic systolic heart failure   • Type 2 diabetes mellitus with renal complication   • Neuropathy   • Acute respiratory failure   • Atelectasis of left lung   • Quadriplegia   • Pleural effusion on left       Therapy Treatment          Rehabilitation Treatment Summary     Row Name 18 1451 18 1420          Treatment Time/Intention    Discipline occupational therapist  -CL physical therapist  -KR     Document Type therapy note (daily note)  -CL2 therapy note (daily note)  -KR     Subjective Information no complaints  -CL no complaints  -KR     Mode of Treatment  -- physical therapy  -KR     Care Plan Review  -- risks/benefits reviewed;patient/other agree to care plan  -KR     Care Plan Review, Other Participant(s)  -- spouse  -KR     Therapy Frequency (PT Clinical Impression)  -- 3 times/wk  -KR     Patient Effort poor  -CL poor  -KR     Existing Precautions/Restrictions fall;oxygen therapy device and L/min;other (see comments)    cervical precuations, trach vent, PEG, quadriplegic  -CL fall;oxygen therapy device and L/min;spinal   trach, vent, PEG, quadriplegic  -KR     Treatment Considerations/Comments Pt very lethargic this date, not following commands. Spouse not present, RN verbalized understanding of B resting hand splint wear schedule and positioning.   -CL  --     Recorded by [CL] Anastasiya Powell, OT 05/02/18 1536 05/02/18 1536  [CL2] Anastasiya Powell OT 05/02/18 1538 05/02/18 1538 [KR] Ethel Becker, PT 05/02/18 1618 05/02/18 1618     Row Name 05/02/18 1451 05/02/18 1420          Vital Signs    Pre Systolic BP Rehab --   TIO RN cleared for tx.   -CL 81  -KR     Pre Treatment Diastolic BP  -- 38  -KR     Post Systolic BP Rehab  -- 117  -KR     Post Treatment Diastolic BP  -- 56  -KR     Pretreatment Heart Rate (beats/min)  -- 89  -KR     Posttreatment Heart Rate (beats/min)  -- 79  -KR     Pre SpO2 (%)  -- 98  -KR     O2 Delivery Pre Treatment  -- supplemental O2  -KR     Post SpO2 (%)  -- 100  -KR     O2 Delivery Post Treatment  -- supplemental O2  -KR     Pre Patient Position  -- Supine  -KR     Intra Patient Position  -- Supine  -KR     Post Patient Position  -- Supine  -KR     Recorded by [CL] Anastasiya Powell, OT 05/02/18 1536 05/02/18 1536 [KR] Ethel Becker, PT 05/02/18 1618 05/02/18 1618     Row Name 05/02/18 1420             Cognitive Assessment/Intervention    Additional Documentation Cognitive Assessment/Intervention (Group)  -KR      Recorded by [KR] Ethel Becker, PT 05/02/18 1618 05/02/18 1618      Row Name 05/02/18 1451 05/02/18 1420          Cognitive Assessment/Intervention- PT/OT    Affect/Mental Status (Cognitive) low arousal/lethargic;unable/difficult to assess  -CL low arousal/lethargic;unable/difficult to assess  -KR     Orientation Status (Cognition) unable/difficult to assess  -CL unable/difficult to assess  -KR     Follows Commands (Cognition) does not follow one step commands  -CL does not follow one step commands   -KR     Safety Deficit (Cognitive) severe deficit  -CL severe deficit;ability to follow commands;awareness of need for assistance;insight into deficits/self awareness;safety precautions awareness  -KR     Personal Safety Interventions fall prevention program maintained;supervised activity  -CL muscle strengthening facilitated;supervised activity  -KR     Recorded by [CL] Anastasiya Powell, OT 05/02/18 1536 05/02/18 1536 [KR] Ethel Becker, PT 05/02/18 1618 05/02/18 1618     Row Name 05/02/18 1451 05/02/18 1420          Bed Mobility Assessment/Treatment    Comment (Bed Mobility) Deferred.   -CL Bed mobility deferred.  -KR     Recorded by [CL] Anastasiya Powell, OT 05/02/18 1536 05/02/18 1536 [KR] Ethel Becker, PT 05/02/18 1618 05/02/18 1618     Row Name 05/02/18 1451 05/02/18 1420          Transfer Assessment/Treatment    Comment (Transfers) Deferred.   -CL Transfers deferred. Not appropriate to assess.   -KR     Recorded by [CL] Anastasiya Powell, OT 05/02/18 1536 05/02/18 1536 [KR] Ethel Becker, PT 05/02/18 1618 05/02/18 1618     Row Name 05/02/18 1420             Gait/Stairs Assessment/Training    Comment (Gait/Stairs) Ambulation deferred. Not appropriate to assess.   -KR      Recorded by [KR] Ethel Becker, PT 05/02/18 1618 05/02/18 1618      Row Name 05/02/18 1420             Therapeutic Exercise    Therapeutic Exercise supine, lower extremities  -KR      Additional Documentation Therapeutic Exercise (Row)  -KR      Recorded by [KR] Ethel Becker, PT 05/02/18 1618 05/02/18 1618      Row Name 05/02/18 1451             Upper Extremity Seated Therapeutic Exercise    Performed, Seated Upper Extremity (Therapeutic Exercise) shoulder flexion/extension;elbow flexion/extension;forearm supination/pronation;wrist flexion/extension;wrist radial/ulnar deviation  -CL      Exercise Type, Seated Upper Extremity (Therapeutic Exercise) PROM (passive range of motion)  -CL      Sets/Reps Detail, Seated Upper Extremity (Therapeutic Exercise)  1/10  -CL      Recorded by [CL] Anastasiya Powell, OT 05/02/18 1536 05/02/18 1536      Row Name 05/02/18 1420             Lower Extremity Supine Therapeutic Exercise    Performed, Supine Lower Extremity (Therapeutic Exercise) hip flexion/extension;hip abduction/adduction;ankle dorsiflexion/plantarflexion;SAQ (short arc quad) over bolster;SLR (straight leg raise);ankle pumps;heel slides  -KR      Exercise Type, Supine Lower Extremity (Therapeutic Exercise) PROM (passive range of motion)  -KR      Sets/Reps Detail, Supine Lower Extremity (Therapeutic Exercise) BLE 20x each  -KR      Recorded by [KR] Ethel Becker, PT 05/02/18 1618 05/02/18 1618      Row Name 05/02/18 1451 05/02/18 1420          Positioning and Restraints    Pre-Treatment Position in bed  -CL in bed  -KR     Post Treatment Position bed  -CL bed  -KR     In Bed notified nsg;fowlers;call light within reach;encouraged to call for assist;with nsg;RUE elevated;LUE elevated;legs elevated;heels elevated  -CL supine;call light within reach;encouraged to call for assist;with nsg;RUE elevated;LUE elevated  -KR     Recorded by [CL] Anastasiya Powell, OT 05/02/18 1536 05/02/18 1536 [KR] Ethel Becker, PT 05/02/18 1618 05/02/18 1618     Row Name 05/02/18 1420             Pain Assessment    Additional Documentation Pain Scale: FACES Pre/Post-Treatment (Group)  -KR      Recorded by [KR] Ethel Becker, PT 05/02/18 1618 05/02/18 1618      Row Name 05/02/18 1451 05/02/18 1420          Pain Scale: FACES Pre/Post-Treatment    Pain: FACES Scale, Pretreatment 0-->no hurt  -CL 0-->no hurt  -KR     Pain: FACES Scale, Post-Treatment 0-->no hurt  -CL 0-->no hurt  -KR     Recorded by [CL] Anastasiya Powell, OT 05/02/18 1536 05/02/18 1536 [KR] Ethel Becker, PT 05/02/18 1618 05/02/18 1618     Row Name 05/02/18 1451             Wrist/Hand Orthosis Management    Type (Wrist/Hand Orthosis) bilateral;resting wrist/hand functional position orthosis  -CL      Therapeutic Indications (Wrist/Hand  Orthosis) contracture prevention/reduction  -CL      Wearing Schedule (Wrist/Hand Orthosis) wear 2 hours on/2 hours off;wear at night or when in bed   at night  -CL      Sensory Assessment (Wrist/Hand Orthosis) RN verbalized understanding, to educated spouse when present.   -CL      Skin Assessment (Wrist/Hand Orthosis) Skin intact.   -CL      Recorded by [CL] Anastasiya Powell, TIANNA 05/02/18 1536 05/02/18 1536      Row Name                Wound 04/12/18 2300 Left posterior heel pressure injury    Wound - Properties Group Date first assessed: 04/12/18 [AC] Time first assessed: 2300 [AC] Present On Admission : yes [AC] Side: Left [AC] Orientation: posterior [AC] Location: heel [AC] Type: pressure injury [AC] Stage, Pressure Injury: deep tissue injury [AC] Recorded by:  [AC] Kareem Esqueda RN 04/13/18 0305 04/13/18 0305    Row Name                Wound 04/13/18 0333 Other (See comments) anterior neck incision    Wound - Properties Group Date first assessed: 04/13/18 [TARYN] Time first assessed: 0333 [TARYN] Present On Admission : no [AC] Side: Other (See comments) [TARYN] Orientation: anterior [AC] Location: neck [TARYN] Type: incision [TARYN] Recorded by:  [AC] Kareem Esqueda RN 04/14/18 0205 04/14/18 0205 [TARYN] Amanda Barton RN 04/13/18 0333 04/13/18 0333    Row Name                Wound 04/13/18 0337 Other (See comments) posterior other (see notes) incision    Wound - Properties Group Date first assessed: 04/13/18 [TARYN] Time first assessed: 0337 [TARYN] Present On Admission : no [AC] Side: Other (See comments) [TARYN] Orientation: posterior [AC] Location: other (see notes) [TARYN] Type: incision [TARYN] Recorded by:  [AC] Kareem Esqueda RN 04/14/18 0205 04/14/18 0205 [TARYN] Amanda Barton RN 04/13/18 0337 04/13/18 0337    Row Name                Wound 04/13/18 1000 Right lateral malleolus pressure injury    Wound - Properties Group Date first assessed: 04/13/18 [AS] Time first assessed: 1000 [AS] Present On  Admission : yes;picture taken [AS] Side: Right [AS] Orientation: lateral [AS] Location: malleolus [AS] Type: pressure injury [AS] Stage, Pressure Injury: deep tissue injury [AS] Recorded by:  [AS] Matt Grace RN 04/13/18 1036 04/13/18 1036    Row Name                Wound 04/13/18 1000 Right lateral;upper foot pressure injury    Wound - Properties Group Date first assessed: 04/13/18 [AS] Time first assessed: 1000 [AS] Present On Admission : yes;picture taken [AS] Side: Right [AS] Orientation: lateral;upper [AS] Location: foot [AS] Type: pressure injury [AS] Stage, Pressure Injury: deep tissue injury [AS] Recorded by:  [AS] Matt Grace RN 04/13/18 1037 04/13/18 1037    Row Name                Wound 04/13/18 2000 medial sacral spine pressure injury    Wound - Properties Group Date first assessed: 04/13/18 [AC] Time first assessed: 2000 [AC] Present On Admission : yes [AC] Orientation: medial [AC] Location: sacral spine [AC] Type: pressure injury [AC] Stage, Pressure Injury: Stage 1 [AC] Recorded by:  [AC] Kareem Esqueda RN 04/14/18 0214 04/14/18 0214    Row Name                Wound 04/16/18 0800 Right chest unspecified    Wound - Properties Group Date first assessed: 04/16/18 [ER] Time first assessed: 0800 [ER] Present On Admission : yes [ER] Side: Right [ER] Location: chest [ER] Type: unspecified [ER] Recorded by:  [ER] Maine Bridges RN 04/16/18 1302 04/16/18 1302    Row Name                Wound 04/24/18 1134 neck incision    Wound - Properties Group Date first assessed: 04/24/18 [MT] Time first assessed: 1134 [MT] Location: neck [MT] Type: incision [MT] Recorded by:  [MT] Gabriela Hauser RN 04/24/18 1134 04/24/18 1134    Row Name                Wound 04/24/18 1134 abdomen incision    Wound - Properties Group Date first assessed: 04/24/18 [MT] Time first assessed: 1134 [MT] Location: abdomen [MT] Type: incision [MT] Recorded by:  [MT] Gabriela Hauser RN 04/24/18 1134 04/24/18 1134    Row Name                 Wound 04/29/18 2000 midline coccyx    Wound - Properties Group Date first assessed: 04/29/18 [CH] Time first assessed: 2000 [CH] Orientation: midline [CH] Location: coccyx [CH] Stage, Pressure Injury: Stage 2 [CH] Recorded by:  [CH] Julia Mcgovern RN 04/30/18 0021 04/30/18 0021    Row Name 05/02/18 1420             Plan of Care Review    Plan of Care Reviewed With patient;spouse  -KR      Recorded by [KR] Ethel Becker, PT 05/02/18 1618 05/02/18 1618      Row Name 05/02/18 1420             Outcome Summary/Treatment Plan (PT)    Daily Summary of Progress (PT) unable to show any progress toward functional goals  -KR      Recorded by [KR] Ethel Becker, PT 05/02/18 1618 05/02/18 1618        User Key  (r) = Recorded By, (t) = Taken By, (c) = Cosigned By    Initials Name Effective Dates Discipline    TARYN Amanda Barton, RN 06/16/16 -  Nurse    SANJEEV Hauser, DEVYN 06/16/16 -  Nurse    JOSE Mcgovern RN 06/16/16 -  Nurse    AS Matt Grace RN 06/16/16 -  Nurse    MONICA Bridges RN 07/19/16 -  Nurse    ELIAZAR Powell, OT 04/03/18 -  OT    CHAO Becker, PT 04/03/18 -  PT    AC Rosa-Marzena Esuqeda RN 03/19/18 -  Nurse          Wound 04/12/18 2300 Left posterior heel pressure injury (Active)   Dressing Appearance dry;intact 5/2/2018  8:00 AM   Closure None 5/2/2018  8:00 AM   Base red/granulating 5/2/2018  8:00 AM   Periwound dry;intact;pink 5/2/2018  8:00 AM   Periwound Temperature warm 5/2/2018  8:00 AM   Drainage Amount none 5/2/2018  8:00 AM       Wound 04/13/18 0333 Other (See comments) anterior neck incision (Active)   Dressing Appearance dry;intact 5/2/2018  8:00 AM       Wound 04/13/18 0337 Other (See comments) posterior other (see notes) incision (Active)   Dressing Appearance open to air 5/2/2018  8:00 AM   Closure HERMELINDA 5/2/2018  8:00 AM   Base scab 5/2/2018  8:00 AM   Periwound intact;pink 5/2/2018  8:00 AM   Drainage Amount none 5/2/2018  8:00 AM       Wound 04/13/18  1000 Right lateral malleolus pressure injury (Active)   Dressing Appearance open to air 5/2/2018  8:00 AM   Closure None 5/2/2018  8:00 AM   Base pink 5/2/2018  8:00 AM   Periwound intact;dry;pink 5/2/2018  8:00 AM   Drainage Amount none 5/2/2018  8:00 AM       Wound 04/13/18 1000 Right lateral;upper foot pressure injury (Active)   Dressing Appearance open to air 5/2/2018  8:00 AM   Closure None 5/2/2018  8:00 AM   Base pink 5/2/2018  8:00 AM   Periwound intact;dry 5/2/2018  8:00 AM   Drainage Amount none 5/2/2018  8:00 AM       Wound 04/13/18 2000 medial sacral spine pressure injury (Active)   Dressing Appearance dry;intact 5/2/2018  8:00 AM   Closure None 5/2/2018  8:00 AM   Base dressing in place, unable to visualize 5/2/2018  8:00 AM   Periwound redness 5/2/2018  8:00 AM   Drainage Amount none 5/2/2018  8:00 AM       Wound 04/16/18 0800 Right chest unspecified (Active)   Dressing Appearance dry;intact 5/2/2018  8:00 AM   Closure None 5/2/2018  8:00 AM   Base scab;pink 5/2/2018  8:00 AM   Periwound dry;intact;pink 5/2/2018  8:00 AM   Drainage Amount none 5/2/2018  8:00 AM       Wound 04/24/18 1134 neck incision (Active)   Dressing Appearance dry;intact 5/2/2018  8:00 AM   Periwound intact;pink;dry 5/2/2018  8:00 AM   Drainage Amount none 5/2/2018  8:00 AM       Wound 04/24/18 1134 abdomen incision (Active)   Dressing Appearance dry;intact 5/2/2018  8:00 AM   Periwound intact;dry;pink 5/2/2018  8:00 AM       Wound 04/29/18 2000 midline coccyx (Active)   Dressing Appearance dry;intact 5/2/2018  8:00 AM             Physical Therapy Education     Title: PT OT SLP Therapies (Active)     Topic: Physical Therapy (Active)     Point: Mobility training (Active)    Learning Progress Summary     Learner Status Readiness Method Response Comment Documented by    Patient Active Acceptance E NR  KR 04/30/18 7389    Significant Other Active Acceptance E NR  KR 04/30/18 162     Done Acceptance E,TB NICOLE MCKEON   04/29/18 8024           Point: Home exercise program (Active)    Learning Progress Summary     Learner Status Readiness Method Response Comment Documented by    Patient Active Acceptance E NR  KR 05/02/18 1618     Active Acceptance E NR  KR 04/30/18 1629     Done Acceptance E,D NR,DU Demonstrated appropriate technique with positioning and PROM of LEs LS 04/25/18 1608    Significant Other Active Acceptance E NR  KR 05/02/18 1618     Active Acceptance E NR  KR 04/30/18 1629     Done Acceptance E,TB VU,DU   04/29/18 2118     Done Acceptance E,D NR,DU Demonstrated appropriate technique with positioning and PROM of LEs LS 04/25/18 1608          Point: Body mechanics (Active)    Learning Progress Summary     Learner Status Readiness Method Response Comment Documented by    Patient Active Acceptance E NR  KR 05/02/18 1618     Active Acceptance E NR  KR 04/30/18 1629    Significant Other Active Acceptance E NR  KR 05/02/18 1618     Active Acceptance E NR  KR 04/30/18 1629     Done Acceptance E,TB VU,DU   04/29/18 2118          Point: Precautions (Active)    Learning Progress Summary     Learner Status Readiness Method Response Comment Documented by    Patient Active Acceptance E NR  KR 05/02/18 1618     Active Acceptance E NR  KR 04/30/18 1629     Done Acceptance E,D NR,DU Demonstrated appropriate technique with positioning and PROM of LEs LS 04/25/18 1608    Significant Other Active Acceptance E NR  KR 05/02/18 1618     Active Acceptance E NR  KR 04/30/18 1629     Done Acceptance E,TB VU,DU   04/29/18 2118     Done Acceptance E,D NR,DU Demonstrated appropriate technique with positioning and PROM of LEs LS 04/25/18 1608                      User Key     Initials Effective Dates Name Provider Type Discipline     06/19/15 -  Kelli Manrique, PT Physical Therapist PT     06/16/16 -  Julia Mcgovern, RN Registered Nurse Nurse     04/03/18 -  Ethel Becker, PT Physical Therapist PT                    PT Recommendation and Plan  Therapy  Frequency (PT Clinical Impression): 3 times/wk  Outcome Summary/Treatment Plan (PT)  Daily Summary of Progress (PT): unable to show any progress toward functional goals  Plan of Care Reviewed With: patient  Progress: no change  Outcome Summary: Pt tolerated BLE PROM therapeutic exercise this date. Education on PROM and HEP provided to pt's spouse. Continue to progress as appropriate.           Outcome Measures     Row Name 05/02/18 1451 05/02/18 1420 04/30/18 1525       How much help from another person do you currently need...    Turning from your back to your side while in flat bed without using bedrails?  -- 1  -KR 1  -KR    Moving from lying on back to sitting on the side of a flat bed without bedrails?  -- 1  -KR 1  -KR    Moving to and from a bed to a chair (including a wheelchair)?  -- 1  -KR 1  -KR    Standing up from a chair using your arms (e.g., wheelchair, bedside chair)?  -- 1  -KR 1  -KR    Climbing 3-5 steps with a railing?  -- 1  -KR 1  -KR    To walk in hospital room?  -- 1  -KR 1  -KR    AM-PAC 6 Clicks Score  -- 6  -KR 6  -KR       How much help from another is currently needed...    Putting on and taking off regular lower body clothing? 1  -CL  --  --    Bathing (including washing, rinsing, and drying) 1  -CL  --  --    Toileting (which includes using toilet bed pan or urinal) 1  -CL  --  --    Putting on and taking off regular upper body clothing 1  -CL  --  --    Taking care of personal grooming (such as brushing teeth) 1  -CL  --  --    Eating meals 1  -CL  --  --    Score 6  -CL  --  --       Functional Assessment    Outcome Measure Options AM-PAC 6 Clicks Daily Activity (OT)  -CL AM-PAC 6 Clicks Basic Mobility (PT)  -KR AM-PAC 6 Clicks Basic Mobility (PT)  -KR    Row Name 04/30/18 1345 04/30/18 1115          How much help from another is currently needed...    Putting on and taking off regular lower body clothing? 1  -CL 1  -CL     Bathing (including washing, rinsing, and drying) 1  -CL 1   -CL     Toileting (which includes using toilet bed pan or urinal) 1  -CL 1  -CL     Putting on and taking off regular upper body clothing 1  -CL 1  -CL     Taking care of personal grooming (such as brushing teeth) 1  -CL 1  -CL     Eating meals 1  -CL 1  -CL     Score 6  -CL 6  -CL        Functional Assessment    Outcome Measure Options AM-PAC 6 Clicks Daily Activity (OT)  -CL AM-PAC 6 Clicks Daily Activity (OT)  -CL       User Key  (r) = Recorded By, (t) = Taken By, (c) = Cosigned By    Initials Name Provider Type    CL Anastasiya Powell, OT Occupational Therapist    CHAO Becker, PT Physical Therapist           Time Calculation:         PT Charges     Row Name 05/02/18 1620             Time Calculation    Start Time 1420  -KR      PT Received On 05/02/18  -KR      PT Goal Re-Cert Due Date 05/05/18  -KR         Time Calculation- PT    Total Timed Code Minutes- PT 16 minute(s)  -KR        User Key  (r) = Recorded By, (t) = Taken By, (c) = Cosigned By    Initials Name Provider Type    CHAO Becker, PT Physical Therapist          Therapy Charges for Today     Code Description Service Date Service Provider Modifiers Qty    [id number] HC PT THER PROC EA 15 MIN 5/2/2018 Etehl Becker, PT GP 1          PT G-Codes  Outcome Measure Options: AM-PAC 6 Clicks Daily Activity (OT)    Mena Becker, PT  5/2/2018

## 2018-05-02 NOTE — PROGRESS NOTES
Adult Nutrition  Assessment/PES    Patient Name:  Lizet Webster  YOB: 1970  MRN: [id number]  Admit Date:  4/12/2018    Assessment Date:  5/2/2018    Comments:  Pt tolerating bolus feedings , continue cirrent protocol : Novasource Renal 180 ml feeding over 30 mins 5 times daily (1356-4423) daily goal volume 900 ml . , 1 Pro-Stat daily, flush before and after feeding s w/ 30 ml water          Adult Nutrition Assessment     Row Name 05/02/18 1802       Nutrient/Fluid Evaluation    Number of Days Evaluated 1 day       Calories Evaluation    Enteral Calories (kcal) 1296    Other Calories (kcal) 100    Total Calories (kcal) 1396    % of Kcal Needs 82       Protein Evaluation    Enteral Protein (gm) 58    Other Protein (gm) 15    Total Protein (gm) 73    % of Protein Needs 88       Intake Assessment    Energy/Calorie Requirement Assessment not meeting needs    Protein Requirement Assessment not meeting needs       Fluid Intake Evaluation    Enteral (Free Water) Fluid (mL) 465    Free Water Flush Fluid (mL) 200    Total Free Water Intake (mL) 665 mL       Recommended Daily Intake Evaluation    RDI Met       EN Evaluation    Number of Days EN Intake Evaluated 1 day    EN Average Volume Delivered (mL/day) 648 mL/day    % Goal Volume  72 %    TF Changes Other (comment)   bolus feeding increased to goal    Row Name 05/02/18 1801       Labs/Procedures/Meds    Lab Results Reviewed reviewed, pertinent       Physical Findings    Overall Physical Appearance tetraplegia (quadriplegia);on ventilator support   trach    Tubes gastrostomy tube   PEG       Nutrition Prescription PO    Current PO Diet NPO       Nutrition Prescription EN    Enteral Route PEG    Product Novasource Renal    Modulars Liquid Protein (15 gm/30 mL)    Liquid Protein (15 gm/30 mL) 30 mL/1 packet    Protein Liquid Frequency Daily    TF Delivery Method Bolus    TF Bolus Goal Volume (mL) 180 mL    TF Bolus Current Volume (mL) 180 mL    TF Bolus Frequency  5 times a day    TF Bolus Cycle Over 30 minutes    Water flush (mL)  30 mL    Water Flush Frequency Every feeding   before and after feedings    Row Name 05/02/18 1759       Nutrition/Diet History    Factors Affecting Nutritional Intake --   RN reports pt tolerating bolus feedings; continues to have  low grade fever  ID svc changed antibiotics    Row Name 05/02/18 1758       Reason for Assessment    Reason For Assessment follow-up protocol;TF/PN;per organizational policy   MDR; 30 mins    Diagnosis --   per notes of this adm    Identified At Risk by Screening Criteria tube feeding or parenteral nutrition          Problem/Interventions:        Problem 1     Row Name 05/02/18 1807       Nutrition Diagnoses Problem 1    Problem 1 Needs Alternate Route    Etiology (related to) Medical Diagnosis   vent    Signs/Symptoms (evidenced by) NPO    Resolved? Yes   PEG placed                    Intervention Goal     Row Name 05/02/18 1807       Intervention Goal    General Nutrition support treatment;Meet nutritional needs for age/condition    TF/PN Tolerate TF at goal            Nutrition Intervention     Row Name 05/02/18 1807       Nutrition Intervention    RD/Tech Action Follow Tx progress;Care plan reviewd            Nutrition Prescription     Row Name 05/02/18 1808       Nutrition Prescription EN    Enteral Prescription Continue same protocol            Education/Evaluation     Row Name 05/02/18 1808       Monitor/Evaluation    Monitor Per protocol;I&O;Pertinent labs;TF delivery/tolerance;Symptoms        Electronically signed by:  Saima Nolen RD  05/02/18 6:09 PM

## 2018-05-02 NOTE — PROGRESS NOTES
"   Subjective    Seen on dialysis.  Remain on ventilator critically ill.    Review of system:    Following few commands, denies nausea vomiting.  No chest pain.    Objective     Vital Signs:  Blood pressure 121/60, pulse 65, temperature 97.6 °F (36.4 °C), temperature source Core, resp. rate 16, height 157 cm (61.81\"), weight 88.5 kg (195 lb 1.7 oz), SpO2 100 %, not currently breastfeeding.    Flowsheet Rows    Flowsheet Row First Filed Value   Admission Height 157 cm (61.81\") Documented at 04/12/2018 2300   Admission Weight 65 kg (143 lb 4.8 oz) Documented at 04/12/2018 2300          05/01 0701 - 05/02 0700  In: 1340.3 [I.V.:142.3]  Out: 5800     Physical Exam:  General Appearance: On ventilator no obvious distress.  Neck: Supple no JVD.  Trach  Lungs: No rales or rhonchi's, equal chest movement.  Heart: No gallop, murmur, rub, RRR.  Abdomen: Soft, nontender, positive bowel sounds, no organomegaly.  Morbid obesity.  Extremities:Positive dependent edema is noted.  No cyanosis.  Skin: Warm and dry.   AVF left upper arm.  Functional    Labs:    Results from last 7 days  Lab Units 05/01/18  0432 04/30/18  0424 04/29/18  0825   WBC 10*3/mm3 18.46* 22.38* 24.18*   HEMOGLOBIN g/dL 10.5* 10.9* 12.0   PLATELETS 10*3/mm3 277 270 325       Results from last 7 days  Lab Units 05/01/18  0432 04/30/18  0424 04/29/18  0825 04/27/18  0451   SODIUM mmol/L 136 137 136 144   POTASSIUM mmol/L 3.5 3.6 4.1 3.7   CHLORIDE mmol/L 98* 99 99 106   CO2 mmol/L 27.0 22.0 23.0 25.0   BUN mg/dL 58* 97* 83* 66*   CREATININE mg/dL 2.70* 3.60* 3.30* 3.20*   CALCIUM mg/dL 8.1* 7.8* 8.0* 7.6*   PHOSPHORUS mg/dL 2.1* 4.6  4.6 4.4 4.5   MAGNESIUM mg/dL 2.0 2.0  2.0 2.0  --    ALBUMIN g/dL 3.10* 2.80* 3.30 2.90*       Results from last 7 days  Lab Units 04/30/18  0424   ALK PHOS U/L 91   BILIRUBIN mg/dL 3.5*   ALT (SGPT) U/L 748*   AST (SGOT) U/L 3,796*       Results from last 7 days  Lab Units 04/30/18  0338   PH, ARTERIAL pH units 7.459*   PO2 ART mm " Hg 200.0*   PCO2, ARTERIAL mm Hg 28.5*   HCO3 ART mmol/L 20.2      A/P:       1.ESRD:  HD MWF.Next dialysisDay.Continue with the current.    2.  Hypotension: better..  Give albumin as needed     3.  Hyperkalemia:  corrected with HD      4.  Anemia:   monitor hemoglobin, use epo and iron as needed.  transfusion for Hgb < 7.     5.  Epidural abscess:  emergent cord decompression.   CNS condition remains the same.    6.  Resp failure: Remain on ventilator.     Plan:  Plan ultrafiltration tomorrow.  Epogen started at 10,000 units 3 times a week, monitor H&H  Electrolytes and volume will be managed by dialysis  High-risk patient with multiple medical problem critically ill.    Case discussed with RN and her   Ruel Hwang MD  05/02/18  10:58 AM

## 2018-05-02 NOTE — PROGRESS NOTES
Critical Care Note     LOS: 20 days   Patient Care Team:  Luan Gandara MD as PCP - General (Internal Medicine)    Chief Complaint/Reason for visit:  MSSA epidural abscess with paralysis, respiratory failure      Subjective   47-year-old female with long-standing diabetes mellitus, came by peripheral neuropathy and end-stage renal disease on hemodialysis for the last year.  She presented on 4/6 with arm and neck pain.  She was treated as an outpatient initially and subsequently she developed decreased LOC.  She was found to have MRSA bacteremia and an MRI of the brain and cervical spine revealed a C5-6 osteomyelitis, discitis, and epidural mass with cord compression.  On 4/12 she underwent emergency surgery with anterior cervical corpectomy and posterior cervical laminectomy for epidural abscess.  She has had persistent postoperative quadriplegia.  Bronchoscopyx3 on 4/21, 4/24, 4/26for left lung atelectasis.  CT scan was done revealing a large left pleural effusion and chest tube was placed. Tracheostomy and PEG tube placement 4/24.   Interval History:   Hemodialysis daily x3 for volume overload.  Tracheostomy site is large with purulent drainage.  She is having persistent daily fevers to 103. Pro-calcitonin remains elevated. Nafcillin was changed to daptomycin for 3 days without any change. Micafungin was added for one week without any change. She was changed back to nafcillin and gentamicin. She is tolerating tube feeding. He did require some phenylephrine during dialysis.    Review of Systems:    All systems were reviewed and negative except as noted in subjective.    Medical history, surgical history, social history, family history reviewed    Objective     Intake/Output:    Intake/Output Summary (Last 24 hours) at 05/02/18 1332  Last data filed at 05/02/18 1115   Gross per 24 hour   Intake            847.3 ml   Output             4725 ml   Net          -3877.7 ml       Nutrition:  NPO  "Diet    Infusions:    dexmedetomidine 0.2-1.5 mcg/kg/hr    phenylephrine 0.5-3 mcg/kg/min Last Rate: 0.5 mcg/kg/min (05/02/18 1214)       Mechanical Ventilator Settings:         Vt (Set, L): 0.45 L  Resp Rate (Set): 6  Pressure Support (cm H2O): 10 cm H20  FiO2 (%): 40 %  PEEP/CPAP (cm H2O): 6 cm H20    Minute Ventilation (L/min) (Obs): 6.56 L/min  Resp Rate (Observed) Vent: 19  I:E Ratio (Set): 1:3.00  I:E Ratio (Obs): 1:3.90    PIP Observed (cm H2O): 20 cm H2O  Plateau Pressure (cm H2O): 17 cm H2O    Telemetry:Sinus rhythm, sinus tachycardia             Vital Signs  Blood pressure 134/65, pulse 75, temperature 97.7 °F (36.5 °C), temperature source Core, resp. rate 16, height 157 cm (61.81\"), weight 88.5 kg (195 lb 1.7 oz), SpO2 100 %, not currently breastfeeding.    Physical Exam:  General Appearance:  Middle-aged white woman in no distress    Head:  Normocephalic, atraumatic    Eyes:          Pupils equal reactive to light, no jaundice    Ears:     Throat: Oral mucosa moist    Neck: Right neck incision with some surrounding ecchymosis but no erythema. Right IJ deep line. Tracheostomy site the size of a half dollar with purulent secretions    Back:      Lungs:   Left pleural tube now with yellow output. Markedly improved breath sounds left chest compared to last week. Right lung is clear.     Heart:  Regular rhythm, normal S1, S2, no murmur    Abdomen:   Bowel sounds present, PEG tube in place. Nondistended, nontender.    Rectal:   Deferred   Extremities: Improving edema. Left upper arm AV fistula with palpable thrill    Pulses:    Skin:    Lymph nodes:    Neurologic: Quadriplegia. Awake and will blink to command       Results Review:     I reviewed the patient's new clinical results.     Results from last 7 days  Lab Units 05/01/18  0432 04/30/18  0424 04/29/18  0825   SODIUM mmol/L 136 137 136   POTASSIUM mmol/L 3.5 3.6 4.1   CHLORIDE mmol/L 98* 99 99   CO2 mmol/L 27.0 22.0 23.0   BUN mg/dL 58* 97* 83* "   CREATININE mg/dL 2.70* 3.60* 3.30*   CALCIUM mg/dL 8.1* 7.8* 8.0*   BILIRUBIN mg/dL  --  3.5* 3.7*   ALK PHOS U/L  --  91 93   ALT (SGPT) U/L  --  748* 608*   AST (SGOT) U/L  --  3,796* 3,091*   GLUCOSE mg/dL 208* 131* 178*       Results from last 7 days  Lab Units 05/01/18  0432 04/30/18  0424 04/29/18  0825   WBC 10*3/mm3 18.46* 22.38* 24.18*   HEMOGLOBIN g/dL 10.5* 10.9* 12.0   HEMATOCRIT % 31.1* 32.5* 35.9   PLATELETS 10*3/mm3 277 270 325   MONOCYTES % % 4.0  --  4.0       Results from last 7 days  Lab Units 04/30/18  0338   PH, ARTERIAL pH units 7.459*   PO2 ART mm Hg 200.0*   PCO2, ARTERIAL mm Hg 28.5*   HCO3 ART mmol/L 20.2     Lab Results   Component Value Date    BLOODCX No growth at 5 days 04/26/2018     No results found for: URINECX    I reviewed the patient's new imaging including images and reports.     FINDINGS: CT cervical spine 5/1/18    Vertebrae:  Nonspecific straightening of the cervical lordosis.  Anterior   fusion involving C5-C7.  Associated posterior fusion.  There is C6 corpectomy   with vertical graft.  Extensive cervical laminectomy.  No acute fracture.    Discs/spinal canal/neural foramina:  No gross high-grade central canal   stenosis to the degree visualized.    Soft tissues:  There is soft tissue gas involving the right-sided   prevertebral space and mild soft tissue gas involving the anterior cervical   soft tissues.  Mild infiltrative changes centered at the prevertebral space.    Mastoid air cells:  Partial opacification of the bilateral mastoid air cells.  The  Pleural space:  No visible pneumothorax.    Tubes, lines and devices:  Tracheostomy tube is present.     IMPRESSION:    Changes of previous fusion, corpectomy and laminectomy as above.       There is prevertebral gas greater towards the right of midline.  There are   mild infiltrative changes also involving the prevertebral space.  No gross   fluid collection within the constraints of this noncontrast examination.    All  medications reviewed.     chlorhexidine 15 mL Mouth/Throat Q12H   epoetin mini 10,000 Units Subcutaneous Once per day on Mon Wed Fri   heparin (porcine) 5,000 Units Subcutaneous Q8H   insulin detemir 40 Units Subcutaneous Daily   insulin regular 0-24 Units Subcutaneous Q6H   insulin regular 7 Units Subcutaneous Q6H   lansoprazole 30 mg Per PEG Tube Q AM   levothyroxine 75 mcg Oral Q AM   midodrine 20 mg Oral Q8H   mupirocin  Topical Q12H   nafcillin 6 g in sodium chloride 0.9 % 250 mL 6 g Intravenous Q12H   PRO-STAT 1 packet Per G Tube BID   sertraline 100 mg Oral Daily         Assessment/Plan     Principal Problem:    Epidural abscess - cervical spine  Active Problems:    Bacteremia, MSSA    Acute respiratory failure    Atelectasis of left lung    Quadriplegia    ESRD (end stage renal disease) on dialysis    Type 2 diabetes mellitus with renal complication    Pleural effusion on left    Metabolic encephalopathy    Hypothyroidism    Chronic systolic heart failure    Neuropathy    47-year-old woman with a cervical epidural abscess resulting in quadriplegia. Abscess was drained and is growing methicillin sensitive staph aureus. She has persistent fever.  Currently receiving Nafcillin/gentamicin.  Was changed to Daptomycin/gentamicin and Micafungin added for a week without change.  CT neck does not reveal undrained pus.    She continues to require mechanical ventilation. She did undergo tracheostomy and PEG tube placement. She failed simple pressure support trials. Yesterday she was changed to IMV 8, pressure support 10 and has tolerated this with a respiratory rate of 18 and an assisted tidal volume of 340. She has persistent atelectasis of her left lung despite 3 bronchoscopies and CT 4/29 reveals a large left effusion, and left lateral tube placed. Cultures remain negative. Chest tube output 320 in the last 24 hours. She received 2 units of packed red cells 4/28 when Hg 7.1 on 4/27 Current Hg 10.5.    Blood sugars  are running 100-220 on 40 units of long-acting and scheduled 11 units every 6 hours regular.    PLAN:  Nafcillin, gentamicin, per infectious disease  Micafungin discontinued per infectious disease   IMV/PS and try to wean rate  Levemir 40units daily, Regular insulin 7 units Q6hrs  Shifts tube feedings to long-term, intermittent bolus feedings as tolerates  Hemodialysis per nephrology  Continue midodrine  Baclofen for spasm  Leave chest tube to drainage, til <100mls/24hr  Thyroid replacement  Zoloft for depression  Wound care to evaluate tracheostomy site and make recommendations  OT for LE splints    VTE Prophylaxis: subcutaneous heparin    Stress Ulcer Prophylaxis: Protonix pump inhibitor    Della Ca MD  05/02/18  1:32 PM      Time:35min  I personally provided care to this critically ill patient as documented above.  Critical care time does not include time spent on separately billed procedures.  Non of my critical care time was concurrent with other critical care providers.

## 2018-05-02 NOTE — PLAN OF CARE
Problem: Patient Care Overview  Goal: Plan of Care Review  Outcome: Ongoing (interventions implemented as appropriate)   05/02/18 1610   Coping/Psychosocial   Plan of Care Reviewed With patient   Plan of Care Review   Progress no change   OTHER   Outcome Summary Pt tolerated BLE PROM therapeutic exercise this date. Education on PROM and HEP provided to pt's spouse. Continue to progress as appropriate.

## 2018-05-02 NOTE — PROGRESS NOTES
Continued Stay Note  [facility]     Patient Name: Lizet Webster  MRN: [id number]  Today's Date: 5/2/2018    Admit Date: 4/12/2018          Discharge Plan     Row Name 05/02/18 1427       Plan    Plan Comments Waiting to see if [facility] is available to take her 5/3/18.  May have to transfer on Friday 5/4/18.      Row Name 05/02/18 1344       Plan    Plan [facility]    Plan Comments [facility] accepted Mrs. Webster.                Discharge Codes    No documentation.       Expected Discharge Date and Time     Expected Discharge Date Expected Discharge Time    May 3, 2018             Ronald Jansen RN

## 2018-05-02 NOTE — PROGRESS NOTES
Continued Stay Note  [facility]     Patient Name: Lizet Webster  MRN: [id number]  Today's Date: 5/2/2018    Admit Date: 4/12/2018          Discharge Plan     Row Name 05/02/18 0930       Plan    Plan L.TACH    Patient/Family in Agreement with Plan yes    Plan Comments Not ready for L/TACH yet per MD.   Has open wound area around trach.  WOC consult.               Discharge Codes    No documentation.       Expected Discharge Date and Time     Expected Discharge Date Expected Discharge Time    May 4, 2018             Ronald Jansen RN

## 2018-05-02 NOTE — PLAN OF CARE
Problem: Patient Care Overview  Goal: Plan of Care Review  Outcome: Ongoing (interventions implemented as appropriate)      Problem: Infection, Risk/Actual (Adult)  Goal: Identify Related Risk Factors and Signs and Symptoms  Outcome: Outcome(s) achieved Date Met: 05/02/18    Goal: Infection Prevention/Resolution  Outcome: Ongoing (interventions implemented as appropriate)      Problem: Skin Injury Risk (Adult)  Goal: Identify Related Risk Factors and Signs and Symptoms  Outcome: Outcome(s) achieved Date Met: 05/02/18    Goal: Skin Health and Integrity  Outcome: Ongoing (interventions implemented as appropriate)      Problem: Hemodialysis (Adult)  Goal: Signs and Symptoms of Listed Potential Problems Will be Absent, Minimized or Managed (Hemodialysis)  Outcome: Outcome(s) achieved Date Met: 05/02/18      Problem: Ventilation, Mechanical Invasive (Adult)  Goal: Signs and Symptoms of Listed Potential Problems Will be Absent, Minimized or Managed (Ventilation, Mechanical Invasive)  Outcome: Outcome(s) achieved Date Met: 05/02/18

## 2018-05-02 NOTE — DISCHARGE SUMMARY
Transfer Summary    Patient name: Lizet Webster     CSN: [id number]     MRN: [id number]     : 1970     Today's date: 2018     Date of Admission: 2018     Date of Discharge:  2018    Admitting Physician:  Della Ca MD    Primary Care Provider: Luan Gandara MD     Consultations:  Aryan Reed MD, Neurosurgery     Aryan Mckeon MD, Nephrology     Jericho Marsh MD, Infectious Disease     Denver Alves MD, General Surgery      Admission Diagnosis: Epidural Abscess     Transfer Diagnoses:   Hospital Problem List     * (Principal)Epidural abscess - cervical spine    Bacteremia, MSSA    Metabolic encephalopathy    ESRD (end stage renal disease) on dialysis    Hypothyroidism    Chronic systolic heart failure    Type 2 diabetes mellitus with renal complication    Neuropathy    Acute respiratory failure    Atelectasis of left lung    Overview Addendum 2018  1:16 PM by Nghia Gifford MD     Recurrent         Quadriplegia    Pleural effusion on left          Allergies:  Review of patient's allergies indicates no known allergies.    Code Status:  Full Code    Procedures:  Procedure(s):  BRONCHOSCOPY AT BEDSIDE (, , )            CERVICAL LAMINECTOMY DECOMPRESSION POSTERIOR, CERVICAL DISCECTOMY ANTERIOR WITH FUSION        Tracheostomy  PEG    History of Present Illness:    Patient is a 47 y.o. female with long-standing diabetes mellitus complicated by severe peripheral neuropathy and end-stage renal disease on hemodialysis for the last year. History is taken from her . On  she went to the emergency room complaining of neck and left arm pain. CT scan of the head was done and was negative. She was given a muscle relaxant. She had a low-grade fever at that time. The next 2 days she felt okay. She returned to the emergency room on  with persistent neck pain and was discharged on prednisone. Then on April 10 she could not talk and  was confused. Her  called her primary care physician and took her in to the office. She reviewed her records and noted that she had methicillin sensitive staph aureus bacteremia from her April 9 emergency room visit and directly admitted her to the hospital. She had an MRI of the brain that was normal and an MRI of the cervical spine that revealed a C5 6 osteomyelitis, discitis and a large anterior epidural mass with cord compression. Echocardiogram revealed no heart valve vegetations with an ejection fraction of 40%. She was transported to [facility] for definitive surgery because the neurosurgeons in [place] felt the case was too complicated. Up on arrival here she is very somnolent she will arouse with noxious stimuli and is nonverbal. She is not moving her extremities. She possibly received vancomycin on April 10 and was changed to nafcillin 2 g IV every 4 hours    Hospital Course:    The patient was admitted and taken to the operating room and underwent for posterior cervical laminectomy C3-7 with facet screw fixation and posterior lateral fusion C5-C7, anterior cervical corpectomy C6, and excision of epidural abscess.  Post-operatively she was brought to the ICU, intubated and on mechanical ventilation. Post operative LP on 4/13 was clear with elevated protein of 288, WBC 43, normal glucose. Infectious disease was consulted  and she was started on Nafcillin and Vancomycin. Nephrology was consulted and she was continued on her regular Monday, Wednesday, Friday schedule for hemodialysis. BY 4/14 she was waking up and able to nod head in answer to yes/no questions. Lower extremity spasms were noted and she was started on Baclofen.  MSSA bacteremia was present in OSH cultures and cefazolin was continued but vancomycin was discontinued. She was started on enteral nutrition on 4/16.     She developed fevers up to 103 degrees and micafungin and levaquin were added to antibiotic regimen.  "She became hypotensive and required vasopressor. Transcutaneous echo showed mild MR/TR but no vegetations and NICHOLAS also showed no vegetations with EF 45%.  On 4/19 she had no gag reflex and no voluntary movement to command and Dr. Alves was consulted for trach and PEG placement. On 4/24 a #8XLT tracheostomy was placed along with a 20 Vietnamese PEG.     She had multiple bronchoscopies (4/21, 4/24, 4/26) due to opacification of the left lung and mucous plugging was found on each.  By 4/20 she continued to have no improvement in quadriplegia. On 4/21 gentamycin was added due to persistent fevers and was stopped on 5/1. Central line was changed from right IJ to left IJ and tip was cultured but no growth was noted. Fevers were suspected to be related to drugs. On 4/29 CXR showed a large left pleural effusion and a chest tube was placed with blood tinged cloudy fluid return. Chest tube was able to be discontinued on 5/3. She developed transaminitis (, AST 3796) consistent with shock liver on 4/29, by 5/3 liver labs were much improved, with ALT 88, AST 71. Response to vent weaning has been mixed. On 5/3 she was able to tolerate PS for approximately an hour.     Physical therapy has been working on PROM of upper and lower extremities. At this time she has some movement of upper extremities but has no sensation.     By 5/4/18 she was felt to have reached maximum benefit from hospitalization and is felt ready for transfer to LTACH at [facility] in [place], [place].   She has received dialysis today (5/4)      Vitals:  /55   Pulse 71   Temp 97.8 °F (36.6 °C) (Axillary)   Resp 14   Ht 157 cm (61.81\")   Wt 78.7 kg (173 lb 8 oz)   SpO2 97%   BMI 31.93 kg/m²     Physical Exam:  General Appearance:  Middle-aged white woman in no distress    Head:  Normocephalic, atraumatic    Eyes:          Pupils equal reactive to light, no jaundice    Ears:      Throat: Oral mucosa moist    Neck: Right neck incision with some " surrounding ecchymosis but no erythema. Right IJ deep line. Tracheostomy site the size of a half dollar with purulent secretions    Back:       Lungs:   Markedly improved breath sounds left chest compared to last week. Right lung is clear.     Heart:  Regular rhythm, normal S1, S2, no murmur    Abdomen:   Bowel sounds present, PEG tube in place. Nondistended, nontender.    Rectal:   Deferred   Extremities: Improving edema. Left upper arm AV fistula with palpable thrill    Pulses: palpable pedal pulses   Skin:  intact, warm and dry   Lymph nodes:  no lymphadenopathy   Neurologic: Quadriplegia. Awake and will blink to command        Labs:    Results from last 7 days  Lab Units 05/01/18  0432   WBC 10*3/mm3 18.46*   HEMOGLOBIN g/dL 10.5*   HEMATOCRIT % 31.1*   PLATELETS 10*3/mm3 277       Results from last 7 days  Lab Units 05/03/18  0411   SODIUM mmol/L 136   POTASSIUM mmol/L 3.7   CHLORIDE mmol/L 96*   CO2 mmol/L 23.0   BUN mg/dL 50*   CREATININE mg/dL 2.30*   CALCIUM mg/dL 9.0   BILIRUBIN mg/dL 3.2*   ALK PHOS U/L 66   ALT (SGPT) U/L 88*   AST (SGOT) U/L 71*   GLUCOSE mg/dL 203*         No results found for: MG, PHOS     Transfer Medications:   Lizet Webster   Home Medication Instructions NATE:[id number]    Printed on:05/04/18 0752   Medication Information                      Amino Acids-Protein Hydrolys (PRO-STAT) liquid  1 packet by Per G Tube route 2 (Two) Times a Day.             DULoxetine (CYMBALTA) 20 MG capsule  Take 20 mg by mouth Daily.             epoetin mini (EPOGEN,PROCRIT) 50654 UNIT/ML injection  Inject 1 mL under the skin 3 (Three) Times a Week.             gabapentin (NEURONTIN) 400 MG capsule  Take 400 mg by mouth every night at bedtime.             Heparin Sodium, Porcine, (HEPARIN, PORCINE,) 5000 UNIT/ML injection  Inject 1 mL under the skin Every 8 (Eight) Hours.             insulin regular (humuLIN R,novoLIN R) 100 UNIT/ML injection  Inject 0-24 Units under the skin Every 6 (Six) Hours.              insulin regular (humuLIN R,novoLIN R) 100 UNIT/ML injection  Inject 7 Units under the skin Every 6 (Six) Hours.             levothyroxine (SYNTHROID, LEVOTHROID) 75 MCG tablet  Take 75 mcg by mouth Daily.             lidocaine (LIDODERM) 5 %  Place 1 patch on the skin Daily. Remove & Discard patch within 12 hours or as directed by MD             loratadine (CLARITIN) 10 MG tablet  Take 10 mg by mouth Daily.             midodrine (PROAMATINE) 2.5 MG tablet  Take 2.5 mg by mouth 2 (Two) Times a Day.             pantoprazole (PROTONIX) 40 MG EC tablet  Take 40 mg by mouth Daily.             piperacillin-tazobactam (ZOSYN) 2-0.25 GM/50ML IVPB  Infuse 50 mL into a venous catheter Every 8 (Eight) Hours for 39 doses.             sertraline (ZOLOFT) 100 MG tablet  Take 100 mg by mouth Daily.             sodium chloride 0.9 % solution 250 mL with nafcillin 2 g reconstituted solution 6 g  Infuse 6 g into a venous catheter Every 12 (Twelve) Hours for 82 doses.             traMADol (ULTRAM) 50 MG tablet  Take 50 mg by mouth 2 (Two) Times a Day.                 Diet:   Diet Instructions     Diet: Tube Feeding; Bolus; Novasource Renal 180 mL every 3 hours, Water 30 mL before and after bolus       Discharge Diet:  Tube Feeding    Feeding Type:  Bolus    Formula, Amount & Frequency:  Novasource Renal 180 mL every 3 hours, Water 30 mL before and after bolus          Activity at Transfer:    Activity Instructions     Activity as Tolerated             Follow-up Appointments  No future appointments.      Transfer Instructions:  Transfer to [facility] in [place], [place] via ambulance at 0845 on 5/4/18       LAYTON Rios, ACNP-BC  Pulmonary & Critical Care Medicine    She is on IMV 6, PS10  Vt 450 30%.  Tolerating PST up to 4hours. Having fever to 102-103 daily.  Initial neck cultures with MSSA.  Sputum also had MSSA initially. One bronch wash pos for Capnocytophaga.  Yesterday changed to Zosyn.  Was on Nafcillin/ plus 1  week of gentamicin.    Time: Discharge 30 min    CC: Luan Gandara MD           .

## 2018-05-02 NOTE — PROGRESS NOTES
Continued Stay Note  [facility]     Patient Name: Lizet Webster  MRN: [id number]  Today's Date: 5/2/2018    Admit Date: 4/12/2018          Discharge Plan     Row Name 05/02/18 1531       Plan    Plan Comments Was able to get earlier time from [facility].  Plan to transfer to [facility] at 0845 on 5/4/18.      Row Name 05/02/18 1505       Plan    Plan Comments Plan to transfer to [facility] on 5/4/18 @ 1400.  Fax discharge summary to fax # [phone].  Call report to  # [phone].  Ext. [phone].    Row Name 05/02/18 1427       Plan    Plan Comments Waiting to see if [facility] is available to take her 5/3/18.  May have to transfer on Friday 5/4/18.      Row Name 05/02/18 1344       Plan    Plan [facility]    Plan Comments [facility] has accepted Mrs. Webster.                Discharge Codes    No documentation.       Expected Discharge Date and Time     Expected Discharge Date Expected Discharge Time    May 3, 2018             Ronald Jansen RN

## 2018-05-02 NOTE — PROGRESS NOTES
Continued Stay Note  [facility]     Patient Name: Lizet Webster  MRN: [id number]  Today's Date: 5/2/2018    Admit Date: 4/12/2018          Discharge Plan     Row Name 05/02/18 1505       Plan    Plan Comments Plan to transfer to [facility] on 5/4/18 @ 1400.  Fax discharge summary to fax # [phone].  Call report to  # [phone].  Ext. [phone].    Row Name 05/02/18 1427       Plan    Plan Comments Waiting to see if [facility] is available to take her 5/3/18.  May have to transfer on Friday 5/4/18.      Row Name 05/02/18 1344       Plan    Plan [facility]    Plan Comments [facility] has accepted Mrs. Webster.                Discharge Codes    No documentation.       Expected Discharge Date and Time     Expected Discharge Date Expected Discharge Time    May 3, 2018             Ronald Jansen RN

## 2018-05-02 NOTE — PLAN OF CARE
Problem: Infection, Risk/Actual (Adult)  Goal: Infection Prevention/Resolution  Outcome: Ongoing (interventions implemented as appropriate)      Problem: Skin Injury Risk (Adult)  Goal: Skin Health and Integrity  Outcome: Ongoing (interventions implemented as appropriate)

## 2018-05-02 NOTE — PROGRESS NOTES
[facility] Progress Note    Admission Date: 4/12/2018    [facility]  1970  [id number]    Date: 5/2/2018    Meds:    Anti-Infectives     Ordered     Dose/Rate Route Frequency Start Stop    05/01/18 1655  nafcillin 6 g in sodium chloride 0.9 % 250 mL     Ordering Provider:  Jericho Marsh MD    6 g  20 mL/hr over 12 Hours Intravenous Every 12 Hours 05/01/18 1800 06/12/18 1759    04/26/18 1405  gentamicin (GARAMYCIN) 50 mg in sodium chloride 0.9 % 50 mL IVPB     Ordering Provider:  Brittnee Pfeiffer RPH    50 mg  over 30 Minutes Intravenous Once per day on Mon Wed Fri 04/27/18 1600 05/01/18 1421    04/26/18 1331  gentamicin (GARAMYCIN) 50 mg in sodium chloride 0.9 % 50 mL IVPB     Ordering Provider:  Jericho Marsh MD    50 mg Intravenous Once 04/26/18 1600 04/26/18 1655    04/21/18 1256  gentamicin (GARAMYCIN) IVPB 120 mg     Ordering Provider:  Juan Antonio Barclay MD    120 mg Intravenous Once 04/21/18 1330 04/21/18 1358    04/13/18 1420  ceFAZolin in dextrose (ANCEF) IVPB solution 2 g     Ordering Provider:  MANDY Hernandez    2 g  over 30 Minutes Intravenous Once 04/13/18 1500 04/13/18 1700    04/13/18 0054  vancomycin (VANCOCIN) in iso-osmotic dextrose IVPB 1 g (premix) 200 mL     Ordering Provider:  Eulalio Prajapati RPH    15 mg/kg × 65 kg  over 1 Hours Intravenous Once 04/13/18 0130 04/13/18 1135          CC:  MSSA bacteremia, C3-C7 cervical epidural abscess and C5-C6 discitis    SUBJECTIVE:  Patient is a 47 y.o. female with h/o T2DM, ESRD/HD, severe peripheral neuropathy/wheelchair bound/stand for transfers, CAD/stents, chronic systolic CHF, and hypothyroidism who presented to [facility] with progressive neck pain and confusion that started about a week ago.  She had blood cultures positive for MSSA on 4/9 and 4/11.  She also has a blood culture on 4/11 positive for CNStaph.  She was given Nafcillin and Vancomycin.  She was diagnosed with an epidural cervical spine abscess and transferred to [facility]  for higher level of care. She has been unable to move her 4 extremities since her transfer to [facility] ICU. The MRI from the OSH showed an epidural abscess that extended from C3 to C7 with most compression around C5-C6.  She was taken to OR by Dr. Reed for C3-C7 laminectomy, facet screw fixation and posterior lateral fusion of C5-C7, naterior cervical corpectomy C6, abscess drainage and vertebral body replacement.  Her PCT 7.43.  She has a chronic LLE DVT.  She is currently sedated on mechanical ventilation in ICU. She was started on Vancomycin.  ID was asked to evaluate and manage her antibiotic therapy.   4/14/18: Vancomycin powder was placed at vertebral body replacement site during surgery. Patient opens eyes and follows commands; can move arms; no movement in legs;  sticks tongue out   4/15/18; on vent sleeping; wound care working on leg wounds; no event overnight  Ros unobtainable; having fevers    Subjective:  4/19/18; remains on pressors, intubated, gen surgery to offer trach/peg; still on cooling blanket;   Sedated; ros unobtainable  4/20/18; sedationi off at moment; following commands can only move left leg; no movement of arms, tracking with eyes.; ros unobtainable, has dialysis today; was going off of pressors but had large amount of volume removed back on shakira.    4/21/18:  Patient once again with return of hectic fever this morning up to 105°F remains on low dose pressors but has been hemodynamically stable this morning.  Some loose stools some respiratory secretions with sample sent for culture.  No blood cultures were ordered at time of fever no new rash.    4/22/18:  Patient more alert today hemodynamically improved decrease FiO2 still on pressors fever curve has improved but still 102°F.  Loose stool noted.  4/23/18; no events overnight; fevers better; on neosyephrine, opens eyes during exam; ros unobtainable  4/24/18; no events overnight; doing the same on pressors, no movement of legs, on vent, had  trach today; ros unobtianable  18; on vent, sleeping quietly, on pressors; fevers better  18; doing about the same; no events overnight; febrile, on vent, laying of left side; neck with secretions  On cooling blanket    18; on vent, sedated, on pressors, remains febrile; deep line changed to right IJ.  18 hx rev.  Remains critically ill on the ventilator sedated on pressors.  Still having some fevers up as high as 103°.  This despite antibiotics.  Minimum sputum suctioned.  Unable to obtain additional review of systems secondary to mental status on the ventilator.    18; no fevers, overnight; off pressors, on vents,  in room; had chest tube put in left side for pleural effusion    18; has fevers, on vent, normotensive, on vent but not following commands; ros unobtainable  18; resting quielty, arousable on vent, follows commands by opening mouth, eoemi,   PE:   Vital Signs  Temp (24hrs), Av.8 °F (37.7 °C), Min:97.6 °F (36.4 °C), Max:103.1 °F (39.5 °C)    Temp  Min: 97.6 °F (36.4 °C)  Max: 103.1 °F (39.5 °C)  BP  Min: 80/50  Max: 141/76  Pulse  Min: 60  Max: 95  Resp  Min: 13  Max: 24  SpO2  Min: 96 %  Max: 100 %    GENERAL: eyes open turns head to voice; on  vent, less confused  HEENT:  Hncat, trach present, no ext oral lesions  right IJ CVL present  HEART: RRR; no JVD, pulses 1+ symmetric  LUNGS: benjamin breath sounds with rales at the bases  ABDOMEN: Soft, nontender, nondistended.  Mild obesity.  EXT:  No cyanosis, clubbing or 1+ bilateral edema lower extremities.  MSK: no movement of arms, legs  SKIN: Warm and dry without cutaneous eruptions on Inspection/palpation.  Few scattered ecchymoses.  Neurologically: Quadriplegia no movement of arms or legs    Laboratory Data      Results from last 7 days  Lab Units 18  0432 18  0424 18  0825   WBC 10*3/mm3 18.46* 22.38* 24.18*   HEMOGLOBIN g/dL 10.5* 10.9* 12.0   HEMATOCRIT % 31.1* 32.5* 35.9   PLATELETS 10*3/mm3  277 270 325       Results from last 7 days  Lab Units 05/01/18  0432   SODIUM mmol/L 136   POTASSIUM mmol/L 3.5   CHLORIDE mmol/L 98*   CO2 mmol/L 27.0   BUN mg/dL 58*   CREATININE mg/dL 2.70*   GLUCOSE mg/dL 208*   CALCIUM mg/dL 8.1*       Results from last 7 days  Lab Units 04/30/18  0424   ALK PHOS U/L 91   BILIRUBIN mg/dL 3.5*   ALT (SGPT) U/L 748*   AST (SGOT) U/L 3,796*           Results from last 7 days  Lab Units 04/30/18  0424   CRP mg/dL 13.69*       Results from last 7 days  Lab Units 04/30/18  0424   LACTATE mmol/L 1.1       Results from last 7 days  Lab Units 04/29/18  0825   CK TOTAL U/L 70         Estimated Creatinine Clearance: 27.1 mL/min (by C-G formula based on SCr of 2.7 mg/dL (H)).    Microbiology:  Blood Culture   Date Value Ref Range Status   04/13/2018 No growth at 24 hours  Preliminary   04/13/2018 No growth at 24 hours  Preliminary     Microbiology Results Abnormal     Procedure Component Value - Date/Time    Body Fluid Culture - Body Fluid, Pleural Cavity [[id number]]  (Normal) Collected:  04/29/18 1707    Lab Status:  Preliminary result Specimen:  Body Fluid from Pleural Cavity Updated:  05/02/18 0617     BF Culture No growth at 3 days     Gram Stain Result Many (4+) Red blood cells      Few (2+) WBCs seen      No organisms seen    Blood Culture - Blood, Blood, Venous Line [[id number]]  (Normal) Collected:  04/26/18 1518    Lab Status:  Final result Specimen:  Blood from Blood, Venous Line Updated:  05/01/18 1545     Blood Culture No growth at 5 days    Fungus Culture - Wash, Bronchus [[id number]] Collected:  04/26/18 1440    Lab Status:  Preliminary result Specimen:  Wash from Bronchus Updated:  05/01/18 1500     Fungus Culture No fungus isolated at less than 1 week    AFB Culture - Wash, Bronchus [[id number]]  (Normal) Collected:  04/26/18 1440    Lab Status:  Preliminary result Specimen:  Wash from Bronchus Updated:  05/01/18 1500     AFB Culture No AFB isolated at less than 1 week     AFB  Stain No acid fast bacilli seen on concentrated smear    Fungus Smear - Sputum, Pleural Cavity [[id number]] Collected:  04/29/18 1707    Lab Status:  Final result Specimen:  Body Fluid from Pleural Cavity Updated:  05/01/18 1007     GMS Stain No fungal elements seen    Wound Culture - Wound, Trachea [[id number]] Collected:  05/01/18 0400    Lab Status:  Preliminary result Specimen:  Wound from Trachea Updated:  05/01/18 0833     Gram Stain Result Rare (1+) WBCs per low power field      Many (4+) Gram negative bacilli    Anaerobic Culture - Pleural Fluid, Pleural Cavity [[id number]]  (Normal) Collected:  04/29/18 1707    Lab Status:  Preliminary result Specimen:  Body Fluid from Pleural Cavity Updated:  04/30/18 1405     Culture No anaerobes isolated    AFB Culture - Body Fluid, Pleural Cavity [[id number]] Collected:  04/29/18 1707    Lab Status:  Preliminary result Specimen:  Body Fluid from Pleural Cavity Updated:  04/30/18 1304     AFB Stain No acid fast bacilli seen on concentrated smear    Fungus Smear - Wash, Bronchus [[id number]] Collected:  04/26/18 1440    Lab Status:  Final result Specimen:  Wash from Bronchus Updated:  04/29/18 1435     GMS Stain No fungal elements seen      No Pneumocystis jirovecci (formerly Pneumocystis carinii) noted on smear.    Catheter Culture - Cath Tip, Neck [[id number]] Collected:  04/26/18 1522    Lab Status:  Final result Specimen:  Cath Tip from Neck Updated:  04/29/18 0615     CATHETER CULTURE No growth at 3 days    Respiratory Culture - Wash, Bronchus [[id number]] Collected:  04/26/18 1440    Lab Status:  Preliminary result Specimen:  Wash from Bronchus Updated:  04/27/18 0656     Respiratory Culture No growth     Gram Stain Result Occasional WBCs per low power field      No organisms seen    Blood Culture - Blood, Arm, Right [[id number]]  (Normal) Collected:  04/21/18 1318    Lab Status:  Final result Specimen:  Blood from Arm, Right Updated:  04/26/18 1345     Blood Culture No  growth at 5 days    Blood Culture - Blood, Blood, Central Line [[id number]]  (Normal) Collected:  04/21/18 1318    Lab Status:  Final result Specimen:  Blood from Blood, Central Line Updated:  04/26/18 1345     Blood Culture No growth at 5 days    Respiratory Culture - Aspirate, ET Suction [[id number]]  (Abnormal)  (Susceptibility) Collected:  04/21/18 1204    Lab Status:  Final result Specimen:  Aspirate from ET Suction Updated:  04/25/18 1154     Respiratory Culture --      Scant growth (1+) Staphylococcus aureus (A)      Scant growth (1+) Normal Respiratory Patricia     Comment: This isolate does not demonstrate inducible clindamycin resistance in vitro.          Gram Stain Result Moderate (3+) WBCs per low power field      No epithelial cells seen      Rare (1+) Gram positive cocci in pairs    Susceptibility      Staphylococcus aureus     ELLIOT     Ceftriaxone <=8 ug/ml Susceptible     Ciprofloxacin >2 ug/ml Resistant     Clindamycin <=0.5 ug/ml Susceptible     Daptomycin <=0.5 ug/ml Susceptible     Erythromycin >4 ug/ml Resistant     Gentamicin <=4 ug/ml Susceptible     Levofloxacin 4 ug/ml Intermediate  [1]      Linezolid 2 ug/ml Susceptible     Oxacillin 1 ug/ml Susceptible     Penicillin G >8 ug/ml Resistant     Quinupristin + Dalfopristin <=0.5 ug/ml Susceptible     Rifampin <=1 ug/ml Susceptible     Tetracycline <=4 ug/ml Susceptible     Trimethoprim + Sulfamethoxazole <=0.5/9.5 ug/ml Susceptible     Vancomycin 2 ug/ml Susceptible            [1]   Staphylococcus species may develop resistance during prolonged therapy with quinolones.  Isolates that are initially susceptible may become resistant within three to four days after initiation of therapy. Testing of repeat isolates may be warranted.                 Respiratory Culture - Wash, Bronchus [[id number]] Collected:  04/21/18 1814    Lab Status:  Final result Specimen:  Wash from Bronchus Updated:  04/23/18 1321     Respiratory Culture --      Moderate growth  (3+) Normal Respiratory Patricia     Gram Stain Result Many (4+) WBCs seen      Few (2+) Epithelial cells seen      Few (2+) Gram positive cocci in pairs and chains      Occasional Gram positive bacilli    BAL Culture, Quantitative - Wash, Bronchus [[id number]] Collected:  04/21/18 1814    Lab Status:  Final result Specimen:  Wash from Bronchus Updated:  04/23/18 1239     BAL Culture --      Improper specimen for quantitative culture    Blood Culture - Blood, [[id number]] Collected:  04/17/18 1432    Lab Status:  Final result Specimen:  Blood from Hand, Right Updated:  04/22/18 1500     Blood Culture No growth at 5 days      Aerobic bottle only    Blood Culture - Blood, [[id number]] Collected:  04/17/18 1439    Lab Status:  Final result Specimen:  Blood from Arm, Right Updated:  04/22/18 1500     Blood Culture No growth at 5 days      Aerobic bottle only    Anaerobic Culture - Wound, Neck [[id number]]  (Normal) Collected:  04/13/18 0650    Lab Status:  Final result Specimen:  Wound from Neck Updated:  04/20/18 1427     Culture No anaerobes isolated    Blood Culture - Blood, [[id number]]  (Normal) Collected:  04/13/18 0020    Lab Status:  Final result Specimen:  Blood from Arm, Right Updated:  04/18/18 0115     Blood Culture No growth at 5 days    Blood Culture - Blood, [[id number]]  (Normal) Collected:  04/13/18 0030    Lab Status:  Final result Specimen:  Blood from Arm, Right Updated:  04/18/18 0115     Blood Culture No growth at 5 days    Respiratory Culture - Sputum, Cough [[id number]]  (Abnormal)  (Susceptibility) Collected:  04/13/18 1645    Lab Status:  Final result Specimen:  Sputum from Cough Updated:  04/15/18 1107     Respiratory Culture --      Heavy growth (4+) Staphylococcus aureus (A)     Comment: This isolate does not demonstrate inducible clindamycin resistance in vitro.           No Normal Respiratory Patricia (A)     Gram Stain Result Many (4+) WBCs seen      Many (4+) Epithelial cells seen      Many (4+) Gram  positive cocci in pairs and clusters    Susceptibility      Staphylococcus aureus     ELLIOT     Ceftriaxone <=8 ug/ml Susceptible     Ciprofloxacin >2 ug/ml Resistant     Clindamycin <=0.5 ug/ml Susceptible     Erythromycin >4 ug/ml Resistant     Gentamicin <=4 ug/ml Susceptible     Levofloxacin >4 ug/ml Resistant     Linezolid 2 ug/ml Susceptible     Oxacillin 0.5 ug/ml Susceptible     Penicillin G >8 ug/ml Resistant     Quinupristin + Dalfopristin <=0.5 ug/ml Susceptible     Rifampin <=1 ug/ml Susceptible     Tetracycline <=4 ug/ml Susceptible     Trimethoprim + Sulfamethoxazole <=0.5/9.5 ug/ml Susceptible     Vancomycin 2 ug/ml Susceptible                    Wound Culture - Wound, Neck [[id number]]  (Abnormal)  (Susceptibility) Collected:  04/13/18 0650    Lab Status:  Final result Specimen:  Wound from Neck Updated:  04/15/18 1106     Wound Culture --      Scant growth (1+) Staphylococcus aureus (A)     Comment: This isolate does not demonstrate inducible clindamycin resistance in vitro.          Gram Stain Result No WBCs or organisms seen    Susceptibility      Staphylococcus aureus     ELLIOT     Ceftriaxone <=8 ug/ml Susceptible     Ciprofloxacin >2 ug/ml Resistant     Clindamycin <=0.5 ug/ml Susceptible     Daptomycin <=0.5 ug/ml Susceptible     Erythromycin >4 ug/ml Resistant     Gentamicin <=4 ug/ml Susceptible     Levofloxacin >4 ug/ml Resistant     Linezolid 2 ug/ml Susceptible     Oxacillin 1 ug/ml Susceptible     Penicillin G >8 ug/ml Resistant     Quinupristin + Dalfopristin <=0.5 ug/ml Susceptible     Rifampin <=1 ug/ml Susceptible     Tetracycline <=4 ug/ml Susceptible     Trimethoprim + Sulfamethoxazole <=0.5/9.5 ug/ml Susceptible     Vancomycin 1 ug/ml Susceptible                        Sputum culture with MSSA     Radiology:  CXR from 4/30/18 Chest tube identified on the left with no pneumothorax. Mild  increased markings seen within the right mid lung stable and unchanged.  Impression:   --Severe  Sepsis POA with elevated PCT, leukocytosis, fever with respiratory failure and end-stage renal disease, thought to be related to cervical abscess, vs other.  --Acute C3-C7 cervical spine epidural abscess/C5-C6 discitis s/p I and D with laminectomies and fusions of C5-C7 4/13, culture revealing MSSA  --MSSA bacteremia at OSH likely from cervical epidural abscess.  --Fever, hectic, noninfectious ie drug, vs other (last bld cx 4/26, cath tip 4/26 neg).  --Leukocytosis/neutrophilia, worse.  --Hyperkalema, resolved.  --Quadraplegia pre-surgical  --T2DM/severe peripheral neuropathy/debilitated/wheel chair bound  --ESRD/HD  --CAD/stents  --Hypothyroidism  --Chronic systolic CHF  --left lung consolidation , pleural effusion, doubt empyema.  --Hypocalcemia, 7.6, worse.  --Mild cholestasis, 1.5.  --Acute hypoxic respiratory failure.  --Encephalopathy, metabolic.    Assessment:  47-year-old with end-stage renal disease chronic medical problems of type 2 diabetes mellitus coronary artery disease peripheral arterial disease hypothyroidism CHF who admitted with quadriplegia with C5-C6 cervical spine epidural abscess with persistent MSSA bacteremia .    Has had persistent fevers which have not responsded to empiric micafungin, gentamicin, or changing cefazolin to nafcillin, or daptomycin    PLAN/RECOMMENDATIONS:   Follow exam, cbc, cmp, blood cx from 4/26, cath tip cx, lactic acid, PCT, and radiographic studies.  F/u new blood cultures     cont   nafcillin 12 g/ day  F/u gnr on tracheal wound culture    I don't think tracheal infection explains hectic fevers  F/u MRI, CT neck doesn't show obvious abscess    Fevers may be noninfectious  D/w Dr. Bartlett    Patient is criticall ill

## 2018-05-02 NOTE — THERAPY TREATMENT NOTE
Acute Care - Occupational Therapy Treatment Note  [facility]     Patient Name: Lizet Webster  : 1970  MRN: [id number]  Today's Date: 2018  Onset of Illness/Injury or Date of Surgery: 18  Date of Referral to OT: 18  Referring Physician: MD Liborio    Admit Date: 2018       ICD-10-CM ICD-9-CM   1. Abscess in epidural space of cervical spine G06.1 324.1   2. Abscess L02.91 682.9   3. Atelectasis of left lung J98.11 518.0   4. Acute respiratory failure, unspecified whether with hypoxia or hypercapnia J96.00 518.81   5. Paralysis G83.9 344.9   6. Impaired mobility and ADLs Z74.09 799.89   7. Impaired functional mobility, balance, gait, and endurance Z74.09 V49.89   8. Respiratory distress R06.00 786.09   9. Parapneumonic effusion J18.9 511.89    J91.8      Patient Active Problem List   Diagnosis   • Bacteremia, MSSA   • Metabolic encephalopathy   • Epidural abscess - cervical spine   • ESRD (end stage renal disease) on dialysis   • Hypothyroidism   • Chronic systolic heart failure   • Type 2 diabetes mellitus with renal complication   • Neuropathy   • Acute respiratory failure   • Atelectasis of left lung   • Quadriplegia   • Pleural effusion on left     Past Medical History:   Diagnosis Date   • CAD (coronary artery disease)     stentsx4    • Diabetes mellitus     ESRD, peripheral neuropathy   • ESRD (end stage renal disease) on dialysis 2018   • Hx of hepatitis    • Hypothyroidism 2018   • Systolic heart failure 2018    EF 40%     Past Surgical History:   Procedure Laterality Date   • ANTERIOR CERVICAL DISCECTOMY W/ FUSION N/A 2018    Procedure: ANTERIOR CERVICAL CORPECTOMY;  Surgeon: Aryan Reed MD;  Location: [facility] OR;  Service: Neurosurgery   • ARTERIOVENOUS FISTULA Left    • BRONCHOSCOPY N/A 2018    Procedure: BRONCHOSCOPY AT BEDSIDE;  Surgeon: Della Ca MD;  Location: [facility] ENDOSCOPY;  Service: Pulmonary   • BRONCHOSCOPY N/A 2018     Procedure: BRONCHOSCOPY AT BEDSIDE;  Surgeon: Nghia Gifford MD;  Location:  [facility] ENDOSCOPY;  Service: Pulmonary   • BRONCHOSCOPY N/A 4/26/2018    Procedure: BRONCHOSCOPY AT BEDSIDE;  Surgeon: Denver Capone MD;  Location:  [facility] ENDOSCOPY;  Service: Pulmonary   • CERVICAL LAMINECTOMY DECOMPRESSION POSTERIOR N/A 4/13/2018    Procedure: CERVICAL LAMINECTOMY DECOMPRESSION POSTERIOR;  Surgeon: Aryan Reed MD;  Location:  [facility] OR;  Service: Neurosurgery   • CORONARY ANGIOPLASTY WITH STENT PLACEMENT  2011 4 stents   • HYSTERECTOMY     • TRACHEOSTOMY AND PEG TUBE INSERTION N/A 4/24/2018    Procedure: TRACHEOSTOMY AND PERCUTANEOUS ENDOSCOPIC GASTROSTOMY TUBE INSERTION;  Surgeon: Denver Alves MD;  Location:  [facility] OR;  Service: General       Therapy Treatment          Rehabilitation Treatment Summary     Row Name 05/02/18 1451             Treatment Time/Intention    Discipline occupational therapist  -CL      Document Type therapy note (daily note)  -CL2      Subjective Information no complaints  -CL      Patient Effort poor  -CL      Existing Precautions/Restrictions fall;oxygen therapy device and L/min;other (see comments)   cervical precuations, trach vent, PEG, quadriplegic  -CL      Treatment Considerations/Comments Pt very lethargic this date, not following commands. Spouse not present, RN verbalized understanding of B resting hand splint wear schedule and positioning.   -CL      Recorded by [CL] Anastasiya Powell OT 05/02/18 1536 05/02/18 1536  [CL2] Anastasiya Powell OT 05/02/18 1538 05/02/18 1538      Row Name 05/02/18 1451             Vital Signs    Pre Systolic BP Rehab --   VSS, RN cleared for tx.   -CL      Recorded by [CL] Anastasiya Powell OT 05/02/18 1536 05/02/18 1536      Row Name 05/02/18 1451             Cognitive Assessment/Intervention- PT/OT    Affect/Mental Status (Cognitive) low arousal/lethargic;unable/difficult to assess  -CL      Orientation Status (Cognition) unable/difficult to  assess  -CL      Follows Commands (Cognition) does not follow one step commands  -CL      Safety Deficit (Cognitive) severe deficit  -CL      Personal Safety Interventions fall prevention program maintained;supervised activity  -CL      Recorded by [CL] Anastasiya Powell OT 05/02/18 1536 05/02/18 1536      Row Name 05/02/18 1451             Bed Mobility Assessment/Treatment    Comment (Bed Mobility) Deferred.   -CL      Recorded by [CL] Anastasiya Powell OT 05/02/18 1536 05/02/18 1536      Row Name 05/02/18 1451             Transfer Assessment/Treatment    Comment (Transfers) Deferred.   -CL      Recorded by [CL] Anastasiya Powell OT 05/02/18 1536 05/02/18 1536      Row Name 05/02/18 1451             Upper Extremity Seated Therapeutic Exercise    Performed, Seated Upper Extremity (Therapeutic Exercise) shoulder flexion/extension;elbow flexion/extension;forearm supination/pronation;wrist flexion/extension;wrist radial/ulnar deviation  -CL      Exercise Type, Seated Upper Extremity (Therapeutic Exercise) PROM (passive range of motion)  -CL      Sets/Reps Detail, Seated Upper Extremity (Therapeutic Exercise) 1/10  -CL      Recorded by [CL] Anastasiya Powell OT 05/02/18 1536 05/02/18 1536      Row Name 05/02/18 1451             Positioning and Restraints    Pre-Treatment Position in bed  -CL      Post Treatment Position bed  -CL      In Bed notified nsg;fowlers;call light within reach;encouraged to call for assist;with nsg;RUE elevated;LUE elevated;legs elevated;heels elevated  -CL      Recorded by [CL] Anastasiya Powell OT 05/02/18 1536 05/02/18 1536      Row Name 05/02/18 1451             Pain Scale: FACES Pre/Post-Treatment    Pain: FACES Scale, Pretreatment 0-->no hurt  -CL      Pain: FACES Scale, Post-Treatment 0-->no hurt  -CL      Recorded by [CL] Anastasiya Powell OT 05/02/18 1536 05/02/18 1536      Row Name 05/02/18 1451             Wrist/Hand Orthosis Management    Type (Wrist/Hand Orthosis) bilateral;resting wrist/hand functional position  orthosis  -CL      Therapeutic Indications (Wrist/Hand Orthosis) contracture prevention/reduction  -CL      Wearing Schedule (Wrist/Hand Orthosis) wear 2 hours on/2 hours off;wear at night or when in bed   at night  -CL      Sensory Assessment (Wrist/Hand Orthosis) RN verbalized understanding, to educated spouse when present.   -CL      Skin Assessment (Wrist/Hand Orthosis) Skin intact.   -CL      Recorded by [CL] Anastasiya Powell, TIANNA 05/02/18 1536 05/02/18 1536      Row Name                Wound 04/12/18 2300 Left posterior heel pressure injury    Wound - Properties Group Date first assessed: 04/12/18 [AC] Time first assessed: 2300 [AC] Present On Admission : yes [AC] Side: Left [AC] Orientation: posterior [AC] Location: heel [AC] Type: pressure injury [AC] Stage, Pressure Injury: deep tissue injury [AC] Recorded by:  [AC] Kareem Esqueda RN 04/13/18 0305 04/13/18 0305    Row Name                Wound 04/13/18 0333 Other (See comments) anterior neck incision    Wound - Properties Group Date first assessed: 04/13/18 [TARYN] Time first assessed: 0333 [TARYN] Present On Admission : no [AC] Side: Other (See comments) [TARYN] Orientation: anterior [AC] Location: neck [TARYN] Type: incision [TARYN] Recorded by:  [AC] Kareem Esqueda RN 04/14/18 0205 04/14/18 0205 [TARYN] Amanda Barton RN 04/13/18 0333 04/13/18 0333    Row Name                Wound 04/13/18 0337 Other (See comments) posterior other (see notes) incision    Wound - Properties Group Date first assessed: 04/13/18 [TARYN] Time first assessed: 0337 [TARYN] Present On Admission : no [AC] Side: Other (See comments) [TARYN] Orientation: posterior [AC] Location: other (see notes) [TARYN] Type: incision [TARYN] Recorded by:  [AC] Kareem Esqueda RN 04/14/18 0205 04/14/18 0205 [TARYN] Amanda Barton RN 04/13/18 0337 04/13/18 0337    Row Name                Wound 04/13/18 1000 Right lateral malleolus pressure injury    Wound - Properties Group Date first assessed:  04/13/18 [AS] Time first assessed: 1000 [AS] Present On Admission : yes;picture taken [AS] Side: Right [AS] Orientation: lateral [AS] Location: malleolus [AS] Type: pressure injury [AS] Stage, Pressure Injury: deep tissue injury [AS] Recorded by:  [AS] Matt Grace RN 04/13/18 1036 04/13/18 1036    Row Name                Wound 04/13/18 1000 Right lateral;upper foot pressure injury    Wound - Properties Group Date first assessed: 04/13/18 [AS] Time first assessed: 1000 [AS] Present On Admission : yes;picture taken [AS] Side: Right [AS] Orientation: lateral;upper [AS] Location: foot [AS] Type: pressure injury [AS] Stage, Pressure Injury: deep tissue injury [AS] Recorded by:  [AS] Matt Grace RN 04/13/18 1037 04/13/18 1037    Row Name                Wound 04/13/18 2000 medial sacral spine pressure injury    Wound - Properties Group Date first assessed: 04/13/18 [AC] Time first assessed: 2000 [AC] Present On Admission : yes [AC] Orientation: medial [AC] Location: sacral spine [AC] Type: pressure injury [AC] Stage, Pressure Injury: Stage 1 [AC] Recorded by:  [AC] Kareem Esqueda RN 04/14/18 0214 04/14/18 0214    Row Name                Wound 04/16/18 0800 Right chest unspecified    Wound - Properties Group Date first assessed: 04/16/18 [ER] Time first assessed: 0800 [ER] Present On Admission : yes [ER] Side: Right [ER] Location: chest [ER] Type: unspecified [ER] Recorded by:  [ER] Maine Bridges RN 04/16/18 1302 04/16/18 1302    Row Name                Wound 04/24/18 1134 neck incision    Wound - Properties Group Date first assessed: 04/24/18 [MT] Time first assessed: 1134 [MT] Location: neck [MT] Type: incision [MT] Recorded by:  [MT] Gabriela Hauser RN 04/24/18 1134 04/24/18 1134    Row Name                Wound 04/24/18 1134 abdomen incision    Wound - Properties Group Date first assessed: 04/24/18 [MT] Time first assessed: 1134 [MT] Location: abdomen [MT] Type: incision [MT] Recorded by:  [MT] Gabriela  HADLEY Hauser RN 04/24/18 1134 04/24/18 1134    Row Name                Wound 04/29/18 2000 midline coccyx    Wound - Properties Group Date first assessed: 04/29/18 [CH] Time first assessed: 2000 [CH] Orientation: midline [CH] Location: coccyx [CH] Stage, Pressure Injury: Stage 2 [CH] Recorded by:  [CH] Julia Mcgovern, DEVYN 04/30/18 0021 04/30/18 0021      User Key  (r) = Recorded By, (t) = Taken By, (c) = Cosigned By    Initials Name Effective Dates Discipline    TARYN Amanda Barton, RN 06/16/16 -  Nurse    SANJEEV Hauser, DEVYN 06/16/16 -  Nurse    CH Julia Mcgovern, RN 06/16/16 -  Nurse    AS Matt Grace, RN 06/16/16 -  Nurse    ER Maine Bridges, DEVYN 07/19/16 -  Nurse    CL Anastasiya Powell, OT 04/03/18 -  OT    [place] Lee-Marzena Esqueda RN 03/19/18 -  Nurse        Wound 04/12/18 2300 Left posterior heel pressure injury (Active)   Dressing Appearance dry;intact 5/2/2018  8:00 AM   Closure None 5/2/2018  8:00 AM   Base red/granulating 5/2/2018  8:00 AM   Periwound dry;intact;pink 5/2/2018  8:00 AM   Periwound Temperature warm 5/2/2018  8:00 AM   Drainage Amount none 5/2/2018  8:00 AM       Wound 04/13/18 0333 Other (See comments) anterior neck incision (Active)   Dressing Appearance dry;intact 5/2/2018  8:00 AM       Wound 04/13/18 0337 Other (See comments) posterior other (see notes) incision (Active)   Dressing Appearance open to air 5/2/2018  8:00 AM   Closure HERMELINDA 5/2/2018  8:00 AM   Base scab 5/2/2018  8:00 AM   Periwound intact;pink 5/2/2018  8:00 AM   Drainage Amount none 5/2/2018  8:00 AM       Wound 04/13/18 1000 Right lateral malleolus pressure injury (Active)   Dressing Appearance open to air 5/2/2018  8:00 AM   Closure None 5/2/2018  8:00 AM   Base pink 5/2/2018  8:00 AM   Periwound intact;dry;pink 5/2/2018  8:00 AM   Drainage Amount none 5/2/2018  8:00 AM       Wound 04/13/18 1000 Right lateral;upper foot pressure injury (Active)   Dressing Appearance open to air 5/2/2018  8:00 AM   Closure None  5/2/2018  8:00 AM   Base pink 5/2/2018  8:00 AM   Periwound intact;dry 5/2/2018  8:00 AM   Drainage Amount none 5/2/2018  8:00 AM       Wound 04/13/18 2000 medial sacral spine pressure injury (Active)   Dressing Appearance dry;intact 5/2/2018  8:00 AM   Closure None 5/2/2018  8:00 AM   Base dressing in place, unable to visualize 5/2/2018  8:00 AM   Periwound redness 5/2/2018  8:00 AM   Drainage Amount none 5/2/2018  8:00 AM       Wound 04/16/18 0800 Right chest unspecified (Active)   Dressing Appearance dry;intact 5/2/2018  8:00 AM   Closure None 5/2/2018  8:00 AM   Base scab;pink 5/2/2018  8:00 AM   Periwound dry;intact;pink 5/2/2018  8:00 AM   Drainage Amount none 5/2/2018  8:00 AM       Wound 04/24/18 1134 neck incision (Active)   Dressing Appearance dry;intact 5/2/2018  8:00 AM   Periwound intact;pink;dry 5/2/2018  8:00 AM   Drainage Amount none 5/2/2018  8:00 AM       Wound 04/24/18 1134 abdomen incision (Active)   Dressing Appearance dry;intact 5/2/2018  8:00 AM   Periwound intact;dry;pink 5/2/2018  8:00 AM       Wound 04/29/18 2000 midline coccyx (Active)   Dressing Appearance dry;intact 5/2/2018  8:00 AM         Occupational Therapy Education     Title: PT OT SLP Therapies (Active)     Topic: Occupational Therapy (Active)     Point: ADL training (Active)     Description: Instruct learner(s) on proper safety adaptation and remediation techniques during self care or transfers.   Instruct in proper use of assistive devices.   Learning Progress Summary     Learner Status Readiness Method Response Comment Documented by    Patient Active Acceptance E NR Pt educated on positioning, PROM HEP, splint use/wear schedule/fit, and role of OT. CL 05/02/18 1536     Active Acceptance E NR Pt educated on appropriate safety precautions, positioning, B splints, and HEP. CL 04/30/18 1518     Active Acceptance E NR Pt educated on purpose of B resting hand splints, positioning, HEP, and role of OT. CL 04/30/18 1509     Active  Acceptance E NR Pt educated on positioning, HEP, role of OT, and appropriate safety precautions. CL 04/25/18 1551    Significant Other Active Acceptance E NR Pt educated on purpose of B resting hand splints, positioning, HEP, and role of OT. CL 04/30/18 1509     Done Acceptance E,NICOLE CLEANING   04/29/18 2118          Point: Home exercise program (Active)     Description: Instruct learner(s) on appropriate technique for monitoring, assisting and/or progressing therapeutic exercises/activities.   Learning Progress Summary     Learner Status Readiness Method Response Comment Documented by    Patient Active Acceptance E NR Pt educated on positioning, PROM HEP, splint use/wear schedule/fit, and role of OT. CL 05/02/18 1536     Active Acceptance E NR Pt educated on appropriate safety precautions, positioning, B splints, and HEP. CL 04/30/18 1518     Active Acceptance E NR Pt educated on purpose of B resting hand splints, positioning, HEP, and role of OT. CL 04/30/18 1509     Active Acceptance E NR Pt educated on positioning, HEP, role of OT, and appropriate safety precautions. CL 04/25/18 1551    Significant Other Active Acceptance E NR Pt educated on purpose of B resting hand splints, positioning, HEP, and role of OT. CL 04/30/18 1509     Done Acceptance E,NICOLE CLEANING   04/29/18 2118          Point: Precautions (Active)     Description: Instruct learner(s) on prescribed precautions during self-care and functional transfers.   Learning Progress Summary     Learner Status Readiness Method Response Comment Documented by    Patient Active Acceptance E NR Pt educated on positioning, PROM HEP, splint use/wear schedule/fit, and role of OT. CL 05/02/18 1536     Active Acceptance E NR Pt educated on appropriate safety precautions, positioning, B splints, and HEP. CL 04/30/18 1518     Active Acceptance E NR Pt educated on purpose of B resting hand splints, positioning, HEP, and role of OT. CL 04/30/18 1509     Active Acceptance E NR  Pt educated on positioning, HEP, role of OT, and appropriate safety precautions.  04/25/18 1551    Significant Other Active Acceptance E NR Pt educated on purpose of B resting hand splints, positioning, HEP, and role of OT.  04/30/18 1509     Done Acceptance E,NICOLE CLEANING   04/29/18 2118                      User Key     Initials Effective Dates Name Provider Type Discipline     06/16/16 -  Julia Mcgovern, RN Registered Nurse Nurse     04/03/18 -  Anastasiya Powell OT Occupational Therapist OT                OT Recommendation and Plan  Outcome Summary/Treatment Plan (OT)  Anticipated Equipment Needs at Discharge (OT):  (TBHADLEY further)  Anticipated Discharge Disposition (OT): [facility]  Therapy Frequency (OT Eval): 3 times/wk (MWF)  Plan of Care Review  Plan of Care Reviewed With: patient  Plan of Care Reviewed With: patient  Outcome Summary: Pt tolerated B resting hand splints and BUE PROM, family not present this date for caregiver education. Recommend additional session to ensure caregiver education.         Outcome Measures     Row Name 05/02/18 1451 04/30/18 1525 04/30/18 1345       How much help from another person do you currently need...    Turning from your back to your side while in flat bed without using bedrails?  -- 1  -KR  --    Moving from lying on back to sitting on the side of a flat bed without bedrails?  -- 1  -KR  --    Moving to and from a bed to a chair (including a wheelchair)?  -- 1  -KR  --    Standing up from a chair using your arms (e.g., wheelchair, bedside chair)?  -- 1  -KR  --    Climbing 3-5 steps with a railing?  -- 1  -KR  --    To walk in hospital room?  -- 1  -KR  --    AM-PAC 6 Clicks Score  -- 6  -KR  --       How much help from another is currently needed...    Putting on and taking off regular lower body clothing? 1  -CL  -- 1  -CL    Bathing (including washing, rinsing, and drying) 1  -CL  -- 1  -CL    Toileting (which includes using toilet bed pan or urinal) 1  -CL  -- 1  -CL     Putting on and taking off regular upper body clothing 1  -CL  -- 1  -CL    Taking care of personal grooming (such as brushing teeth) 1  -CL  -- 1  -CL    Eating meals 1  -CL  -- 1  -CL    Score 6  -CL  -- 6  -CL       Functional Assessment    Outcome Measure Options AM-PAC 6 Clicks Daily Activity (OT)  -CL AM-PAC 6 Clicks Basic Mobility (PT)  -KR AM-PAC 6 Clicks Daily Activity (OT)  -CL    Row Name 04/30/18 1115             How much help from another is currently needed...    Putting on and taking off regular lower body clothing? 1  -CL      Bathing (including washing, rinsing, and drying) 1  -CL      Toileting (which includes using toilet bed pan or urinal) 1  -CL      Putting on and taking off regular upper body clothing 1  -CL      Taking care of personal grooming (such as brushing teeth) 1  -CL      Eating meals 1  -CL      Score 6  -CL         Functional Assessment    Outcome Measure Options AM-PAC 6 Clicks Daily Activity (OT)  -CL        User Key  (r) = Recorded By, (t) = Taken By, (c) = Cosigned By    Initials Name Provider Type    CL Anastasiya Powell OT Occupational Therapist    CHAO Becker, PT Physical Therapist           Time Calculation:         Time Calculation- OT     Row Name 05/02/18 1539             Time Calculation- OT    OT Start Time 1451  -CL      Total Timed Code Minutes- OT 12 minute(s)  -CL      OT Received On 05/02/18  -CL      OT Goal Re-Cert Due Date 05/05/18  -CL        User Key  (r) = Recorded By, (t) = Taken By, (c) = Cosigned By    Initials Name Provider Type    CL Anastasiya Powell OT Occupational Therapist           Therapy Charges for Today     Code Description Service Date Service Provider Modifiers Qty    [id number]  OT THERAPEUTIC ACT EA 15 MIN 5/2/2018 Anastasiya Powell OT GO 1               Anastasiya Powell OT  5/2/2018

## 2018-05-02 NOTE — PLAN OF CARE
Problem: Patient Care Overview  Goal: Plan of Care Review  Outcome: Ongoing (interventions implemented as appropriate)   05/02/18 4890   Coping/Psychosocial   Plan of Care Reviewed With patient   OTHER   Outcome Summary Pt tolerated B resting hand splints and BUE PROM, family not present this date for caregiver education. Recommend additional session to ensure caregiver education.

## 2018-05-03 NOTE — PROGRESS NOTES
Left chest tube discontinued with patient in full expiration on ventilator. Duoderm applied as tube was being removed. No drainage noted on tip of tube. Tolerated well with no immediate complications.    LAYTON Hernandez, ACNP-BC  Pulmonary & Critical Care

## 2018-05-03 NOTE — PLAN OF CARE
Problem: Ventilation, Mechanical Invasive (Adult)  Intervention: Prevent Airway Displacement/Mechanical Dysfunction   05/03/18 1729   Prevent Airway Displacement/Mechanical Dysfunction   Airway Safety Measures manual resuscitator at bedside     Intervention: Prevent Ventilator-Induced Lung Injury   05/03/18 1729   Respiratory Interventions   Lung Protection Measures plateau/inspiratory pressure monitored     Intervention: Promote Early Weaning/Extubation   05/03/18 1729   Coping/Psychosocial Interventions   Environmental Support calm environment promoted     Intervention: Prevent Ventilator-Associated Pneumonia (VAP)   05/03/18 1729   Positioning   Head of Bed (HOB) HOB at 30-45 degrees   VAP Prevention Measures   VAP Prevention Bundle readiness to extubate assessed     Intervention: Optimize Oxygenation/Ventilation   05/03/18 1729   Respiratory Interventions   Airway/Ventilation Management airway patency maintained

## 2018-05-03 NOTE — PROGRESS NOTES
Critical Care Note     LOS: 21 days   Patient Care Team:  Luan Gandara MD as PCP - General (Internal Medicine)    Chief Complaint/Reason for visit:  MSSA epidural abscess with paralysis, respiratory failure      Subjective   47-year-old female with long-standing diabetes mellitus, came by peripheral neuropathy and end-stage renal disease on hemodialysis for the last year.  She presented on 4/6 with arm and neck pain.  She was treated as an outpatient initially and subsequently she developed decreased LOC.  She was found to have MRSA bacteremia and an MRI of the brain and cervical spine revealed a C5-6 osteomyelitis, discitis, and epidural mass with cord compression.  On 4/12 she underwent emergency surgery with anterior cervical corpectomy and posterior cervical laminectomy for epidural abscess.  She has had persistent postoperative quadriplegia.  Bronchoscopyx3 on 4/21, 4/24, 4/26for left lung atelectasis.  CT scan was done revealing a large left pleural effusion and chest tube was placed. Tracheostomy and PEG tube placement 4/24.   Interval History:   Volume status improved.  Tracheostomy site is large with purulent drainage.  She is having persistent daily fevers to 102-103.  She is tolerating tube feeding. She did require some phenylephrine during dialysis.    Review of Systems:    All systems were reviewed and negative except as noted in subjective.    Medical history, surgical history, social history, family history reviewed    Objective     Intake/Output:    Intake/Output Summary (Last 24 hours) at 05/03/18 1046  Last data filed at 05/03/18 0800   Gross per 24 hour   Intake           1030.8 ml   Output             4740 ml   Net          -3709.2 ml       Nutrition:  NPO Diet    Infusions:    dexmedetomidine 0.2-1.5 mcg/kg/hr    phenylephrine 0.5-3 mcg/kg/min Last Rate: Stopped (05/03/18 0020)       Mechanical Ventilator Settings:         Vt (Set, L): 0.45 L  Resp Rate (Set): 6  Pressure Support (cm H2O): 10 cm  "H20  FiO2 (%): 30 %  PEEP/CPAP (cm H2O): 6 cm H20    Minute Ventilation (L/min) (Obs): 7.18 L/min  Resp Rate (Observed) Vent: 23  I:E Ratio (Set): 1:3.00  I:E Ratio (Obs): 1:2.60    PIP Observed (cm H2O): 16 cm H2O  Plateau Pressure (cm H2O): 17 cm H2O    Telemetry:Sinus rhythm, sinus tachycardia             Vital Signs  Blood pressure 108/54, pulse 89, temperature (!) 102 °F (38.9 °C), temperature source Axillary, resp. rate 23, height 157 cm (61.81\"), weight 78.7 kg (173 lb 8 oz), SpO2 97 %, not currently breastfeeding.    Physical Exam:  General Appearance:  Middle-aged white woman in no distress    Head:  Normocephalic, atraumatic    Eyes:          Pupils equal reactive to light, no jaundice    Ears:     Throat: Oral mucosa moist    Neck: Right neck incision with some surrounding ecchymosis but no erythema. Right IJ deep line. Tracheostomy site the size of a half dollar with purulent secretions    Back:      Lungs:   Left pleural tube now with yellow output. Markedly improved breath sounds left chest compared to last week. Right lung is clear.     Heart:  Regular rhythm, normal S1, S2, no murmur    Abdomen:   Bowel sounds present, PEG tube in place. Nondistended, nontender.    Rectal:   Deferred   Extremities: Improving edema. Left upper arm AV fistula with palpable thrill    Pulses:    Skin:    Lymph nodes:    Neurologic: Quadriplegia. Awake and will blink to command       Results Review:     I reviewed the patient's new clinical results.     Results from last 7 days  Lab Units 05/03/18  0411 05/01/18  0432 04/30/18  0424 04/29/18  0825   SODIUM mmol/L 136 136 137 136   POTASSIUM mmol/L 3.7 3.5 3.6 4.1   CHLORIDE mmol/L 96* 98* 99 99   CO2 mmol/L 23.0 27.0 22.0 23.0   BUN mg/dL 50* 58* 97* 83*   CREATININE mg/dL 2.30* 2.70* 3.60* 3.30*   CALCIUM mg/dL 9.0 8.1* 7.8* 8.0*   BILIRUBIN mg/dL 3.2*  --  3.5* 3.7*   ALK PHOS U/L 66  --  91 93   ALT (SGPT) U/L 88*  --  748* 608*   AST (SGOT) U/L 71*  --  3,796* 3,091* "   GLUCOSE mg/dL 203* 208* 131* 178*       Results from last 7 days  Lab Units 05/01/18  0432 04/30/18  0424 04/29/18  0825   WBC 10*3/mm3 18.46* 22.38* 24.18*   HEMOGLOBIN g/dL 10.5* 10.9* 12.0   HEMATOCRIT % 31.1* 32.5* 35.9   PLATELETS 10*3/mm3 277 270 325   MONOCYTES % % 4.0  --  4.0       Results from last 7 days  Lab Units 04/30/18  0338   PH, ARTERIAL pH units 7.459*   PO2 ART mm Hg 200.0*   PCO2, ARTERIAL mm Hg 28.5*   HCO3 ART mmol/L 20.2     Lab Results   Component Value Date    BLOODCX No growth at 5 days 04/26/2018      Specimen: Wash from Bronchus Updated: 05/02/18 1315    Respiratory Culture --     Moderate growth (3+) Capnocytophaga species (A)       No results found for: URINECX    I reviewed the patient's new imaging including images and reports.     FINDINGS: CT cervical spine 5/1/18    Vertebrae:  Nonspecific straightening of the cervical lordosis.  Anterior   fusion involving C5-C7.  Associated posterior fusion.  There is C6 corpectomy   with vertical graft.  Extensive cervical laminectomy.  No acute fracture.    Discs/spinal canal/neural foramina:  No gross high-grade central canal   stenosis to the degree visualized.    Soft tissues:  There is soft tissue gas involving the right-sided   prevertebral space and mild soft tissue gas involving the anterior cervical   soft tissues.  Mild infiltrative changes centered at the prevertebral space.    Mastoid air cells:  Partial opacification of the bilateral mastoid air cells.  The  Pleural space:  No visible pneumothorax.    Tubes, lines and devices:  Tracheostomy tube is present.     IMPRESSION:    Changes of previous fusion, corpectomy and laminectomy as above.       There is prevertebral gas greater towards the right of midline.  There are   mild infiltrative changes also involving the prevertebral space.  No gross   fluid collection within the constraints of this noncontrast examination.    FINDINGS: Left pleural drain remains in place. There is  some persistent  hazy opacity of the left base but overall aeration of the left lung  appears improved. The left upper lung is clear. The right lung appears  clear. No pneumothorax is seen. Tracheostomy tube is again noted. Right  IJ catheter tip remains in the distal SVC.         IMPRESSION:  Mild further improvement in aeration of the left lung. No  new chest disease is seen.     D:  05/03/2018  All medications reviewed.     chlorhexidine 15 mL Mouth/Throat Q12H   epoetin mini 10,000 Units Subcutaneous Once per day on Mon Wed Fri   heparin (porcine) 5,000 Units Subcutaneous Q8H   insulin detemir 40 Units Subcutaneous Daily   insulin regular 0-24 Units Subcutaneous Q6H   insulin regular 7 Units Subcutaneous Q6H   lansoprazole 30 mg Per PEG Tube Q AM   levothyroxine 75 mcg Oral Q AM   midodrine 20 mg Oral Q8H   mupirocin  Topical Q12H   nafcillin 6 g in sodium chloride 0.9 % 250 mL 6 g Intravenous Q12H   PRO-STAT 1 packet Per G Tube BID   sertraline 100 mg Oral Daily         Assessment/Plan     Principal Problem:    Epidural abscess - cervical spine  Active Problems:    Bacteremia, MSSA    Acute respiratory failure    Atelectasis of left lung    Quadriplegia    ESRD (end stage renal disease) on dialysis    Type 2 diabetes mellitus with renal complication    Pleural effusion on left    Metabolic encephalopathy    Hypothyroidism    Chronic systolic heart failure    Neuropathy    47-year-old woman with a cervical epidural abscess resulting in quadriplegia. Abscess was drained, requiring both anterior and posterior cervical surgery. Cultures growing methicillin sensitive staph aureus. She has persistent fever.  Currently receiving Nafcillin/gentamicin.  Was changed to Daptomycin/gentamicin and Micafungin added for a week without change.  CT neck does not reveal undrained pus. Tracheostomy site is purulent and LLL has improving infiltrate.  Bronchoscopy wash growing  Capnocytophaga organism.    She continues to require  mechanical ventilation. She did undergo tracheostomy and PEG tube placement. She failed simple pressure support trials. She was changed to IMV 8, pressure support 10 and has tolerated this with a respiratory rate of 18 and an assisted tidal volume of 340. Rate was further decreased to 6. She has persistent atelectasis of her left lung despite 3 bronchoscopies and CT 4/29 reveals a large left effusion, and left lateral tube placed. Cultures remain negative. Chest tube output 260 in the last 24 hours. She received 2 units of packed red cells 4/28 when Hg 7.1 on 4/27 Current Hg 10.5.    Blood sugars are running 100-220 on 40 units of long-acting and scheduled 7 units every 6 hours regular.    PLAN:  Nafcillin, gentamicin, per infectious disease   IMV/PS and try PST  Levemir 40units daily, Regular insulin 7 units Q6hrs  Shift tube feedings to long-term, intermittent bolus feedings as tolerates  Hemodialysis per nephrology  Continue midodrine  Baclofen for spasm  Pull chest tube  Thyroid replacement  Zoloft for depression  Wound care to evaluate tracheostomy site and make recommendations  OT for LE splints  LTACH tomorrow    VTE Prophylaxis: subcutaneous heparin    Stress Ulcer Prophylaxis: Protonix pump inhibitor    Della Ca MD  05/03/18  10:46 AM      Time:35min  I personally provided care to this critically ill patient as documented above.  Critical care time does not include time spent on separately billed procedures.  Non of my critical care time was concurrent with other critical care providers.

## 2018-05-03 NOTE — PROGRESS NOTES
"Patient Name:  Lizet Webster  YOB: 1970  [id number]    Surgery Progress Note    Date of visit: 5/3/2018    Subjective   Subjective: Asked to see for drainage around tracheostomy. Remains on mechanical ventilation, spiking fevers.         Objective     Objective:     /55   Pulse 87   Temp (!) 101.9 °F (38.8 °C) (Axillary)   Resp 16   Ht 157 cm (61.81\")   Wt 78.7 kg (173 lb 8 oz)   SpO2 98%   BMI 31.93 kg/m²     Intake/Output Summary (Last 24 hours) at 05/03/18 0744  Last data filed at 05/03/18 0600   Gross per 24 hour   Intake            943.8 ml   Output             4790 ml   Net          -3846.2 ml       CV:  Rhythm  regular and rate regular   L:  Rhonchi to auscultation bilaterally. Trach intact, no evidence of wound infection, but secretions profuse, consistent with oral secretions draining around tracheostomy  Abd:  Bowel sounds positive , soft, PEG c/d/i  Ext:  No cyanosis, clubbing, edema    Labs that are back at this time have been reviewed.        Assessment/Plan     Assessment/ Plan:     Acute respiratory failure - Trach intact without infection. Secretions are likely due to increased oral secretions building above tracheostomy cuff and \"leaking\" around trach to skin. Consider glycopyrrolate or other medication to help decrease secretions, as well as better oral care and suctioning. No surgical issues. Will sign off, please call with questions.     Hospital Problem List     * (Principal)Epidural abscess - cervical spine    Bacteremia, MSSA    Metabolic encephalopathy    ESRD (end stage renal disease) on dialysis        Hypothyroidism    Chronic systolic heart failure    Type 2 diabetes mellitus with renal complication        Neuropathy        Atelectasis of left lung    Overview Addendum 4/24/2018  1:16 PM by Nghia Gifford MD     Recurrent         Quadriplegia    Pleural effusion on left              Denver Alves MD  5/3/2018  7:44 AM      "

## 2018-05-03 NOTE — PLAN OF CARE
Problem: Ventilation, Mechanical Invasive (Adult)  Intervention: Prevent Airway Displacement/Mechanical Dysfunction   05/03/18 0237   Prevent Airway Displacement/Mechanical Dysfunction   Airway Safety Measures manual resuscitator at bedside;suction at bedside       Goal: Signs and Symptoms of Listed Potential Problems Will be Absent, Minimized or Managed (Ventilation, Mechanical Invasive)  Outcome: Ongoing (interventions implemented as appropriate)   05/03/18 0237   Goal/Outcome Evaluation   Problems Assessed (Mechanical Ventilation, Invasive) artificial airway-induced skin/tissue breakdown;inability to wean;mechanical dysfunction;ventilator-induced lung injury   Problems Present (Mech Vent, Invasive) inability to wean

## 2018-05-03 NOTE — PROGRESS NOTES
"NEUROSURGERY PROGRESS NOTE    Vital Signs  Blood pressure 104/55, pulse 87, temperature (!) 101.9 °F (38.8 °C), temperature source Axillary, resp. rate 16, height 157 cm (61.81\"), weight 78.7 kg (173 lb 8 oz), SpO2 98 %, not currently breastfeeding.    Interval History:   MRI and CT scan images of the cervical spine done yesterday, reviewed today.    The reason for repeating the studies was because of persisting fever, to rule out osteomyelitis, persistent epidural abscess, paraspinous abscess, or other source of residual infection as a cause of the fever.  The instrumentation and anterior C6 vertebral body replacement were placed in known infected tissue, but because of the necessity for the corpectomy and vertebral body replacement to prevent collapse of the cervical spine, there was no choice in placing the prosthetic material.    The MRI and CT scan images show no evidence of osteomyelitis of the C5 or C7 vertebral bodies.  There is abnormal signal in the spinal cord from C4 to T1, which probably simply represents residual signal change and myelopathy from prior compression from the epidural abscess; this does not appear to be an intramedullary abscess..  There is no evidence of significant residual epidural abscess.  There is a fluid collection behind the C6 vertebral body prosthesis, which simply takes up the space without creation of mass effect.  There is no paraspinal or prevertebral abscess.  The alignment remains good, no evidence of instability or loosening of the internal fixation.    There is no way radiographically to estimate whether there is residual bacteria within or around the C6 vertebral body prosthesis.  However if such were present it should progress to adjacent osteomyelitis, which we do not see, and in the absence of any gross evidence of infection, the cervical spine should not be considered the source of her current fever.      ASSESSMENT: No radiographic evidence of persistent or " recurrent infection in the cervical spine causing recurrent and persistent fever.    PLAN: No other neurosurgical procedures recommended at this time.    Aryan Reed MD  05/03/18  7:25 AM

## 2018-05-03 NOTE — PROGRESS NOTES
Clinical Nutrition      Patient Name: Lizet Webster  MRN: [id number]  Admission date: 4/12/2018      Reason for visit: Multidisciplinary Rounds    Additional information obtained during MDR: Pt tolerating bolus feedings ; dc planned to [facility] later today.     Current diet: NPO Diet    Diet, Tube Feeding Tube Feeding Formula: Novasource Renal (Electrolyte Restricted)   180 ml per feeding- 5 feedings/d scheduled every 3 hrs from 1147-6584 w/ modular -  PRO-STAT 1 packet,     Pertinent medical data reviewed    Intervention:  Follow treatment plan  Care plan reviewed    Monitor:  BUFFY to follow per protocol      Saima Nolen RD  05/03/18 4:06 PM  Time: 20min

## 2018-05-03 NOTE — PROGRESS NOTES
[facility] Progress Note    Admission Date: 4/12/2018    [facility]  1970  [id number]    Date: 5/3/2018    Meds:    Anti-Infectives     Ordered     Dose/Rate Route Frequency Start Stop    05/03/18 1250  piperacillin-tazobactam (ZOSYN) in iso-osmotic dextrose IVPB 2.25 g (premix)     Ordering Provider:  Jericho Marsh MD    2.25 g  over 30 Minutes Intravenous Every 8 Hours 05/03/18 1330 05/17/18 1329    05/02/18 1438  sodium chloride 0.9 % solution 250 mL with nafcillin 2 g reconstituted solution 6 g     Ordering Provider:  LAYTON Hernandez    6 g  20 mL/hr over 12 Hours Intravenous Every 12 Hours 05/02/18 0000 06/12/18 2359    04/26/18 1405  gentamicin (GARAMYCIN) 50 mg in sodium chloride 0.9 % 50 mL IVPB     Ordering Provider:  Brittnee Pfeiffer RPH    50 mg  over 30 Minutes Intravenous Once per day on Mon Wed Fri 04/27/18 1600 05/01/18 1421    04/26/18 1331  gentamicin (GARAMYCIN) 50 mg in sodium chloride 0.9 % 50 mL IVPB     Ordering Provider:  Jericho Marsh MD    50 mg Intravenous Once 04/26/18 1600 04/26/18 1655    04/21/18 1256  gentamicin (GARAMYCIN) IVPB 120 mg     Ordering Provider:  Juan Antonio Barclay MD    120 mg Intravenous Once 04/21/18 1330 04/21/18 1358    04/13/18 1420  ceFAZolin in dextrose (ANCEF) IVPB solution 2 g     Ordering Provider:  MANDY Hernandez    2 g  over 30 Minutes Intravenous Once 04/13/18 1500 04/13/18 1700    04/13/18 0054  vancomycin (VANCOCIN) in iso-osmotic dextrose IVPB 1 g (premix) 200 mL     Ordering Provider:  Eulalio Prajapati RPH    15 mg/kg × 65 kg  over 1 Hours Intravenous Once 04/13/18 0130 04/13/18 1135          CC:  MSSA bacteremia, C3-C7 cervical epidural abscess and C5-C6 discitis    SUBJECTIVE:  Patient is a 47 y.o. female with h/o T2DM, ESRD/HD, severe peripheral neuropathy/wheelchair bound/stand for transfers, CAD/stents, chronic systolic CHF, and hypothyroidism who presented to [facility] with progressive neck pain and confusion that  started about a week ago.  She had blood cultures positive for MSSA on 4/9 and 4/11.  She also has a blood culture on 4/11 positive for CNStaph.  She was given Nafcillin and Vancomycin.  She was diagnosed with an epidural cervical spine abscess and transferred to [facility] for higher level of care. She has been unable to move her 4 extremities since her transfer to [facility] ICU. The MRI from the OSH showed an epidural abscess that extended from C3 to C7 with most compression around C5-C6.  She was taken to OR by Dr. Reed for C3-C7 laminectomy, facet screw fixation and posterior lateral fusion of C5-C7, naterior cervical corpectomy C6, abscess drainage and vertebral body replacement.  Her PCT 7.43.  She has a chronic LLE DVT.  She is currently sedated on mechanical ventilation in ICU. She was started on Vancomycin.  ID was asked to evaluate and manage her antibiotic therapy.   4/14/18: Vancomycin powder was placed at vertebral body replacement site during surgery. Patient opens eyes and follows commands; can move arms; no movement in legs;  sticks tongue out   4/15/18; on vent sleeping; wound care working on leg wounds; no event overnight  Ros unobtainable; having fevers    Subjective:  4/19/18; remains on pressors, intubated, gen surgery to offer trach/peg; still on cooling blanket;   Sedated; ros unobtainable  4/20/18; sedationi off at moment; following commands can only move left leg; no movement of arms, tracking with eyes.; ros unobtainable, has dialysis today; was going off of pressors but had large amount of volume removed back on shakira.    4/21/18:  Patient once again with return of hectic fever this morning up to 105°F remains on low dose pressors but has been hemodynamically stable this morning.  Some loose stools some respiratory secretions with sample sent for culture.  No blood cultures were ordered at time of fever no new rash.    4/22/18:  Patient more alert today hemodynamically improved decrease FiO2 still on  pressors fever curve has improved but still 102°F.  Loose stool noted.  18; no events overnight; fevers better; on neosyephrine, opens eyes during exam; ros unobtainable  18; no events overnight; doing the same on pressors, no movement of legs, on vent, had trach today; ros unobtianable  18; on vent, sleeping quietly, on pressors; fevers better  18; doing about the same; no events overnight; febrile, on vent, laying of left side; neck with secretions  On cooling blanket    18; on vent, sedated, on pressors, remains febrile; deep line changed to right IJ.  18 hx rev.  Remains critically ill on the ventilator sedated on pressors.  Still having some fevers up as high as 103°.  This despite antibiotics.  Minimum sputum suctioned.  Unable to obtain additional review of systems secondary to mental status on the ventilator.    18; no fevers, overnight; off pressors, on vents,  in room; had chest tube put in left side for pleural effusion    18; has fevers, on vent, normotensive, on vent but not following commands; ros unobtainable  18; resting quielty, arousable on vent, follows commands by opening mouth, eoemi,     5/3/18; still febrile but hemodynamically stable, on vent, awake, following commands; no changes neurologically    PE:   Vital Signs  Temp (24hrs), Av.6 °F (38.1 °C), Min:98.2 °F (36.8 °C), Max:102.4 °F (39.1 °C)    Temp  Min: 98.2 °F (36.8 °C)  Max: 102.4 °F (39.1 °C)  BP  Min: 64/40  Max: 141/127  Pulse  Min: 70  Max: 89  Resp  Min: 10  Max: 23  SpO2  Min: 96 %  Max: 100 %    GENERAL: eyes open turns head to voice; on  vent,   HEENT:  Hncat, trach present, no ext oral lesions  right IJ CVL present  HEART: RRR; no JVD, pulses 1+ symmetric  LUNGS: benjamin breath sounds with rales at the bases  ABDOMEN: Soft, nontender, nondistended.  Mild obesity.  EXT:  No cyanosis, clubbing or 1+ bilateral edema lower extremities.  MSK: no movement of arms, legs  SKIN: Warm  and dry without cutaneous eruptions on Inspection/palpation.  Few scattered ecchymoses.  Neurologically: Quadriplegia no movement of arms or legs    Laboratory Data      Results from last 7 days  Lab Units 05/01/18  0432 04/30/18  0424 04/29/18  0825   WBC 10*3/mm3 18.46* 22.38* 24.18*   HEMOGLOBIN g/dL 10.5* 10.9* 12.0   HEMATOCRIT % 31.1* 32.5* 35.9   PLATELETS 10*3/mm3 277 270 325       Results from last 7 days  Lab Units 05/03/18  0411   SODIUM mmol/L 136   POTASSIUM mmol/L 3.7   CHLORIDE mmol/L 96*   CO2 mmol/L 23.0   BUN mg/dL 50*   CREATININE mg/dL 2.30*   GLUCOSE mg/dL 203*   CALCIUM mg/dL 9.0       Results from last 7 days  Lab Units 05/03/18  0411   ALK PHOS U/L 66   BILIRUBIN mg/dL 3.2*   ALT (SGPT) U/L 88*   AST (SGOT) U/L 71*           Results from last 7 days  Lab Units 04/30/18  0424   CRP mg/dL 13.69*       Results from last 7 days  Lab Units 04/30/18  0424   LACTATE mmol/L 1.1       Results from last 7 days  Lab Units 04/29/18  0825   CK TOTAL U/L 70         Estimated Creatinine Clearance: 29.9 mL/min (by C-G formula based on SCr of 2.3 mg/dL (H)).    Microbiology:  Blood Culture   Date Value Ref Range Status   04/13/2018 No growth at 24 hours  Preliminary   04/13/2018 No growth at 24 hours  Preliminary     Microbiology Results Abnormal     Procedure Component Value - Date/Time    Body Fluid Culture - Body Fluid, Pleural Cavity [[id number]]  (Normal) Collected:  04/29/18 1707    Lab Status:  Final result Specimen:  Body Fluid from Pleural Cavity Updated:  05/03/18 0751     BF Culture No growth at 4 days     Gram Stain Result Many (4+) Red blood cells      Few (2+) WBCs seen      No organisms seen    Respiratory Culture - Wash, Bronchus [[id number]]  (Abnormal) Collected:  04/26/18 1440    Lab Status:  Final result Specimen:  Wash from Bronchus Updated:  05/02/18 1315     Respiratory Culture --      Moderate growth (3+) Capnocytophaga species (A)     Gram Stain Result Occasional WBCs per low power  field      No organisms seen    Blood Culture - Blood, Blood, Venous Line [[id number]]  (Normal) Collected:  04/26/18 1518    Lab Status:  Final result Specimen:  Blood from Blood, Venous Line Updated:  05/01/18 1545     Blood Culture No growth at 5 days    Fungus Culture - Wash, Bronchus [[id number]] Collected:  04/26/18 1440    Lab Status:  Preliminary result Specimen:  Wash from Bronchus Updated:  05/01/18 1500     Fungus Culture No fungus isolated at less than 1 week    AFB Culture - Wash, Bronchus [[id number]]  (Normal) Collected:  04/26/18 1440    Lab Status:  Preliminary result Specimen:  Wash from Bronchus Updated:  05/01/18 1500     AFB Culture No AFB isolated at less than 1 week     AFB Stain No acid fast bacilli seen on concentrated smear    Fungus Smear - Sputum, Pleural Cavity [[id number]] Collected:  04/29/18 1707    Lab Status:  Final result Specimen:  Body Fluid from Pleural Cavity Updated:  05/01/18 1007     GMS Stain No fungal elements seen    Wound Culture - Wound, Trachea [[id number]] Collected:  05/01/18 0400    Lab Status:  Preliminary result Specimen:  Wound from Trachea Updated:  05/01/18 0833     Gram Stain Result Rare (1+) WBCs per low power field      Many (4+) Gram negative bacilli    Anaerobic Culture - Pleural Fluid, Pleural Cavity [[id number]]  (Normal) Collected:  04/29/18 1707    Lab Status:  Preliminary result Specimen:  Body Fluid from Pleural Cavity Updated:  04/30/18 1405     Culture No anaerobes isolated    AFB Culture - Body Fluid, Pleural Cavity [[id number]] Collected:  04/29/18 1707    Lab Status:  Preliminary result Specimen:  Body Fluid from Pleural Cavity Updated:  04/30/18 1304     AFB Stain No acid fast bacilli seen on concentrated smear    Fungus Smear - Wash, Bronchus [[id number]] Collected:  04/26/18 1440    Lab Status:  Final result Specimen:  Wash from Bronchus Updated:  04/29/18 1435     GMS Stain No fungal elements seen      No Pneumocystis jirovecci (formerly  Pneumocystis carinii) noted on smear.    Catheter Culture - Cath Tip, Neck [[id number]] Collected:  04/26/18 1522    Lab Status:  Final result Specimen:  Cath Tip from Neck Updated:  04/29/18 0615     CATHETER CULTURE No growth at 3 days    Blood Culture - Blood, Arm, Right [[id number]]  (Normal) Collected:  04/21/18 1318    Lab Status:  Final result Specimen:  Blood from Arm, Right Updated:  04/26/18 1345     Blood Culture No growth at 5 days    Blood Culture - Blood, Blood, Central Line [[id number]]  (Normal) Collected:  04/21/18 1318    Lab Status:  Final result Specimen:  Blood from Blood, Central Line Updated:  04/26/18 1345     Blood Culture No growth at 5 days    Respiratory Culture - Aspirate, ET Suction [[id number]]  (Abnormal)  (Susceptibility) Collected:  04/21/18 1204    Lab Status:  Final result Specimen:  Aspirate from ET Suction Updated:  04/25/18 1154     Respiratory Culture --      Scant growth (1+) Staphylococcus aureus (A)      Scant growth (1+) Normal Respiratory Patricia     Comment: This isolate does not demonstrate inducible clindamycin resistance in vitro.          Gram Stain Result Moderate (3+) WBCs per low power field      No epithelial cells seen      Rare (1+) Gram positive cocci in pairs    Susceptibility      Staphylococcus aureus     ELLIOT     Ceftriaxone <=8 ug/ml Susceptible     Ciprofloxacin >2 ug/ml Resistant     Clindamycin <=0.5 ug/ml Susceptible     Daptomycin <=0.5 ug/ml Susceptible     Erythromycin >4 ug/ml Resistant     Gentamicin <=4 ug/ml Susceptible     Levofloxacin 4 ug/ml Intermediate  [1]      Linezolid 2 ug/ml Susceptible     Oxacillin 1 ug/ml Susceptible     Penicillin G >8 ug/ml Resistant     Quinupristin + Dalfopristin <=0.5 ug/ml Susceptible     Rifampin <=1 ug/ml Susceptible     Tetracycline <=4 ug/ml Susceptible     Trimethoprim + Sulfamethoxazole <=0.5/9.5 ug/ml Susceptible     Vancomycin 2 ug/ml Susceptible            [1]   Staphylococcus species may develop resistance  during prolonged therapy with quinolones.  Isolates that are initially susceptible may become resistant within three to four days after initiation of therapy. Testing of repeat isolates may be warranted.                 Respiratory Culture - Wash, Bronchus [[id number]] Collected:  04/21/18 1814    Lab Status:  Final result Specimen:  Wash from Bronchus Updated:  04/23/18 1321     Respiratory Culture --      Moderate growth (3+) Normal Respiratory Patricia     Gram Stain Result Many (4+) WBCs seen      Few (2+) Epithelial cells seen      Few (2+) Gram positive cocci in pairs and chains      Occasional Gram positive bacilli    BAL Culture, Quantitative - Wash, Bronchus [[id number]] Collected:  04/21/18 1814    Lab Status:  Final result Specimen:  Wash from Bronchus Updated:  04/23/18 1239     BAL Culture --      Improper specimen for quantitative culture    Blood Culture - Blood, [[id number]] Collected:  04/17/18 1432    Lab Status:  Final result Specimen:  Blood from Hand, Right Updated:  04/22/18 1500     Blood Culture No growth at 5 days      Aerobic bottle only    Blood Culture - Blood, [[id number]] Collected:  04/17/18 1439    Lab Status:  Final result Specimen:  Blood from Arm, Right Updated:  04/22/18 1500     Blood Culture No growth at 5 days      Aerobic bottle only    Anaerobic Culture - Wound, Neck [[id number]]  (Normal) Collected:  04/13/18 0650    Lab Status:  Final result Specimen:  Wound from Neck Updated:  04/20/18 1427     Culture No anaerobes isolated    Blood Culture - Blood, [[id number]]  (Normal) Collected:  04/13/18 0020    Lab Status:  Final result Specimen:  Blood from Arm, Right Updated:  04/18/18 0115     Blood Culture No growth at 5 days    Blood Culture - Blood, [[id number]]  (Normal) Collected:  04/13/18 0030    Lab Status:  Final result Specimen:  Blood from Arm, Right Updated:  04/18/18 0115     Blood Culture No growth at 5 days    Respiratory Culture - Sputum, Cough [[id number]]  (Abnormal)   (Susceptibility) Collected:  04/13/18 1645    Lab Status:  Final result Specimen:  Sputum from Cough Updated:  04/15/18 1107     Respiratory Culture --      Heavy growth (4+) Staphylococcus aureus (A)     Comment: This isolate does not demonstrate inducible clindamycin resistance in vitro.           No Normal Respiratory Patricia (A)     Gram Stain Result Many (4+) WBCs seen      Many (4+) Epithelial cells seen      Many (4+) Gram positive cocci in pairs and clusters    Susceptibility      Staphylococcus aureus     ELLIOT     Ceftriaxone <=8 ug/ml Susceptible     Ciprofloxacin >2 ug/ml Resistant     Clindamycin <=0.5 ug/ml Susceptible     Erythromycin >4 ug/ml Resistant     Gentamicin <=4 ug/ml Susceptible     Levofloxacin >4 ug/ml Resistant     Linezolid 2 ug/ml Susceptible     Oxacillin 0.5 ug/ml Susceptible     Penicillin G >8 ug/ml Resistant     Quinupristin + Dalfopristin <=0.5 ug/ml Susceptible     Rifampin <=1 ug/ml Susceptible     Tetracycline <=4 ug/ml Susceptible     Trimethoprim + Sulfamethoxazole <=0.5/9.5 ug/ml Susceptible     Vancomycin 2 ug/ml Susceptible                    Wound Culture - Wound, Neck [[id number]]  (Abnormal)  (Susceptibility) Collected:  04/13/18 0650    Lab Status:  Final result Specimen:  Wound from Neck Updated:  04/15/18 1106     Wound Culture --      Scant growth (1+) Staphylococcus aureus (A)     Comment: This isolate does not demonstrate inducible clindamycin resistance in vitro.          Gram Stain Result No WBCs or organisms seen    Susceptibility      Staphylococcus aureus     ELLIOT     Ceftriaxone <=8 ug/ml Susceptible     Ciprofloxacin >2 ug/ml Resistant     Clindamycin <=0.5 ug/ml Susceptible     Daptomycin <=0.5 ug/ml Susceptible     Erythromycin >4 ug/ml Resistant     Gentamicin <=4 ug/ml Susceptible     Levofloxacin >4 ug/ml Resistant     Linezolid 2 ug/ml Susceptible     Oxacillin 1 ug/ml Susceptible     Penicillin G >8 ug/ml Resistant     Quinupristin + Dalfopristin <=0.5  ug/ml Susceptible     Rifampin <=1 ug/ml Susceptible     Tetracycline <=4 ug/ml Susceptible     Trimethoprim + Sulfamethoxazole <=0.5/9.5 ug/ml Susceptible     Vancomycin 1 ug/ml Susceptible                        Sputum culture with MSSA     Radiology:  CXR from 4/30/18 Chest tube identified on the left with no pneumothorax. Mild  increased markings seen within the right mid lung stable and unchanged.  Impression:   --Severe Sepsis POA with elevated PCT, leukocytosis, fever with respiratory failure and end-stage renal disease, thought to be related to cervical abscess, vs other.  --Acute C3-C7 cervical spine epidural abscess/C5-C6 discitis s/p I and D with laminectomies and fusions of C5-C7 4/13, culture revealing MSSA  --MSSA bacteremia at OSH likely from cervical epidural abscess.  --Fever, hectic, noninfectious ie drug, vs other (last bld cx 4/26, cath tip 4/26 neg).  --Leukocytosis/neutrophilia, worse.  --Hyperkalema, resolved.  --Quadraplegia pre-surgical  --T2DM/severe peripheral neuropathy/debilitated/wheel chair bound  --ESRD/HD  --CAD/stents  --Hypothyroidism  --Chronic systolic CHF  --left lung consolidation , pleural effusion, doubt empyema.  --Hypocalcemia, 7.6, worse.  --Mild cholestasis, 1.5.  --Acute hypoxic respiratory failure.  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    Has had persistent fevers which have not responsded to empiric micafungin, gentamicin, or changing cefazolin to nafcillin, or daptomycin    Capnocytophaga is mouth bacteria classically in mouth of canines and in bites in humans can cause severe sepsis in patients that are asplenic.    This can cause  severe gingival infections as well;     Certainly could be part of mouth caden and could be involved with aspiration pnemonia    Zosyn, clindam, meropenem, an d vanco have activity  PLAN/RECOMMENDATIONS:   Follow exam, cbc, cmp, blood cx from 4/26, cath tip cx, lactic acid, PCT, and radiographic studies.  F/u new blood cultures   d/c nafcillin  Change to zosyn renally dosed 2.25 iv q8 or 2.25 q12 with supplement after dialysis  F/u cultures    Patient is critically ill    D/w Brittnee Pfeiffer PharmD, family    Very complicated case

## 2018-05-04 NOTE — PLAN OF CARE
Problem: Ventilation, Mechanical Invasive (Adult)  Intervention: Optimize Oxygenation/Ventilation   05/03/18 4572   Respiratory Interventions   Airway/Ventilation Management airway patency maintained;calming measures promoted;humidification applied;pulmonary hygiene promoted       Goal: Signs and Symptoms of Listed Potential Problems Will be Absent, Minimized or Managed (Ventilation, Mechanical Invasive)  Outcome: Ongoing (interventions implemented as appropriate)   05/03/18 1260   Goal/Outcome Evaluation   Problems Assessed (Mechanical Ventilation, Invasive) artificial airway-induced skin/tissue breakdown;inability to wean;mechanical dysfunction;ventilator-induced lung injury   Problems Present (Mech Vent, Invasive) inability to wean

## 2018-05-04 NOTE — PROGRESS NOTES
"NEUROSURGERY PROGRESS NOTE    Vital Signs  Blood pressure 106/55, pulse 71, temperature 97.8 °F (36.6 °C), temperature source Axillary, resp. rate 14, height 157 cm (61.81\"), weight 78.7 kg (173 lb 8 oz), SpO2 97 %, not currently breastfeeding.    Interval History:   No neurologic change overnight.  The only consistent voluntary movement noted is in the left foot at this time.    Transfer to [place] for long-term care planned today.      ASSESSMENT: Dense quadriparesis secondary to cervical epidural abscess.    PLAN: Transfer to [place] today for long-term care.    Aryan Reed MD  05/04/18  6:25 AM    "

## 2018-05-25 NOTE — ANESTHESIA POSTPROCEDURE EVALUATION
Patient: Lizet Webster    Procedure Summary     Date:  05/25/18 Room / Location:  [facility] OR 04 /  [facility] OR    Anesthesia Start:  1312 Anesthesia Stop:  1555    Procedure:  REMOVAL OF CLOT FROM LEFT ARM GRAFT (Left ) Diagnosis:      Surgeon:  Jeevan Montoya MD Provider:  Arian Aiken MD    Anesthesia Type:  general ASA Status:  3          Anesthesia Type: general  Last vitals  BP   100/64 (05/25/18 1626)   Temp   97 °F (36.1 °C) (05/25/18 1626)   Pulse   83 (05/25/18 1626)   Resp   22 (05/25/18 1626)     SpO2   92 % (05/25/18 1626)     Post Anesthesia Care and Evaluation    Patient location during evaluation: PHASE II  Patient participation: complete - patient participated  Level of consciousness: awake and alert  Pain score: 0  Pain management: adequate  Airway patency: patent  Anesthetic complications: No anesthetic complications    Cardiovascular status: acceptable  Respiratory status: acceptable  Hydration status: acceptable

## 2018-05-25 NOTE — ANESTHESIA PREPROCEDURE EVALUATION
Anesthesia Evaluation     Patient summary reviewed and Nursing notes reviewed   no history of anesthetic complications:  NPO Solid Status: > 8 hours  NPO Liquid Status: > 8 hours           Airway   Mallampati: II  TM distance: >3 FB  Neck ROM: full  No difficulty expected  Comment: intubated  Dental    (+) poor dentition    Pulmonary - normal exam    breath sounds clear to auscultation  (+) pleural effusion,     ROS comment: Intubated, respiratory failure  Cardiovascular - normal exam    ECG reviewed  Rhythm: regular  Rate: normal    (+) CAD, cardiac stents     ROS comment: EF 40%    Neuro/Psych    ROS Comment: S/p anterior and posterior cervical fusion; epidural abscess; quadraplegia  GI/Hepatic/Renal/Endo    (+)   renal disease ESRD and dialysis, diabetes mellitus type 2, hypothyroidism,     Musculoskeletal     Abdominal  - normal exam   Substance History      OB/GYN          Other                          Anesthesia Plan    ASA 3     general     intravenous induction   Anesthetic plan and risks discussed with patient.    Plan discussed with CRNA.

## 2018-05-26 NOTE — ANESTHESIA POSTPROCEDURE EVALUATION
Patient: Lizet Webster    Procedure Summary     Date:  05/26/18 Room / Location:  [facility] OR 01 / [facility] OR    Anesthesia Start:  1110 Anesthesia Stop:      Procedure:  HEMODIALYSIS CATHETER INSERTION (N/A ) Diagnosis:      Surgeon:  Jeevan Montoya MD Provider:      Anesthesia Type:  general ASA Status:  3          Anesthesia Type: general  Last vitals  BP   104/50 (05/25/18 1656)   Temp   97 °F (36.1 °C) (05/25/18 1656)   Pulse   82 (05/25/18 1656)   Resp   20 (05/25/18 1656)     SpO2   93 % (05/25/18 1656)     Post Anesthesia Care and Evaluation    Patient location during evaluation: PHASE II  Patient participation: complete - patient participated  Level of consciousness: awake and alert  Pain score: 0  Pain management: adequate  Airway patency: patent  Anesthetic complications: No anesthetic complications    Cardiovascular status: acceptable  Respiratory status: acceptable  Hydration status: acceptable

## 2018-05-26 NOTE — ANESTHESIA PREPROCEDURE EVALUATION
Anesthesia Evaluation     Patient summary reviewed and Nursing notes reviewed   no history of anesthetic complications:  NPO Solid Status: > 8 hours  NPO Liquid Status: > 8 hours           Airway   Mallampati: II  TM distance: >3 FB  Neck ROM: full  No difficulty expected  Comment: intubated  Dental    (+) poor dentition    Pulmonary - normal exam    breath sounds clear to auscultation  (+) pleural effusion,     ROS comment: Intubated, respiratory failure  Cardiovascular - normal exam    ECG reviewed  Rhythm: regular  Rate: normal    (+) CAD, cardiac stents     ROS comment: EF 40%    Neuro/Psych    ROS Comment: S/p anterior and posterior cervical fusion; epidural abscess; quadraplegia  GI/Hepatic/Renal/Endo    (+)   renal disease ESRD and dialysis, diabetes mellitus type 2 well controlled, hypothyroidism,     Musculoskeletal     Abdominal  - normal exam   Substance History      OB/GYN          Other                          Anesthesia Plan    ASA 3     general     intravenous induction   Anesthetic plan and risks discussed with patient.    Plan discussed with CRNA.

## 2018-05-30 NOTE — ANESTHESIA POSTPROCEDURE EVALUATION
Patient: Lizet Webster    Procedure Summary     Date:  05/30/18 Room / Location:   [facility] OR 01 / [facility]    Anesthesia Start:  1305 Anesthesia Stop:  1404    Procedure:  REPLACEMENT  OF LEFT CHEST WALL HEMODIALYSIS CATHETER (Left ) Diagnosis:      Surgeon:  Jeevan Montoya MD Provider:  Arian Aiken MD    Anesthesia Type:  general ASA Status:  3          Anesthesia Type: general  Last vitals  BP   135/80 (05/30/18 1425)   Temp   97.1 °F (36.2 °C) (05/30/18 1405)   Pulse   80 (05/30/18 1425)   Resp   17 (05/30/18 1425)     SpO2   95 % (05/30/18 1425)     Post Anesthesia Care and Evaluation    Patient location during evaluation: PHASE II  Patient participation: complete - patient participated  Level of consciousness: awake and alert  Pain score: 0  Pain management: adequate  Airway patency: patent  Anesthetic complications: No anesthetic complications    Cardiovascular status: acceptable  Respiratory status: acceptable  Hydration status: acceptable

## 2018-05-30 NOTE — ANESTHESIA POSTPROCEDURE EVALUATION
Patient: Lizet Webster    Procedure Summary     Date:  05/30/18 Room / Location:   [facility] 01 / [facility]    Anesthesia Start:  1305 Anesthesia Stop:  1404    Procedure:  REPLACEMENT  OF LEFT CHEST WALL HEMODIALYSIS CATHETER (Left ) Diagnosis:      Surgeon:  Jeevan Montoya MD Provider:  Arian Aiken MD    Anesthesia Type:  general ASA Status:  3          Anesthesia Type: general  Last vitals  BP   135/83 (05/30/18 1435)   Temp   97.1 °F (36.2 °C) (05/30/18 1435)   Pulse   83 (05/30/18 1435)   Resp   18 (05/30/18 1435)     SpO2   95 % (05/30/18 1435)     Post Anesthesia Care and Evaluation    Patient location during evaluation: PHASE II  Patient participation: complete - patient participated  Level of consciousness: awake and alert  Pain score: 0  Pain management: adequate  Airway patency: patent  Anesthetic complications: No anesthetic complications    Cardiovascular status: acceptable  Respiratory status: acceptable  Hydration status: acceptable

## 2018-06-02 NOTE — OP NOTE
Insertion tunneled hemodialysis catheter RIJ  LIJ triple lumen under ultrasound guidance  Removal of left subclavian tunneled HD cath    Surgeon:  Modesta Ontiveros M.D., BALTAZARALudwinC.S.    Assistant;  Naomi Amaro    Pre-op:  End stage renal disease    Post-op:  Same    Anesthesia: general       Indications: ESRD - current left subclavian catheter not working       Procedure Details   After obtaining informed consent and receiving preoperative antibiotics and with venous compression boots in place, the patient was taken to the operating room and placed under anesthesia.  The right and left neck and chest were prepped and draped in a sterile fashion.  The patient had an existing right internal jugular triple lumen.   Ultrasound was used to confirm the patency of the LIJ.  This was cannulated under us guidance.  Venous pressure was very high.  The guidewire was passed without resistance but a lot of resistance was obtained when the tract was dilated.  As smaller dilator was obtained and under fluoroscopic guidance the smaller dilator was able to be passed and the triple lumen passed over this.  Position confirmed under fluoro. Catheter was sutured in place and dressing placed.    The left subclavian tunneled HD cath was removed next.  Sutures were cut and the catheter was removed with minimal resistance.  Pressure was held to obtain hemostatsis and dressing was placed.  The right IJ HD catheter was placed next. The guidewire was passed without resistance and position was confirmed under fluoroscopy.  Following this the catheter exit site was identified and the skin was infiltrated with lidocaine.  A 1 cm incision was made.  With use of the tunneling device the catheter was brought from the chest skin incision through the neck incision.  Under fluoroscopic guidance the tract was dilated and then the dilator/peel-away sheath was passed over the guidewire.  The peel-away sheath was removed and the catheter was passed.  Fluoroscopy  demonstrated the catheter to be in good position.  There did not appear to be any kinking of the catheter.  There was good blood return through both ports.  The catheter was then sutured in place with 2-0 silk sutures.  The neck skin incision was closed with 3-0 Vicryl and 4-0 Vicryl sutures and dressing was placed.  Patient tolerated the procedure well and was taken to the recovery room in stable condition where chest x-ray is pending  Findings:    Estimated Blood Loss:  Minimal    Blood administered:  none           Drains: none           Specimens: None     Grafts and Implants: 23 palilndrone       Complications:  none           Disposition: PACU - hemodynamically stable.           Condition: stable

## 2018-06-02 NOTE — ANESTHESIA POSTPROCEDURE EVALUATION
Patient: Lizet Webster    Procedure Summary     Date:  06/02/18 Room / Location:  [facility] OR 01 /  [facility] OR    Anesthesia Start:   Anesthesia Stop:      Procedures:       HEMODIALYSIS CATHETER INSERTION (Right Neck)      CENTRAL VENOUS LINE INSERTION (Left Neck)      REMOVAL TUNNELED DIALYSIS CATHETER (Left Abdomen) Diagnosis:      Surgeon:  Modesta Ontiveors MD Provider:      Anesthesia Type:  general ASA Status:  3          Anesthesia Type: general  Last vitals  BP   119/72 (06/02/18 1213)   Temp   97.4 °F (36.3 °C) (06/02/18 1213)   Pulse   87 (06/02/18 1213)   Resp   11 (06/02/18 1213)     SpO2   97 % (06/02/18 1213)     Post Anesthesia Care and Evaluation    Patient location during evaluation: PHASE II  Patient participation: complete - patient participated  Level of consciousness: awake and alert  Pain score: 0  Pain management: adequate  Airway patency: patent  Anesthetic complications: No anesthetic complications    Cardiovascular status: acceptable  Respiratory status: acceptable  Hydration status: acceptable

## 2018-06-02 NOTE — ANESTHESIA PREPROCEDURE EVALUATION
Anesthesia Evaluation     Patient summary reviewed and Nursing notes reviewed   no history of anesthetic complications:  NPO Solid Status: > 8 hours  NPO Liquid Status: < 2 hours           Airway   Mallampati: II  TM distance: >3 FB  Neck ROM: full  No difficulty expected  Comment: intubated  Dental    (+) poor dentition    Pulmonary - normal exam    breath sounds clear to auscultation  (+) pleural effusion,     ROS comment: Intubated, respiratory failure  Cardiovascular - normal exam    ECG reviewed  Rhythm: regular  Rate: normal    (+) CAD, cardiac stents     ROS comment: EF 40%    Neuro/Psych    ROS Comment: S/p anterior and posterior cervical fusion; epidural abscess; quadraplegia  GI/Hepatic/Renal/Endo    (+)   renal disease ESRD and dialysis, diabetes mellitus type 2 well controlled, hypothyroidism,     Musculoskeletal     Abdominal  - normal exam   Substance History      OB/GYN          Other                          Anesthesia Plan    ASA 3     general     intravenous induction   Anesthetic plan and risks discussed with patient.    Plan discussed with CRNA.

## 2018-06-04 NOTE — MBS/VFSS/FEES
Inpatient Rehabilitation - Speech Language Pathology   Swallow Re-Evaluation  [place]   MODIFIED BARIUM SWALLOW STUDY     Patient Name: Lizet Webster  : 1970  MRN: [id number]  Today's Date: 2018             Admit Date: 2018    Lizet Webster  presents to the radiology suite this am from OA11/2S to participate in an instrumental MBS to re-evaluate safety/efficacy of swallowing fnx, determine safest/least restrictive diet. She is s/p tracheostomy tube change 18 to a size 6 Shiley fenestrated w/ improved upper airway patency. She continues w/ limited tolerance of sv 2/2 anxiety, but she is able to vocalize w/ finger occlusion. Her  accompanies her to this evaluation.          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  Has been in a wheelchair because of severe peripheral neuropathy.         Diet Orders (active)     Start     Ordered    18  Vital 1.5; Tube Feeding Type: Continuous; Continuous Tube Feeding Start Rate (mL/hr): 30; Then Advance Rate By (mL/hr): 10; Every __ Hours: 4; To Goal Rate of (mL/hr): 60  Diet Effective Now     Comments:  Advance as tolerated    18          Evidenced on O2 via Trach collar.    Risks and benefits of the procedure are explained w/ verbalizing understanding/agreement to participate. Proceed per protocol.    Ms. Webster  is positioned upright and centered in a soft strap supportive chair to accept multiple po presentations of solid cracker, puree, honey thick, nectar thick, and thin liquids via spoon,  cup and straw, along w/ whole placebo pill in puree. She is unable to self feed 2/2 quadriplegia.      All views are from the lateral plane.     Facial/oral structures are symmetrical upon observation w/o lingual deviation upon protrusion. Oral mucosa are moist, pink and clean. Secretions are clear, thin and controlled. OROM/LIZ is wfl to imitate oral postures. Gag is not assessed. Volitional cough is adequate in intensity, clear in quality, nonproductive. Vocal quality is aphonic 2/2 tracheostomy, intolerance of SV. She is able to vocalize w/ finger occlusion. She is a/a and cooperative to participate, oriented to person, place, and time, follows simple directives, and participates in simple conversational exchanges.     Upon po presentations, adequate bolus anticipation w/ good labial seal for bolus clearance via spoon bowl, cup rim stability and suction via straw.  Bolus formation, manipulation,  and control are wfl w/ rotary mastication pattern. A-p transit is timely w/o oral residue. Tongue base retraction and linguavelar seal are adequate w/o premature spillage. No laryngeal penetration or aspiration is evidenced before the swallow.     Pharyngeal swallow is timely w/ adequate hyolaryngeal elevation and epiglottic inversion. Pharyngeal contraction is adequate w/o significant residue. No laryngeal penetration or aspiration evidenced during or after the swallow.     Full esophageal sweep reveals no mucosal abnormalities. Motility is wfl w/o retrograde flow noted.      Visit Dx:     ICD-10-CM ICD-9-CM   1. ESRD (end stage renal disease) on dialysis N18.6 585.6    Z99.2 V45.11   2. Pneumonia J18.9 486   3. Pneumonia due to infectious organism, unspecified laterality, unspecified part of lung J18.9 136.9     484.8     Patient Active Problem List   Diagnosis   • Bacteremia, MSSA   • Metabolic encephalopathy   • Epidural abscess - cervical spine   • ESRD (end stage renal disease) on dialysis   • Hypothyroidism   •  Chronic systolic heart failure   • Type 2 diabetes mellitus with renal complication   • Neuropathy   • Acute respiratory failure   • Atelectasis of left lung   • Quadriplegia   • Pleural effusion on left     Past Medical History:   Diagnosis Date   • CAD (coronary artery disease)     stentsx4 2011   • Diabetes mellitus     ESRD, peripheral neuropathy   • ESRD (end stage renal disease) on dialysis 4/12/2018   • Hx of hepatitis    • Hypothyroidism 4/12/2018   • Systolic heart failure 04/12/2018    EF 40%     Past Surgical History:   Procedure Laterality Date   • ANTERIOR CERVICAL DISCECTOMY W/ FUSION N/A 4/13/2018    Procedure: ANTERIOR CERVICAL CORPECTOMY;  Surgeon: Aryan Reed MD;  Location:  [facility];  Service: Neurosurgery   • ARTERIOVENOUS FISTULA Left    • ARTERIOVENOUS FISTULA/SHUNT SURGERY Left 5/25/2018    Procedure: REMOVAL OF CLOT FROM LEFT ARM GRAFT;  Surgeon: Jeevan Montoya MD;  Location:  [facility];  Service: Vascular   • BRONCHOSCOPY N/A 4/21/2018    Procedure: BRONCHOSCOPY AT BEDSIDE;  Surgeon: Della Ca MD;  Location:  [facility];  Service: Pulmonary   • BRONCHOSCOPY N/A 4/24/2018    Procedure: BRONCHOSCOPY AT BEDSIDE;  Surgeon: Nghia Gifford MD;  [facility]:  [facility];  Service: Pulmonary   • BRONCHOSCOPY N/A 4/26/2018    Procedure: BRONCHOSCOPY AT BEDSIDE;  Surgeon: Denver Capone MD;  [facility]:  [facility];  Service: Pulmonary   • BRONCHOSCOPY N/A 5/31/2018    Procedure: BRONCHOSCOPY @ BEDSIDE;  Surgeon: Bolivar Mckeon MD;  [facility]:  [facility];  Service: Pulmonary   • CENTRAL VENOUS LINE INSERTION Left 6/2/2018    Procedure: CENTRAL VENOUS LINE INSERTION;  Surgeon: Modesta Ontiveros MD;  Location:  [facility];  Service: General   • CERVICAL LAMINECTOMY DECOMPRESSION POSTERIOR N/A 4/13/2018    Procedure: CERVICAL LAMINECTOMY DECOMPRESSION POSTERIOR;  Surgeon: Aryan Reed MD;  Location:  [facility];  Service: Neurosurgery   • CORONARY ANGIOPLASTY WITH STENT  PLACEMENT  2011    4 stents   • HYSTERECTOMY     • INSERTION HEMODIALYSIS CATHETER N/A 5/26/2018    Procedure: HEMODIALYSIS CATHETER INSERTION;  Surgeon: Jeevan Montoya MD;  [facility]:  [facility];  Service: General   • INSERTION HEMODIALYSIS CATHETER Left 5/30/2018    Procedure: REPLACEMENT  OF LEFT CHEST WALL HEMODIALYSIS CATHETER;  Surgeon: Jeevan Montoya MD;  [facility];  Service: General   • INSERTION HEMODIALYSIS CATHETER Right 6/2/2018    Procedure: HEMODIALYSIS CATHETER INSERTION;  Surgeon: Modesta Ontiveros MD;  [facility];  Service: General   • REMOVAL PERITONEAL DIALYSIS CATHETER Left 6/2/2018    Procedure: REMOVAL TUNNELED DIALYSIS CATHETER;  Surgeon: Modesta Ontiveros MD;  [facility]:  [facility];  Service: General   • TRACHEOSTOMY AND PEG TUBE INSERTION N/A 4/24/2018    Procedure: TRACHEOSTOMY AND PERCUTANEOUS ENDOSCOPIC GASTROSTOMY TUBE INSERTION;  Surgeon: Denver Alves MD;  Location:  [facility];  Service: General       EDUCATION  The patient has been educated in the following areas:   Communication Impairment Dysphagia (Swallowing Impairment) Oral Care/Hydration.    Impression: Ms. Webster presents w/wfl oropharyngeal swallow w/o laryngeal penetration or aspiration across this evaluation.     SLP Recommendation and Plan    1. Regular consistency diet, thin liquids.  2. Meds whole in puree/thins.   3. Martin precautions.   4. Oral care protocol.   No further SLP f/u warranted at this time.     D/w pt and  results and recommendations w/ verbal understanding and agreement.     D/w RNMyra,  results and recommendations w/ verbal understanding and agreement.     Thank you for allowing me to participate in the care of your patient-  Manju Barnhart M.S, CCC/SLP                                                   Time Calculation:     Therapy Charges for Today     Code Description Service Date Service Provider Modifiers Qty    61003003992 HC ST SWALLOWING CURRENT STATUS 6/4/2018 Manju  Daniela Barnhart, MS CCC-SLP GN, CH 1    81525495816 HC ST SWALLOWING PROJECTED 6/4/2018 Manju Barnhart MS CCC-SLP GN, CH 1    03702460845 HC ST SWALLOWING DISCHARGE 6/4/2018 Manju Barnhart MS CCC-SLP GN, CH 1    99475066229 HC ST MOTION FLUORO EVAL SWALLOW 8 6/4/2018 Manju Barnhart MS [facility]-SLP GN 1          SLP G-Codes  SLP NOMS Used?: Yes  Functional Limitations: Swallowing  Swallow Current Status (): 0 percent impaired, limited or restricted  Swallow Goal Status (): 0 percent impaired, limited or restricted  Swallow Discharge Status (): 0 percent impaired, limited or restricted    Manju Barnhart MS JOHN-SLP  6/4/2018

## 2018-06-07 PROBLEM — L89.159 PRESSURE ULCER OF COCCYGEAL REGION: Status: ACTIVE | Noted: 2018-01-01

## 2018-06-07 NOTE — OP NOTE
Bronchoscopy Procedure Note    Date of Operation: 6/7/2018    Pre-op Diagnosis: Left-sided atelectasis with ventilator-dependent respiratory failure    Post-op Diagnosis: Left-sided atelectasis with ventilator-dependent respiratory failure    Surgeon: Bolivar Mckeon MD    Assistants: None    Anesthesia: None    Operation: Flexible fiberoptic bronchoscopy, diagnostic     Findings: Left-sided main bronchus mucous plugging    Specimen: Left lower lobe, lingula and left upper lobe bronchoalveolar lavages    Estimated Blood Loss: None    Complications: None    Indications and History:  The patient is a 47 y.o. female with left-sided atelectasis with mucous plugging with ventilator-dependent respiratory failure.  The risks, benefits, complications, treatment options and expected outcomes were discussed with the patients .  The possibilities of reaction to medication, pulmonary aspiration, perforation of a viscus, bleeding, failure to diagnose a condition and creating a complication requiring transfusion or operation were discussed with the patients  who freely signed the consent.      Description of Procedure:  The procedure was and in the ICU at bedside and patient was identified as Lizet Webster and the procedure verified as Flexible Fiberoptic Bronchoscopy.  A Time Out was held and the above information confirmed.     The bronchoscope was passed through the tracheostomy tube.   The scope was then passed into the trachea.      2 ml 1 % lidocaine was used topically on the lilia.  Careful inspection of the tracheal lumen was accomplished. The scope was sequentially passed into the left main and then left upper and lower bronchi and segmental bronchi.       The scope was then withdrawn and advanced into the right main bronchus and then into the RUL, RML, and RLL bronchi and segmental bronchi.   Left-sided mucous plugging were seen.  Mucous plugs were cleaned with bronchial washings    Samples-    Then the  bronchoscope was wedged into the left lower lobe for bronchoalveolar lavage and close to 60 cc of saline was given once and close to 20 cc was aspirated back.    Then the bronchoscope was wedged into the lingula for a bronchial the lavages and close to 60 cc of saline was given once and close to 18 cc was aspirated back.    Then the bronchoscope was wedged into the left upper lobe for bronchoalveolar lavage and close to 40 cc of saline was given once and close to 20 cc was aspirated back      Samples were sent for microbiology.  Findings were discussed with patient's .        Bolivar Mckeon MD

## 2018-06-07 NOTE — NURSING NOTE
ACC REVIEW REPORT: [facility]        PATIENT NAME: Lizet Webster    PATIENT ID: [id number]    BED: N 225    BED TYPE: ICU    BED GIVEN TO: Norma on [facility] floor    TIME BED GIVEN: 1745    YOB: 1970    AGE: 47    GENDER: F    PREVIOUS ADMIT TO [facility]: yes    PREVIOUS ADMISSION DATE: May, 2018 ?    PATIENT CLASS:     TODAY'S DATE: 6/7/2018    TRANSFER DATE: 6-7-18    ETA: after 2000    TRANSFERRING FACILITY: [facility]    TRANSFERRING FACILITY PHONE # : [phone]    TRANSFERRING MD:     DATE/TIME REQUEST RECEIVED: 6-7-18@11:20    [facility] RN: Billie Kirkland    REPORT FROM: Norma    TIME REPORT TAKEN: 1745    DIAGNOSIS: possible wound infection, SZ, possible aspiration    REASON FOR TRANSFER TO [facility]: higher level of care    TRANSPORTATION: EMS    CLINICAL REASON FOR TRANSFER TO [facility]: 47 y.o. Had recent cervical disectomy with resulting quadriplegia; has been at [facility] since May 4  Trach placed 5/24  G-tube placed 5/24  Dialysis started  Off vent 5/28-6/6 but put back on 6/6  Left IJ deepline pulled today due to placement concern  Bronch done today  Pt had a seizure last p.m.  Pt has unstageable skin breakdown on coccyx  Pt has a neck wound with packing      CLINICAL INFORMATION    HEIGHT:     WEIGHT:     ALLERGIES: NKA    VARGAS: pt has anuria    INFECTIOUS DISEASE: MSSA in neck wound    ISOLATION:     LAST VITAL SIGNS:  TIME: 1800  TEMP:   PULSE: 86  B/P: 122/85  RESP: 23    LAB INFORMATION: labs today: K+ 3.4, anion gap 13.3, Cr 1.99, bun 46, PTT 16.1, INR 1.26, WBC 9.88, hgb 7.9, hct 24.5  fsbs at 1730- 145    CULTURE INFORMATION:     MEDS/IV FLUIDS: no IVF infusing                                  Problem with IV access earlier today - left IJ removed - Tygacil, daptomycin & mycofungin not given today  keppra started 6/7 - 250mg per G-tube is due at 2100 today  Tramadol was d/c'd  30meq KCL given today per G-tube    AV fistula left arm is non-functioning    No sticks in left arm    Left subclavian deepline placed  "today        CARDIAC SYSTEM:    RHYTHM: NSR    Is patient taking or has patient been given any drugs that could increase bleeding? yes  (Plavix, Brilinta, Effient, Eliquis, Xarelto, Warfarin, Integrilin, Angiomax)    DRUG: heparin      DOSE/FREQUENCY: 5,000 U SQ tid    CARDIAC NOTES: + hx cad with cardiac stents, EF 40%      RESPIRATORY SYSTEM:    LUNG SOUNDS:  Bronch today showed left main bronchus mucus plugging    ABG DATE: \"VBG\" done at 0817       ABG RESULTS:    PH: 7.57  PO2: 163  PCO2: 25  HCO3: 23  O2 SAT: 99.7%       ETT: trach - #6 isabela extended length placed 5/24    ON VENTILATOR:    TV: 400  FI02: 50%  RATE: 20  PEEP: 8    RADIOLOGY RESULTS: CXR post bronch - decreased left pleural effusion, cardiomegaly      CNS/MUSCULOSKELETAL    ALERT AND ORIENTED:  Pt will open her eyes, she had been having some intermittent movement in shoulders but has not had any movement since sz yesterday  She will make some noises -\"growl\"/moan at times  Left retina is \"blown\"     CNS/MUSCULOSKELETAL NOTES: seizure 6/7 - unknown etiology  CT neck on 6/6 - no intracranial abnormality; worsening wound; large soft tissue defect of neck at previous surgery level; no significant edema; post surgical changes C5-7, left pleural effusion, left air space dz  Neck is hyperextended; no contractures otherwise      GI//GY      ABDOMINAL PAIN: no    VOMITING: no    DIARRHEA: loose stool - x3 today - c-diff neg on 5/6    BOWEL SOUNDS: +    GI//GY NOTES:   - g-tube - had been getting Vital 1.5 @ 60 - this was held today & has not been restarted yet    PAST MEDICAL HISTORY: DM, ESRD, on dialysis, abcess c-spine, quadriplegia, heart failure, left pleural effusion, MSSA spine, hypothyroid, cad with stents x4, cervical disectomy, hysterectomy, PEG, trach    OTHER SYMPTOM NOTES: coccyx - slough - yellow/gray/blackish area ~ 4x4  c-spine abcess - she did have a healed incision but then it opened up - they were packing it with 6\" of 1/4\" " "packing but they they are using \"1/2 a bottle\" - the drainage was gray color but  Now it is clear and they suspect possible CSF (pH test not done)    ADDITIONAL NOTES: unable to do dialysis today   - Ugo Webster [phone] - very supportive          Miriam Kirkland, RN  6/7/2018  6:45 PM  "

## 2018-06-08 PROBLEM — L08.9 WOUND INFECTION: Status: ACTIVE | Noted: 2018-01-01

## 2018-06-08 PROBLEM — T14.8XXA WOUND INFECTION: Status: ACTIVE | Noted: 2018-01-01

## 2018-06-08 NOTE — CONSULTS
INFECTIOUS DISEASE CONSULT/INITIAL HOSPITAL VISIT    [facility]  1970  [id number]    Date of Consult: 6/8/2018    Admission Date: 6/8/2018      Requesting Provider: Della Ca MD  Evaluating Physician: Jericho Marsh III, MD    Reason for Consultation: MSSA bacteremia, C3-C7 cervical epidural abscess and C5-C6 discitis    History of present illness:    Patient is a 47 y.o. female with h/o T2DM, ESRD/HD, severe peripheral neuropathy/wheelchair bound wjp we saw 4/13/18; for epidural abscess, MSSA bacteremia which was evacuated by neurosurgery in OR with C3-C7 laminectomy, facet screw fixation and posterior lateral fusion of C5-C7, anterior cervical corpectomy C6, abscess drainage and vertebral body replacement.  Patient was treated with cefazolin followed by nafcillin but had continual fevers despite antifungal coverage, line changes.  Patient treated for capnocytophaga pneumonia.  Ultimately had trach/peg and was transferred to LTAC in [place] and followed by Dr. Ordoñez.    Patient treated with meropenem at [place], ultimately had seizure and was changed to tigecycline. Patient had new wound in posterior neck with clear drainage  Patient transferred back here to evaluate for CSF leak;  To have surgery today.     remarks that she is intermittently moving both arms.  Per Dr. Jones large left pleural effusion has been treated with a pigtail drain.      Patient is afebrile, hemodynamically stable    Patient awake on vent, voices words on request.  ROS unobtainable.    Past Medical History:   Diagnosis Date   • CAD (coronary artery disease)     stentsx4 2011   • Diabetes mellitus     ESRD, peripheral neuropathy   • ESRD (end stage renal disease) on dialysis 4/12/2018   • Hx of hepatitis    • Hypothyroidism 4/12/2018   • Systolic heart failure 04/12/2018    EF 40%       Past Surgical History:   Procedure Laterality Date   • ANTERIOR CERVICAL DISCECTOMY W/ FUSION N/A 4/13/2018    Procedure: ANTERIOR  CERVICAL CORPECTOMY;  Surgeon: Aryan Reed MD;  Location:  [facility];  Service: Neurosurgery   • ARTERIOVENOUS FISTULA Left    • ARTERIOVENOUS FISTULA/SHUNT SURGERY Left 5/25/2018    Procedure: REMOVAL OF CLOT FROM LEFT ARM GRAFT;  Surgeon: Jeevan Montoya MD;  Location:  [facility];  Service: Vascular   • BRONCHOSCOPY N/A 4/21/2018    Procedure: BRONCHOSCOPY AT BEDSIDE;  Surgeon: Della Ca MD;  Location:  [facility];  Service: Pulmonary   • BRONCHOSCOPY N/A 4/24/2018    Procedure: BRONCHOSCOPY AT BEDSIDE;  Surgeon: Nghia Gifford MD;  Location:  [facility];  Service: Pulmonary   • BRONCHOSCOPY N/A 4/26/2018    Procedure: BRONCHOSCOPY AT BEDSIDE;  Surgeon: Denver Capone MD;  Location:  [facility];  Service: Pulmonary   • BRONCHOSCOPY N/A 5/31/2018    Procedure: BRONCHOSCOPY @ BEDSIDE;  Surgeon: Bolivar Mckeon MD;  [facility]:  [facility];  Service: Pulmonary   • BRONCHOSCOPY N/A 6/7/2018    Procedure: BRONCHOSCOPY @ BEDSIDE;  Surgeon: Bolivar Mckeon MD;  [facility]:  [facility];  Service: Pulmonary   • CENTRAL VENOUS LINE INSERTION Left 6/2/2018    Procedure: CENTRAL VENOUS LINE INSERTION;  Surgeon: Modesta Ontiveros MD;  [facility]:  [facility];  Service: General   • CERVICAL LAMINECTOMY DECOMPRESSION POSTERIOR N/A 4/13/2018    Procedure: CERVICAL LAMINECTOMY DECOMPRESSION POSTERIOR;  Surgeon: Aryan Reed MD;  Location:  [facility];  Service: Neurosurgery   • CORONARY ANGIOPLASTY WITH STENT PLACEMENT  2011    4 stents   • HYSTERECTOMY     • INSERTION HEMODIALYSIS CATHETER N/A 5/26/2018    Procedure: HEMODIALYSIS CATHETER INSERTION;  Surgeon: Jeevan Montoya MD;  [facility]:  [facility];  Service: General   • INSERTION HEMODIALYSIS CATHETER Left 5/30/2018    Procedure: REPLACEMENT  OF LEFT CHEST WALL HEMODIALYSIS CATHETER;  Surgeon: Jeevan Montoya MD;  [facility]:  [facility];  Service: General   • INSERTION HEMODIALYSIS CATHETER Right 6/2/2018    Procedure: HEMODIALYSIS CATHETER INSERTION;   Surgeon: Modesta Ontiveros MD;  [facility];  Service: General   • REMOVAL PERITONEAL DIALYSIS CATHETER Left 6/2/2018    Procedure: REMOVAL TUNNELED DIALYSIS CATHETER;  Surgeon: Modesta Ontiveros MD;  [facility];  Service: General   • TRACHEOSTOMY AND PEG TUBE INSERTION N/A 4/24/2018    Procedure: TRACHEOSTOMY AND PERCUTANEOUS ENDOSCOPIC GASTROSTOMY TUBE INSERTION;  Surgeon: Denver Alves MD;  Location:  [facility];  Service: General       Family History   Problem Relation Age of Onset   • Family history unknown: Yes       Social History     Social History   • Marital status:      Spouse name: N/A   • Number of children: 2   • Years of education: N/A     Occupational History   •  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  Has been in a wheelchair because of severe peripheral neuropathy.        Allergies   Allergen Reactions   • Zosyn [Piperacillin Sod-Tazobactam So] Itching         Medication:    Current Facility-Administered Medications:   •  acetaminophen (TYLENOL) tablet 650 mg, 650 mg, Oral, Q4H PRN **OR** [DISCONTINUED] acetaminophen (TYLENOL) suppository 650 mg, 650 mg, Rectal, Q4H PRN, LAYTON Souza  •  chlorhexidine (PERIDEX) 0.12 % solution 15 mL, 15 mL, Mouth/Throat, Q12H, LAYTON Souza, 15 mL at 06/08/18 1134  •  dextrose 5 % and sodium chloride 0.9 % infusion, 50 mL/hr, Intravenous, Continuous, LAYTON Souza, Last Rate: 50 mL/hr at 06/08/18 1145, 50 mL/hr at 06/08/18 1145  •  DULoxetine (CYMBALTA) DR capsule 20 mg, 20 mg, Oral, Daily, LAYTON Souza  •  epoetin mini (EPOGEN,PROCRIT) injection 10,000 Units, 10,000 Units, Subcutaneous, Once per day on Mon Wed Fri, LAYTON Souza, 10,000 Units  at 06/08/18 1134  •  gabapentin (NEURONTIN) capsule 400 mg, 400 mg, Oral, Nightly, LAYTON Souza  •  heparin (porcine) 5000 UNIT/ML injection 5,000 Units, 5,000 Units, Subcutaneous, Q8H, LAYTON Souza, Stopped at 06/08/18 1400  •  insulin regular (humuLIN R,novoLIN R) injection 0-14 Units, 0-14 Units, Subcutaneous, Q6H, LAYTON Souza  •  ipratropium-albuterol (DUO-NEB) nebulizer solution 3 mL, 3 mL, Nebulization, 4x Daily - RT, Ap Wise MD, 3 mL at 06/08/18 1240  •  levothyroxine (SYNTHROID, LEVOTHROID) tablet 75 mcg, 75 mcg, Oral, Q AM, LAYTON Souza, 75 mcg at 06/08/18 0606  •  lidocaine (LIDODERM) 5 % 1 patch, 1 patch, Transdermal, Q24H, LAYTON Souza, 1 patch at 06/08/18 1132  •  meropenem (MERREM) 500mg/100 mL 0.9% NS IVPB (mbp), 500 mg, Intravenous, Once, Pantera Bradley, [facility]  •  [START ON 6/9/2018] meropenem (MERREM) 500mg/100 mL 0.9% NS IVPB (mbp), 500 mg, Intravenous, Q24H, Pantera Bradley, [facility]  •  micafungin 100 mg/100 mL 0.9% NS IVPB (mbp), 100 mg, Intravenous, Q24H, LAYTON Souza, 100 mg at 06/08/18 1145  •  midodrine (PROAMATINE) tablet 2.5 mg, 2.5 mg, Oral, BID, LAYTON Souza, Stopped at 06/08/18 0800  •  nystatin (MYCOSTATIN) powder, , Topical, Q12H, Ap Wise MD  •  pantoprazole (PROTONIX) EC tablet 40 mg, 40 mg, Oral, Q AM, LAYTON Souza, 40 mg at 06/08/18 0606  •  sertraline (ZOLOFT) tablet 100 mg, 100 mg, Oral, Daily, LAYTON Souza  •  sodium chloride 0.9 % flush 1-10 mL, 1-10 mL, Intravenous, PRN, LAYTON Souza    Antibiotics:  Anti-Infectives     Ordered     Dose/Rate Route Frequency Start Stop    06/08/18 0608  meropenem (MERREM) 500mg/100 mL 0.9% NS IVPB (mbp)     Ordering Provider:  Pantera Bradley RPH    500 mg  over 180 Minutes Intravenous Every 24 Hours 06/09/18 1400 06/16/18 1359    06/08/18 0356  micafungin 100 mg/100 mL 0.9% NS IVPB (mbp)     Ordering Provider:  LAYTON Souza    100 mg  over 60 Minutes Intravenous  Every 24 Hours 18 1200 06/15/18 1159    18 0608  meropenem (MERREM) 500mg/100 mL 0.9% NS IVPB (mbp)     Ordering Provider:  Pantera Bradley RPH    500 mg  over 30 Minutes Intravenous Once 18 0645              Review of Systems:  Unable to obtain secondary to sedation/vent.    Fh/sh unobtainable  Physical Exam:   Vital Signs  Temp (24hrs), Av.6 °F (36.4 °C), Min:97.4 °F (36.3 °C), Max:97.7 °F (36.5 °C)    Temp  Min: 97.4 °F (36.3 °C)  Max: 97.7 °F (36.5 °C)  BP  Min: 113/73  Max: 137/85  Pulse  Min: 82  Max: 87  Resp  Min: 18  Max: 18  SpO2  Min: 97 %  Max: 100 %    GENERAL: sedated on mechanical vent through trach in neck  HEENT: Normocephalic, atraumatic. No conjunctival injection. No icterus. Oral vent      HEART: RRR; No murmur,  LUNGS: Diminished at lung bases bilaterally  ABDOMEN: Soft, nondistended. Hypoactive bowel sounds.  EXT:  No cyanosis, clubbing or edema. No cord.    MSK: No joint swelling or erythema  SKIN: Warm and dry without cutaneous eruptions on Inspection/palpation.    NEURO: follows commands has focal deficit below waist; arms propped up on pillows but making no movement    Laboratory Data      Results from last 7 days  Lab Units 18  0420 18  0311 18  0218   WBC 10*3/mm3 10.30 9.88 8.33   HEMOGLOBIN g/dL 8.3* 7.9* 8.0*   HEMATOCRIT % 27.3* 24.5* 23.5*   PLATELETS 10*3/mm3 266 304 282       Results from last 7 days  Lab Units 18  0420   SODIUM mmol/L 139   POTASSIUM mmol/L 4.5   CHLORIDE mmol/L 103   CO2 mmol/L 22.0   BUN mg/dL 59*   CREATININE mg/dL 2.25*   GLUCOSE mg/dL 86   CALCIUM mg/dL 9.4       Results from last 7 days  Lab Units 18  0420   ALK PHOS U/L 97   BILIRUBIN mg/dL 0.5   ALT (SGPT) U/L 9   AST (SGOT) U/L 26           Results from last 7 days  Lab Units 18  0416   CRP mg/dL 4.82*       Results from last 7 days  Lab Units 18  0420   LACTATE mmol/L 1.1       Results from last 7 days  Lab Units 18  0311   CK TOTAL U/L  13*         Estimated Creatinine Clearance: 27 mL/min (A) (by C-G formula based on SCr of 2.25 mg/dL (H)).      Microbiology:  Blood Culture   Date Value Ref Range Status   04/13/2018 No growth at less than 24 hours  Preliminary   04/13/2018 No growth at less than 24 hours  Preliminary           Neck surgical abscess cx (4/13) pending                 Radiology:  Imaging Results (last 72 hours)     Procedure Component Value Units Date/Time    CT Head Without Contrast [[id number]] Updated:  04/13/18 1220    XR Chest 1 View [[id number]] Collected:  04/13/18 0823     Updated:  04/13/18 0826    Narrative:          EXAMINATION: XR CHEST 1 VW-      INDICATION: et placement; G06.1-Intraspinal abscess and granuloma      COMPARISON: 4/12/2018     FINDINGS: The heart size is normal. There are areas of bibasilar  atelectasis. A central venous catheter is well-positioned. An  endotracheal tube is well-positioned. There has been interval surgery in  the lower cervical spine.           Impression:       Endotracheal tube is well-positioned. There is bibasilar  atelectasis.     This report was finalized on 4/13/2018 8:24 AM by Dr. Anthony Perera MD.       XR Spine Cervical Lateral [[id number]] Collected:  04/13/18 0812     Updated:  04/13/18 0812    Narrative:       EXAMINATION: XR SPINE CERVICAL LATERAL-      INDICATION: ACDF; G06.1-Intraspinal abscess and granuloma.      COMPARISON: None.     FINDINGS: Portable lateral cervical spine reveals a radiopaque marker  seen anteriorly at the C6-C7 disc space.  Please see the procedural  report for full details.       Impression:       Anterior localization of the C5-C6 disc space.     D:  04/13/2018  E:  04/13/2018       XR Spine Cervical Lateral [[id number]] Collected:  04/13/18 0812     Updated:  04/13/18 0812    Narrative:       EXAMINATION: XR SPINE CERVICAL LATERAL-      INDICATION: Cervical laminectomy decompression posterior with  fusion/ACDF; G06.1-Intraspinal abscess and granuloma.       COMPARISON: 04/13/2018.     FINDINGS: Portable lateral cervical spine reveals anterior fusion of the  C5, C6, and C7 levels. No definite malalignment. Lines and tubes  overlying the soft tissues. Postsurgical changes seen within the soft  tissues.           Impression:       Anterior fusion of C5 through C7.     D:  04/13/2018  E:  04/13/2018          XR Abdomen KUB [[id number]] Collected:  04/13/18 0001     Updated:  04/13/18 0111    Narrative:       EXAM:    XR Abdomen, 1 View    CLINICAL HISTORY:    47 years old, female; Intraspinal abscess and granuloma; Device placement; Gi   device; Nasogastric tube; Additional info: Ng tube placement    TECHNIQUE:    Frontal supine view of the abdomen/pelvis.    COMPARISON:    No relevant prior studies available.    FINDINGS:     Tip of NASOGASTRIC/ENTERIC tube distal to the gastroesophageal junction in   the gastric antrum/pylorus.      Impression:         As above.    THIS DOCUMENT HAS BEEN ELECTRONICALLY SIGNED BY ANDI KHANNA MD    XR Chest 1 View [[id number]] Collected:  04/12/18 2342     Updated:  04/13/18 0110    Narrative:       EXAM:    XR Chest, 1 View    CLINICAL HISTORY:    47 years old, female; Intraspinal abscess and granuloma; Signs and symptoms;   Shortness of breath; Additional info: Metabolic encephalopathy, resp failure    TECHNIQUE:    Frontal view of the chest.    COMPARISON:    No relevant prior studies available.    FINDINGS:    Decreased lung volumes bilaterally, predominantly RIGHT lung base   atelectasis, crowding of the vessels without evidence of consolidation.   Otherwise normal appearing lungs and mediastinum. No effusion or pneumothorax.   Cardiomediastinal silhouette is normal.       LEFT sided CENTRAL VENOUS line with its tip in the SVC.  Stent within the   LEFT axilla/upper arm. Tip of NASOGASTRIC/ENTERIC tube is not visualized and   extends beyond the gastroesophageal junction into the LEFT upper abdomen.       Impression:         No  active lung parenchymal lesion.     THIS DOCUMENT HAS BEEN ELECTRONICALLY SIGNED BY ANDI KHANNA MD            Impression:   chronic respiratory failure on vent  Epidural abscess MSSA 4/13/18  ? CSF leak  ESRD  Left pleural effusion  S/p decompression 4/13/18.  ? Seizure while on meropenem  Sputum cultures growing yeast, mold 5/25/18    Patient looks markedly better than last admission;    Other than risk of fevers; patient should finish 8 week  Course of cefazolin from 4/13/18 (~6-8-18)  Thereafter we ususally offer 3-6 months of oral abx Keflex    D/w Dr. Jones  Elevated procalc may reflect ESRD    D/c meropenem  Start cefazolin 2 g iv q24h (dosed for ESRD)  F/u  Pleural cultures; appear transudative by studies    Partners to follow

## 2018-06-08 NOTE — PLAN OF CARE
Problem: Patient Care Overview  Goal: Plan of Care Review  Outcome: Ongoing (interventions implemented as appropriate)    Goal: Individualization and Mutuality  Outcome: Ongoing (interventions implemented as appropriate)    Goal: Discharge Needs Assessment  Outcome: Ongoing (interventions implemented as appropriate)    Goal: Interprofessional Rounds/Family Conf  Outcome: Ongoing (interventions implemented as appropriate)      Problem: Ventilation, Mechanical Invasive (Adult)  Goal: Signs and Symptoms of Listed Potential Problems Will be Absent, Minimized or Managed (Ventilation, Mechanical Invasive)  Outcome: Ongoing (interventions implemented as appropriate)      Problem: Mobility, Physical Impaired (Adult)  Goal: Identify Related Risk Factors and Signs and Symptoms  Outcome: Ongoing (interventions implemented as appropriate)    Goal: Enhanced Mobility Skills  Outcome: Ongoing (interventions implemented as appropriate)    Goal: Enhanced Functional Ability  Outcome: Ongoing (interventions implemented as appropriate)      Problem: Hemodialysis (Adult)  Goal: Signs and Symptoms of Listed Potential Problems Will be Absent, Minimized or Managed (Hemodialysis)  Outcome: Ongoing (interventions implemented as appropriate)      Problem: Wound (Includes Pressure Injury) (Adult)  Goal: Signs and Symptoms of Listed Potential Problems Will be Absent, Minimized or Managed (Wound)  Outcome: Ongoing (interventions implemented as appropriate)

## 2018-06-08 NOTE — PROGRESS NOTES
INTENSIVIST   PROGRESS NOTE     Hospital:  LOS: 0 days      S     Ms. Lizet Webster, 47 y.o. female is followed for:      Acute respiratory failure    ESRD (end stage renal disease) on dialysis    Hypothyroidism    Type 2 diabetes mellitus with renal complication    As an Intensivist, we provide an integrated approach to the ICU patient and family, medical management of comorbid conditions, lead interdisciplinary rounds and coordinate the care with all other services, including those from other specialists.     Interval History:  Resting comfortable on vent.  No fevers.     The patient's relevant past medical, surgical and social history were reviewed and updated in Epic as appropriate.     ROS:   Constitutional: Negative for fever.   Respiratory: Negative for dyspnea.   Cardiovascular: Negative for chest pain.   Gastrointestinal: Negative for  nausea, vomiting and diarrhea.        O     Vitals:  Temp: 97.4 °F (36.3 °C) (06/08/18 1200) Temp  Min: 97.4 °F (36.3 °C)  Max: 97.7 °F (36.5 °C)   BP: 120/85 (06/08/18 1050) BP  Min: 113/73  Max: 137/85   Pulse: 86 (06/08/18 1050) Pulse  Min: 82  Max: 87   Resp: 18 (06/08/18 0600) Resp  Min: 18  Max: 18   SpO2: 97 % (06/08/18 1050) SpO2  Min: 97 %  Max: 100 %   Device: ventilator (06/08/18 1050)    Flow Rate:   No Data Recorded     Intake/Ouptut 24 hrs (7:00AM - 6:59 AM)  Intake/Output       06/07/18 0700 - 06/08/18 0659 06/08/18 0700 - 06/09/18 0659    Intake (ml) 30 383    Output (ml) -- --    Net (ml) 30 383    Last Weight  61 kg (134 lb 7.7 oz)  --           Medications (drips):    dextrose 5 % and sodium chloride 0.9 % Last Rate: 50 mL/hr (06/08/18 1145)       Mechanical Ventilator:  Settings: Observed:   Mode: VC+/AC (06/08/18 1050)    Vt (Set, L): 0.4 L (06/08/18 1050)    Resp Rate (Set): 10 (06/08/18 1050) Resp Rate (Observed) Vent: 14 (06/08/18 1050)   FiO2 (%): 30 % (06/08/18 1050)    PEEP/CPAP (cm H2O): (S) 0 cm H20 (06/08/18 1050) Plateau Pressure (cm H2O): (S) 19  cm H2O (06/08/18 0705)    Minute Ventilation (L/min) (Obs): 5.96 L/min (06/08/18 1050)    I:E Ratio (Obs): 1:3.20 (06/08/18 1050)     Physical Examination  Telemetry:  Rhythm: normal sinus rhythm (06/08/18 0600)         Constitutional:  No acute distress.   Cardiovascular: Normal rate, regular and rhythm. Normal heart sounds.  No murmurs, gallop or rub.   Respiratory: No respiratory distress. Normal respiratory effort.  Decreased breath sounds  Clear to auscultation and percussion.    Abdominal:  Soft. No masses. Non-tender. No distension. No HSM.   Extremities: No digital cyanosis. No clubbing.  Edema.   Neurological:   Awake  Best Eye Response: 4-->(E4) spontaneous (06/08/18 0600)  Best Motor Response: 6-->(M6) obeys commands (06/08/18 0600)  Best Verbal Response: 1-->(V1) none (06/08/18 0600)  Penelope Coma Scale Score: 11 (06/08/18 0600)   Lines/Drains/Airways: PEG  Trach   L SCV 3L  R IJ tunneled Dialysis catheter     Hematology:    Results from last 7 days  Lab Units 06/08/18 0420 06/07/18 0311 06/06/18 0218   WBC 10*3/mm3 10.30 9.88 8.33   HEMOGLOBIN g/dL 8.3* 7.9* 8.0*   MCV fL 107.1* 106.1* 102.6*   PLATELETS 10*3/mm3 266 304 282     Chemistry:  Estimated Creatinine Clearance: 27 mL/min (A) (by C-G formula based on SCr of 2.25 mg/dL (H)).    Results from last 7 days  Lab Units 06/08/18 0420 06/07/18 0311 06/06/18 0218   SODIUM mmol/L 139 136 139   POTASSIUM mmol/L 4.5 3.4* 3.4*   CHLORIDE mmol/L 103 98* 97*   CO2 mmol/L 22.0 24.7 28.8   ANION GAP mmol/L 14.0* 13.3* 13.2*   GLUCOSE mg/dL 86 316* 240*   CALCIUM mg/dL 9.4 9.2 8.7       Results from last 7 days  Lab Units 06/08/18 0420 06/07/18  0311 06/06/18  0218   BUN mg/dL 59* 46* 20   CREATININE mg/dL 2.25* 1.99* 1.45*       Results from last 7 days  Lab Units 06/08/18  0420 06/07/18  0311   MAGNESIUM mg/dL 2.3  2.3 2.3   PHOSPHORUS mg/dL 3.6  3.6 2.7       Hepatic:    Results from last 7 days  Lab Units 06/08/18  0420 06/07/18  0311 06/04/18  0247    ALK PHOS U/L 97 96 92   BILIRUBIN mg/dL 0.5 0.4 0.4   ALT (SGPT) U/L 9 12 8*   AST (SGOT) U/L 26 31* 14   ALBUMIN g/dL 3.26 3.30* 3.20*       Arterial Blood Gases:    Results from last 7 days  Lab Units 06/08/18  0349 06/07/18  0817 06/06/18  1225 06/05/18  1116   PH, ARTERIAL pH units 7.497*  --  7.410 7.358   PCO2, ARTERIAL mm Hg 30.7*  --  42.6 40.2   PO2 ART mm Hg 114.0*  --  75.6* 64.9*   FIO2 % 35 50 40 40       Lab Results  Lab Value Date/Time   PROCALCITO 1.53 (H) 06/08/2018 0420   PROCALCITO 12.94 (H) 04/30/2018 0424   CRP 4.82 (H) 06/08/2018 0416   CRP 7.67 (H) 06/07/2018 0311       Images:  Xr Shoulder 1 View Left    Result Date: 6/8/2018  Normal AP radiograph of the left shoulder.  D:  06/08/2018 E:  06/08/2018  This report was finalized on 6/8/2018 11:55 AM by Dr. Anthony Perera MD.      Ct Head Without Contrast    Result Date: 6/6/2018  1. Moderate atrophy for patient age. 2. No CT evidence of acute intracranial abnormality.  This report was finalized on 6/6/2018 2:22 PM by Dr. Reynaldo Victor MD.      Ct Soft Tissue Neck Without Contrast    Result Date: 6/6/2018  1. Large soft tissue defect posterior midline neck at level of previous surgery corresponding to area of incision and drainage. No significant accumulation fluid or debris identified. 2. No significant edema of the adjacent soft tissues. 3. Post surgical changes from cervical disc fusion and posterior decompression C5-C7. 4. Left pleural effusion and left airspace disease.  This report was finalized on 6/6/2018 2:56 PM by Dr. Reynaldo Victor MD.      Ct Chest Without Contrast    Result Date: 6/8/2018  Bilateral dependent airspace disease/atelectasis with moderate left greater than right pleural effusions. Tracheostomy, right double-lumen catheter, left subclavian line. Atheromatous vascular calcification. Lower cervical fusion. THIS DOCUMENT HAS BEEN ELECTRONICALLY SIGNED BY IGOR CATES MD    Xr Chest 1 View    Result Date: 6/8/2018  Following  insertion of a left chest tube there has been significant improved aeration of the left lung base with minimal residual bibasilar airspace disease.  D:  06/08/2018 E:  06/08/2018  This report was finalized on 6/8/2018 11:55 AM by Dr. Anthony Perera MD.      Xr Chest 1 View    Result Date: 6/8/2018  Mild-moderate left pleural effusion with underlying infiltrate not excluded. Tracheostomy, right double lumen catheter, left subclavian line. Lower cervical fusion. THIS DOCUMENT HAS BEEN ELECTRONICALLY SIGNED BY IGOR CATES MD    Xr Chest 1 View    Result Date: 6/7/2018  1. Interval placement of left subclavian deep line. No pneumothorax. 2. Continued interval improvement of the left chest.  This report was finalized on 6/7/2018 1:41 PM by Dr. Reynaldo Victor MD.      Xr Chest 1 View    Result Date: 6/7/2018  1. Improved appearance of the left chest. 2. Support devices as above.  This report was finalized on 6/7/2018 10:20 AM by Dr. Reynaldo Victor MD.      Xr Chest 1 View    Result Date: 6/6/2018  1. Persistent complete opacification left hemithorax. 2. Slight improvement in edema of the right lung. 3. Support devices as above.  This report was finalized on 6/6/2018 12:49 PM by Dr. Reynaldo Victor MD.        Echo:  Results for orders placed during the hospital encounter of 04/12/18   Adult Transesophageal Echo (NICHOLAS) W/ Cont if Necessary Per Protocol    Narrative · Mild left atrial and left ventricular enlargement.  · Left ventricular systolic function is mildly decreased. Estimated EF =   45%.  · Mild calcification of the posterior mitral leaflet with mild mitral   regurgitation.  · Mild tricuspid regurgitation.  · No evidence of intracardiac thrombus or mass, clear left atrial   appendage.  · No evidence of valvular vegetations.  · No evidence of ASD, VSD or PFO.          Results: Reviewed.  I reviewed the patient's new laboratory and imaging results.  I independently reviewed the patient's new images.    Medications:  Reviewed.    Assessment/Plan   A / P     47 y.o.female, admitted on 6/8/2018 with Wound infection [T14.8XXA, L08.9]  Wound infection [T14.8XXA, L08.9]:     1. Wound infection, cervical vs CSF leak  1. S/p Laminectomy and decompression 4/13/2018 for C5-6 epidural abscess and persistent MSSA  2. Respiratory failure, chronic  1. She has been on trach collars until few days ago, when she was placed back on vent  2. Trach  3. Large left effusion and compressive atelectasis  1. Left SBCT placed 06/08/18   3. Renal failure  1. On Hemodialysis  4. Anemia, macrocytic.  5. Quadriplegia  6. Pressure ulcer, coccyx.  7. Enteral Nutrition: PEG, on hold for surgery  8. Antibiotics: MRP  9. Hypothyroidism on Rx.    Lab Results   Component Value Date    TSH 6.155 (H) 06/08/2018      10. T2DM with nephropathy:    Results from last 7 days  Lab Units 06/08/18  1137 06/08/18  0517 06/08/18  0011 06/07/18  1724 06/07/18  1537 06/06/18  2346   GLUCOSE mg/dL 106 95 88 145* 154* 343*       Lab Results  Lab Value Date/Time   HGBA1C 4.90 06/08/2018 0420   HGBA1C 5.40 04/13/2018 0043       Nutrition Support: Patient isn't on Tube Feeding   Modulars: Patient doesn't have any tube feeding modular orders   Diet: NPO Diet No active supplement orders     Advance Directives: Full Code     Assessment / Plan:    1. Left chest tube (done by me, earlier today)  2. ID consult  3. Nephrology to see and continue her dialysis  4. Dr. Reed is planning for exploration of surgical wound  5. Discussed with family at bedside  6. Plan to trach collars after her surgery this afternoon  7. Resume Enteral Nutrition after Surgery  8. Disposition: Keep in ICU.    Plan of care and goals reviewed during interdisciplinary rounds.  Level of Risk is High due to:  severe exacerbation of chronic illness, illness with threat to life or bodily function and drugs requiring intensive monitoring for toxicity.   I discussed the patient's findings and my recommendations with  patient, family, nursing staff and consulting provider    Time: was greater than 35 minutes.    (This excludes time spent performing separately reportable procedures and services).  Patient was at high risk of imminent or life-threatening deterioration in her condition due to Respiratory failure and Renal failure.     Ap Wise MD, FACP, FCCP, [facility]  Intensive Care Medicine, Nutrition Support and Pulmonary Medicine

## 2018-06-08 NOTE — PLAN OF CARE
"Problem: Patient Care Overview  Goal: Plan of Care Review  Outcome: Ongoing (interventions implemented as appropriate)   06/08/18 0900   Coping/Psychosocial   Plan of Care Reviewed With patient;spouse;other (see comments)  (Manju SHEPARD)   Plan of Care Review   Progress no change   OTHER   Outcome Summary WOC nurse consulted to assess skin. Pt has unstageable PI (POA) with 100% eschar coccyx; cleaned with NS; covered with foam dressing; plan to apply Thera Honey and foam QD. PT Wound Care consulted to assess for debridement. Pt has stage 1 PI (POA) right buttocks; cleaned with blue skin wipes; Z-guard applied; covered with foam dressing. Pt has open abscess cervical spine; 0.8 X 0.9 X 4.0 cm; cleaned with NS; packed loosely with about 24\" of 1/2 in plain packing strip moistened with NS; covered with foam dressing. Per Manju SHEPARD, pt to have debridement of cervical wound today. Bilateral groin areas with MASD and yeast; cleaned with blue skin wipes; Nystatin ordered. Heels dry, scaly, red, blanchable, but boggy; black heel boots applied. Dolphin bed ordered from [facility] due to immobility, quadroplegia, high skin risk. WOC nurse will f/u. Please contact WOC nurse as needed for concerns.          "

## 2018-06-08 NOTE — PROGRESS NOTES
Pharmacy has adjusted the dose of Meropenem for [facility]  Estimated Creatinine Clearance: 27 mL/min (A) (by C-G formula based on SCr of 2.25 mg/dL (H)).  61 kg (134 lb 7.7 oz)    Original order: Meropenem 1000 mg IV Q8H (extended infusion protocol)  Adjusted order: Meropenem 500 mg IV x 1 now, then Meropenem 500 mg IV Q24H (give dose after dialysis)    Clinical Pharmacist may consider additional 500 mg dose if patient receives Hemodialysis on Friday 6/8/18. Pharmacy will continue to monitor patient's renal function for further potential dose ajdustements.    Pantera [facility]  6/8/2018  6:09 AM

## 2018-06-08 NOTE — ANESTHESIA PREPROCEDURE EVALUATION
Anesthesia Evaluation     NPO Solid Status: > 8 hours  NPO Liquid Status: > 8 hours           Airway   Dental      Pulmonary    Cardiovascular     ECG reviewed  Rhythm: regular  Rate: normal    (-) pacemaker, cardiac stents      Neuro/Psych  GI/Hepatic/Renal/Endo    (+)   diabetes mellitus,     Musculoskeletal     Abdominal    Substance History      OB/GYN          Other        ROS/Med Hx Other: Patient for posterior cervical wound dehiscence. Patient is a quadriplegic with a trach tube Shiley in place.    3 days since last dialysis per RN      Phys Exam Other: Patient with trach in place, cuffed Shiley              Anesthesia Plan    ASA 4     general   (GA  Hook up to trach, will be sutured by surgeon in place)  intravenous induction     Plan discussed with CRNA.

## 2018-06-08 NOTE — H&P
CRITICAL CARE ADMISSION NOTE    Chief Complaint     Acute respiratory failure, possible wound infection    History of Present Illness     Patient is a 47 y.o. female with long-standing diabetes mellitus complicated by severe peripheral neuropathy and end-stage renal disease on hemodialysis for the last year. On April 6 she went to the emergency room complaining of neck and left arm pain. CT scan of the head was done and was negative. She was given a muscle relaxant. She had a low-grade fever at that time. The next 2 days she felt okay. She returned to the emergency room on April 9 with persistent neck pain and was discharged on prednisone. Then on April 10 she could not talk and was confused. Her  called her primary care physician and took her in to the office. She reviewed her records and noted that she had methicillin sensitive staph aureus bacteremia from her April 9 emergency room visit and directly admitted her to the hospital. She had an MRI of the brain that was normal and an MRI of the cervical spine that revealed a C5 6 osteomyelitis, discitis and a large anterior epidural mass with cord compression. Echocardiogram revealed no heart valve vegetations with an ejection fraction of 40%. She was transported to [facility] for definitive surgery because the neurosurgeons in [place] felt the case was too complicated. Upon arrival to [facility] she was somnolent and not moving her extremities.      The patient underwent a posterior cervical laminectomy C3-7 with facet screw fixation and posterior lateral fusion C5-C7, anterior cervical corpectomy C6, and excision of epidural abscess per Dr Reed. Post-operatively she was brought to the ICU, intubated and on mechanical ventilation. Post operative LP on 4/13 was clear with elevated protein of 288, WBC 43, normal glucose. Infectious disease was consulted  and she was started on Nafcillin and Vancomycin. Nephrology was consulted and she was continued on  her regular Monday, Wednesday, Friday schedule for hemodialysis. BY 4/14 she was waking up and able to nod head in answer to yes/no questions. Lower extremity spasms were noted and she was started on Baclofen.  MSSA bacteremia was present in OSH cultures and cefazolin was continued but vancomycin was discontinued. She was started on enteral nutrition on 4/16.      She developed fevers up to 103 degrees and micafungin and levaquin were added to antibiotic regimen. Transcutaneous echo showed mild MR/TR but no vegetations and NICHOLAS also showed no vegetations with EF 45%.  On 4/19 she had no gag reflex and no voluntary movement to command and Dr. Alves was consulted for trach and PEG placement. On 4/24 a #8XLT tracheostomy was placed along with a 20 [place]ian PEG.      She had multiple bronchoscopies (4/21, 4/24, 4/26) due to opacification of the left lung and mucous plugging was found on each.  By 4/20 she continued to have no improvement in quadriplegia. On 4/21 gentamycin was added due to persistent fevers and was stopped on 5/1. Central line was changed from right IJ to left IJ and tip was cultured but no growth was noted. Fevers were suspected to be related to drugs. On 4/29 CXR showed a large left pleural effusion and a chest tube was placed with blood tinged cloudy fluid return with negative cultures. Chest tube was able to be discontinued on 5/3. She developed transaminitis (, AST 3796) consistent with shock liver on 4/29, by 5/3 liver labs were much improved, with ALT 88, AST 71. Response to vent weaning were mixed, however on 5/3 she was able to tolerate PS.      Physical therapy had been working on PROM of upper and lower extremities with some movement of upper extremities but no sensation. By 5/4/18 she was felt to have reached maximum benefit from hospitalization and was transfered [facility] in [place], [place].  reports that she was on TC.  She passed her swallow evaluation.  She had  "some movement of her UEs.  She was talking with  A PMV.      During her admission at [facility] in [place] she continued to receive her scheduled hemodialysis, however her coarse was complicated by a malfunctioning AVF, placement of atunneled dialysis catheter which malfunctioned and was replaced with a left subclavian tunneled dialysis catheter on 5/30. Unfortunately they had continued complications with the catheter and on 6/2 it was removed.  Insertion of a right IJ tunneled dialysis catheter and a left IJ triple lumen catheter was done on 6/2 as well. She also underwent AVF declotting. Her last dialysis was 6/5.      The patient also had continued respiratory issues and a bronchoscopy was performed on 5/31 and 6/7 with cultures pending. She had recurrent left sided atelectasis with left main bronchus plugging.  She also had a reported seizure on 6/7 with concern for possible aspiration and was placed back on the ventilator.  Her  reports that she required ventilator after she received ativan.  He notes that she is very sensitive to sedation medication.      On 6/1 \"part of laminectomy wound opened\" and it was reported that is began to drain clear liquid concerning for a CSF leak and was empirically placed on Merrem which was later switched to tigecycline.  CT scan of head and neck was done. Dr Reed was called and felt that it was not an infection but may be a CSF leak and the patient was accepted for transfer.     Problem List, Surgical History, Family, Social History, and ROS     Patient Active Problem List   Diagnosis   • Recent Bacteremia, MSSA   • Metabolic encephalopathy   • Epidural abscess - cervical spine s/p laminectomy and decompression 4/13/18   • ESRD (end stage renal disease) on dialysis   • Hypothyroidism   • Chronic systolic heart failure   • Type 2 diabetes mellitus with renal complication   • Neuropathy   • Acute respiratory failure   • Atelectasis of left lung, recurrent   • Quadriplegia   • " Pleural effusion on left   • Pressure ulcer of coccygeal region   • Wound infection, cervical, possible vs CFS leak     Past Surgical History:   Procedure Laterality Date   • ANTERIOR CERVICAL DISCECTOMY W/ FUSION N/A 4/13/2018    Procedure: ANTERIOR CERVICAL CORPECTOMY;  Surgeon: Aryan Reed MD;  Location:  [facility];  Service: Neurosurgery   • ARTERIOVENOUS FISTULA Left    • ARTERIOVENOUS FISTULA/SHUNT SURGERY Left 5/25/2018    Procedure: REMOVAL OF CLOT FROM LEFT ARM GRAFT;  Surgeon: Jeevan Montoya MD;  Location:  [facility];  Service: Vascular   • BRONCHOSCOPY N/A 4/21/2018    Procedure: BRONCHOSCOPY AT BEDSIDE;  Surgeon: Della Ca MD;  Location:  [facility];  Service: Pulmonary   • BRONCHOSCOPY N/A 4/24/2018    Procedure: BRONCHOSCOPY AT BEDSIDE;  Surgeon: Nghia Gifford MD;  Location:  [facility];  Service: Pulmonary   • BRONCHOSCOPY N/A 4/26/2018    Procedure: BRONCHOSCOPY AT BEDSIDE;  Surgeon: Denver Capone MD;  Location:  [facility];  Service: Pulmonary   • BRONCHOSCOPY N/A 5/31/2018    Procedure: BRONCHOSCOPY @ BEDSIDE;  Surgeon: Bolivar Mckeon MD;  Location:  [facility];  Service: Pulmonary   • CENTRAL VENOUS LINE INSERTION Left 6/2/2018    Procedure: CENTRAL VENOUS LINE INSERTION;  Surgeon: Modesta Ontiveros MD;  Location:  [facility];  Service: General   • CERVICAL LAMINECTOMY DECOMPRESSION POSTERIOR N/A 4/13/2018    Procedure: CERVICAL LAMINECTOMY DECOMPRESSION POSTERIOR;  Surgeon: Aryan Reed MD;  Location:  [facility];  Service: Neurosurgery   • CORONARY ANGIOPLASTY WITH STENT PLACEMENT  2011    4 stents   • HYSTERECTOMY     • INSERTION HEMODIALYSIS CATHETER N/A 5/26/2018    Procedure: HEMODIALYSIS CATHETER INSERTION;  Surgeon: Jeevan Montoya MD;  [facility]:  [facility];  Service: General   • INSERTION HEMODIALYSIS CATHETER Left 5/30/2018    Procedure: REPLACEMENT  OF LEFT CHEST WALL HEMODIALYSIS CATHETER;  Surgeon: Jeevan Montoya MD;  [facility]:  [facility];   Service: General   • INSERTION HEMODIALYSIS CATHETER Right 6/2/2018    Procedure: HEMODIALYSIS CATHETER INSERTION;  Surgeon: Modesta Ontiveros MD;  [facility]:  [facility];  Service: General   • REMOVAL PERITONEAL DIALYSIS CATHETER Left 6/2/2018    Procedure: REMOVAL TUNNELED DIALYSIS CATHETER;  Surgeon: Modesta Ontiveros MD;  Location:  [facility];  Service: General   • TRACHEOSTOMY AND PEG TUBE INSERTION N/A 4/24/2018    Procedure: TRACHEOSTOMY AND PERCUTANEOUS ENDOSCOPIC GASTROSTOMY TUBE INSERTION;  Surgeon: Denver Alves MD;  Location:  [facility];  Service: General       Allergies   Allergen Reactions   • Zosyn [Piperacillin Sod-Tazobactam So] Itching     Current Facility-Administered Medications on File Prior to Encounter   Medication   • [COMPLETED] sodium chloride 0.9 % infusion     Current Outpatient Prescriptions on File Prior to Encounter   Medication Sig   • Amino Acids-Protein Hydrolys (PRO-STAT) liquid 1 packet by Per G Tube route 2 (Two) Times a Day.   • DULoxetine (CYMBALTA) 20 MG capsule Take 20 mg by mouth Daily.   • epoetin mini (EPOGEN,PROCRIT) 43749 UNIT/ML injection Inject 1 mL under the skin 3 (Three) Times a Week.   • gabapentin (NEURONTIN) 400 MG capsule Take 400 mg by mouth every night at bedtime.   • Heparin Sodium, Porcine, (HEPARIN, PORCINE,) 5000 UNIT/ML injection Inject 1 mL under the skin Every 8 (Eight) Hours.   • insulin regular (humuLIN R,novoLIN R) 100 UNIT/ML injection Inject 0-24 Units under the skin Every 6 (Six) Hours.   • insulin regular (humuLIN R,novoLIN R) 100 UNIT/ML injection Inject 7 Units under the skin Every 6 (Six) Hours.   • levothyroxine (SYNTHROID, LEVOTHROID) 75 MCG tablet Take 75 mcg by mouth Daily.   • lidocaine (LIDODERM) 5 % Place 1 patch on the skin Daily. Remove & Discard patch within 12 hours or as directed by MD   • loratadine (CLARITIN) 10 MG tablet Take 10 mg by mouth Daily.   • midodrine (PROAMATINE) 2.5 MG tablet Take 2.5 mg by mouth 2 (Two) Times a Day.  "  • pantoprazole (PROTONIX) 40 MG EC tablet Take 40 mg by mouth Daily.   • sertraline (ZOLOFT) 100 MG tablet Take 100 mg by mouth Daily.   • sodium chloride 0.9 % solution 250 mL with nafcillin 2 g reconstituted solution 6 g Infuse 6 g into a venous catheter Every 12 (Twelve) Hours for 82 doses.   • traMADol (ULTRAM) 50 MG tablet Take 50 mg by mouth 2 (Two) Times a Day.   • [DISCONTINUED] piperacillin-tazobactam (ZOSYN) 2-0.25 GM/50ML IVPB Infuse 50 mL into a venous catheter Every 8 (Eight) Hours for 39 doses.     MEDICATION LIST AND ALLERGIES REVIEWED.    Family History   Problem Relation Age of Onset   • Family history unknown: Yes     Social History   Substance Use Topics   • Smoking status: Never Smoker   • Smokeless tobacco: Never Used   • Alcohol use No     Social History     Social History Narrative    Disabled since started dialysis 1 year ago.  Has been in a wheelchair because of severe peripheral neuropathy.      FAMILY AND SOCIAL HISTORY REVIEWED.    Review of Systems  ALL OTHER SYSTEMS REVIEWED AND ARE NEGATIVE.  Patient on ventilator, sedated  Physical Exam and Clinical Information   /86   Pulse 87   Temp 97.7 °F (36.5 °C) (Axillary)   Resp 18   Ht 157 cm (61.81\")   Wt 61 kg (134 lb 7.7 oz)   LMP  (LMP Unknown)   SpO2 100%   BMI 24.75 kg/m²   Physical Exam:   GENERAL: well developed WW   HEENT: Pupils small equal reactive. Tracheostomy   LUNGS: breath sounds bilateral, clear anteriorly   HEART: regular rhythm. Normal S1S2, no murmer   ABDOMEN: PEG. Bowel sounds present, soft   EXTREMITIES: LUE surgical wound intact over AVF, trace edema   NEURO/PSYCH: sedates   SKIN: CERVICAL INCISION SIZE OF AN ERASER, TUNNELING. THE WOUND WAS PACKED WITH 1/2 INCH GAUZE; COCCYX WOUND DRY, ESCHAR, UNSTAGABLE.      Results from last 7 days  Lab Units 06/07/18  0311 06/06/18  0218 06/04/18  0247   WBC 10*3/mm3 9.88 8.33 13.13*   HEMOGLOBIN g/dL 7.9* 8.0* 7.7*   PLATELETS 10*3/mm3 304 282 302       Results " from last 7 days  Lab Units 06/07/18  0311 06/06/18  0218 06/05/18  0106   SODIUM mmol/L 136 139 137   POTASSIUM mmol/L 3.4* 3.4* 4.5   CO2 mmol/L 24.7 28.8 24.3   BUN mg/dL 46* 20 31*   CREATININE mg/dL 1.99* 1.45* 2.32*   MAGNESIUM mg/dL 2.3  --   --    PHOSPHORUS mg/dL 2.7  --   --    GLUCOSE mg/dL 316* 240* 258*     Estimated Creatinine Clearance: 30.5 mL/min (A) (by C-G formula based on SCr of 1.99 mg/dL (H)).        Results from last 7 days  Lab Units 06/08/18  0349   PH, ARTERIAL pH units 7.497*   PCO2, ARTERIAL mm Hg 30.7*   PO2 ART mm Hg 114.0*     Lab Results   Component Value Date    LACTATE 0.5 05/25/2018        IMAGES:     CT HEAD WITHOUT CONTRAST:  FINDINGS:     Moderate generalized cerebral atrophy.  No acute intracranial abnormality.  No midline shift or mass effect.  No hydrocephalus or intracranial hemorrhage.  There is no CT evidence of acute vascular territory infarct.  Bone windows show no acute osseous abnormality.     IMPRESSION:  1. Moderate atrophy for patient age.  2. No CT evidence of acute intracranial abnormality.    CT SOFT TISSUE NECK WO CONTRAST:  FINDINGS:      Large soft tissue defect posterior midline neck at the level of  patient's cervical fusion and posterior decompression which corresponds  to location of previous incision and drainage for reported history of  epidural abscess. No fluid collections seen within the incisional  defect. Packing material is noted. No large amounts of fluid are  identified in the surrounding soft tissues. No significant edema of the  subcutaneous tissues identified.     No bony stenosis. Streak artifact limits fine assessment of the central  canal. Epidural fluid collection cannot be excluded on this exam.     Tracheostomy tube is noted. The prevertebral soft tissues are within  normal limits. Left pleural effusion and left lung airspace disease.     IMPRESSION:  1. Large soft tissue defect posterior midline neck at level of previous  surgery  corresponding to area of incision and drainage. No significant  accumulation fluid or debris identified.  2. No significant edema of the adjacent soft tissues.  3. Post surgical changes from cervical disc fusion and posterior  decompression C5-C7.  4. Left pleural effusion and left airspace disease.    I reviewed the patient's results and images.     [facility] Problem List     * (Principal)Acute respiratory failure    Metabolic encephalopathy    Wound infection, cervical, possible vs CFS leak    Recent Bacteremia, MSSA    Epidural abscess - cervical spine s/p laminectomy and decompression 4/13/18    ESRD (end stage renal disease) on dialysis    Chronic systolic heart failure    Type 2 diabetes mellitus with renal complication    Neuropathy    Atelectasis of left lung    Quadriplegia    Pleural effusion on left    Pressure ulcer of coccygeal region    Hypothyroidism          Plan/Recommendations     ICU admission  Mechanical ventilation  Pan culture, wound culture  Merrem, Micafungin pending cultures  Neurosurgery consult in am, Dr Reed  Nephrology consult in am for HD  CT Chest for pleural effusion, recurrent left atelectasis  Possible pleural drain tomorrow  Follow up cultures  SS insulin coverage  Nutrition panel   Initiate eternal feeding today  Heparin for DVT prophylaxis  PPI for GI prophylaxis      Critical Care time spent in direct patient care: *65 minutes (excluding procedure time, if applicable) including high complexity decision making to assess, manipulate, and support vital organ system failure in this individual who has impairment of one or more vital organ systems such that there is a high probability of imminent or life threatening deterioration in the patient’s condition.    LAYTON Alas  Pulmonary and Critical Care Medicine  06/08/18 4:06 AM     I reviewed her chart, interviewed her , did the above physical examination, reviewed her x-ray and lab data and modified the above  note to reflect my findings    Della Ca MD, FACCP  Pulmonary and Critical Care    CC: Luan Gandara MD

## 2018-06-08 NOTE — PROCEDURES
"Chest Tube Insertion  Date/Time: 6/8/2018 11:20 AM  Performed by: YING BLANC  Authorized by: YING BLANC   Consent: Verbal consent obtained.  Risks and benefits: risks, benefits and alternatives were discussed  Consent given by: spouse  Patient understanding: patient states understanding of the procedure being performed  Patient consent: the patient's understanding of the procedure matches consent given  Procedure consent: procedure consent matches procedure scheduled  Relevant documents: relevant documents present and verified  Test results: test results available and properly labeled  Site marked: the operative site was marked  Imaging studies: imaging studies available  Patient identity confirmed: provided demographic data and hospital-assigned identification number  Time out: Immediately prior to procedure a \"time out\" was called to verify the correct patient, procedure, equipment, support staff and site/side marked as required.  Indications: pleural effusion    Sedation:  Patient sedated: yes  Sedatives: midazolam (1 mg IV)  Vitals: Vital signs were monitored during sedation.  Anesthesia: local infiltration    Anesthesia:  Local Anesthetic: lidocaine 1% without epinephrine  Anesthetic total: 5 mL  Preparation: skin prepped with ChloraPrep  Placement location: Left mid axillary line, 4-5 ic space.  Tube size (Macedonian): 14.  Ultrasound guidance: yes  Tension pneumothorax heard: no  Tube connected to: suction  Drainage characteristics: clear and yellow  Suture material: 0 silk  Post-insertion x-ray findings: tube in good position  Patient tolerance: Patient tolerated the procedure well with no immediate complications        Patient is on vent, chronic trach.    A 14 Fr Alex catheter was inserted with a Seldinger technique, once site was identified with the ultrasound.  No immediate complication.  Samples sent for cell count, chemistries, microbiology and cytology  Immediately 1,000 ml was collected in the " [facility]  CXR ordered

## 2018-06-08 NOTE — OP NOTE
OPERATIVE REPORT    DATE OF OPERATION: 6/8/18    PREOPERATIVE DIAGNOSIS: Posterior cervical wound dehiscence    POSTOPERATIVE DIAGNOSIS: Same    PROCEDURE PERFORMED: Posterior cervical wound debridement, removal of facet screws, and closure    SURGEON: Aryan Reed MD    ASSISTANT: Benita An PA-C    INDICATIONS: The patient had a posterior cervical laminectomy from C3 to C7 7 weeks ago for epidural abscess.  She developed a wound separation in the upper incision with wound drainage and was transferred back from [place] to [place].  CT scan showed a large subcutaneous and subfascial air collection at the site of laminectomy indicating wound dehiscence, and with the visible wound drainage indicating wound infection.  The patient was taken to surgery for wound debridement and closure.    DESCRIPTON OF PROCEDURE: The tracheostomy was sutured to the anterior neck to prevent dislodgment in the prone position.  Surgery was performed under general anesthesia in the prone position on rolled blankets with the head fixed in the [place] head gannon and pins.  The posterior scalp was shaved and the posterior scalp neck and upper back were prepared sterilely and draped.  There was a perforation in the upper portion incision with iodoform gauze protruding which had been packed in the wound.  The incision was opened widely from C2 to C7 and another iodoform gauze piece used to pack the incision in [place] was found within the wound.  The wound was grossly contaminated and the muscle was retracted widely to expose the prior laminectomy site and facet screws.  The wound was filled with a 50% Betadine/ 50% hydrogen peroxide solution which was allowed to sit for several minutes and then irrigated away with antibiotic irrigation.  The wound was debrided of all nonvital tissue coating the margins.  The facet screw set screws were removed along with the дмитрий and the facet screws themselves.  All the inlay bone graft in the lateral  recesses was removed and a curet was used to be sure that all loose and necrotic material was freed and removed from the incision.  The ligamentum flavum had been left over the dura hinged on the right side and the ligamentum was removed to expose the dura fully.  Incision was then again filled with hydrogen peroxide and Betadine solution and allowed to sit for several more minutes, then cleaned out with antibiotic irrigation.  Finally the incision was closed in layers with 0 Vicryl for the muscle, fascia, and subcutaneous/subcuticular layer with staples for the skin.  The blood loss was less than 50 cc.  The patient was turned from the OR table and returned to the intensive care unit.    Aryan Reed M.D.

## 2018-06-08 NOTE — ANESTHESIA POSTPROCEDURE EVALUATION
Patient: Lizet Webster    Procedure Summary     Date:  06/08/18 Room / Location:   [facility] / [facility]    Anesthesia Start:  1414 Anesthesia Stop:  1622    Procedure:  POSTERIOR CERVICAL WOUND DEBRIDEMENT AND CLOSURE WITH HARDWARE REMOVAL (N/A Spine Cervical) Diagnosis:       Wound infection      (Wound infection [T14.8XXA, L08.9])    Surgeon:  Aryan Reed MD Provider:  Luis Lawson MD    Anesthesia Type:  general ASA Status:  4          Anesthesia Type: general  Last vitals  BP   108/76   Temp   97.0   Pulse   77   Resp   10-v   SpO2   100     Post Anesthesia Care and Evaluation    Patient location during evaluation: PACU  Patient participation: complete - patient cannot participate  Pain score: 0  Pain management: adequate  Airway patency: patent  Anesthetic complications: No anesthetic complications  PONV Status: none  Cardiovascular status: hemodynamically stable and acceptable  Respiratory status: acceptable, trach and ventilator  Hydration status: acceptable

## 2018-06-08 NOTE — PLAN OF CARE
Problem: Ventilation, Mechanical Invasive (Adult)  Intervention: Prevent Airway Displacement/Mechanical Dysfunction   06/08/18 5553   Prevent Airway Displacement/Mechanical Dysfunction   Airway Safety Measures mask at bedside;suction at bedside

## 2018-06-08 NOTE — NURSING NOTE
ACC UPDATE REPORT: [facility]          PATIENT NAME: Lizet Webster    PATIENT ID: [id number]    BED: [id number]    BED TYPE: ICU    BED GIVEN TO: Lavinia López RN    Change in condition/New information: NURSE HAD CHANGED DRESSING ON CERVICAL INCISION AND THERE IS AN OPENING SHE DESCRIBES AS THE SIZE OF AN ERASER THAT IS 1 1/2 INCHES DEEP THAT IS TUNNELING.  12 O'CLOCK IS 5 CM, 3 O'CLOCK 4 CM, 6 O'CLOCK 2 CM, AND 9 O'CLOCK 3 MG.  THE WOUND WAS PACKED WITH 1/2 INCH NUGAUZE AND COVERED WITH A MEPILEX.  HER COCCYX WOUND WAS CHANCED AS WELL AND A 4/4 WAS PLACED OVER IT AND MEPILEX PLACED.  THEY ARE AWAITING TRANSPORT AT THIS TIME.  SHE IS GOING TO CALL [facility] ONCE TRANSPORT IS FOUND.    [facility] RN: ANDRES FERNANDES RN    Report from:  LAVINIA LÓPEZ RN    Time report taken:  2045.

## 2018-06-08 NOTE — CONSULTS
NEUROSURGERY CONSULT    Referring Provider: Della Ca, *  Reason for Consultation: Posterior cervical wound dehiscence    Patient Care Team:  Luan Gandara MD as PCP - General (Internal Medicine)    Chief complaint: Posterior cervical wound drainage    Subjective .     History of present illness:  The patient presented with a cervical epidural abscess from C3 to C7 on 4/13/18 and had a posterior cervical laminectomy from C3 to C7 with facet screw fixation and posterior lateral fusion from C5 to C7, and at the same time had an anterior cervical corpectomy at C6 with excision of the ventral epidural abscess.  She remained quadriplegic, requiring tracheostomy and PEG.  She was transferred to [place] for long-term care.  We received a call yesterday that there was an opening in the posterior cervical incision and she was transferred back to [place] for evaluation.  She has recently been placed back on the ventilator.  Remains quadriplegic.    Results Review:   Cervical CT scan shows a large posterior cervical area pocket extending the length of the laminectomy from C3 to C7.    Review of Systems  Pertinent items are noted in HPI, all other systems reviewed.    History  Past Medical History:   Diagnosis Date   • CAD (coronary artery disease)     stentsx4 2011   • Diabetes mellitus     ESRD, peripheral neuropathy   • ESRD (end stage renal disease) on dialysis 4/12/2018   • Hx of hepatitis    • Hypothyroidism 4/12/2018   • Systolic heart failure 04/12/2018    EF 40%   ,   Past Surgical History:   Procedure Laterality Date   • ANTERIOR CERVICAL DISCECTOMY W/ FUSION N/A 4/13/2018    Procedure: ANTERIOR CERVICAL CORPECTOMY;  Surgeon: Aryan Reed MD;  [facility]: [facility];  Service: Neurosurgery   • ARTERIOVENOUS FISTULA Left    • ARTERIOVENOUS FISTULA/SHUNT SURGERY Left 5/25/2018    Procedure: REMOVAL OF CLOT FROM LEFT ARM GRAFT;  Surgeon: Jeevan Montoya MD;  Location: [facility];  Service: Vascular   •  BRONCHOSCOPY N/A 4/21/2018    Procedure: BRONCHOSCOPY AT BEDSIDE;  Surgeon: Della Ca MD;  Location:  [facility];  Service: Pulmonary   • BRONCHOSCOPY N/A 4/24/2018    Procedure: BRONCHOSCOPY AT BEDSIDE;  Surgeon: Nghia Gifford MD;  Location:  [facility];  Service: Pulmonary   • BRONCHOSCOPY N/A 4/26/2018    Procedure: BRONCHOSCOPY AT BEDSIDE;  Surgeon: Denver Capone MD;  Location:  [facility];  Service: Pulmonary   • BRONCHOSCOPY N/A 5/31/2018    Procedure: BRONCHOSCOPY @ BEDSIDE;  Surgeon: Bolivar Mckeon MD;  [facility]:  [facility];  Service: Pulmonary   • CENTRAL VENOUS LINE INSERTION Left 6/2/2018    Procedure: CENTRAL VENOUS LINE INSERTION;  Surgeon: Modesta Ontiveros MD;  [facility]:  [facility];  Service: General   • CERVICAL LAMINECTOMY DECOMPRESSION POSTERIOR N/A 4/13/2018    Procedure: CERVICAL LAMINECTOMY DECOMPRESSION POSTERIOR;  Surgeon: Aryan Reed MD;  Location:  [facility];  Service: Neurosurgery   • CORONARY ANGIOPLASTY WITH STENT PLACEMENT  2011    4 stents   • HYSTERECTOMY     • INSERTION HEMODIALYSIS CATHETER N/A 5/26/2018    Procedure: HEMODIALYSIS CATHETER INSERTION;  Surgeon: Jeevan Montoya MD;  [facility]:  [facility];  Service: General   • INSERTION HEMODIALYSIS CATHETER Left 5/30/2018    Procedure: REPLACEMENT  OF LEFT CHEST WALL HEMODIALYSIS CATHETER;  Surgeon: Jeevan Montoya MD;  [facility]  [facility];  Service: General   • INSERTION HEMODIALYSIS CATHETER Right 6/2/2018    Procedure: HEMODIALYSIS CATHETER INSERTION;  Surgeon: Modesta Ontiveros MD;  [facility];  Service: General   • REMOVAL PERITONEAL DIALYSIS CATHETER Left 6/2/2018    Procedure: REMOVAL TUNNELED DIALYSIS CATHETER;  Surgeon: Modesta Ontiveros MD;  [facility];  Service: General   • TRACHEOSTOMY AND PEG TUBE INSERTION N/A 4/24/2018    Procedure: TRACHEOSTOMY AND PERCUTANEOUS ENDOSCOPIC GASTROSTOMY TUBE INSERTION;  Surgeon: Denver Alves MD;  Location:  [facility];   Service: General   ,   Family History   Problem Relation Age of Onset   • Family history unknown: Yes   ,   Social History   Substance Use Topics   • Smoking status: Never Smoker   • Smokeless tobacco: Never Used   • Alcohol use No   ,   Prescriptions Prior to Admission   Medication Sig Dispense Refill Last Dose   • acetaminophen (TYLENOL) 650 MG suppository Insert 650 mg into the rectum Every 4 (Four) Hours As Needed for Mild Pain  or Fever (temp > 101F).      • dakin's (HYSEPT) 0.25 % solution topical solution Apply  topically Daily As Needed.      • DAPTOmycin in sodium chloride 0.9 % 50 mL Infuse 500 mg into a venous catheter 3 (Three) Times a Week. Tuesday, Thursday, Saturday 6/7/2018 at 6/19/2018   • diphenhydrAMINE (BENADRYL) 25 mg capsule Take 25 mg by mouth Every 6 (Six) Hours As Needed for Itching.      • diphenhydrAMINE-zinc acetate 2-0.1 % cream Apply 1 application topically Daily.      • epoetin mini (EPOGEN,PROCRIT) 81117 UNIT/ML injection Inject 1 mL under the skin 3 (Three) Times a Week.      • famotidine (PEPCID) 20 MG tablet Take 40 mg by mouth 2 (Two) Times a Day.      • Heparin Sodium, Porcine, (HEPARIN, PORCINE,) 5000 UNIT/ML injection Inject 1 mL under the skin Every 8 (Eight) Hours.      • hydrocortisone 2.5 % cream Apply 1 application topically 2 (Two) Times a Day As Needed.      • hydrOXYzine (ATARAX) 25 MG tablet Take 25 mg by mouth Every 8 (Eight) Hours As Needed for Itching.      • insulin glargine (LANTUS) 100 UNIT/ML injection Inject 10 Units under the skin Daily.      • insulin lispro (humaLOG) 100 UNIT/ML injection Inject 0-6 Units under the skin Every 6 (Six) Hours. Sugar 120-159 - give 0 units  Sugar 160-199 - give 2 units  Sugar 200-239 - give 3 units  Sugar 240-279 - give 4 units  Sugar 280-319 - give 5 units  Sugar 320-349 - give 6 Units      • ipratropium-albuterol (DUONEB) 0.5-2.5 mg/3 ml nebulizer Take 3 mL by nebulization Every 4 (Four) Hours As Needed for Wheezing.      •  ipratropium-albuterol (DUONEB) 0.5-2.5 mg/3 ml nebulizer Take 3 mL by nebulization Every 4 (Four) Hours.      • lactulose (CHRONULAC) 10 GM/15ML solution Take 10 g by mouth Daily.      • levETIRAcetam (KEPPRA) 100 MG/ML solution Take 250 mg by mouth 2 (Two) Times a Day.      • levothyroxine (SYNTHROID, LEVOTHROID) 75 MCG tablet Take 75 mcg by mouth Daily.      • lidocaine (LIDODERM) 5 % Place 1 patch on the skin Daily. Remove & Discard patch within 12 hours or as directed by MD      • lidocaine-prilocaine (EMLA) 2.5-2.5 % cream Apply 1 application topically 3 (Three) Times a Week.      • loperamide (IMODIUM) 2 MG capsule Take 2 mg by mouth 2 (Two) Times a Day As Needed for Diarrhea.      • micafungin sodium 100 mg in sodium chloride 0.9 % 100 mL IVPB Infuse 100 mg into a venous catheter Daily.      • midodrine (PROAMATINE) 5 MG tablet Take 10 mg by mouth Daily.      • nitroglycerin (NITROSTAT) 0.4 MG SL tablet Place 0.4 mg under the tongue Every 5 (Five) Minutes As Needed for Chest Pain. Take no more than 3 doses in 15 minutes.      • ondansetron (ZOFRAN) 4 MG tablet Take 4 mg by mouth Every 6 (Six) Hours As Needed for Nausea or Vomiting.      • potassium chloride (MICRO-K) 10 MEQ CR capsule Take 40 mEq by mouth Daily As Needed (If K < 3.5 Please give 40 MeQ per G-tube. dissolve in 120 mL of water or juice).      • sennosides-docusate sodium (SENOKOT-S) 8.6-50 MG tablet Take 2 tablets by mouth 2 (Two) Times a Day As Needed for Constipation.      • sertraline (ZOLOFT) 100 MG tablet Take 100 mg by mouth Daily.      • tigecycline in sodium chloride 0.9 % 100 mL IVPB Infuse 50 mg into a venous catheter Every 12 (Twelve) Hours.      • vitamin D (ERGOCALCIFEROL) 68175 units capsule capsule Take 50,000 Units by mouth 1 (One) Time Per Week.      • Amino Acids-Protein Hydrolys (PRO-STAT) liquid 1 packet by Per G Tube route 2 (Two) Times a Day.      • DULoxetine (CYMBALTA) 20 MG capsule Take 20 mg by mouth Daily.      •  "gabapentin (NEURONTIN) 400 MG capsule Take 400 mg by mouth every night at bedtime.      • insulin regular (humuLIN R,novoLIN R) 100 UNIT/ML injection Inject 0-24 Units under the skin Every 6 (Six) Hours.  12    • insulin regular (humuLIN R,novoLIN R) 100 UNIT/ML injection Inject 7 Units under the skin Every 6 (Six) Hours.  12    • loratadine (CLARITIN) 10 MG tablet Take 10 mg by mouth Daily.      • pantoprazole (PROTONIX) 40 MG EC tablet Take 40 mg by mouth Daily.      • sodium chloride 0.9 % solution 250 mL with nafcillin 2 g reconstituted solution 6 g Infuse 6 g into a venous catheter Every 12 (Twelve) Hours for 82 doses.      • traMADol (ULTRAM) 50 MG tablet Take 50 mg by mouth 2 (Two) Times a Day.       and Allergies:  Zosyn [piperacillin sod-tazobactam so]    Objective     Vital Signs   Blood pressure 120/78, pulse 84, temperature 97.7 °F (36.5 °C), temperature source Axillary, resp. rate 18, height 157 cm (61.81\"), weight 61 kg (134 lb 7.7 oz), SpO2 100 %, not currently breastfeeding.    Physical Exam:  NEUROLOGICAL EXAMINATION:      MENTAL STATUS:  Alert.Unable to vocalize because of a tracheostomy but can mouth words.     CRANIAL NERVES:  Cranial nerves III, IV and VI:  PERRLADC.  Extraocular movements are intact.  Nystagmus is not present.  Cranial nerve V:  Facial sensation is intact to light touch.  Cranial nerve VII:  Muscles of facial expression reveal no asymmetry.  Cranial nerves IX and X:  Palate elevates symmetrically.  Cranial nerve XI:  Shoulder shrug is intact.  Cranial nerve XII:  Tongue is midline without evidence of atrophy or fasciculation.    MUSCULOSKELETAL:  Posterior cervical wound shows a small opening which appears to enter a large subcutaneous/subfascial pocket of air.     MOTOR:  Quadriplegia      Assessment/Plan     DIAGNOSIS: [Posterior cervical wound dehiscence and nonhealing.]    RECOMMENDATION: [Will need a posterior cervical wound debridement and reclosure, with removal of the " hardware.  We will schedule for surgery later today.]    Aryan Reed MD  06/08/18  7:07 AM

## 2018-06-08 NOTE — PROGRESS NOTES
Clinical Nutrition   Reason For Visit: MDR, Admission assessment, Identified at risk by screening criteria, EN    Patient Name: Lizet Webster  YOB: 1970  MRN: [id number]  Date of Encounter: 06/08/18 12:22 PM  Admission date: 6/8/2018      Nutrition Assessment     Hospital Problem List  Principal Problem:    Acute respiratory failure  Active Problems:    Recent Bacteremia, MSSA    Metabolic encephalopathy    Epidural abscess - cervical spine s/p laminectomy and decompression 4/13/18    ESRD (end stage renal disease) on dialysis    Hypothyroidism    Chronic systolic heart failure    Type 2 diabetes mellitus with renal complication    Neuropathy    Atelectasis of left lung, recurrent    Quadriplegia    Pleural effusion on left    Pressure ulcer of coccygeal region    Wound infection, cervical, possible vs CFS leak      PMH: She  has a past medical history of CAD (coronary artery disease); Diabetes mellitus; ESRD (end stage renal disease) on dialysis (4/12/2018); hepatitis; Hypothyroidism (4/12/2018); and Systolic heart failure (04/12/2018).   PSxH: She  has a past surgical history that includes Coronary angioplasty with stent (2011); Hysterectomy; AV fistula placement (Left); cervical laminectomy decompression posterior (N/A, 4/13/2018); Anterior cervical discectomy w/ fusion (N/A, 4/13/2018); Bronchoscopy (N/A, 4/21/2018); tracheostomy and peg tube insertion (N/A, 4/24/2018); Bronchoscopy (N/A, 4/24/2018); Bronchoscopy (N/A, 4/26/2018); arteriovenous fistula/shunt surgery (Left, 5/25/2018); Hemodialysis Catheter (N/A, 5/26/2018); Hemodialysis Catheter (Left, 5/30/2018); Bronchoscopy (N/A, 5/31/2018); Hemodialysis Catheter (Right, 6/2/2018); Central venous catheter insertion (Left, 6/2/2018); Peritoneal Dialysis Catheter Removal (Left, 6/2/2018); and Bronchoscopy (N/A, 6/7/2018).       Other Applicable Diagnosis:  Hx of Severe peripheral neuropathy/wheelchair bound, quadriplegia  Chronic LLE DVT  ESRD- HD  MWF    Admitted to OSH on (4/9/18), transferred to [facility] on (4/12/18), d/c'd to LTACH on (5/4/18), and  transferred back to [facility] on (5/8).   S/p recent cervical laminectomy decompression, anterior cervical corpectomy (4/13)  S/p trach/PEG placement (4/24)  S/p multiple bronchoscopies (4/21, 4/24, 4/26, 5/31, 6/7)  Passed SLP MBS at OSH on (6/4)- rec regular texture with thin liquids    A/c resp failure  Vent, chronic trach  Posterior cervical wound dehiscence  Plan for wound debridement and reclosure, removal of hardware today (6/8)  L pleural effusion, s/p chest tube placement (6/8)    WOCN (6/8)- Pt has unstageable PI (POA) with 100% eschar coccyx. PT Wound Care consulted to assess for debridement. Pt has stage 1 PI (POA) right buttocks. Bilateral groin areas with MASD and yeast.      Reported/Observed/Food/Nutrition Related History     Pt currently on the vent, spoke with pt's , states that pt was started on PO diet at outside facility. RD called and spoke with medical records at [facility], who was able to report pt's enteral nutrition hx at [facility]. Pt started on Suplena and changed to Vital 1.2 secondary to diarrhea. States that pt was having nausea and high residuals on Vital 1.2 and then changed Vital 1.5 with ProStat. EN changed to Osmolite 1.5 per pt's request secondary to pt thinking that EN formula was causing diarrhea. Intensivist reports plan to start EN after surgery today.       Anthropometrics   Height: 62 in  Weight: 151 lb per bed scale wt from [facility] on (6/7) per medical records. Pt's  reports that bed scale wt of 134 lb on (6/8) is not accurate.    BMI: 27.6  BMI classification: Overweight: 25.0-29.9kg/m2    Weight change: Per medical records from [facility], pt's weights have been between 150-165 lb (5/9= 164 lb, 6/7= 151 lb).  reports that pt's wt has been trending down.     Date Weight (kg) Weight (lbs) Weight Method   6/8/2018 61 kg 134 lb 7.7 oz Bed scale   5/3/2018 78.7 kg 173 lb 8 oz  -   4/30/2018 88.5 kg 195 lb 1.7 oz Bed scale   4/29/2018 89.2 kg 196 lb 10.4 oz -   4/27/2018 78.7 kg 173 lb 8 oz -   4/12/2018 65 kg 143 lb 4.8 oz Bed scale       Needs Assessment   Height used: 62 in  Weight used: 151 lb  PSU (Ve= 5.8, Tmax= 36.5)= 1297 kcal/day  MSJ= 1285 x1.3= 1670 kcal/day  20-25 kcal/kg= 4652-2740 kcal/day    Protein needs:  1.2-1.5 g/kg=  g pro/day      Labs reviewed   Labs reviewed: Yes    Results from last 7 days  Lab Units 06/08/18  0420 06/07/18  0311 06/06/18  0218   SODIUM mmol/L 139 136 139   POTASSIUM mmol/L 4.5 3.4* 3.4*   CHLORIDE mmol/L 103 98* 97*   CO2 mmol/L 22.0 24.7 28.8   BUN mg/dL 59* 46* 20   CREATININE mg/dL 2.25* 1.99* 1.45*   GLUCOSE mg/dL 86 316* 240*   CALCIUM mg/dL 9.4 9.2 8.7       Results from last 7 days  Lab Units 06/08/18  0420 06/07/18  0311   MAGNESIUM mg/dL 2.3  2.3 2.3   PHOSPHORUS mg/dL 3.6  3.6 2.7       Results from last 7 days  Lab Units 06/08/18  0420 06/08/18  0416 06/07/18  0311 06/06/18  0218   WBC 10*3/mm3 10.30  --  9.88 8.33   CRP mg/dL  --  4.82* 7.67* 15.25*     Medications reviewed   Medications reviewed: Yes  Pertinent: D5% NS at 50 ml/hr    Current Nutrition Prescription   PO: NPO Diet    Nutrition Diagnosis     Problem Inadequate energy intake   Etiology Diet order   Signs/Symptoms npo       Intervention     Goal:   General: Nutrition support treatment  EN/PN: Initiate EN    Intervention: Follow treatment progress, Care plan reviewed, Nutrition support order placed   -EN recs provided:  IsoSource 1.5 to goal rate of 45 ml/hr (goal volume= 900 ml/day) with 1 ProStat 2x.day and free water at 20 ml/hr  -Will provide at goal volume:  1550 kcal, 100%  91 g pro, 99%  13 g fiber  51 mEq K+  1018 mg phos  1100 ml total water      Monitoring/Evaluation:       Monitoring/Evaluation: Per protocol, Pertinent labs, EN delivery/tolerance, Weight, Skin status, GI status, Symptoms  Will Continue to follow per protocol  Edie Downey, MS RD/LD  CNSC  Time Spent: 60 minutes

## 2018-06-08 NOTE — PROGRESS NOTES
Discharge Planning Assessment  [facility]     Patient Name: Lizet Webster  MRN: [id number]  Today's Date: 6/8/2018    Admit Date: 6/8/2018          Discharge Needs Assessment     Row Name 06/08/18 1059       Living Environment    Lives With spouse    Name(s) of Who Lives With Patient [place] spouse  [phone]    Current Living Arrangements --   pt came from [facility]    Primary Care Provided by spouse/significant other    Provides Primary Care For no one, unable/limited ability to care for self    Family Caregiver if Needed spouse    Quality of Family Relationships helpful;involved;supportive    Living Arrangement Comments Pt does live with spouse in [facility].       Resource/Environmental Concerns    Resource/Environmental Concerns none       Transition Planning    Patient/Family Anticipates Transition to long term care facility       Discharge Needs Assessment    Readmission Within the Last 30 Days no previous admission in last 30 days    Concerns to be Addressed basic needs;discharge planning    Equipment Currently Used at Home oxygen;wheelchair;walker, rolling            Discharge Plan     Row Name 06/08/18 1102       Plan    Plan TBD    Patient/Family in Agreement with Plan yes    Plan Comments Met with spouse in room pt came from [facility] [place] pt is going to surgery today to remove hardware and cervical wound debridement. Pt [facility]. Pt is on home 02 at night thru [facility]        Destination     No service coordination in this encounter.      Durable Medical Equipment     No service coordination in this encounter.      Dialysis/Infusion     No service coordination in this encounter.      Home Medical Care     No service coordination in this encounter.      Social Care     No service coordination in this encounter.        Expected Discharge Date and Time     Expected Discharge Date Expected Discharge Time    Jun 12, 2018               Demographic Summary     Row Name 06/08/18  1055       General Information    Admission Type inpatient    Arrived From hospital    Referral Source admission list    Reason for Consult discharge planning       Contact Information    Permission Granted to Share Info With             Functional Status     Row Name 06/08/18 1058       Functional Status    Usual Activity Tolerance poor    Functional Status Comments Pt confined to w/c she can stand to transfer       Functional Status, IADL    Medications assistive equipment and person    Meal Preparation assistive equipment and person    Housekeeping assistive equipment and person    Laundry assistive equipment and person    Shopping assistive equipment and person    IADL Comments Pt came from [facility]            Psychosocial    No documentation.           Abuse/Neglect    No documentation.           Legal    No documentation.           Substance Abuse    No documentation.           Patient Forms    No documentation.         Della Baptiste RN

## 2018-06-08 NOTE — CONSULTS
NAL Consult Note    Lizet Webster  1970  [id number]    Date of Admit:  6/8/2018    Date of Consult: 6/8/2018        Requesting Provider: Della Ca MD  Evaluating Physician: Narayan Holguin MD        Reason for Consultation: ESRD    History of present illness:    Patient is a 47 y.o.  Yr old female presented with a cervical epidural abscess from C3 to C7 on 4/13/18 and had a posterior cervical laminectomy from C3 to C7 with facet screw fixation and posterior lateral fusion from C5 to C7, and at the same time had an anterior cervical corpectomy at C6 with excision of the ventral epidural abscess.  She remained quadriplegic, requiring tracheostomy and PEG.  She was transferred to [place] for long-term care.  We received a call yesterday that there was an opening in the posterior cervical incision and she was transferred back to [place] for evaluation.  She has recently been placed back on the ventilator.  Remains quadriplegic.    Patient with long-standing diabetes mellitus complicated by severe peripheral neuropathy and end-stage renal disease on hemodialysis for the last year. On April 6 she went to the emergency room complaining of neck and left arm pain. CT scan of the head was done and was negative. She was given a muscle relaxant. She had a low-grade fever at that time. The next 2 days she felt okay. She returned to the emergency room on April 9 with persistent neck pain and was discharged on prednisone. Then on April 10 she could not talk and was confused. Her  called her primary care physician and took her in to the office. She reviewed her records and noted that she had methicillin sensitive staph aureus bacteremia from her April 9 emergency room visit and directly admitted her to the hospital. She had an MRI of the brain that was normal and an MRI of the cervical spine that revealed a C5 6 osteomyelitis, discitis and a large anterior epidural mass with cord compression. Echocardiogram  revealed no heart valve vegetations with an ejection fraction of 40%. She was transported to [facility] for definitive surgery because the neurosurgeons in [place] felt the case was too complicated. Upon arrival to [facility] she was somnolent and not moving her extremities.     Past Medical History:   Diagnosis Date   • CAD (coronary artery disease)     stentsx4 2011   • Diabetes mellitus     ESRD, peripheral neuropathy   • ESRD (end stage renal disease) on dialysis 4/12/2018   • Hx of hepatitis    • Hypothyroidism 4/12/2018   • Systolic heart failure 04/12/2018    EF 40%       Past Surgical History:   Procedure Laterality Date   • ANTERIOR CERVICAL DISCECTOMY W/ FUSION N/A 4/13/2018    Procedure: ANTERIOR CERVICAL CORPECTOMY;  Surgeon: Aryan Reed MD;  Location:  [facility];  Service: Neurosurgery   • ARTERIOVENOUS FISTULA Left    • ARTERIOVENOUS FISTULA/SHUNT SURGERY Left 5/25/2018    Procedure: REMOVAL OF CLOT FROM LEFT ARM GRAFT;  Surgeon: Jeevan Montoya MD;  Location:  [facility];  Service: Vascular   • BRONCHOSCOPY N/A 4/21/2018    Procedure: BRONCHOSCOPY AT BEDSIDE;  Surgeon: Della Ca MD;  Location:  [facility];  Service: Pulmonary   • BRONCHOSCOPY N/A 4/24/2018    Procedure: BRONCHOSCOPY AT BEDSIDE;  Surgeon: Nghia Gifford MD;  Location:  [facility];  Service: Pulmonary   • BRONCHOSCOPY N/A 4/26/2018    Procedure: BRONCHOSCOPY AT BEDSIDE;  Surgeon: Denver Capone MD;  [facility]:  [facility];  Service: Pulmonary   • BRONCHOSCOPY N/A 5/31/2018    Procedure: BRONCHOSCOPY @ BEDSIDE;  Surgeon: Bolivar Mckeon MD;  [facility]:  [facility];  Service: Pulmonary   • BRONCHOSCOPY N/A 6/7/2018    Procedure: BRONCHOSCOPY @ BEDSIDE;  Surgeon: Bolivar Mckeon MD;  [facility]:  [facility];  Service: Pulmonary   • CENTRAL VENOUS LINE INSERTION Left 6/2/2018    Procedure: CENTRAL VENOUS LINE INSERTION;  Surgeon: Modesta Ontiveros MD;  [facility]:  [facility];  Service: General   •  CERVICAL LAMINECTOMY DECOMPRESSION POSTERIOR N/A 4/13/2018    Procedure: CERVICAL LAMINECTOMY DECOMPRESSION POSTERIOR;  Surgeon: Aryan Reed MD;  Location:  [facility];  Service: Neurosurgery   • CORONARY ANGIOPLASTY WITH STENT PLACEMENT  2011    4 stents   • HYSTERECTOMY     • INSERTION HEMODIALYSIS CATHETER N/A 5/26/2018    Procedure: HEMODIALYSIS CATHETER INSERTION;  Surgeon: Jeevan Montoya MD;  [facility]:  [facility];  Service: General   • INSERTION HEMODIALYSIS CATHETER Left 5/30/2018    Procedure: REPLACEMENT  OF LEFT CHEST WALL HEMODIALYSIS CATHETER;  Surgeon: Jeevan Montoya MD;  [facility]:  [facility];  Service: General   • INSERTION HEMODIALYSIS CATHETER Right 6/2/2018    Procedure: HEMODIALYSIS CATHETER INSERTION;  Surgeon: Modesta Ontiveros MD;  [facility]  [facility];  Service: General   • REMOVAL PERITONEAL DIALYSIS CATHETER Left 6/2/2018    Procedure: REMOVAL TUNNELED DIALYSIS CATHETER;  Surgeon: Modesta Ontiveros MD;  [facility]:  [facility];  Service: General   • TRACHEOSTOMY AND PEG TUBE INSERTION N/A 4/24/2018    Procedure: TRACHEOSTOMY AND PERCUTANEOUS ENDOSCOPIC GASTROSTOMY TUBE INSERTION;  Surgeon: Denver Alves MD;  [facility]:  [facility];  Service: General       Social History     Social History   • Marital status:    • Number of children: 2     Occupational History   •  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  Has been in a wheelchair because of severe peripheral neuropathy.        Family history is unknown by patient.    Allergies   Allergen Reactions   • Zosyn [Piperacillin Sod-Tazobactam So] Itching       Medication:    Current Facility-Administered Medications:   •  acetaminophen (TYLENOL) tablet 650 mg, 650 mg, Oral, Q4H PRN  **OR** [DISCONTINUED] acetaminophen (TYLENOL) suppository 650 mg, 650 mg, Rectal, Q4H PRN, LAYTON Souza  •  chlorhexidine (PERIDEX) 0.12 % solution 15 mL, 15 mL, Mouth/Throat, Q12H, LAYTON Souza, 15 mL at 06/08/18 1134  •  dextrose 5 % and sodium chloride 0.9 % infusion, 50 mL/hr, Intravenous, Continuous, LAYTON Souza, Last Rate: 50 mL/hr at 06/08/18 0607, 50 mL/hr at 06/08/18 0607  •  DULoxetine (CYMBALTA) DR capsule 20 mg, 20 mg, Oral, Daily, LAYTON Souza  •  epoetin mini (EPOGEN,PROCRIT) injection 10,000 Units, 10,000 Units, Subcutaneous, Once per day on Mon Wed Fri, LAYTON Souza, 10,000 Units at 06/08/18 1134  •  gabapentin (NEURONTIN) capsule 400 mg, 400 mg, Oral, Nightly, LAYTON Souza  •  heparin (porcine) 5000 UNIT/ML injection 5,000 Units, 5,000 Units, Subcutaneous, Q8H, LAYTON Souza, 5,000 Units at 06/08/18 0606  •  insulin regular (humuLIN R,novoLIN R) injection 0-14 Units, 0-14 Units, Subcutaneous, Q6H, LAYTON Souza  •  ipratropium-albuterol (DUO-NEB) nebulizer solution 3 mL, 3 mL, Nebulization, 4x Daily - RT, Ap Wise MD, 3 mL at 06/08/18 0809  •  levothyroxine (SYNTHROID, LEVOTHROID) tablet 75 mcg, 75 mcg, Oral, Q AM, LAYTON Souza, 75 mcg at 06/08/18 0606  •  lidocaine (LIDODERM) 5 % 1 patch, 1 patch, Transdermal, Q24H, LAYTON Souza, 1 patch at 06/08/18 1132  •  meropenem (MERREM) 500mg/100 mL 0.9% NS IVPB (mbp), 500 mg, Intravenous, Once, Pantera Bradley [facility]  •  [START ON 6/9/2018] meropenem (MERREM) 500mg/100 mL 0.9% NS IVPB (mbp), 500 mg, Intravenous, Q24H, Pantera Bradley, AUBRIE  •  micafungin 100 mg/100 mL 0.9% NS IVPB (mbp), 100 mg, Intravenous, Q24H, LAYTON Souza, 100 mg at 06/08/18 1145  •  midodrine (PROAMATINE) tablet 2.5 mg, 2.5 mg, Oral, BID, LAYTON Souza, Stopped at 06/08/18 0800  •  nystatin (MYCOSTATIN) powder, , Topical, Q12H, Ap Wise MD  •  pantoprazole (PROTONIX) EC tablet 40 mg, 40 mg, Oral, Q  AM, LAYTON Souza, 40 mg at 06/08/18 0606  •  sertraline (ZOLOFT) tablet 100 mg, 100 mg, Oral, Daily, LAYTON Souza  •  sodium chloride 0.9 % flush 1-10 mL, 1-10 mL, Intravenous, PRN, LAYTON Souza    Prescriptions Prior to Admission   Medication Sig Dispense Refill Last Dose   • acetaminophen (TYLENOL) 650 MG suppository Insert 650 mg into the rectum Every 4 (Four) Hours As Needed for Mild Pain  or Fever (temp > 101F).      • dakin's (HYSEPT) 0.25 % solution topical solution Apply  topically Daily As Needed.      • DAPTOmycin in sodium chloride 0.9 % 50 mL Infuse 500 mg into a venous catheter 3 (Three) Times a Week. Tuesday, Thursday, Saturday 6/7/2018 at 6/19/2018   • diphenhydrAMINE (BENADRYL) 25 mg capsule Take 25 mg by mouth Every 6 (Six) Hours As Needed for Itching.      • diphenhydrAMINE-zinc acetate 2-0.1 % cream Apply 1 application topically Daily.      • epoetin mini (EPOGEN,PROCRIT) 45717 UNIT/ML injection Inject 1 mL under the skin 3 (Three) Times a Week.      • famotidine (PEPCID) 20 MG tablet Take 40 mg by mouth 2 (Two) Times a Day.      • Heparin Sodium, Porcine, (HEPARIN, PORCINE,) 5000 UNIT/ML injection Inject 1 mL under the skin Every 8 (Eight) Hours.      • hydrocortisone 2.5 % cream Apply 1 application topically 2 (Two) Times a Day As Needed.      • hydrOXYzine (ATARAX) 25 MG tablet Take 25 mg by mouth Every 8 (Eight) Hours As Needed for Itching.      • insulin glargine (LANTUS) 100 UNIT/ML injection Inject 10 Units under the skin Daily.      • insulin lispro (humaLOG) 100 UNIT/ML injection Inject 0-6 Units under the skin Every 6 (Six) Hours. Sugar 120-159 - give 0 units  Sugar 160-199 - give 2 units  Sugar 200-239 - give 3 units  Sugar 240-279 - give 4 units  Sugar 280-319 - give 5 units  Sugar 320-349 - give 6 Units      • ipratropium-albuterol (DUONEB) 0.5-2.5 mg/3 ml nebulizer Take 3 mL by nebulization Every 4 (Four) Hours As Needed for Wheezing.      •  ipratropium-albuterol (DUONEB) 0.5-2.5 mg/3 ml nebulizer Take 3 mL by nebulization Every 4 (Four) Hours.      • lactulose (CHRONULAC) 10 GM/15ML solution Take 10 g by mouth Daily.      • levETIRAcetam (KEPPRA) 100 MG/ML solution Take 250 mg by mouth 2 (Two) Times a Day.      • levothyroxine (SYNTHROID, LEVOTHROID) 75 MCG tablet Take 75 mcg by mouth Daily.      • lidocaine (LIDODERM) 5 % Place 1 patch on the skin Daily. Remove & Discard patch within 12 hours or as directed by MD      • lidocaine-prilocaine (EMLA) 2.5-2.5 % cream Apply 1 application topically 3 (Three) Times a Week.      • loperamide (IMODIUM) 2 MG capsule Take 2 mg by mouth 2 (Two) Times a Day As Needed for Diarrhea.      • micafungin sodium 100 mg in sodium chloride 0.9 % 100 mL IVPB Infuse 100 mg into a venous catheter Daily.      • midodrine (PROAMATINE) 5 MG tablet Take 10 mg by mouth Daily.      • nitroglycerin (NITROSTAT) 0.4 MG SL tablet Place 0.4 mg under the tongue Every 5 (Five) Minutes As Needed for Chest Pain. Take no more than 3 doses in 15 minutes.      • ondansetron (ZOFRAN) 4 MG tablet Take 4 mg by mouth Every 6 (Six) Hours As Needed for Nausea or Vomiting.      • potassium chloride (MICRO-K) 10 MEQ CR capsule Take 40 mEq by mouth Daily As Needed (If K < 3.5 Please give 40 MeQ per G-tube. dissolve in 120 mL of water or juice).      • sennosides-docusate sodium (SENOKOT-S) 8.6-50 MG tablet Take 2 tablets by mouth 2 (Two) Times a Day As Needed for Constipation.      • sertraline (ZOLOFT) 100 MG tablet Take 100 mg by mouth Daily.      • tigecycline in sodium chloride 0.9 % 100 mL IVPB Infuse 50 mg into a venous catheter Every 12 (Twelve) Hours.      • vitamin D (ERGOCALCIFEROL) 68040 units capsule capsule Take 50,000 Units by mouth 1 (One) Time Per Week.      • Amino Acids-Protein Hydrolys (PRO-STAT) liquid 1 packet by Per G Tube route 2 (Two) Times a Day.      • DULoxetine (CYMBALTA) 20 MG capsule Take 20 mg by mouth Daily.      •  "gabapentin (NEURONTIN) 400 MG capsule Take 400 mg by mouth every night at bedtime.      • insulin regular (humuLIN R,novoLIN R) 100 UNIT/ML injection Inject 0-24 Units under the skin Every 6 (Six) Hours.  12    • insulin regular (humuLIN R,novoLIN R) 100 UNIT/ML injection Inject 7 Units under the skin Every 6 (Six) Hours.  12    • loratadine (CLARITIN) 10 MG tablet Take 10 mg by mouth Daily.      • pantoprazole (PROTONIX) 40 MG EC tablet Take 40 mg by mouth Daily.      • sodium chloride 0.9 % solution 250 mL with nafcillin 2 g reconstituted solution 6 g Infuse 6 g into a venous catheter Every 12 (Twelve) Hours for 82 doses.      • traMADol (ULTRAM) 50 MG tablet Take 50 mg by mouth 2 (Two) Times a Day.          Review of Systems:    Unable to obtain for now    Physical Exam:   Vital Signs   Blood pressure 120/85, pulse 86, temperature 97.7 °F (36.5 °C), temperature source Axillary, resp. rate 18, height 157 cm (61.81\"), weight 61 kg (134 lb 7.7 oz), SpO2 97 %, not currently breastfeeding.     GENERAL: Awake and alert, in no acute distress. Intubated    GENERAL: well developed WW              HEENT: Pupils small equal reactive. Tracheostomy              LUNGS: breath sounds bilateral, clear anteriorly              HEART: regular rhythm. Normal S1S2, no murmer              ABDOMEN: PEG. Bowel sounds present, soft              EXTREMITIES: LUE surgical wound intact over AVF, trace edema              NEURO/PSYCH: sedates              SKIN: CERVICAL INCISION SIZE OF AN ERASER, TUNNELING. THE WOUND WAS PACKED WITH 1/2 INCH  .  Laboratory Data    Results from last 7 days  Lab Units 06/08/18  0420 06/07/18  0311 06/06/18  0218   HEMOGLOBIN g/dL 8.3* 7.9* 8.0*   HEMATOCRIT % 27.3* 24.5* 23.5*       Results from last 7 days  Lab Units 06/08/18  0420 06/07/18  0311 06/06/18  0218 06/05/18  0106 06/04/18  0247  06/02/18  0032   SODIUM mmol/L 139 136 139 137 135  < > 137   POTASSIUM mmol/L 4.5 3.4* 3.4* 4.5 3.7  < > 4.6 "   CHLORIDE mmol/L 103 98* 97* 97* 98*  < > 98*   CO2 mmol/L 22.0 24.7 28.8 24.3 26.4  < > 25.9   BUN mg/dL 59* 46* 20 31* 22*  < > 33*   CREATININE mg/dL 2.25* 1.99* 1.45* 2.32* 1.84*  < > 2.61*   CALCIUM mg/dL 9.4 9.2 8.7 9.4 8.7  < > 8.7   PHOSPHORUS mg/dL 3.6  3.6 2.7  --   --   --   --   --    MAGNESIUM mg/dL 2.3  2.3 2.3  --   --   --   --   --    ALBUMIN g/dL 3.26 3.30*  --   --  3.20*  --  3.10*   < > = values in this interval not displayed.    Results from last 7 days  Lab Units 06/08/18  0420   GLUCOSE mg/dL 86           Results from last 7 days  Lab Units 06/08/18  0420   ALK PHOS U/L 97   BILIRUBIN mg/dL 0.5   ALT (SGPT) U/L 9   AST (SGOT) U/L 26     Estimated Creatinine Clearance: 27 mL/min (A) (by C-G formula based on SCr of 2.25 mg/dL (H)).    Radiology:      Renal Imaging:    I personally read  the radiographic studies    Impression:   ESRD  VOLUME OVERLOAD  DISCITIS S/P SURGERY  ANEMIA  HTN    PLAN: Thank you for asking us to see Lizet Webster, I recommend the following:   Will do dialysis today after her OR schedule  Continue epogen     Narayan Holguin MD  6/8/2018  11:46 AM

## 2018-06-08 NOTE — BRIEF OP NOTE
CERVICAL LAMINECTOMY DECOMPRESSION POSTERIOR  Progress Note    [facility]  6/8/2018    Pre-op Diagnosis:   Wound infection [T14.8XXA, L08.9]       Post-Op Diagnosis Codes:     * Wound infection [T14.8XXA, L08.9]    Procedure/CPT® Codes:      Procedure(s):  POSTERIOR CERVICAL WOUND DEBRIDEMENT AND CLOSURE WITH HARDWARE REMOVAL    Surgeon(s):  Aryan Reed MD    Anesthesia: General    Staff:   Circulator: Jessica Yin RN; Leena Carlson RN  Scrub Person: Julissa Mar  Vendor Representative: Marco A Ryan  Assistant: Benita An PA-C    Estimated Blood Loss: minimal    Urine Voided: * No values recorded between 6/8/2018  2:12 PM and 6/8/2018  3:51 PM *    Specimens:                None      Drains:   Chest Tube 1 Left (Active)   Function -20 cm H2O 6/8/2018 12:00 PM   Air Leak/Fluctuation connections tightened 6/8/2018 12:00 PM   Drainage Description Serous 6/8/2018 12:00 PM   Dressing Type Gauze 6/8/2018 12:00 PM   Dressing Status Clean;Dry;Intact 6/8/2018 12:00 PM   Dressing Intervention New dressing 6/8/2018 12:00 PM   Site Assessment Clean;Dry;Intact 6/8/2018 12:00 PM   Surrounding Skin Dry;Intact 6/8/2018 12:00 PM   Left Subcutaneous Emphysema none present 6/8/2018 12:00 PM   Safety all connections secured;all tubing connections taped;petroleum gauze dressing with patient;suction checked 6/8/2018 12:00 PM   Output (mL) 1000 mL 6/8/2018  1:00 PM       Closed/Suction Drain 1 Neck Bulb 7 Fr. (Active)       Gastrostomy/Enterostomy Percutaneous endoscopic gastrostomy (PEG) 20 Fr. (Active)   Surrounding Skin Dry;Intact 6/8/2018 12:00 PM   Securement anchored to abdomen with adhesive device 6/8/2018 12:00 PM   Drain Status Clamped 6/8/2018 12:00 PM   Drainage Appearance None 6/8/2018 12:00 PM   Site Description Healing 6/8/2018 12:00 PM   Dressing Status Clean;Dry;Intact 5/4/2018  8:00 AM   Dressing Intervention Removed 4/29/2018  8:00 PM   Dressing Type Foam;Non adherent 5/4/2018  8:00 AM   Dressing Change  Due 04/27/18 4/26/2018  2:00 AM   Tube Feeding Frequency Intermittent 5/4/2018  8:00 AM   Tube Feeding Product Novasource Renal 5/4/2018  8:00 AM   Tube Feeding Strength Full strength 5/4/2018  8:00 AM   Tube Feeding Method Bolus per pump 5/4/2018  8:00 AM   Tube Feeding Rate (mL) 40 mL 5/1/2018  4:00 PM   Tube Feeding Bag Changed Yes 4/30/2018  6:00 PM   Tube Feeding Residual (mL) 0 mL 5/4/2018  8:00 AM   Tube Feeding Residual Returned (mL) 30 mL 4/29/2018  8:00 PM   Feeding Tube Flushed With Sterile water 6/8/2018  6:00 AM   Flush/ Irrigation Intake (mL) 30 6/8/2018  6:00 AM   Tube Feeding Intake (mL) 180 5/3/2018  6:00 PM   Intake (mL) 60 mL 5/3/2018  4:00 PM       Findings: Posterior cervical wound dehiscence    Complications: None      Aryan Reed MD     Date: 6/8/2018  Time: 3:51 PM

## 2018-06-08 NOTE — PATIENT CARE CONFERENCE
ICU Rounds: Pt to undergo cervical I&D this date. Pt will need PT re-consult once medically appropriate post-op.

## 2018-06-09 NOTE — PLAN OF CARE
Problem: Patient Care Overview  Goal: Individualization and Mutuality  Outcome: Ongoing (interventions implemented as appropriate)    Goal: Discharge Needs Assessment  Outcome: Ongoing (interventions implemented as appropriate)      Problem: Ventilation, Mechanical Invasive (Adult)  Goal: Signs and Symptoms of Listed Potential Problems Will be Absent, Minimized or Managed (Ventilation, Mechanical Invasive)  Outcome: Ongoing (interventions implemented as appropriate)      Problem: Mobility, Physical Impaired (Adult)  Goal: Identify Related Risk Factors and Signs and Symptoms  Outcome: Ongoing (interventions implemented as appropriate)      Problem: Hemodialysis (Adult)  Goal: Signs and Symptoms of Listed Potential Problems Will be Absent, Minimized or Managed (Hemodialysis)  Outcome: Ongoing (interventions implemented as appropriate)      Problem: Wound (Includes Pressure Injury) (Adult)  Goal: Signs and Symptoms of Listed Potential Problems Will be Absent, Minimized or Managed (Wound)  Outcome: Ongoing (interventions implemented as appropriate)

## 2018-06-09 NOTE — THERAPY RE-EVALUATION
Acute Care - Wound/Debridement Initial Evaluation  [facility]     Patient Name: Lizet Webster  : 1970  MRN: [id number]  Today's Date: 2018  Onset of Illness/Injury or Date of Surgery: 18  Date of Referral to PT: 18   Referring Physician: KIP Wise MD       Admit Date: 2018    Visit Dx:    ICD-10-CM ICD-9-CM   1. Pleural effusion on left J90 511.9   2. Wound infection, cervical, possible vs CFS leak T14.8XXA 958.3    L08.9    3. Impaired mobility and ADLs Z74.09 799.89   4. Impaired functional mobility, balance, gait, and endurance Z74.09 V49.89       Patient Active Problem List   Diagnosis   • Recent Bacteremia, MSSA   • Metabolic encephalopathy   • Epidural abscess - cervical spine s/p laminectomy and decompression 18   • ESRD (end stage renal disease) on dialysis   • Hypothyroidism   • Chronic systolic heart failure   • Type 2 diabetes mellitus with renal complication   • Neuropathy   • Acute respiratory failure   • Atelectasis of left lung, recurrent   • Quadriplegia   • Pleural effusion on left   • Pressure ulcer of coccygeal region   • Wound infection, cervical, possible vs CFS leak        Past Medical History:   Diagnosis Date   • CAD (coronary artery disease)     stentsx4    • Diabetes mellitus     ESRD, peripheral neuropathy   • ESRD (end stage renal disease) on dialysis 2018   • Hx of hepatitis    • Hypothyroidism 2018   • Systolic heart failure 2018    EF 40%        Past Surgical History:   Procedure Laterality Date   • ANTERIOR CERVICAL DISCECTOMY W/ FUSION N/A 2018    Procedure: ANTERIOR CERVICAL CORPECTOMY;  Surgeon: Aryan Reed MD;  Location: [facility];  Service: Neurosurgery   • ARTERIOVENOUS FISTULA Left    • ARTERIOVENOUS FISTULA/SHUNT SURGERY Left 2018    Procedure: REMOVAL OF CLOT FROM LEFT ARM GRAFT;  Surgeon: Jeevan Montoya MD;  Location: [facility];  Service: Vascular   • BRONCHOSCOPY N/A 2018    Procedure: BRONCHOSCOPY AT  BEDSIDE;  Surgeon: Della Ca MD;  Location:  [facility];  Service: Pulmonary   • BRONCHOSCOPY N/A 4/24/2018    Procedure: BRONCHOSCOPY AT BEDSIDE;  Surgeon: Nghia Gifford MD;  Location:  [facility];  Service: Pulmonary   • BRONCHOSCOPY N/A 4/26/2018    Procedure: BRONCHOSCOPY AT BEDSIDE;  Surgeon: Denver Capone MD;  Location:  [facility];  Service: Pulmonary   • BRONCHOSCOPY N/A 5/31/2018    Procedure: BRONCHOSCOPY @ BEDSIDE;  Surgeon: Bolivar Mckeon MD;  [facility]:  [facility];  Service: Pulmonary   • BRONCHOSCOPY N/A 6/7/2018    Procedure: BRONCHOSCOPY @ BEDSIDE;  Surgeon: Bolivar Mckeon MD;  [facility]:  [facility];  Service: Pulmonary   • CENTRAL VENOUS LINE INSERTION Left 6/2/2018    Procedure: CENTRAL VENOUS LINE INSERTION;  Surgeon: Modesta Ontiveros MD;  [facility]:  [facility];  Service: General   • CERVICAL LAMINECTOMY DECOMPRESSION POSTERIOR N/A 4/13/2018    Procedure: CERVICAL LAMINECTOMY DECOMPRESSION POSTERIOR;  Surgeon: Aryan Reed MD;  Location:  [facility];  Service: Neurosurgery   • CORONARY ANGIOPLASTY WITH STENT PLACEMENT  2011    4 stents   • HYSTERECTOMY     • INSERTION HEMODIALYSIS CATHETER N/A 5/26/2018    Procedure: HEMODIALYSIS CATHETER INSERTION;  Surgeon: Jeevan Montoya MD;  Location:  [facility];  Service: General   • INSERTION HEMODIALYSIS CATHETER Left 5/30/2018    Procedure: REPLACEMENT  OF LEFT CHEST WALL HEMODIALYSIS CATHETER;  Surgeon: Jeevan Montoya MD;  [facility]:  [facility];  Service: General   • INSERTION HEMODIALYSIS CATHETER Right 6/2/2018    Procedure: HEMODIALYSIS CATHETER INSERTION;  Surgeon: Modesta Ontiveros MD;  [facility]:  [facility];  Service: General   • REMOVAL PERITONEAL DIALYSIS CATHETER Left 6/2/2018    Procedure: REMOVAL TUNNELED DIALYSIS CATHETER;  Surgeon: Modesta Ontiveros MD;  [facility]:  [facility];  Service: General   • TRACHEOSTOMY AND PEG TUBE INSERTION N/A 4/24/2018    Procedure: TRACHEOSTOMY AND PERCUTANEOUS ENDOSCOPIC  "GASTROSTOMY TUBE INSERTION;  Surgeon: Denver Alves MD;  Location: [facility];  Service: General                 WOUND DEBRIDEMENT  Debridement Site 1  Location- Site 1: Coccyx   Selective Debridement- Site 1: Wound Surface <20cmsq  Instruments- Site 1: #15, scapel, tweezers, scissors  Excised Tissue Description- Site 1: moderate, eschar, necrotic  Bleeding- Site 1: none  Cross Hatching- Site 1: eschar                  PT ASSESSMENT (last 12 hours)      Physical Therapy Evaluation     Row Name 06/09/18 1500 06/09/18 1120       PT Evaluation Time/Intention    Subjective Information complains of;pain;dyspnea   \"I can't breathe\"   - complains of;weakness;fatigue  -JERICHO    Document Type re-evaluation;wound care;therapy note (daily note)  - evaluation  -JERICHO    Mode of Treatment individual therapy;physical therapy  - physical therapy  -JERICHO    Patient Effort  -- fair  -JERICHO    Comment spouse assisting with rolling; very calming and encouraging to pt   -  --    Row Name 06/09/18 1500 06/09/18 1120       General Information    Patient Profile Reviewed? yes  - yes  -JERICHO    Onset of Illness/Injury or Date of Surgery 06/08/18  - 06/08/18  -JERICHO    Referring Physician KIP Wise MD   - MD Wise  -JERICHO    Patient Observations --   sleepy, but attempting to talk;   -MW lethargic;agree to therapy  -JERICHO    Patient/Family Observations Pt supported sitting in bed with head partially flexed forward and to Rt on pillows. Has trach, and multiple lines, tubes including Lt CT.   - brother at bedside  -JERICHO    General Observations of Patient  -- patient is on trach trials. wouild only open her eyes and mouth words on a few occassions during tx  -JERICHO    Prior Level of Function  -- dependent:;gait;transfer;bed mobility;ADL's  -JERICHO    Equipment Currently Used at Home  -- oxygen;walker, rolling;wheelchair  -JERICHO    Pertinent History of Current Functional Problem  -- posterior cervical laminectomy C3-C7 7 weeks ago. She is quadraplegic she " developed would dehiscence and underwent wound debridement 6/8 patient also had bronchoscopy 6/7 to remove mucus plug  -JERICHO    Existing Precautions/Restrictions  -- fall;spinal   trach and PEG  -JERICHO    Risks Reviewed patient and family:;increased discomfort;change in vital signs  -MW patient and family:;increased discomfort  -JERICHO    Benefits Reviewed patient and family:;improve skin integrity  -MW patient and family:;decrease risk of DVT  -JERICHO    Barriers to Rehab medically complex;previous functional deficit   tube feeding, freq BM's, limited positioning   -MW medically complex;previous functional deficit  -JERICHO    Row Name 06/09/18 1120          Relationship/Environment    Primary Source of Support/Comfort spouse  -JERICHO     Lives With spouse  -JERICHO     Row Name 06/09/18 1120          Resource/Environmental Concerns    Current Living Arrangements home/apartment/condo  -JERICHO     Resource/Environmental Concerns none  -JERICHO     Row Name 06/09/18 1500 06/09/18 1120       Cognitive Assessment/Intervention- PT/OT    Affect/Mental Status (Cognitive) WFL;anxious  -MW low arousal/lethargic  -JERICHO    Orientation Status (Cognition) oriented to;person;situation  -MW oriented to;person  -JERICHO    Follows Commands (Cognition)  -- follows one step commands;0-24% accuracy  -JERICHO    Cognitive Function (Cognitive)  -- safety deficit  -JERICHO    Safety Deficit (Cognitive)  -- moderate deficit;safety precautions awareness;awareness of need for assistance  -JERICHO    Personal Safety Interventions  -- fall prevention program maintained  -JERICHO    Row Name 06/09/18 1120          Safety Issues, Functional Mobility    Safety Issues Affecting Function (Mobility) friction/shear risk  -JERICHO     Impairments Affecting Function (Mobility) balance;endurance/activity tolerance;strength  -JERICHO     Row Name 06/09/18 1500 06/09/18 1120       Bed Mobility Assessment/Treatment    Bed Mobility Assessment/Treatment  -- rolling left;rolling right;scooting/bridging  -JERICHO    Rolling Left  [place] (Bed Mobility) dependent (less than 25% patient effort)  -MW dependent (less than 25% patient effort)  -JERICHO    Rolling Right [place] (Bed Mobility) dependent (less than 25% patient effort)  -MW dependent (less than 25% patient effort)  -JERICHO    Scooting/Bridging [place] (Bed Mobility)  -- dependent (less than 25% patient effort)  -JERICHO    Bed Mobility, Safety Issues  -- decreased use of arms for pushing/pulling;decreased use of legs for bridging/pushing;impaired trunk control for bed mobility  -JERICHO    Comment (Bed Mobility) family assisted with rolling and positioning for wound care and post tx   -MW  --    Row Name 06/09/18 1120          Transfer Assessment/Treatment    Comment (Transfers) N/A  -     Row Name 06/09/18 1120          Gait/Stairs Assessment/Training    [place] Level (Gait) not tested  -     Comment (Gait/Stairs) patient is nonambulatory  -     Row Name 06/09/18 1120          General ROM    GENERAL ROM COMMENTS BLE ROM is WNL  -[facility] Name 06/09/18 1120          General Assessment (Manual Muscle Testing)    Comment, General Manual Muscle Testing (MMT) Assessment BLE MMT 0/5  -     Row Name 06/09/18 1120          Motor Assessment/Intervention    Additional Documentation Therapeutic Exercise (Group)  -     Row Name 06/09/18 1120          Therapeutic Exercise    Therapeutic Exercise supine, upper extremities;supine, lower extremities  -     Row Name 06/09/18 1120          Upper Extremity Supine Therapeutic Exercise    Performed, Supine Upper Extremity (Therapeutic Exercise) shoulder flexion/extension;shoulder abduction/adduction;shoulder external/internal rotation;elbow flexion/extension  -     Exercise Type, Supine Upper Extremity (Therapeutic Exercise) PROM (passive range of motion)  -     Sets/Reps Detail, Supine Upper Extremity (Therapeutic Exercise) 1/10  -     Row Name 06/09/18 1120          Lower Extremity Supine Therapeutic Exercise    Performed, Supine  Lower Extremity (Therapeutic Exercise) hip flexion/extension;hip abduction/adduction;hip external/internal rotation;ankle dorsiflexion/plantarflexion;heel slides  -JERICHO     Exercise Type, Supine Lower Extremity (Therapeutic Exercise) PROM (passive range of motion)  -JERICHO     Sets/Reps Detail, Supine Lower Extremity (Therapeutic Exercise) 1/10  -JERICHO     Row Name 06/09/18 1120          Pain Scale: Numbers Pre/Post-Treatment    Pain Scale: Numbers, Pretreatment 0/10 - no pain  -JERICHO     Pain Scale: Numbers, Post-Treatment 0/10 - no pain  -JERICHO     Row Name 06/09/18 1500          Pain Scale: FACES Pre/Post-Treatment    Pain: FACES Scale, Pretreatment 0-->no hurt  -MW     Pain: FACES Scale, Post-Treatment 0-->no hurt  -MW     Pre/Post Treatment Pain Comment c/o pain with repositioning but then seemed comfortable once settled.   -MW     Row Name             Wound 05/25/18 1544 Left arm incision    Wound - Properties Group Date first assessed: 05/25/18  -CE Time first assessed: 1544  -CE Side: Left  -CE Location: arm  -CE Type: incision  -CE    Row Name             Wound 06/08/18 0215 Right gluteal pressure injury    Wound - Properties Group Date first assessed: 06/08/18  -LB Time first assessed: 0215  -LB Present On Admission : yes  -LB Side: Right  -LB Location: gluteal  -LB Type: pressure injury  -LB Stage, Pressure Injury: Stage 1  -LB    Row Name             Wound 06/08/18 0215 anus pressure injury    Wound - Properties Group Date first assessed: 06/08/18  -LB Time first assessed: 0215  -LB Present On Admission : yes  -LB Location: anus  -LB Type: pressure injury  -LB Stage, Pressure Injury: Stage 2  -LB    Row Name             Wound 06/08/18 0215 posterior cervical spine abscess    Wound - Properties Group Date first assessed: 06/08/18  -LB Time first assessed: 0215  -LB Present On Admission : yes  -LB Orientation: posterior  -LB Location: cervical spine  -LB Type: abscess  -LB    Row Name 06/09/18 1500          Wound  06/08/18 0900 medial coccyx    Wound - Properties Group Date first assessed: 06/08/18  -CP Time first assessed: 0900  -CP Present On Admission : yes;picture taken  -CP Orientation: medial  -CP Location: coccyx  -CP Stage, Pressure Injury: unstageable  -CP    Dressing Appearance intact;moist drainage;other (see comments)   BM at lower edge of dressing but not in wound   -MW     Base black eschar;moist;yellow;slough;pink  -MW     Black (%), Wound Tissue Color 95  -MW     Red (%), Wound Tissue Color 3  -MW     Yellow (%), Wound Tissue Color 2  -MW     Periwound intact;moist;pink;redness;warm;blanchable  -MW     Edges irregular  -MW     Wound Depth (cm) --   at least 0.4 but base still with nonviable tissue  -MW     Drainage Amount scant  -MW     Care, Wound cleansed with;wound cleanser;debrided;honey applied  -MW     Dressing Care, Wound dressing applied;low-adherent;foam   sacral optifoam gentle   -MW     Periwound Care, Wound cleansed with pH balanced cleanser;dry periwound area maintained  -     Row Name             Wound 06/08/18 1550 Other (See comments) cervical spine incision    Wound - Properties Group Date first assessed: 06/08/18  -AL Time first assessed: 1550  -AL Side: Other (See comments)  -AL Location: cervical spine  -AL Type: incision  -AL    Row Name 06/09/18 1500 06/09/18 1120       Coping    Observed Emotional State anxious;calm  -MW accepting  -JERICHO    Verbalized Emotional State acceptance;other (see comments)   sleepy with pain meds   - acceptance  -JERICHO    Row Name 06/09/18 1500 06/09/18 1120       Plan of Care Review    Plan of Care Reviewed With patient;spouse  -MW patient;family  -JERICHO    Row Name 06/09/18 1500 06/09/18 1120       Physical Therapy Clinical Impression    Date of Referral to PT 06/08/18  -MW 06/09/18  -JERICHO    PT Diagnosis (PT Clinical Impression) DTPI; impaired integument integrity   -MW impaired bed mobility transfer and gait, decreased strength and balance  -JERICHO    Patient/Family  Goals Statement (PT Clinical Impression) Heal area   -MW patient to go back to rehab  -JERICHO    Criteria for Skilled Interventions Met (PT Clinical Impression) yes;treatment indicated  -MW yes;treatment indicated  -JERICHO    Pathology/Pathophysiology Noted (Describe Specifically for Each System) integumentary  -MW  --    Rehab Potential (PT Clinical Summary)  -- fair, will monitor progress closely  -JERICHO    Care Plan Review (PT) evaluation/treatment results reviewed;care plan/treatment goals reviewed;current/potential barriers reviewed;patient/other agree to care plan  - evaluation/treatment results reviewed;care plan/treatment goals reviewed;risks/benefits reviewed;patient/other agree to care plan  -JERICHO    Care Plan Review, Other Participant (PT Clinical Impression) spouse  -MW spouse  -JERICHO    Row Name 06/09/18 1500 06/09/18 1120       Physical Therapy Goals    Bed Mobility Goal Selection (PT)  -- bed mobility, PT goal 1  -JERICHO    Transfer Goal Selection (PT)  -- transfer, PT goal 1  -JERICHO    Wound Care Goal Selection (PT) wound care, PT goal 1  -  --    Additional Documentation Wound Care Goal Selection (PT) (Row)  -  --    Row Name 06/09/18 1120          Bed Mobility Goal 1 (PT)    Activity/Assistive Device (Bed Mobility Goal 1, PT) rolling to left;rolling to right;supine to sit  -JERICHO     [place] Level/Cues Needed (Bed Mobility Goal 1, PT) maximum assist (25-49% patient effort)  -JERICHO     Time Frame (Bed Mobility Goal 1, PT) long term goal (LTG);10 days  -JERICHO     Progress/Outcomes (Bed Mobility Goal 1, PT) goal ongoing  -     Row Name 06/09/18 1120          Strength Goal 1 (PT)    Strength Goal 1 (PT) Pt demonstrated 1/5 strength at hip musculature bilaterally.  -JERICHO     Time Frame (Strength Goal 1, PT) long term goal (LTG);10 days  -JERICHO     Progress/Outcome (Strength Goal 1, PT) goal ongoing  -     Row Name 06/09/18 1120          Balance Goal 1 (PT)    Activity/Assistive Device (Balance Goal 1, PT) sitting, static   -JERICHO     [place] Level/Cues Needed (Balance Goal 1, PT) maximum assist (25-49% patient effort)  -JERICHO     Time Frame (Balance Goal 1, PT) long term goal (LTG);10 days  -JERICHO     Progress/Outcomes (Balance Goal 1, PT) goal ongoing  -JERICHO     Row Name 06/09/18 1500          Wound Care Goal 1 (PT)    Wound Care Goal 1 (PT) Decrease eschar/ non viable tissue by at least 25%; to promote granulation tissue formation.   -MW     Time Frame (Wound Care Goal 1, PT) 10 days  -MW     Barriers (Wound Care Goal 1, PT) Complex due to weakness, pain, limited positioning. Eschar is thick leathery and may be deep to bone.   -MW     Row Name 06/09/18 1120          Patient Education Goal (PT)    Activity (Patient Education Goal, PT) ROM ex  -JERICHO     [place]/Cues/Accuracy (Memory Goal 2, PT) verbalizes understanding;demonstrates adequately  -JERICHO     Time Frame (Patient Education Goal, PT) long term goal (LTG);10 days  -JERICHO     Progress/Outcome (Patient Education Goal, PT) goal ongoing  -JERICHO     Row Name 06/09/18 1500 06/09/18 1120       Positioning and Restraints    Pre-Treatment Position in bed  -MW in bed  -JERICHO    Post Treatment Position bed  - bed  -JERICHO    In Bed with family/caregiver;side lying right;pillow between legs  -MW notified nsg;supine;call light within reach;with family/caregiver  -JERICHO      User Key  (r) = Recorded By, (t) = Taken By, (c) = Cosigned By    Initials Name Provider Type    JERICHO Arita, PT Physical Therapist    LAYTON Tobar Nurse Practitioner     Ayse Hugo, PT Physical Therapist    CALLIE Peralta RN Registered Nurse    KAZ Euceda RN Registered Nurse    NIURKA Yin, RN Registered Nurse        Physical Therapy Education     Title: PT OT SLP Therapies (Active)     Topic: Physical Therapy (Active)     Point: Mobility training (Active)    Learning Progress Summary     Learner Status Readiness Method Response Comment Documented by    Patient Active Acceptance E NR  JERICHO  06/09/18 1415     Done Eager E,D,TB VU   06/09/18 0712     Done Acceptance E VU,DU   06/08/18 1306    Family Done Acceptance E VU,On license of [facility] 06/08/18 1306    Significant Other Done Eager E,D,TB VU   06/09/18 0712     Done Acceptance E VU,DU   06/08/18 1306          Point: Home exercise program (Active)    Learning Progress Summary     Learner Status Readiness Method Response Comment Documented by    Patient Active Acceptance E NR   06/09/18 1415     Done Eager E,D,TB VU   06/09/18 0712     Done Acceptance E VU,DU   06/08/18 1306    Family Done Acceptance E VU,On license of [facility] 06/08/18 1306    Significant Other Done Eager E,D,TB [facility] 06/09/18 0712     Done Acceptance E VU,On license of [facility] 06/08/18 1306          Point: Body mechanics (Active)    Learning Progress Summary     Learner Status Readiness Method Response Comment Documented by    Patient Active Acceptance E NR   06/09/18 1415     Done Eager E,D,TB [facility] 06/09/18 0712     Done Acceptance E VU,On license of [facility] 06/08/18 1306    Family Done Acceptance E VU,On license of [facility] 06/08/18 1306    Significant Other Done Eager E,D,TB [facility] 06/09/18 0712     Done Acceptance E VU,On license of [facility] 06/08/18 1306          Point: Precautions (Active)    Learning Progress Summary     Learner Status Readiness Method Response Comment Documented by    Patient Active Acceptance E NR   06/09/18 1415     Done Eager E,D,TB [facility] 06/09/18 0712     Done Acceptance E VU,On license of [facility] 06/08/18 1306    Family Done Acceptance E VU,On license of [facility] 06/08/18 1306    Significant Other Done Eager E,D,TB [facility] 06/09/18 0712     Done Acceptance E VU,On license of [facility] 06/08/18 1306                      User Key     Initials Effective Dates Name Provider Type Discipline     06/19/15 -  Alfreda Arita, PT Physical Therapist PT     02/14/17 -  Manju Tyler, RN Registered Nurse Nurse                    Recommendation and Plan  Anticipated Discharge Disposition (PT): long term acute care facility  Planned Therapy Interventions (PT Eval): wound  care  Therapy Frequency (PT Clinical Impression): 3 times/wk    Plan of Care Reviewed With: patient, spouse       Outcome Summary/Treatment Plan (PT)  Anticipated Discharge Disposition (PT): long term acute care facility   Outcome Summary: Coccyx pressure ulcer with thick leathery eschar that is soft to allow partial debridement. Selective debridement approximately 0.5 cm deep but not yet to viable tissues; applied [place] to promote autolytic debridement, but may also benefit from surgical debridement for long term management/ flap closure when medically more stable.   Plan of Care Reviewed With: patient, spouse            Outcome Measures     Row Name 06/09/18 1120 06/09/18 0830          How much help from another person do you currently need...    Turning from your back to your side while in flat bed without using bedrails? 1  -JERICHO  --     Moving from lying on back to sitting on the side of a flat bed without bedrails? 1  -JERICHO  --     Moving to and from a bed to a chair (including a wheelchair)? 1  -JERICHO  --     Standing up from a chair using your arms (e.g., wheelchair, bedside chair)? 1  -JERICHO  --     Climbing 3-5 steps with a railing? 1  -JERICHO  --     To walk in hospital room? 1  -JERICHO  --     AM-PAC 6 Clicks Score 6  -JERICHO  --        How much help from another is currently needed...    Putting on and taking off regular lower body clothing?  -- 1  -JR     Bathing (including washing, rinsing, and drying)  -- 1  -JR     Toileting (which includes using toilet bed pan or urinal)  -- 1  -JR     Putting on and taking off regular upper body clothing  -- 1  -JR     Taking care of personal grooming (such as brushing teeth)  -- 1  -JR     Eating meals  -- 1  -JR     Score  -- 6  -JR        Functional Assessment    Outcome Measure Options AM-PAC 6 Clicks Basic Mobility (PT)  -JERICHO AM-PAC 6 Clicks Daily Activity (OT)  -JR       User Key  (r) = Recorded By, (t) = Taken By, (c) = Cosigned By    Initials Name Provider Type    JERICHO Gant  HADLEY Arita, PT Physical Therapist    JR Radha Salazar OT Occupational Therapist              Time Calculation        PT Charges     Row Name 06/09/18 1724 06/09/18 1416          Time Calculation    Start Time 1500  -MW 1120  -JERICHO     PT Received On  -- 06/09/18  -JERICHO     PT Goal Re-Cert Due Date  -- 06/19/18  -JERICHO        Time Calculation- PT    Total Timed Code Minutes- PT  -- 16 minute(s)  -JERICHO       User Key  (r) = Recorded By, (t) = Taken By, (c) = Cosigned By    Initials Name Provider Type    JERICHO Arita, PT Physical Therapist     Ayse Hugo PT Physical Therapist            Therapy Charges for Today     Code Description Service Date Service Provider Modifiers Qty    [id number] HC GIACOMO DEBRIDE OPEN WOUND UP TO 20CM 6/9/2018 Ayse Hugo, PT GP 1            PT G-Codes  Outcome Measure Options: AM-PAC 6 Clicks Basic Mobility (PT)       Ayse Hugo PT  6/9/2018

## 2018-06-09 NOTE — PLAN OF CARE
Problem: Patient Care Overview  Goal: Plan of Care Review  Outcome: Ongoing (interventions implemented as appropriate)   06/09/18 6950   Coping/Psychosocial   Plan of Care Reviewed With patient   OTHER   Outcome Summary PT eval completed patient is lethargic today limiting tx. PROM to all extremites

## 2018-06-09 NOTE — THERAPY EVALUATION
Acute Care - Occupational Therapy Initial Evaluation   [facility]     Patient Name: Lizet Webster  : 1970  MRN: [id number]  Today's Date: 2018  Onset of Illness/Injury or Date of Surgery: 18  Date of Referral to OT: 18  Referring Physician: Dr. Wise    Admit Date: 2018       ICD-10-CM ICD-9-CM   1. Pleural effusion on left J90 511.9   2. Wound infection, cervical, possible vs CFS leak T14.8XXA 958.3    L08.9    3. Impaired mobility and ADLs Z74.09 799.89     Patient Active Problem List   Diagnosis   • Recent Bacteremia, MSSA   • Metabolic encephalopathy   • Epidural abscess - cervical spine s/p laminectomy and decompression 18   • ESRD (end stage renal disease) on dialysis   • Hypothyroidism   • Chronic systolic heart failure   • Type 2 diabetes mellitus with renal complication   • Neuropathy   • Acute respiratory failure   • Atelectasis of left lung, recurrent   • Quadriplegia   • Pleural effusion on left   • Pressure ulcer of coccygeal region   • Wound infection, cervical, possible vs CFS leak     Past Medical History:   Diagnosis Date   • CAD (coronary artery disease)     stentsx4    • Diabetes mellitus     ESRD, peripheral neuropathy   • ESRD (end stage renal disease) on dialysis 2018   • Hx of hepatitis    • Hypothyroidism 2018   • Systolic heart failure 2018    EF 40%     Past Surgical History:   Procedure Laterality Date   • ANTERIOR CERVICAL DISCECTOMY W/ FUSION N/A 2018    Procedure: ANTERIOR CERVICAL CORPECTOMY;  Surgeon: Aryan Reed MD;  Location:  [facility];  Service: Neurosurgery   • ARTERIOVENOUS FISTULA Left    • ARTERIOVENOUS FISTULA/SHUNT SURGERY Left 2018    Procedure: REMOVAL OF CLOT FROM LEFT ARM GRAFT;  Surgeon: Jeevan Montoya MD;  [facility]:  [facility];  Service: Vascular   • BRONCHOSCOPY N/A 2018    Procedure: BRONCHOSCOPY AT BEDSIDE;  Surgeon: Della Ca MD;  Location:  [facility];  Service: Pulmonary   •  BRONCHOSCOPY N/A 4/24/2018    Procedure: BRONCHOSCOPY AT BEDSIDE;  Surgeon: Nghia Gifford MD;  Location:  [facility];  Service: Pulmonary   • BRONCHOSCOPY N/A 4/26/2018    Procedure: BRONCHOSCOPY AT BEDSIDE;  Surgeon: Denver Capone MD;  Location:  [facility];  Service: Pulmonary   • BRONCHOSCOPY N/A 5/31/2018    Procedure: BRONCHOSCOPY @ BEDSIDE;  Surgeon: Bolivar Mckeon MD;  [facility]:  [facility];  Service: Pulmonary   • BRONCHOSCOPY N/A 6/7/2018    Procedure: BRONCHOSCOPY @ BEDSIDE;  Surgeon: Bolivar Mckeon MD;  [facility];  Service: Pulmonary   • CENTRAL VENOUS LINE INSERTION Left 6/2/2018    Procedure: CENTRAL VENOUS LINE INSERTION;  Surgeon: Modesta Ontiveros MD;  [facility]  [facility];  Service: General   • CERVICAL LAMINECTOMY DECOMPRESSION POSTERIOR N/A 4/13/2018    Procedure: CERVICAL LAMINECTOMY DECOMPRESSION POSTERIOR;  Surgeon: Aryan Reed MD;  [facility]:  [facility];  Service: Neurosurgery   • CORONARY ANGIOPLASTY WITH STENT PLACEMENT  2011    4 stents   • HYSTERECTOMY     • INSERTION HEMODIALYSIS CATHETER N/A 5/26/2018    Procedure: HEMODIALYSIS CATHETER INSERTION;  Surgeon: Jeevan Montoya MD;  [facility];  Service: General   • INSERTION HEMODIALYSIS CATHETER Left 5/30/2018    Procedure: REPLACEMENT  OF LEFT CHEST WALL HEMODIALYSIS CATHETER;  Surgeon: Jeevan Montoya MD;  [facility];  Service: General   • INSERTION HEMODIALYSIS CATHETER Right 6/2/2018    Procedure: HEMODIALYSIS CATHETER INSERTION;  Surgeon: Modesta Ontiveros MD;  [facility];  Service: General   • REMOVAL PERITONEAL DIALYSIS CATHETER Left 6/2/2018    Procedure: REMOVAL TUNNELED DIALYSIS CATHETER;  Surgeon: Modesta Ontiveros MD;  [facility];  Service: General   • TRACHEOSTOMY AND PEG TUBE INSERTION N/A 4/24/2018    Procedure: TRACHEOSTOMY AND PERCUTANEOUS ENDOSCOPIC GASTROSTOMY TUBE INSERTION;  Surgeon: Denver Alves MD;  Location:  [facility];  Service: General           OT ASSESSMENT FLOWSHEET (last 72 hours)      Occupational Therapy Evaluation     Row Name 06/09/18 0830                   OT Evaluation Time/Intention    Subjective Information no complaints  -JR        Document Type evaluation  -JR        Mode of Treatment occupational therapy  -JR        Patient Effort good  -JR        Comment Pt is quadriplegic. RN reported slight R UE movement, none noted during initial eval. RN reports no cervical precautions  -JR           General Information    Patient Profile Reviewed? yes  -JR        Onset of Illness/Injury or Date of Surgery 06/08/18  -JR        Referring Physician Dr. Wise  -JR        Prior Level of Function dependent:;ADL's;bed mobility;transfer  -JR        Equipment Currently Used at Home oxygen;walker, rolling;wheelchair  -JR        Pertinent History of Current Functional Problem Pt initially found to have cervical epidural abscess at C5-6 in April and underwent cervical laminectomy and decompression posterior C3-7, anterior cervical corpectomy C6. Pt then underwent trach and PEG. Pt transferred to [facility] and returned with cervical wound dehiscence. Pt s/p cervical wound debridement with removal of facet screws and closure 6/8. Pt also s/p L chest tube.  -JR        Existing Precautions/Restrictions fall;spinal   cervical precautions, trach, PEG, quadriplegia  -JR        Risks Reviewed patient and family:;increased discomfort  -JR        Benefits Reviewed patient and family:;improve function;increase independence  -JR        Barriers to Rehab medically complex;previous functional deficit;cognitive status  -JR           Relationship/Environment    Primary Source of Support/Comfort spouse  -JR        Lives With spouse  -JR           Resource/Environmental Concerns    Current Living Arrangements home/apartment/condo   Pt has been at [facility]  -           Home Main Entrance    Number of Stairs, Main Entrance one  -JR           Cognitive Assessment/Intervention- PT/OT     Affect/Mental Status (Cognitive) low arousal/lethargic  -JR        Orientation Status (Cognition) oriented to;person  -JR        Follows Commands (Cognition) follows one step commands  -JR        Safety Deficit (Cognitive) mild deficit  -JR           Safety Issues, Functional Mobility    Safety Issues Affecting Function (Mobility) friction/shear risk  -JR        Impairments Affecting Function (Mobility) balance;cognition;coordination;endurance/activity tolerance;grasp;motor control;motor planning;muscle tone abnormal;pain;postural/trunk control;sensation/sensory awareness;strength  -JR           Bed Mobility Assessment/Treatment    Comment (Bed Mobility) Defer-family refused  -JR           ADL Assessment/Intervention    BADL Assessment/Intervention feeding;lower body dressing  -JR           Lower Body Dressing Assessment/Training    Lower Body Dressing [place] Level don;doff;socks;dependent (less than 25% patient effort)  -JR        Lower Body Dressing Position supine  -JR           Self-Feeding Assessment/Training    [place] Level (Feeding) feeding skills;dependent (less than 25% patient effort)  -JR           General ROM    GENERAL ROM COMMENTS B UE ROM WFL  -           General Assessment (Manual Muscle Testing)    Comment, General Manual Muscle Testing (MMT) Assessment B UE MMT 0/5  -JR           Sensory Assessment/Intervention    Sensory General Assessment no sensation deficits identified   Pt reported kinesthesia deficits  -JR           Positioning and Restraints    Pre-Treatment Position in bed  -JR        Post Treatment Position bed  -JR        In Bed notified nsg;supine;call light within reach;encouraged to call for assist;with family/caregiver;RUE elevated;SCD pump applied;waffle boots/both  -JR           Pain Scale: Numbers Pre/Post-Treatment    Pain Scale: Numbers, Pretreatment 0/10 - no pain  -JR        Pain Scale: Numbers, Post-Treatment 8/10  -JR        Pain Location --   generalized   -JR           Wound 05/25/18 1544 Left arm incision    Wound - Properties Group Date first assessed: 05/25/18  -CE Time first assessed: 1544  -CE Side: Left  -CE Location: arm  -CE Type: incision  -CE       [REMOVED] Wound 04/13/18 0333 Other (See comments) anterior neck incision    Wound - Properties Group Date first assessed: 04/13/18  -TARYN Time first assessed: 0333  -TARYN Present On Admission : no  -AC Side: Other (See comments)  -TARYN Orientation: anterior  -AC Location: neck  -TARYN Type: incision  -TARYN Resolution Date: 06/08/18  -LB Resolution Time: 0215  -LB Wound Outcome: Healed  -LB       [REMOVED] Wound 05/26/18 1145 Left chest incision    Wound - Properties Group Date first assessed: 05/26/18  -CW Time first assessed: 1145  -CW Side: Left  -CW Location: chest  -CW Type: incision  -CW Resolution Date: 06/08/18  -LB Resolution Time: 0215  -LB Wound Outcome: Healed  -LB       [REMOVED] Wound 05/30/18 1402 Left neck incision    Wound - Properties Group Date first assessed: 05/30/18  -MC Time first assessed: 1402  -MC Side: Left  -MC Location: neck  -MC Type: incision  -MC Resolution Date: 06/08/18  -LB Resolution Time: 0215  -LB Wound Outcome: Healed  -LB       [REMOVED] Wound 06/02/18 1351 Left chest incision    Wound - Properties Group Date first assessed: 06/02/18  -JH Time first assessed: 1351  -JH Side: Left  -JH Location: chest  -JH Type: incision  -JH Resolution Date: 06/08/18  -LB Resolution Time: 0215  -LB Wound Outcome: Healed  -LB       [REMOVED] Wound 04/12/18 2300 Left posterior heel pressure injury    Wound - Properties Group Date first assessed: 04/12/18  -AC Time first assessed: 2300  -AC Present On Admission : yes  -AC Side: Left  -AC Orientation: posterior  -AC Location: heel  -AC Type: pressure injury  -AC Stage, Pressure Injury: deep tissue injury  -AC Resolution Date: 06/08/18  -LB Resolution Time: 0215  -LB Wound Outcome: Healed  -LB       [REMOVED] Wound 04/13/18 0337 Other (See comments)  posterior other (see notes) incision    Wound - Properties Group Date first assessed: 04/13/18  -TARYN Time first assessed: 0337  -TARYN Present On Admission : no  -AC Side: Other (See comments)  -TARYN Orientation: posterior  -AC Location: other (see notes)  -TARYN Type: incision  -TARYN Resolution Date: 06/08/18  -LB Resolution Time: 0215  -LB Wound Outcome: Unknown  -LB       [REMOVED] Wound 04/13/18 1000 Right lateral malleolus pressure injury    Wound - Properties Group Date first assessed: 04/13/18  -AS Time first assessed: 1000  -AS Present On Admission : yes;picture taken  -AS Side: Right  -AS Orientation: lateral  -AS Location: malleolus  -AS Type: pressure injury  -AS Stage, Pressure Injury: deep tissue injury  -AS Resolution Date: 06/08/18  -LB Resolution Time: 0215  -LB Wound Outcome: Unknown  -LB       [REMOVED] Wound 04/13/18 1000 Right lateral;upper foot pressure injury    Wound - Properties Group Date first assessed: 04/13/18  -AS Time first assessed: 1000  -AS Present On Admission : yes;picture taken  -AS Side: Right  -AS Orientation: lateral;upper  -AS Location: foot  -AS Type: pressure injury  -AS Stage, Pressure Injury: deep tissue injury  -AS Resolution Date: 06/08/18  -LB Resolution Time: 0215  -LB Wound Outcome: Unknown  -LB       [REMOVED] Wound 04/24/18 1134 neck incision    Wound - Properties Group Date first assessed: 04/24/18  -MT Time first assessed: 1134  -MT Location: neck  -MT Type: incision  -MT Resolution Date: 06/08/18  -LB Resolution Time: 0215  -LB Wound Outcome: Unknown  -LB       [REMOVED] Wound 04/24/18 1134 abdomen incision    Wound - Properties Group Date first assessed: 04/24/18  -MT Time first assessed: 1134  -MT Location: abdomen  -MT Type: incision  -MT Resolution Date: 06/08/18  -LB Resolution Time: 0215  -LB Wound Outcome: Healed  -LB       [REMOVED] Wound 04/29/18 2000 midline coccyx    Wound - Properties Group Date first assessed: 04/29/18  -CH Time first assessed: 2000  -  Orientation: midline  -CH Location: coccyx  -CH Stage, Pressure Injury: Stage 2  -CH Resolution Date: 06/08/18  -LB Resolution Time: 0215  -LB Wound Outcome: Unknown  -LB       [REMOVED] Wound 04/13/18 2000 medial sacral spine pressure injury    Wound - Properties Group Date first assessed: 04/13/18  -AC Time first assessed: 2000  -AC Present On Admission : yes  -AC Orientation: medial  -AC Location: sacral spine  -AC Type: pressure injury  -AC Stage, Pressure Injury: Stage 1  -AC Resolution Date: 06/08/18  -CP Resolution Time: 0900  -CP       Wound 06/08/18 0215 Right gluteal pressure injury    Wound - Properties Group Date first assessed: 06/08/18  -LB Time first assessed: 0215  -LB Present On Admission : yes  -LB Side: Right  -LB Location: gluteal  -LB Type: pressure injury  -LB Stage, Pressure Injury: Stage 1  -LB       Wound 06/08/18 0215 anus pressure injury    Wound - Properties Group Date first assessed: 06/08/18  -LB Time first assessed: 0215  -LB Present On Admission : yes  -LB Location: anus  -LB Type: pressure injury  -LB Stage, Pressure Injury: Stage 2  -LB       Wound 06/08/18 0215 posterior cervical spine abscess    Wound - Properties Group Date first assessed: 06/08/18  -LB Time first assessed: 0215  -LB Present On Admission : yes  -LB Orientation: posterior  -LB Location: cervical spine  -LB Type: abscess  -LB       Wound 06/08/18 0900 medial coccyx    Wound - Properties Group Date first assessed: 06/08/18  -CP Time first assessed: 0900  -CP Present On Admission : yes;picture taken  -CP Orientation: medial  -CP Location: coccyx  -CP Stage, Pressure Injury: unstageable  -CP       Wound 06/08/18 1550 Other (See comments) cervical spine incision    Wound - Properties Group Date first assessed: 06/08/18  -AL Time first assessed: 1550  -AL Side: Other (See comments)  -AL Location: cervical spine  -AL Type: incision  -AL       Coping    Verbalized Emotional State acceptance  -JR           Plan of Care  Review    Plan of Care Reviewed With patient;spouse  -JR           Clinical Impression (OT)    Date of Referral to OT 06/09/18  -JR        OT Diagnosis Decreased independence with ADL's and mobility  -JR        Patient/Family Goals Statement (OT Eval)  would like to take pt home  -JR        Criteria for Skilled Therapeutic Interventions Met (OT Eval) yes;treatment indicated  -JR        Rehab Potential (OT Eval) fair, will monitor progress closely  -JR        Therapy Frequency (OT Eval) daily  -JR        Care Plan Review (OT) risks/benefits reviewed;patient/other agree to care plan  -JR        Care Plan Review, Other Participant (OT Eval) spouse  -JR        Anticipated Equipment Needs at Discharge (OT) hospital bed;patient lift;wheelchair  -JR        Anticipated Discharge Disposition (OT) inpatient rehabilitation facility  -JR           Vital Signs    Pre Systolic BP Rehab 98  -JR        Pre Treatment Diastolic BP 56  -JR        Post Systolic BP Rehab 104  -JR        Post Treatment Diastolic BP 67  -JR        Pretreatment Heart Rate (beats/min) 78  -JR        Posttreatment Heart Rate (beats/min) 80  -JR        Pre SpO2 (%) 100  -JR        O2 Delivery Pre Treatment trach collar  -JR        Post SpO2 (%) 100  -JR        O2 Delivery Post Treatment supplemental O2  -JR        Pre Patient Position Supine  -JR        Intra Patient Position Supine  -JR        Post Patient Position Supine  -JR           Planned OT Interventions    Planned Therapy Interventions (OT Eval) activity tolerance training;adaptive equipment training;BADL retraining;cognitive/visual perception retraining;functional balance retraining;neuromuscular control/coordination retraining;occupation/activity based interventions;orthotic fabrication/fitting/training;passive ROM/stretching;patient/caregiver education/training;strengthening exercise;transfer/mobility retraining  -JR           OT Goals    Bed Mobility Goal Selection (OT) bed mobility, OT  goal 1  -JR        ROM Goal Selection (OT) ROM, OT goal 1  -JR        Strength Goal Selection (OT) strength, OT goal 1  -JR        Balance Goal Selection (OT) balance, OT goal 1  -JR        Additional Documentation ROM Goal Selection (OT) (Row);Strength Goal Selection (OT) (Row);Balance Goal Selection (OT) (Row)  -JR           ROM Goal 1 (OT)    ROM Goal 1 (OT) Family to demonstrate independence with B UE ROM to preserve joint integrity.  -JR        Time Frame (ROM Goal 1, OT) long term goal (LTG);5 days  -JR        Barriers (ROM Goal 1, OT) pt medically complex  -JR        Progress/Outcome (ROM Goal 1, OT) goal ongoing  -JR           Strength Goal 1 (OT)    Strength Goal 1 (OT) Pt to increase R UE strength by 1/2 muscle grade to support ADL independence.  -JR        Time Frame (Strength Goal 1, OT) long term goal (LTG);5 days  -JR        Barriers (Strength Goal 1, OT) medically complex  -JR        Progress/Outcome (Strength Goal 1, OT) goal ongoing  -JR           OT Cognitive Goals    Orientation Goal Selection (OT) orientation, OT goal 1  -JR           Orientation Goal 1 (OT)    Activity (Orientation Goal 1, OT) oriented x 4  -JR        Effie/Cues/Accuracy (Orientation Goal 1, OT) with 90% accuracy  -JR        Time Frame (Orientation Goal 1, OT) long term goal (LTG);5 days  -JR        Barriers (Orientation Goal 1, OT) medically complex  -JR        Progress/Outcome (Orientation Goal 1, OT) goal ongoing  -JR           Living Environment    Home Accessibility stairs to enter home;tub/shower is not walk in  -JR          User Key  (r) = Recorded By, (t) = Taken By, (c) = Cosigned By    Initials Name Effective Dates    CP Leighann Santiago, APRN 04/06/17 - 06/07/18    JR Radha Salazar, OT 06/22/15 -     LOBO Sadler, DEVYN 02/20/18 -      Yoli Martin, DEVYN 06/16/16 -     CALLIE Peralta, RN 06/16/16 -     CW Myra Mason, RN 06/16/16 -     TARYN Amanad Barton, DEVYN 06/16/16 -     MT  Gabriela Hauser, RN 06/16/16 -     LB Kelli Euceda, RN 06/16/16 -     CH Julia Mcgovern, RN 06/16/16 -     AS Matt Grace, RN 06/16/16 -     NIURKA Yin, RN 12/29/17 -     AC Rosa-Marzena Esqueda RN 03/19/18 -            Occupational Therapy Education     Title: PT OT SLP Therapies (Active)     Topic: Occupational Therapy (Active)     Point: ADL training (Active)     Description: Instruct learner(s) on proper safety adaptation and remediation techniques during self care or transfers.   Instruct in proper use of assistive devices.   Learning Progress Summary     Learner Status Readiness Method Response Comment Documented by    Patient Active Acceptance E NR Educated pt and family regarding role of therapy and continued treatment plan.  06/09/18 0943     Done HUMBLE Davidson,[facility] 06/09/18 0712     Done Acceptance E VU,[facility] 06/08/18 1306    Family Active Acceptance E NR Educated pt and family regarding role of therapy and continued treatment plan.  06/09/18 0943     Done Acceptance E VU,[facility] 06/08/18 1306    Significant Other Done HUMBLE Davidson,[facility] 06/09/18 0712     Done Acceptance E VU,[facility] 06/08/18 1306          Point: Home exercise program (Done)     Description: Instruct learner(s) on appropriate technique for monitoring, assisting and/or progressing therapeutic exercises/activities.   Learning Progress Summary     Learner Status Readiness Method Response Comment Documented by    Patient Done HUMBLE Davidson,MARCO MCKEON   06/09/18 0712     Done Acceptance E VU,NICOLE   06/08/18 1306    Family Done Acceptance E VU,[facility] 06/08/18 1306    Significant Other Done HUMBLE Davidson,MARCO MCKEON   06/09/18 0712     Done Acceptance E VU,[facility] 06/08/18 1306          Point: Precautions (Done)     Description: Instruct learner(s) on prescribed precautions during self-care and functional transfers.   Learning Progress Summary     Learner Status Readiness Method Response Comment Documented by    Patient Done HUMBLE Davidson,MARCO MCKEON    06/09/18 0712     Done Acceptance E NICOLE MCKEON   06/08/18 1306    Family Done Acceptance E MIKE,NICOLE   06/08/18 1306    Significant Other Done Wilfred E,D,TB VU   06/09/18 0712     Done Acceptance E NICOLE MCKEON   06/08/18 1306                      User Key     Initials Effective Dates Name Provider Type Discipline     06/22/15 -  Radha Salazar OT Occupational Therapist OT     02/14/17 -  Manju Tyler RN Registered Nurse Nurse                  OT Recommendation and Plan  Outcome Summary/Treatment Plan (OT)  Anticipated Equipment Needs at Discharge (OT): hospital bed, patient lift, wheelchair  Anticipated Discharge Disposition (OT): inpatient rehabilitation facility  Planned Therapy Interventions (OT Eval): activity tolerance training, adaptive equipment training, BADL retraining, cognitive/visual perception retraining, functional balance retraining, neuromuscular control/coordination retraining, occupation/activity based interventions, orthotic fabrication/fitting/training, passive ROM/stretching, patient/caregiver education/training, strengthening exercise, transfer/mobility retraining  Therapy Frequency (OT Eval): daily  Plan of Care Review  Plan of Care Reviewed With: patient, spouse  Plan of Care Reviewed With: patient, spouse  Outcome Summary: OT initial eval completed, extensive chart review completed. NO UE strength identified this during initial eval. Pt would  benefit from spinal cord rehab,  reported wanting to take pt home.          Outcome Measures     Row Name 06/09/18 0830             How much help from another is currently needed...    Putting on and taking off regular lower body clothing? 1  -JR      Bathing (including washing, rinsing, and drying) 1  -JR      Toileting (which includes using toilet bed pan or urinal) 1  -JR      Putting on and taking off regular upper body clothing 1  -JR      Taking care of personal grooming (such as brushing teeth) 1  -JR      Eating meals 1  -JR       Score 6  -         Functional Assessment    Outcome Measure Options AM-PAC 6 Clicks Daily Activity (OT)  -        User Key  (r) = Recorded By, (t) = Taken By, (c) = Cosigned By    Initials Name Provider Type    JR Radha Salazar, OT Occupational Therapist          Time Calculation:   OT Start Time: 0830    Therapy Charges for Today     Code Description Service Date Service Provider Modifiers Qty    [id number]  OT EVAL LOW COMPLEXITY 4 6/9/2018 Radha Salazar, OT GO 1    [id number]  OT THER SUPP EA 15 MIN 6/9/2018 Radha Salazar, OT GO 2               Radha Salazar, OT  6/9/2018

## 2018-06-09 NOTE — PLAN OF CARE
Problem: Patient Care Overview  Goal: Plan of Care Review  Outcome: Ongoing (interventions implemented as appropriate)   06/09/18 1138   Coping/Psychosocial   Plan of Care Reviewed With patient;family   Plan of Care Review   Progress (fees pmv eval)   SLP evaluation completed. Will continue to address dysphagia. Please see note for further details and recommendations.

## 2018-06-09 NOTE — PROGRESS NOTES
"[facility] Progress Note    Date of Admission: 2018      Antibiotics:  Cefazolin    CC: No chief complaint on file.  Cervical wound dehiscence    S: Patient chronically ill-appearing hemodynamic stable today no fevers postop day 1 from cervical wound debridement.    O:  /60 (BP Location: Right arm, Patient Position: Lying)   Pulse 82   Temp 97.6 °F (36.4 °C) (Axillary)   Resp 18   Ht 157 cm (61.81\")   Wt 61 kg (134 lb 7.7 oz)   LMP  (LMP Unknown)   SpO2 100%   BMI 24.75 kg/m²   Temp (24hrs), Av.7 °F (36.5 °C), Min:97 °F (36.1 °C), Max:98.1 °F (36.7 °C)      PE:   GENERAL: sedated on mechanical vent through trach in neck some agitation  HEENT: Normocephalic, atraumatic. No conjunctival injection. No icterus.    Neck;: Posterior cervical BHAVNA drain in place.   HEART: RRR; No murmurs rubs or gallops noted.  LUNGS: Diminished at lung bases bilaterally  ABDOMEN: Soft, nondistended. Hypoactive bowel sounds.  EXT:  No cyanosis, clubbing or edema. No cord.   MSK: No joint swelling or erythema  SKIN: Warm and dry without cutaneous eruptions on Inspection/palpation.    NEURO: follows commands has focal deficit below waist; arms propped up on pillows but making no movement       Laboratory Data      Results from last 7 days  Lab Units 18  0753 18  0420 18  0311   WBC 10*3/mm3 8.18 10.30 9.88   HEMOGLOBIN g/dL 7.0* 8.3* 7.9*   HEMATOCRIT % 22.6* 27.3* 24.5*   PLATELETS 10*3/mm3 194 266 304       Results from last 7 days  Lab Units 18  0753   SODIUM mmol/L 140   POTASSIUM mmol/L 3.2*   CHLORIDE mmol/L 105   CO2 mmol/L 23.0   BUN mg/dL 31*   CREATININE mg/dL 1.33*   GLUCOSE mg/dL 175*   CALCIUM mg/dL 8.6*       Results from last 7 days  Lab Units 18  0753   ALK PHOS U/L 104*   BILIRUBIN mg/dL 0.5   ALT (SGPT) U/L 7   AST (SGOT) U/L 20           Results from last 7 days  Lab Units 18  0416   CRP mg/dL 4.82*       Estimated Creatinine Clearance: 45.7 mL/min (A) (by C-G formula based " on SCr of 1.33 mg/dL (H)).      Microbiology:  Pleural fluid negative wound cultures from neck no organisms seen, repeat blood cultures negative    Radiology:  Imaging Results (last 24 hours)     Procedure Component Value Units Date/Time    Fiberoptic Endo (fees) [[id number]] Resulted:  06/09/18 1053     Updated:  06/09/18 1053    Narrative:       This procedure was auto-finalized with no dictation required.    XR Chest 1 View [[id number]] Updated:  06/09/18 0341    XR Chest 1 View [[id number]] Collected:  06/08/18 1143     Updated:  06/08/18 1157    Narrative:       EXAMINATION: XR CHEST 1 VW- 06/08/2018     INDICATION: post chest tube; P26-Sofdegn effusion, not elsewhere  classified; T14.8XXA-Other injury of unspecified body region, initial  encounter; L08.9-Local infection of the skin and subcutaneous tissue,  unspecified      COMPARISON: 06/08/2018     FINDINGS: A left chest tube has been inserted since the previous  examination of the same day. A chest tube is well-positioned. There has  been evacuation of pleural fluid since previous exam of the same day.   The tracheostomy tube and central venous catheter are well-positioned.  There is minimal residual bibasilar airspace disease.           Impression:       Following insertion of a left chest tube there has been  significant improved aeration of the left lung base with minimal  residual bibasilar airspace disease.     D:  06/08/2018  E:  06/08/2018     This report was finalized on 6/8/2018 11:55 AM by Dr. Anthony Perera MD.       XR Shoulder 1 View Left [[id number]] Collected:  06/08/18 1146     Updated:  06/08/18 1157    Narrative:       EXAMINATION: XR SHOULDER 1 VW LEFT- 06/08/2018     INDICATION: S01-Gpenigv effusion, not elsewhere classified;  T14.8XXA-Other injury of unspecified body region, initial encounter;  L08.9-Local infection of the skin and subcutaneous tissue, unspecified      COMPARISON: NONE     FINDINGS: Images of the left shoulder reveal no  fracture or dislocation.  A Port-A-Cath is seen as well as a left chest tube visible on the  radiograph.           Impression:       Normal AP radiograph of the left shoulder.     D:  06/08/2018  E:  06/08/2018     This report was finalized on 6/8/2018 11:55 AM by Dr. Anthony Perera MD.           Impression/Problem list:   chronic respiratory failure on vent  Epidural abscess MSSA 4/13/18  Suspected CSF leak post cervical wound dehiscence with posterior cervical wound debridement 6/8/18  ESRD  Left pleural effusion  S/p cervical spine decompression 4/13/18.  ? Seizure while on meropenem  Sputum cultures growing yeast, mold 5/25/18     Assessment:  Complex case 47-year-old with cervical spine discitis and epidural abscess status post decompression 4/13/18 on long-term IV antibiotics readmitted with cervical wound dehiscence and pleural effusions but normal white blood cell count status post surgery with posterior cervical wound debridement and BHAVNA drain in place stable on cefazolin    PLAN:    Course of cefazolin from 4/13/18 to present   Follow-up all cultures    Dr. Marsh to see again on Monday      Juan Antonio Barclay MD  6/9/2018

## 2018-06-09 NOTE — PROGRESS NOTES
"NEUROSURGERY PROGRESS NOTE    Vital Signs  Blood pressure 107/64, pulse 74, temperature 98.1 °F (36.7 °C), temperature source Oral, resp. rate 18, height 157 cm (61.81\"), weight 61 kg (134 lb 7.7 oz), SpO2 100 %, not currently breastfeeding.    Interval History:   POD #1: Posterior cervical wound debridement, hardware removal, and closure.    85 cc drainage from the BHAVNA drain; will leave another 1-2 days.    Minimal voluntary movement in the right upper extremity and the legs, remains densely quadriplegic.    Infectious disease consulted again.  On antibiotics.  Hopefully the wound well-healed this time.    Currently on the ventilator via her tracheostomy.      ASSESSMENT: POD #1: Cervical posterior wound debridement, hardware removal, and closure.    PLAN: Continue BHAVNA drain another 1-2 days, depending upon volume of drainage.  Change posterior cervical wound dressing if any drainage noted on the bandage.    Aryan Reed MD  06/09/18  7:22 AM    "

## 2018-06-09 NOTE — PROGRESS NOTES
Intensive Care Follow-up      LOS: 1 day     Ms. Lizet Webster, 47 y.o. female is followed for: Acute respiratory failure     Subjective - Interval History     Doing well on tracheostomy collar during the day  Wants to talk using Passy-Dre valve  1100 cc out left chest tube in the last 24 hours    The patient's relevant past medical, surgical and social history were reviewed and updated in Epic as appropriate.     Objective     Infusions:    dextrose 5 % and sodium chloride 0.9 % 50 mL/hr Last Rate: 50 mL/hr (06/09/18 0530)   lactated ringers 9 mL/hr      Medications:    ceFAZolin 2 g Intravenous Q24H   chlorhexidine 15 mL Mouth/Throat Q12H   DULoxetine 20 mg Oral Daily   epoetin mini 10,000 Units Subcutaneous Once per day on Mon Wed Fri   gabapentin 400 mg Oral Nightly   heparin (porcine) 5,000 Units Subcutaneous Q8H   insulin regular 0-14 Units Subcutaneous Q6H   ipratropium-albuterol 3 mL Nebulization 4x Daily - RT   levothyroxine 75 mcg Oral Q AM   lidocaine 1 patch Transdermal Q24H   micafungin (MYCAMINE)  mg Intravenous Q24H   midodrine 2.5 mg Oral BID   nystatin  Topical Q12H   pantoprazole 40 mg Intravenous Q AM   sertraline 100 mg Oral Daily     Intake/Output       06/08/18 0700 - 06/09/18 0659 06/09/18 0700 - 06/10/18 0659    Intake (ml) 2350.2 510    Output (ml) 4425 0    Net (ml) -2074.8 510        Vital Sign Min/Max for last 24 hours  Temp  Min: 97 °F (36.1 °C)  Max: 98.1 °F (36.7 °C)   BP  Min: 89/63  Max: 126/76   Pulse  Min: 72  Max: 86   Resp  Min: 11  Max: 20   SpO2  Min: 97 %  Max: 100 %   No Data Recorded        Physical Exam:   GENERAL: Breathing spontaneously, no distress   HEENT: Tracheostomy site and left subclavian vein triple-lumen catheter site okay   LUNGS: Decreased breath sounds with a few rhonchi, no wheezes   HEART: Regular rate and rhythm without murmurs   ABDOMEN: Soft.  Bowel sounds present.  PEG site okay   EXTREMITIES: Trace edema, no cyanosis   NEURO/PSYCH: Awake.  Remains  incomplete quadriplegic without change      Results from last 7 days  Lab Units 06/09/18  0753 06/08/18  0420 06/07/18  0311   WBC 10*3/mm3 8.18 10.30 9.88   HEMOGLOBIN g/dL 7.0* 8.3* 7.9*   PLATELETS 10*3/mm3 194 266 304       Results from last 7 days  Lab Units 06/09/18  0753 06/08/18  0420 06/07/18  0311   SODIUM mmol/L 140 139 136   POTASSIUM mmol/L 3.2* 4.5 3.4*   CO2 mmol/L 23.0 22.0 24.7   BUN mg/dL 31* 59* 46*   CREATININE mg/dL 1.33* 2.25* 1.99*   MAGNESIUM mg/dL 2.1 2.3  2.3 2.3   PHOSPHORUS mg/dL 2.5 3.6  3.6 2.7   GLUCOSE mg/dL 175* 86 316*     Estimated Creatinine Clearance: 45.7 mL/min (A) (by C-G formula based on SCr of 1.33 mg/dL (H)).      Results from last 7 days  Lab Units 06/08/18  0420   HEMOGLOBIN A1C % 4.90         Results from last 7 days  Lab Units 06/09/18  0338   PH, ARTERIAL pH units 7.434   PCO2, ARTERIAL mm Hg 33.5*   PO2 ART mm Hg 93.8     Lab Results   Component Value Date    LACTATE 1.1 06/08/2018          Images: Chest x-ray without consolidation or effusions with left chest tube in good position    I reviewed the patient's results and images.     Impression      Hospital Problem List     * (Principal)Acute respiratory failure    Recent Bacteremia, MSSA    Metabolic encephalopathy    Epidural abscess - cervical spine s/p laminectomy and decompression 4/13/18    ESRD (end stage renal disease) on dialysis    Hypothyroidism    Chronic systolic heart failure    Type 2 diabetes mellitus with renal complication    Neuropathy    Atelectasis of left lung, recurrent    Overview Deleted 6/8/2018  3:51 AM by LAYTON Souza            Quadriplegia    Pleural effusion on left    Pressure ulcer of coccygeal region    Wound infection, cervical, possible vs CFS leak               Plan        Follow-up culture results  Passy-[place] valve  Continue tracheostomy collar during the day  Continue chest tube until output decreases    Plan of care and goals reviewed with mulitdisciplinary team at  daily rounds   I discussed the patient's findings and my recommendations with patient, family and nursing staff       FABIANA Arroyo MD  Pulmonary and Critical Care Medicine

## 2018-06-09 NOTE — PLAN OF CARE
Problem: Patient Care Overview  Goal: Plan of Care Review  Outcome: Ongoing (interventions implemented as appropriate)   06/09/18 1000   Coping/Psychosocial   Plan of Care Reviewed With patient;spouse   OTHER   Outcome Summary Coccyx pressure ulcer with thick leathery eschar that is soft to allow partial debridement. Selective debridement approximately 0.5 cm deep but not yet to viable tissues; applied Therahoney to promote autolytic debridement, but may also benefit from surgical debridement for long term management/ flap closure when medically more stable.

## 2018-06-09 NOTE — PLAN OF CARE
Problem: Ventilation, Mechanical Invasive (Adult)  Goal: Signs and Symptoms of Listed Potential Problems Will be Absent, Minimized or Managed (Ventilation, Mechanical Invasive)  Outcome: Ongoing (interventions implemented as appropriate)  Pt is currently performing trach collar trial. She will remain on trach collar as tolerated.

## 2018-06-09 NOTE — MBS/VFSS/FEES
Acute Care - Speech Language Pathology Initial Evaluation  [facility]   Trach/Speaking Valve Evaluation  Fiberoptic Endoscopic Evaluation of Swallowing (FEES)       Patient Name: Lizet Webster  : 1970  MRN: [id number]  Today's Date: 2018  Onset of Illness/Injury or Date of Surgery: 18     Referring Physician: Dr. Wise      Admit Date: 2018     Visit Dx:    ICD-10-CM ICD-9-CM   1. Pleural effusion on left J90 511.9   2. Wound infection, cervical, possible vs CFS leak T14.8XXA 958.3    L08.9    3. Impaired mobility and ADLs Z74.09 799.89     Patient Active Problem List   Diagnosis   • Recent Bacteremia, MSSA   • Metabolic encephalopathy   • Epidural abscess - cervical spine s/p laminectomy and decompression 18   • ESRD (end stage renal disease) on dialysis   • Hypothyroidism   • Chronic systolic heart failure   • Type 2 diabetes mellitus with renal complication   • Neuropathy   • Acute respiratory failure   • Atelectasis of left lung, recurrent   • Quadriplegia   • Pleural effusion on left   • Pressure ulcer of coccygeal region   • Wound infection, cervical, possible vs CFS leak     Past Medical History:   Diagnosis Date   • CAD (coronary artery disease)     stentsx4    • Diabetes mellitus     ESRD, peripheral neuropathy   • ESRD (end stage renal disease) on dialysis 2018   • Hx of hepatitis    • Hypothyroidism 2018   • Systolic heart failure 2018    EF 40%     Past Surgical History:   Procedure Laterality Date   • ANTERIOR CERVICAL DISCECTOMY W/ FUSION N/A 2018    Procedure: ANTERIOR CERVICAL CORPECTOMY;  Surgeon: Aryan Reed MD;  Location: [facility] OR;  Service: Neurosurgery   • ARTERIOVENOUS FISTULA Left    • ARTERIOVENOUS FISTULA/SHUNT SURGERY Left 2018    Procedure: REMOVAL OF CLOT FROM LEFT ARM GRAFT;  Surgeon: Jeevan Montoya MD;  Location: [facility] OR;  Service: Vascular   • BRONCHOSCOPY N/A 2018    Procedure: BRONCHOSCOPY AT BEDSIDE;  Surgeon:  Della Ca MD;  Location:  [facility];  Service: Pulmonary   • BRONCHOSCOPY N/A 4/24/2018    Procedure: BRONCHOSCOPY AT BEDSIDE;  Surgeon: Nhgia Gifford MD;  Location:  [facility];  Service: Pulmonary   • BRONCHOSCOPY N/A 4/26/2018    Procedure: BRONCHOSCOPY AT BEDSIDE;  Surgeon: Denver Capone MD;  Location:  [facility];  Service: Pulmonary   • BRONCHOSCOPY N/A 5/31/2018    Procedure: BRONCHOSCOPY @ BEDSIDE;  Surgeon: Bolivar Mckeon MD;  [facility];  Service: Pulmonary   • BRONCHOSCOPY N/A 6/7/2018    Procedure: BRONCHOSCOPY @ BEDSIDE;  Surgeon: Bolivar Mckeon MD;  [facility];  Service: Pulmonary   • CENTRAL VENOUS LINE INSERTION Left 6/2/2018    Procedure: CENTRAL VENOUS LINE INSERTION;  Surgeon: Modesta Ontiveros MD;  [facility];  Service: General   • CERVICAL LAMINECTOMY DECOMPRESSION POSTERIOR N/A 4/13/2018    Procedure: CERVICAL LAMINECTOMY DECOMPRESSION POSTERIOR;  Surgeon: Aryan Reed MD;  Location:  [facility];  Service: Neurosurgery   • CORONARY ANGIOPLASTY WITH STENT PLACEMENT  2011    4 stents   • HYSTERECTOMY     • INSERTION HEMODIALYSIS CATHETER N/A 5/26/2018    Procedure: HEMODIALYSIS CATHETER INSERTION;  Surgeon: Jeevan Montoya MD;  [facility];  Service: General   • INSERTION HEMODIALYSIS CATHETER Left 5/30/2018    Procedure: REPLACEMENT  OF LEFT CHEST WALL HEMODIALYSIS CATHETER;  Surgeon: Jeevan Montoya MD;  [facility];  Service: General   • INSERTION HEMODIALYSIS CATHETER Right 6/2/2018    Procedure: HEMODIALYSIS CATHETER INSERTION;  Surgeon: Modesta Ontiveros MD;  [facility];  Service: General   • REMOVAL PERITONEAL DIALYSIS CATHETER Left 6/2/2018    Procedure: REMOVAL TUNNELED DIALYSIS CATHETER;  Surgeon: Modesta Ontiveros MD;  [facility];  Service: General   • TRACHEOSTOMY AND PEG TUBE INSERTION N/A 4/24/2018    Procedure: TRACHEOSTOMY AND PERCUTANEOUS ENDOSCOPIC GASTROSTOMY TUBE  INSERTION;  Surgeon: Denver Alves MD;  Location: [facility];  Service: General          SLP EVALUATION (last 72 hours)      SLP SLC Evaluation     Row Name 06/09/18 1100                   Communication Assessment/Intervention    Document Type evaluation  -AV        Subjective Information --   unable to respond  -AV        Patient Observations lethargic  -AV        Patient/Family Observations brother at bedside   -AV        Patient Effort fair  -AV           General Information    Patient Profile Reviewed yes  -AV        Pertinent History Of Current Problem patient has been on trach collar for ~ 1 month and has been undergoing rehab at [facility] per brother.  Patient has abscess (cervical) surgery, and second surgery yesterday.    -AV        Precautions/Limitations, Vision WFL;for purposes of eval  -AV        Precautions/Limitations, Hearing WFL;for purposes of eval  -AV        Prior Level of Function-Communication WFL  -AV        Plans/Goals Discussed with patient;family   brother present   -AV        Barriers to Rehab medically complex  -AV        Patient's Goals for Discharge patient could not state  -AV        Family Goals for Discharge patient able to return to previous activities/roles  -AV           Pain Assessment    Additional Documentation Pain Scale: Numbers Pre/Post-Treatment (Group)  -AV           Pain Scale: Numbers Pre/Post-Treatment    Pain Scale: Numbers, Pretreatment 0/10 - no pain  -AV        Pain Scale: Numbers, Post-Treatment 0/10 - no pain  -AV           Speaking Valve    Respiratory Status trach collar  -AV        Pretreatment Heart Rate (beats/min) 78  -AV        Pre SpO2 (%) 100  -AV        Trach Type Shiley;size 6  -AV        Types of Intervention tracheostomy speaking valve  -AV        Speaking Valve Type PMV-2000 (clear)  -AV        Phonation with Occlusion speaking valve  -AV        Vocal Quality on Phonation aphonic  -AV        Response to Intervention speaking valve;tolerated for short  period  -AV        Minutes Tolerated 15-20 minutes  -AV        Post SpO2 (%) 100  -AV        At Conclusion speaking valve removed;cuff re-inflated;notified RN/LPN  -AV        Speaking Valve Placement Recommendation SLP, RT and RN/LPN only  -AV           SLP Clinical Impressions    SLP Diagnosis moderate difficulty with PMV at this time   -AV        Rehab Potential/Prognosis good  -AV        Criteria for Skilled Therapy Interventions Met yes  -AV        Functional Impact functional impact in social situations;functional impact in ADLs;unable to make medical decisions  -AV           Recommendations    Therapy Frequency (SLP SLC) 5 days per week  -AV        Predicted Duration Therapy Intervention (Days) until discharge  -AV        Anticipated Dischage Disposition [facility]  -AV           Communication Treatment Objective and Progress Goals (SLP)    Speaking Valve Treatment Objectives Speaking Valve Treatment Objectives (Group)  -AV           Speaking Valve Treatment Objectives    Tolerate Speaking Valve Placement Selection tolerate speaking valve placement, SLP goal 1  -AV        Audible Speech Selection audible speech, SLP goal 1  -AV           Tolerate Speaking Valve Placement Goal 1 (SLP)    Tolerate Speaking Valve Placement Goal 1 (SLP) speaking valve >30 min;80%;with minimal cues (75-90%)  -AV        Time Frame (Tolerate Speaking Valve Placement Goal 1, SLP) short term goal (STG);by discharge  -AV           Audible Speech with Speaking Valve Goal 1 (SLP)    Audible Speech Goal 1 (SLP) 1 foot;80%;with minimal cues (75-90%)  -AV        Time Frame (Audible Speech Goal 1, SLP) by discharge;short term goal (STG)  -AV          User Key  (r) = Recorded By, (t) = Taken By, (c) = Cosigned By    Initials Name Effective Dates    AV Nguyen Aguilar MS CCC-SLP 04/03/18 -                EDUCATION  The patient has been educated in the following areas:     Tracheo-Esophageal Prosthesis Dysphagia (Swallowing  Impairment) Oral Care/Hydration NPO rationale.    SLP Recommendation and Plan    SLP Diagnosis: moderate difficulty with PMV at this time     Rehab Potential/Prognosis: good    Criteria for Skilled Therapeutic Interventions Met: demonstrates skilled criteria  Criteria for Skilled Therapy Interventions Met: yes    Anticipated Dischage Disposition: long term acute care facility         Therapy Frequency (Swallow): 5 days per week    Predicted Duration Therapy Intervention (Days): until discharge          Plan of Care Review  Plan of Care Reviewed With: patient, family  Plan of Care Reviewed With: patient, family  Progress:  (fees pmv eval)              SLP GOALS     Row Name 06/09/18 1100             Tolerate Speaking Valve Placement Goal 1 (SLP)    Tolerate Speaking Valve Placement Goal 1 (SLP) speaking valve >30 min;80%;with minimal cues (75-90%)  -AV      Time Frame (Tolerate Speaking Valve Placement Goal 1, SLP) short term goal (STG);by discharge  -AV         Audible Speech with Speaking Valve Goal 1 (SLP)    Audible Speech Goal 1 (SLP) 1 foot;80%;with minimal cues (75-90%)  -AV      Time Frame (Audible Speech Goal 1, SLP) by discharge;short term goal (STG)  -AV        User Key  (r) = Recorded By, (t) = Taken By, (c) = Cosigned By    Initials Name Provider Type    NASRIN Aguilar MS CCC-SLP Speech and Language Pathologist                      Time Calculation:           Time Calculation- SLP     Row Name 06/09/18 1139             Time Calculation- SLP    SLP Start Time 1030  -AV      SLP Received On 06/09/18  -AV        User Key  (r) = Recorded By, (t) = Taken By, (c) = Cosigned By    Initials Name Provider Type    NASRIN Aguilar MS CCC-SLP Speech and Language Pathologist          Therapy Charges for Today     Code Description Service Date Service Provider Modifiers Qty    70162638644  ST FIBEROPTIC ENDO EVAL SWALL 6 6/9/2018 Nguyen Aguilar MS CCC-SLP GN 1    09176081669 HC ST EVAL SPEECH AND  PROD W LANG  2 2018 Nguyen Aguilar, MS CCC-SLP GN 1                     Nguyen Aguilar, MS CCC-SLP  2018 and Acute Care - Speech Language Pathology   Swallow Initial Evaluation  [facility]     Patient Name: Lizet Webster  : 1970  MRN: [id number]  Today's Date: 2018  Onset of Illness/Injury or Date of Surgery: 18     Referring Physician: Dr. Wies      Admit Date: 2018    Visit Dx:     ICD-10-CM ICD-9-CM   1. Pleural effusion on left J90 511.9   2. Wound infection, cervical, possible vs CFS leak T14.8XXA 958.3    L08.9    3. Impaired mobility and ADLs Z74.09 799.89     Patient Active Problem List   Diagnosis   • Recent Bacteremia, MSSA   • Metabolic encephalopathy   • Epidural abscess - cervical spine s/p laminectomy and decompression 18   • ESRD (end stage renal disease) on dialysis   • Hypothyroidism   • Chronic systolic heart failure   • Type 2 diabetes mellitus with renal complication   • Neuropathy   • Acute respiratory failure   • Atelectasis of left lung, recurrent   • Quadriplegia   • Pleural effusion on left   • Pressure ulcer of coccygeal region   • Wound infection, cervical, possible vs CFS leak     Past Medical History:   Diagnosis Date   • CAD (coronary artery disease)     stentsx4    • Diabetes mellitus     ESRD, peripheral neuropathy   • ESRD (end stage renal disease) on dialysis 2018   • Hx of hepatitis    • Hypothyroidism 2018   • Systolic heart failure 2018    EF 40%     Past Surgical History:   Procedure Laterality Date   • ANTERIOR CERVICAL DISCECTOMY W/ FUSION N/A 2018    Procedure: ANTERIOR CERVICAL CORPECTOMY;  Surgeon: Aryan Reed MD;  Location: [facility];  Service: Neurosurgery   • ARTERIOVENOUS FISTULA Left    • ARTERIOVENOUS FISTULA/SHUNT SURGERY Left 2018    Procedure: REMOVAL OF CLOT FROM LEFT ARM GRAFT;  Surgeon: Jeevan Montoya MD;  Location: [facility];  Service: Vascular   • BRONCHOSCOPY N/A 2018     Procedure: BRONCHOSCOPY AT BEDSIDE;  Surgeon: Della Ca MD;  Location:  [facility];  Service: Pulmonary   • BRONCHOSCOPY N/A 4/24/2018    Procedure: BRONCHOSCOPY AT BEDSIDE;  Surgeon: Nghia Gifford MD;  Location:  [facility];  Service: Pulmonary   • BRONCHOSCOPY N/A 4/26/2018    Procedure: BRONCHOSCOPY AT BEDSIDE;  Surgeon: Denver Capone MD;  Location:  [facility];  Service: Pulmonary   • BRONCHOSCOPY N/A 5/31/2018    Procedure: BRONCHOSCOPY @ BEDSIDE;  Surgeon: Bolivar Mckeon MD;  [facility];  Service: Pulmonary   • BRONCHOSCOPY N/A 6/7/2018    Procedure: BRONCHOSCOPY @ BEDSIDE;  Surgeon: Bolivar Mckeon MD;  [facility];  Service: Pulmonary   • CENTRAL VENOUS LINE INSERTION Left 6/2/2018    Procedure: CENTRAL VENOUS LINE INSERTION;  Surgeon: Modesta Ontiveros MD;  [facility];  Service: General   • CERVICAL LAMINECTOMY DECOMPRESSION POSTERIOR N/A 4/13/2018    Procedure: CERVICAL LAMINECTOMY DECOMPRESSION POSTERIOR;  Surgeon: Aryan Reed MD;  Location:  [facility];  Service: Neurosurgery   • CORONARY ANGIOPLASTY WITH STENT PLACEMENT  2011    4 stents   • HYSTERECTOMY     • INSERTION HEMODIALYSIS CATHETER N/A 5/26/2018    Procedure: HEMODIALYSIS CATHETER INSERTION;  Surgeon: Jeevan Montoya MD;  [facility];  Service: General   • INSERTION HEMODIALYSIS CATHETER Left 5/30/2018    Procedure: REPLACEMENT  OF LEFT CHEST WALL HEMODIALYSIS CATHETER;  Surgeon: Jeevan Montoya MD;  [facility];  Service: General   • INSERTION HEMODIALYSIS CATHETER Right 6/2/2018    Procedure: HEMODIALYSIS CATHETER INSERTION;  Surgeon: Modesta Ontiveros MD;  [facility];  Service: General   • REMOVAL PERITONEAL DIALYSIS CATHETER Left 6/2/2018    Procedure: REMOVAL TUNNELED DIALYSIS CATHETER;  Surgeon: Modesta Ontiveros MD;  [facility];  Service: General   • TRACHEOSTOMY AND PEG TUBE INSERTION N/A 4/24/2018    Procedure: TRACHEOSTOMY AND  PERCUTANEOUS ENDOSCOPIC GASTROSTOMY TUBE INSERTION;  Surgeon: Denver Alves MD;  Location: [facility];  Service: General          SWALLOW EVALUATION (last 72 hours)      SLP Adult Swallow Evaluation     Row Name 06/09/18 1100                   Clinical Swallow Eval    Oral Prep Phase impaired  -AV        Oral Transit impaired  -AV        Oral Residue impaired  -AV        Pharyngeal Phase suspected pharyngeal impairment  -AV           Fiberoptic Endoscopic Evaluation of Swallowing (FEES)    Risks/Benefits Reviewed risks/benefits explained;patient;family;agreed to eval  -AV        Tracheostomy Tested with speaking valve on  -AV        Nasal Entry right:  -AV           Anatomy and Physiology    Anatomic Considerations no anatomic structural deviation  -AV        Velopharyngeal WFL  -AV        Base of Tongue symmetrical  -AV        Epiglottis WFL  -AV        Laryngeal Function Breathing symmetrical  -AV        Laryngeal Function Phonation CNA  -AV        Laryngeal Function to Breath Hold CNA  -AV        Secretion Rating Scale (Jake et al. 1996) 1- secretions present around the laryngeal vestibule  -AV        Secretion Description clear;thick  -AV        Ice Chips elicited swallow;partially cleared secretions  -AV        Spontaneous Swallow frequency reduced   partially clears secretions   -AV        Sensory reduced sensation  -AV        Utensils Used Spoon  -AV        Consistencies Trialed thin liquids;nectar-thick liquids;pudding/puree  -AV           FEES Interpretation    Oral Phase inadequate manipulation;oral holding;prolonged manipulation;increased A-P transit time;oral residue present  -AV           Initiation of Pharyngeal Swallow    Initiation of Pharyngeal Swallow bolus in valleculae  -AV        Pharyngeal Phase impaired pharyngeal phase of swallowing  -AV        Penetration After the Swallow thin liquids;nectar-thick liquids;pudding/puree  -AV        Aspiration After the Swallow thin liquids;nectar-thick  liquids;pudding/puree  -AV        Response to Aspiration no response, silent aspiration  -AV        Rosenbek's Scale all consistencies:;8-->Level 8  -AV        Residue all consistencies tested  -AV        Response to Residue unable to clear residue  -AV        FEES Summary FEES completed this am:  Patient with decreased alertness/participation.  Patient with oral holding, difficulty opening oral cavity for bolus acceptance, increased prep and transit time and delayed initaition of swallow.  Pen with subsequent aspiration after from residue at the posterior commissure and pyriforms.  All silent.  Moderate residue with all trials, unable to clear.  ? alertness vs. edema from surgery.  Rec NPO at this time.  Will reassess with FEES Monday if patient improved.    -AV           Clinical Impression    SLP Swallowing Diagnosis mod-severe;pharyngeal dysfunction  -AV        Functional Impact risk of aspiration/pneumonia;risk of malnutrition  -AV        Rehab Potential/Prognosis, Swallowing adequate, monitor progress closely  -AV        Criteria for Skilled Therapeutic Interventions Met demonstrates skilled criteria  -AV           Recommendations    Therapy Frequency (Swallow) 5 days per week  -AV        SLP Diet Recommendation NPO  -AV        Recommended Diagnostics reassess via FEES  -AV        SLP Rec. for Method of Medication Administration meds via alternate route  -AV          User Key  (r) = Recorded By, (t) = Taken By, (c) = Cosigned By    Initials Name Effective Dates    AV Nguyen Aguilar MS CCC-SLP 04/03/18 -         EDUCATION  The patient has been educated in the following areas:   Dysphagia (Swallowing Impairment) Oral Care/Hydration NPO rationale.    SLP Recommendation and Plan  SLP Swallowing Diagnosis: mod-severe, pharyngeal dysfunction  SLP Diet Recommendation: NPO           Recommended Diagnostics: reassess via FEES  Criteria for Skilled Therapeutic Interventions Met: demonstrates skilled  criteria  Anticipated Dischage Disposition: long term acute care facility  Rehab Potential/Prognosis, Swallowing: adequate, monitor progress closely  Therapy Frequency (Swallow): 5 days per week  Predicted Duration Therapy Intervention (Days): until discharge       Plan of Care Reviewed With: patient, family  Plan of Care Review  Plan of Care Reviewed With: patient, family  Progress:  (fees pmv eval)          SLP GOALS     Row Name 06/09/18 1100             Tolerate Speaking Valve Placement Goal 1 (SLP)    Tolerate Speaking Valve Placement Goal 1 (SLP) speaking valve >30 min;80%;with minimal cues (75-90%)  -AV      Time Frame (Tolerate Speaking Valve Placement Goal 1, SLP) short term goal (STG);by discharge  -AV         Audible Speech with Speaking Valve Goal 1 (SLP)    Audible Speech Goal 1 (SLP) 1 foot;80%;with minimal cues (75-90%)  -AV      Time Frame (Audible Speech Goal 1, SLP) by discharge;short term goal (STG)  -AV        User Key  (r) = Recorded By, (t) = Taken By, (c) = Cosigned By    Initials Name Provider Type    NASRIN Aguilar MS CCC-SLP Speech and Language Pathologist               Time Calculation:         Time Calculation- SLP     Row Name 06/09/18 1139             Time Calculation- SLP    SLP Start Time 1030  -AV      SLP Received On 06/09/18  -AV        User Key  (r) = Recorded By, (t) = Taken By, (c) = Cosigned By    Initials Name Provider Type    NASRIN Aguilar MS CCC-SLP Speech and Language Pathologist          Therapy Charges for Today     Code Description Service Date Service Provider Modifiers Qty    46119397985 HC ST FIBEROPTIC ENDO EVAL SWALL 6 6/9/2018 MS GELA Mojica GN 1    [id number] HC ST EVAL SPEECH AND PROD W LANG  2 6/9/2018 MS GELA Mojica GN 1               MS GELA Mojica  6/9/2018

## 2018-06-09 NOTE — THERAPY EVALUATION
Acute Care - Physical Therapy Initial Evaluation   [place]     Patient Name: Lizet Webster  : 1970  MRN: [id number]  Today's Date: 2018   Onset of Illness/Injury or Date of Surgery: 18  Date of Referral to PT: 18  Referring Physician: MD Wise      Admit Date: 2018    Visit Dx:     ICD-10-CM ICD-9-CM   1. Pleural effusion on left J90 511.9   2. Wound infection, cervical, possible vs CFS leak T14.8XXA 958.3    L08.9    3. Impaired mobility and ADLs Z74.09 799.89   4. Impaired functional mobility, balance, gait, and endurance Z74.09 V49.89     Patient Active Problem List   Diagnosis   • Recent Bacteremia, MSSA   • Metabolic encephalopathy   • Epidural abscess - cervical spine s/p laminectomy and decompression 18   • ESRD (end stage renal disease) on dialysis   • Hypothyroidism   • Chronic systolic heart failure   • Type 2 diabetes mellitus with renal complication   • Neuropathy   • Acute respiratory failure   • Atelectasis of left lung, recurrent   • Quadriplegia   • Pleural effusion on left   • Pressure ulcer of coccygeal region   • Wound infection, cervical, possible vs CFS leak     Past Medical History:   Diagnosis Date   • CAD (coronary artery disease)     stentsx4    • Diabetes mellitus     ESRD, peripheral neuropathy   • ESRD (end stage renal disease) on dialysis 2018   • Hx of hepatitis    • Hypothyroidism 2018   • Systolic heart failure 2018    EF 40%     Past Surgical History:   Procedure Laterality Date   • ANTERIOR CERVICAL DISCECTOMY W/ FUSION N/A 2018    Procedure: ANTERIOR CERVICAL CORPECTOMY;  Surgeon: Aryan Reed MD;  [facility];  Service: Neurosurgery   • ARTERIOVENOUS FISTULA Left    • ARTERIOVENOUS FISTULA/SHUNT SURGERY Left 2018    Procedure: REMOVAL OF CLOT FROM LEFT ARM GRAFT;  Surgeon: Jeevan Montoya MD;  Location: [facility] OR;  Service: Vascular   • BRONCHOSCOPY N/A 2018    Procedure: BRONCHOSCOPY AT BEDSIDE;   Surgeon: Della Ca MD;  Location:  [facility];  Service: Pulmonary   • BRONCHOSCOPY N/A 4/24/2018    Procedure: BRONCHOSCOPY AT BEDSIDE;  Surgeon: Nghia Gifford MD;  Location:  [facility];  Service: Pulmonary   • BRONCHOSCOPY N/A 4/26/2018    Procedure: BRONCHOSCOPY AT BEDSIDE;  Surgeon: Denver Capone MD;  Location:  [facility];  Service: Pulmonary   • BRONCHOSCOPY N/A 5/31/2018    Procedure: BRONCHOSCOPY @ BEDSIDE;  Surgeon: Bolivar Mckeon MD;  Location:  [facility];  Service: Pulmonary   • BRONCHOSCOPY N/A 6/7/2018    Procedure: BRONCHOSCOPY @ BEDSIDE;  Surgeon: Bolivar Mckeon MD;  [facility]:  [facility];  Service: Pulmonary   • CENTRAL VENOUS LINE INSERTION Left 6/2/2018    Procedure: CENTRAL VENOUS LINE INSERTION;  Surgeon: Modesta Ontiveros MD;  Location:  [facility];  Service: General   • CERVICAL LAMINECTOMY DECOMPRESSION POSTERIOR N/A 4/13/2018    Procedure: CERVICAL LAMINECTOMY DECOMPRESSION POSTERIOR;  Surgeon: Aryan Reed MD;  Location:  [facility];  Service: Neurosurgery   • CORONARY ANGIOPLASTY WITH STENT PLACEMENT  2011    4 stents   • HYSTERECTOMY     • INSERTION HEMODIALYSIS CATHETER N/A 5/26/2018    Procedure: HEMODIALYSIS CATHETER INSERTION;  Surgeon: Jeevan Montoya MD;  Location:  [facility];  Service: General   • INSERTION HEMODIALYSIS CATHETER Left 5/30/2018    Procedure: REPLACEMENT  OF LEFT CHEST WALL HEMODIALYSIS CATHETER;  Surgeon: Jeevan Montoya MD;  [facility]:  [facility];  Service: General   • INSERTION HEMODIALYSIS CATHETER Right 6/2/2018    Procedure: HEMODIALYSIS CATHETER INSERTION;  Surgeon: Modesta Ontiveros MD;  [facility]:  [facility];  Service: General   • REMOVAL PERITONEAL DIALYSIS CATHETER Left 6/2/2018    Procedure: REMOVAL TUNNELED DIALYSIS CATHETER;  Surgeon: Modesta Ontiveros MD;  [facility]:  [facility];  Service: General   • TRACHEOSTOMY AND PEG TUBE INSERTION N/A 4/24/2018    Procedure: TRACHEOSTOMY AND PERCUTANEOUS ENDOSCOPIC GASTROSTOMY TUBE  INSERTION;  Surgeon: Denver Alves MD;  Location: [facility] OR;  Service: General        PT ASSESSMENT (last 12 hours)      Physical Therapy Evaluation     Row Name 06/09/18 1120          PT Evaluation Time/Intention    Subjective Information complains of;weakness;fatigue  -JERICHO     Document Type evaluation  -JERICHO     Mode of Treatment physical therapy  -JERICHO     Patient Effort fair  -JERICHO     Row Name 06/09/18 1120          General Information    Patient Profile Reviewed? yes  -JERICHO     Onset of Illness/Injury or Date of Surgery 06/08/18  -JERICHO     Referring Physician MD Wise  -JERICHO     Patient Observations lethargic;agree to therapy  -JERICHO     Patient/Family Observations brother at bedside  -JERICHO     General Observations of Patient patient is on trach trials. wouild only open her eyes and mouth words on a few occassions during tx  -JERICHO     Prior Level of Function dependent:;gait;transfer;bed mobility;ADL's  -JERICHO     Equipment Currently Used at Home oxygen;walker, rolling;wheelchair  -JERICHO     Pertinent History of Current Functional Problem posterior cervical laminectomy C3-C7 7 weeks ago. She is quadraplegic she developed would dehiscence and underwent wound debridement 6/8 patient also had bronchoscopy 6/7 to remove mucus plug  -JERICHO     Existing Precautions/Restrictions fall;spinal   trach and PEG  -JERICHO     Risks Reviewed patient and family:;increased discomfort  -JERICHO     Benefits Reviewed patient and family:;decrease risk of DVT  -JERICHO     Barriers to Rehab medically complex;previous functional deficit  -JERICHO     Row Name 06/09/18 1120          Relationship/Environment    Primary Source of Support/Comfort spouse  -JERICHO     Lives With spouse  -JERICHO     Row Name 06/09/18 1120          Resource/Environmental Concerns    Current Living Arrangements home/apartment/condo  -JERICHO     Resource/Environmental Concerns none  -JERICHO     Row Name 06/09/18 1120          Cognitive Assessment/Intervention- PT/OT    Affect/Mental Status (Cognitive) low  arousal/lethargic  -     Orientation Status (Cognition) oriented to;person  -JERICHO     Follows Commands (Cognition) follows one step commands;0-24% accuracy  -     Cognitive Function (Cognitive) safety deficit  -     Safety Deficit (Cognitive) moderate deficit;safety precautions awareness;awareness of need for assistance  -     Personal Safety Interventions fall prevention program maintained  -[facility] Name 06/09/18 1120          Safety Issues, Functional Mobility    Safety Issues Affecting Function (Mobility) friction/shear risk  -     Impairments Affecting Function (Mobility) balance;endurance/activity tolerance;strength  -[facility] Name 06/09/18 1120          Bed Mobility Assessment/Treatment    Bed Mobility Assessment/Treatment rolling left;rolling right;scooting/bridging  -     Rolling Left [place] (Bed Mobility) dependent (less than 25% patient effort)  -     Rolling Right [place] (Bed Mobility) dependent (less than 25% patient effort)  -     Scooting/Bridging [place] (Bed Mobility) dependent (less than 25% patient effort)  -     Bed Mobility, Safety Issues decreased use of arms for pushing/pulling;decreased use of legs for bridging/pushing;impaired trunk control for bed mobility  -[facility] Name 06/09/18 1120          Transfer Assessment/Treatment    Comment (Transfers) N/A  -[facility] Name 06/09/18 1120          Gait/Stairs Assessment/Training    [place] Level (Gait) not tested  -     Comment (Gait/Stairs) patient is nonambulatory  -[facility] Name 06/09/18 1120          General ROM    GENERAL ROM COMMENTS BLE ROM is WNL  -[facility] Name 06/09/18 1120          General Assessment (Manual Muscle Testing)    Comment, General Manual Muscle Testing (MMT) Assessment BLE MMT 0/5  -[facility] Name 06/09/18 1120          Motor Assessment/Intervention    Additional Documentation Therapeutic Exercise (Group)  -[facility] Name 06/09/18 1120          Therapeutic Exercise     Therapeutic Exercise supine, upper extremities;supine, lower extremities  -     Row Name 06/09/18 1120          Upper Extremity Supine Therapeutic Exercise    Performed, Supine Upper Extremity (Therapeutic Exercise) shoulder flexion/extension;shoulder abduction/adduction;shoulder external/internal rotation;elbow flexion/extension  -     Exercise Type, Supine Upper Extremity (Therapeutic Exercise) PROM (passive range of motion)  -JERICHO     Sets/Reps Detail, Supine Upper Extremity (Therapeutic Exercise) 1/10  -     Row Name 06/09/18 1120          Lower Extremity Supine Therapeutic Exercise    Performed, Supine Lower Extremity (Therapeutic Exercise) hip flexion/extension;hip abduction/adduction;hip external/internal rotation;ankle dorsiflexion/plantarflexion;heel slides  -     Exercise Type, Supine Lower Extremity (Therapeutic Exercise) PROM (passive range of motion)  -JERICHO     Sets/Reps Detail, Supine Lower Extremity (Therapeutic Exercise) 1/10  -     Row Name 06/09/18 1120          Pain Scale: Numbers Pre/Post-Treatment    Pain Scale: Numbers, Pretreatment 0/10 - no pain  -     Pain Scale: Numbers, Post-Treatment 0/10 - no pain  -     Row Name             Wound 05/25/18 1544 Left arm incision    Wound - Properties Group Date first assessed: 05/25/18  -CE Time first assessed: 1544  -CE Side: Left  -CE Location: arm  -CE Type: incision  -CE    Row Name             Wound 06/08/18 0215 Right gluteal pressure injury    Wound - Properties Group Date first assessed: 06/08/18  -LB Time first assessed: 0215  -LB Present On Admission : yes  -LB Side: Right  -LB Location: gluteal  -LB Type: pressure injury  -LB Stage, Pressure Injury: Stage 1  -LB    Row Name             Wound 06/08/18 0215 anus pressure injury    Wound - Properties Group Date first assessed: 06/08/18  -LB Time first assessed: 0215  -LB Present On Admission : yes  -LB Location: anus  -LB Type: pressure injury  -LB Stage, Pressure Injury: Stage 2  -LB     Row Name             Wound 06/08/18 0215 posterior cervical spine abscess    Wound - Properties Group Date first assessed: 06/08/18  -LB Time first assessed: 0215  -LB Present On Admission : yes  -LB Orientation: posterior  -LB Location: cervical spine  -LB Type: abscess  -LB    Row Name             Wound 06/08/18 0900 medial coccyx    Wound - Properties Group Date first assessed: 06/08/18  -CP Time first assessed: 0900  -CP Present On Admission : yes;picture taken  -CP Orientation: medial  -CP Location: coccyx  -CP Stage, Pressure Injury: unstageable  -CP    Row Name             Wound 06/08/18 1550 Other (See comments) cervical spine incision    Wound - Properties Group Date first assessed: 06/08/18  -AL Time first assessed: 1550  -AL Side: Other (See comments)  -AL Location: cervical spine  -AL Type: incision  -AL    Row Name 06/09/18 1120          Coping    Observed Emotional State accepting  -JERICHO     Verbalized Emotional State acceptance  -JERICHO     Row Name 06/09/18 1120          Plan of Care Review    Plan of Care Reviewed With patient;family  -JERICHO     Row Name 06/09/18 1120          Physical Therapy Clinical Impression    Date of Referral to PT 06/09/18  -JERICHO     PT Diagnosis (PT Clinical Impression) impaired bed mobility transfer and gait, decreased strength and balance  -JERICHO     Patient/Family Goals Statement (PT Clinical Impression) patient to go back to rehab  -JERICHO     Criteria for Skilled Interventions Met (PT Clinical Impression) yes;treatment indicated  -JERICHO     Rehab Potential (PT Clinical Summary) fair, will monitor progress closely  -JERICHO     Care Plan Review (PT) evaluation/treatment results reviewed;care plan/treatment goals reviewed;risks/benefits reviewed;patient/other agree to care plan  -JERICHO     Care Plan Review, Other Participant (PT Clinical Impression) spouse  -JERICHO     Row Name 06/09/18 1120          Physical Therapy Goals    Bed Mobility Goal Selection (PT) bed mobility, PT goal 1  -JERICHO     Transfer  Goal Selection (PT) transfer, PT goal 1  -JERICHO     Row Name 06/09/18 1120          Bed Mobility Goal 1 (PT)    Activity/Assistive Device (Bed Mobility Goal 1, PT) rolling to left;rolling to right;supine to sit  -JERICHO     [place] Level/Cues Needed (Bed Mobility Goal 1, PT) maximum assist (25-49% patient effort)  -JERICHO     Time Frame (Bed Mobility Goal 1, PT) long term goal (LTG);10 days  -JERICHO     Progress/Outcomes (Bed Mobility Goal 1, PT) goal ongoing  -JERICHO     Row Name 06/09/18 1120          Strength Goal 1 (PT)    Strength Goal 1 (PT) Pt demonstrated 1/5 strength at hip musculature bilaterally.  -JERICHO     Time Frame (Strength Goal 1, PT) long term goal (LTG);10 days  -JERICHO     Progress/Outcome (Strength Goal 1, PT) goal ongoing  -JERICHO     Row Name 06/09/18 1120          Balance Goal 1 (PT)    Activity/Assistive Device (Balance Goal 1, PT) sitting, static  -JERICHO     [place] Level/Cues Needed (Balance Goal 1, PT) maximum assist (25-49% patient effort)  -JERICHO     Time Frame (Balance Goal 1, PT) long term goal (LTG);10 days  -JERICHO     Progress/Outcomes (Balance Goal 1, PT) goal ongoing  -JERICHO     Row Name 06/09/18 1120          Patient Education Goal (PT)    Activity (Patient Education Goal, PT) ROM ex  -JERICHO     [place]/Cues/Accuracy (Memory Goal 2, PT) verbalizes understanding;demonstrates adequately  -JERICHO     Time Frame (Patient Education Goal, PT) long term goal (LTG);10 days  -JERICHO     Progress/Outcome (Patient Education Goal, PT) goal ongoing  -JERICHO     Row Name 06/09/18 1120          Positioning and Restraints    Pre-Treatment Position in bed  -JERICHO     Post Treatment Position bed  -JERICHO     In Bed notified nsg;supine;call light within reach;with family/caregiver  -JERICHO       User Key  (r) = Recorded By, (t) = Taken By, (c) = Cosigned By    Initials Name Provider Type    JERICHO Arita, PT Physical Therapist    LAYTON Tobar Nurse Practitioner    CALLIE Peralta, RN Registered Nurse    KAZ Euceda, RN  Registered Nurse    NIURKA Yin, RN Registered Nurse          Physical Therapy Education     Title: PT OT SLP Therapies (Active)     Topic: Physical Therapy (Active)     Point: Mobility training (Active)    Learning Progress Summary     Learner Status Readiness Method Response Comment Documented by    Patient Active Acceptance E NR   06/09/18 1415     Done Eager E,D,TB [facility] 06/09/18 0712     Done Acceptance E VU,[facility] 06/08/18 1306    Family Done Acceptance E VU,[facility] 06/08/18 1306    Significant Other Done Eager E,D,TB [facility] 06/09/18 0712     Done Acceptance E VU,[facility] 06/08/18 1306          Point: Home exercise program (Active)    Learning Progress Summary     Learner Status Readiness Method Response Comment Documented by    Patient Active Acceptance E NR   06/09/18 1415     Done Eager E,D,TB [facility] 06/09/18 0712     Done Acceptance E VU,[facility] 06/08/18 1306    Family Done Acceptance E VU,[facility] 06/08/18 1306    Significant Other Done Eager E,D,TB [facility] 06/09/18 0712     Done Acceptance E VU,[facility] 06/08/18 1306          Point: Body mechanics (Active)    Learning Progress Summary     Learner Status Readiness Method Response Comment Documented by    Patient Active Acceptance E NR   06/09/18 1415     Done Eager E,D,TB [facility] 06/09/18 0712     Done Acceptance E VU,[facility] 06/08/18 1306    Family Done Acceptance E VU,[facility] 06/08/18 1306    Significant Other Done Eager E,D,TB [facility] 06/09/18 0712     Done Acceptance E VU,[facility] 06/08/18 1306          Point: Precautions (Active)    Learning Progress Summary     Learner Status Readiness Method Response Comment Documented by    Patient Active Acceptance E NR   06/09/18 1415     Done Eager E,D,TB [facility] 06/09/18 0712     Done Acceptance E VU,[facility] 06/08/18 1306    Family Done Acceptance E VU,[facility] 06/08/18 1306    Significant Other Done Eager E,D,TB [facility] 06/09/18 0712     Done Acceptance E VU,[facility] 06/08/18 1306                      User Key      Initials Effective Dates Name Provider Type Discipline    JERICHO 06/19/15 -  Alfreda Arita, PT Physical Therapist PT    JW 02/14/17 -  Manju Tyler RN Registered Nurse Nurse                PT Recommendation and Plan  Anticipated Discharge Disposition (PT): long term acute care facility  Planned Therapy Interventions (PT Eval): balance training, bed mobility training, home exercise program, ROM (range of motion), strengthening  Therapy Frequency (PT Clinical Impression): 3 times/wk  Outcome Summary/Treatment Plan (PT)  Anticipated Discharge Disposition (PT): long term acute care facility  Plan of Care Reviewed With: patient  Outcome Summary: PT eval completed patient is lethargic today limiting tx. PROM to all extremites          Outcome Measures     Row Name 06/09/18 1120 06/09/18 0830          How much help from another person do you currently need...    Turning from your back to your side while in flat bed without using bedrails? 1  -JERICHO  --     Moving from lying on back to sitting on the side of a flat bed without bedrails? 1  -JERICHO  --     Moving to and from a bed to a chair (including a wheelchair)? 1  -JERICHO  --     Standing up from a chair using your arms (e.g., wheelchair, bedside chair)? 1  -JERICHO  --     Climbing 3-5 steps with a railing? 1  -JERICHO  --     To walk in hospital room? 1  -JERICHO  --     AM-PAC 6 Clicks Score 6  -EJRICHO  --        How much help from another is currently needed...    Putting on and taking off regular lower body clothing?  -- 1  -JR     Bathing (including washing, rinsing, and drying)  -- 1  -JR     Toileting (which includes using toilet bed pan or urinal)  -- 1  -JR     Putting on and taking off regular upper body clothing  -- 1  -JR     Taking care of personal grooming (such as brushing teeth)  -- 1  -JR     Eating meals  -- 1  -JR     Score  -- 6  -JR        Functional Assessment    Outcome Measure Options AM-PAC 6 Clicks Basic Mobility (PT)  -JERICHO AM-PAC 6 Clicks Daily Activity (OT)  -JR        User Key  (r) = Recorded By, (t) = Taken By, (c) = Cosigned By    Initials Name Provider Type    JERICHO Arita, PT Physical Therapist    JR Radha Salazar, OT Occupational Therapist           Time Calculation:         PT Charges     Row Name 06/09/18 1416             Time Calculation    Start Time 1120  -JERICHO      PT Received On 06/09/18  -JERICHO      PT Goal Re-Cert Due Date 06/19/18  -JERICHO         Time Calculation- PT    Total Timed Code Minutes- PT 16 minute(s)  -JERICHO        User Key  (r) = Recorded By, (t) = Taken By, (c) = Cosigned By    Initials Name Provider Type    JERICHO Arita, PT Physical Therapist          Therapy Charges for Today     Code Description Service Date Service Provider Modifiers Qty    27903905303 HC PT EVAL MOD COMPLEXITY 2 6/9/2018 Alfreda Arita, PT GP 1    44107575893 HC PT THER PROC EA 15 MIN 6/9/2018 Alfreda Arita, PT GP 1          PT G-Codes  Outcome Measure Options: AM-PAC 6 Clicks Basic Mobility (PT)      Alfreda Arita PT  6/9/2018

## 2018-06-09 NOTE — PROGRESS NOTES
"NAL Progress Note  Lizet Webster  [id number]  1970    6/8/2018    Reason for visit:  ESRD    ROS:    Pulm:  No SOA  CV:  No CP    I&O:    Intake/Output Summary (Last 24 hours) at 06/09/18 1140  Last data filed at 06/09/18 0843   Gross per 24 hour   Intake          2860.22 ml   Output             4425 ml   Net         -1564.78 ml       PE:   Blood pressure 102/60, pulse 82, temperature 97.6 °F (36.4 °C), temperature source Axillary, resp. rate 18, height 157 cm (61.81\"), weight 61 kg (134 lb 7.7 oz), SpO2 100 %, not currently breastfeeding.    GENERAL:      Awake and alert, in no acute distress. Intubated               GENERAL: well developed WW              HEENT: Pupils small equal reactive. Tracheostomy              LUNGS: breath sounds bilateral, clear anteriorly              HEART: regular rhythm. Normal S1S2, no murmer              ABDOMEN: PEG. Bowel sounds present, soft              EXTREMITIES: LUE surgical wound intact over AVF, trace edema              NEURO/PSYCH: sedates              SKIN: CERVICAL INCISION SIZE OF AN ERASER, TUNNELING. THE WOUND WAS PACKED WITH 1/2    Medications:    Current Facility-Administered Medications:   •  acetaminophen (TYLENOL) tablet 650 mg, 650 mg, Oral, Q4H PRN **OR** [DISCONTINUED] acetaminophen (TYLENOL) suppository 650 mg, 650 mg, Rectal, Q4H PRN, LAYTON Souza  •  albumin human 25 % IV SOLN 12.5 g, 12.5 g, Intravenous, PRN, Narayan Holguin MD  •  albumin human 25 % IV SOLN 25 g, 25 g, Intravenous, PRN, Narayan Holguin MD, 25 g at 06/08/18 1717  •  ceFAZolin in dextrose (ANCEF) IVPB solution 2 g, 2 g, Intravenous, Q24H, Chris Tyler IV, [facility]  •  chlorhexidine (PERIDEX) 0.12 % solution 15 mL, 15 mL, Mouth/Throat, Q12H, LAYTON Souza, 15 mL at 06/09/18 0842  •  DULoxetine (CYMBALTA) DR capsule 20 mg, 20 mg, Oral, Daily, LAYTON Souza, 20 mg at 06/09/18 0841  •  epoetin mini (EPOGEN,PROCRIT) injection 10,000 Units, 10,000 Units, Subcutaneous, Once per " day on Mon Wed Fri, LAYTON Souza, 10,000 Units at 06/08/18 1134  •  fentaNYL citrate (PF) (SUBLIMAZE) injection 50 mcg, 50 mcg, Intravenous, Q5 Min PRN, Aguila Roman CRNA  •  gabapentin (NEURONTIN) capsule 400 mg, 400 mg, Oral, Nightly, Jyothi Carrasco APRN, 400 mg at 06/08/18 2138  •  heparin (porcine) 5000 UNIT/ML injection 5,000 Units, 5,000 Units, Subcutaneous, Q8H, Jyothi Carrasco APRN, 5,000 Units at 06/09/18 0605  •  hydrALAZINE (APRESOLINE) injection 5 mg, 5 mg, Intravenous, Q10 Min PRN, Aguila Roman CRNA  •  insulin regular (humuLIN R,novoLIN R) injection 0-14 Units, 0-14 Units, Subcutaneous, Q6H, Jyothi Carrasco APRN, 3 Units at 06/09/18 0605  •  ipratropium-albuterol (DUO-NEB) nebulizer solution 3 mL, 3 mL, Nebulization, 4x Daily - RT, Ap Wise MD, 3 mL at 06/09/18 1111  •  ipratropium-albuterol (DUO-NEB) nebulizer solution 3 mL, 3 mL, Nebulization, Once PRN, Aguila Roman CRNA  •  labetalol (NORMODYNE,TRANDATE) injection 5 mg, 5 mg, Intravenous, Q5 Min PRN, Aguila Roman CRNA  •  lactated ringers bolus 500 mL, 500 mL, Intravenous, Once PRN, Aguila Roman CRNA  •  levothyroxine (SYNTHROID, LEVOTHROID) tablet 75 mcg, 75 mcg, Oral, Q AM, Jyothi Carrasco APRN, 75 mcg at 06/09/18 0604  •  lidocaine (LIDODERM) 5 % 1 patch, 1 patch, Transdermal, Q24H, LAYTON Souza, 1 patch at 06/09/18 0842  •  lidocaine PF 1% (XYLOCAINE) injection 0.5 mL, 0.5 mL, Injection, Once PRN, Luis Lawson MD  •  meperidine (DEMEROL) injection 12.5 mg, 12.5 mg, Intravenous, Q5 Min PRN, Aguila Roman CRNA  •  micafungin 100 mg/100 mL 0.9% NS IVPB (mbp), 100 mg, Intravenous, Q24H, LAYTON Souza, 100 mg at 06/08/18 1145  •  midodrine (PROAMATINE) tablet 2.5 mg, 2.5 mg, Oral, BID, LAYTON Souza, 2.5 mg at 06/09/18 0841  •  naloxone (NARCAN) injection 0.4 mg, 0.4 mg, Intravenous, PRN, Aguila Roman CRNA  •  nystatin (MYCOSTATIN) powder, , Topical, Q12H, Ap Wise MD  •  ondansetron (ZOFRAN)  injection 4 mg, 4 mg, Intravenous, Once PRN, Aguila Roman CRNA  •  oxyCODONE-acetaminophen (PERCOCET) 7.5-325 MG per tablet 1 tablet, 1 tablet, Oral, Once PRN, Aguila Roman CRNA, 1 tablet at 06/09/18 0529  •  pantoprazole (PROTONIX) injection 40 mg, 40 mg, Intravenous, Q AM, Eulalio Prajapati, [facility], 40 mg at 06/09/18 0605  •  promethazine (PHENERGAN) injection 6.25 mg, 6.25 mg, Intravenous, Once PRN **OR** promethazine (PHENERGAN) injection 6.25 mg, 6.25 mg, Intramuscular, Once PRN **OR** promethazine (PHENERGAN) suppository 25 mg, 25 mg, Rectal, Once PRN **OR** promethazine (PHENERGAN) tablet 25 mg, 25 mg, Oral, Once PRN, Aguila Roman CRNA  •  sertraline (ZOLOFT) tablet 100 mg, 100 mg, Oral, Daily, LAYTON Souza, 100 mg at 06/09/18 0841  •  sodium chloride 0.9 % flush 1-10 mL, 1-10 mL, Intravenous, PRN, LAYTON Souza  •  sodium chloride 0.9 % flush 1-10 mL, 1-10 mL, Intravenous, PRN, Luis Lawson MD  •  sodium chloride 0.9 % flush 1-10 mL, 1-10 mL, Intravenous, PRN, Aguila Roman CRNA    Meds reviewed and doses adjusted for renal function    Labs:    Results from last 7 days  Lab Units 06/09/18  0753 06/08/18  0420 06/07/18  0311 06/06/18  0218 06/05/18  0106 06/04/18  0247 06/03/18  0147   WBC 10*3/mm3 8.18 10.30 9.88 8.33  --  13.13*  --    HEMOGLOBIN g/dL 7.0* 8.3* 7.9* 8.0*  --  7.7*  --    HEMATOCRIT % 22.6* 27.3* 24.5* 23.5*  --  24.2*  --    PLATELETS 10*3/mm3 194 266 304 282  --  302  --    SODIUM mmol/L 140 139 136 139 137 135 135   POTASSIUM mmol/L 3.2* 4.5 3.4* 3.4* 4.5 3.7 4.7   CHLORIDE mmol/L 105 103 98* 97* 97* 98* 98*   CO2 mmol/L 23.0 22.0 24.7 28.8 24.3 26.4 22.9*   BUN mg/dL 31* 59* 46* 20 31* 22* 39*   CREATININE mg/dL 1.33* 2.25* 1.99* 1.45* 2.32* 1.84* 2.90*   GLUCOSE mg/dL 175* 86 316* 240* 258* 196* 156*   CALCIUM mg/dL 8.6* 9.4 9.2 8.7 9.4 8.7 8.8   PHOSPHORUS mg/dL 2.5 3.6  3.6 2.7  --   --   --   --              Radiology:  Imaging Results (last 72 hours)      Procedure Component Value Units Date/Time    Fiberoptic Endo (fees) [[id number]] Resulted:  06/09/18 1053     Updated:  06/09/18 1053    Narrative:       This procedure was auto-finalized with no dictation required.    XR Chest 1 View [[id number]] Updated:  06/09/18 0341    XR Chest 1 View [[id number]] Collected:  06/08/18 1143     Updated:  06/08/18 1157    Narrative:       EXAMINATION: XR CHEST 1 VW- 06/08/2018     INDICATION: post chest tube; O88-Jvjabqv effusion, not elsewhere  classified; T14.8XXA-Other injury of unspecified body region, initial  encounter; L08.9-Local infection of the skin and subcutaneous tissue,  unspecified      COMPARISON: 06/08/2018     FINDINGS: A left chest tube has been inserted since the previous  examination of the same day. A chest tube is well-positioned. There has  been evacuation of pleural fluid since previous exam of the same day.   The tracheostomy tube and central venous catheter are well-positioned.  There is minimal residual bibasilar airspace disease.           Impression:       Following insertion of a left chest tube there has been  significant improved aeration of the left lung base with minimal  residual bibasilar airspace disease.     D:  06/08/2018  E:  06/08/2018     This report was finalized on 6/8/2018 11:55 AM by Dr. Anthony Perera MD.       XR Shoulder 1 View Left [[id number]] Collected:  06/08/18 1146     Updated:  06/08/18 1157    Narrative:       EXAMINATION: XR SHOULDER 1 VW LEFT- 06/08/2018     INDICATION: J71-Egamocd effusion, not elsewhere classified;  T14.8XXA-Other injury of unspecified body region, initial encounter;  L08.9-Local infection of the skin and subcutaneous tissue, unspecified      COMPARISON: NONE     FINDINGS: Images of the left shoulder reveal no fracture or dislocation.  A Port-A-Cath is seen as well as a left chest tube visible on the  radiograph.           Impression:       Normal AP radiograph of the left shoulder.     D:  06/08/2018  E:   06/08/2018     This report was finalized on 6/8/2018 11:55 AM by Dr. Anthony Perera MD.       CT Chest Without Contrast [[id number]] Collected:  06/08/18 0338     Updated:  06/08/18 0639    Narrative:       EXAM:    CT Chest Without Intravenous Contrast    EXAM DATE/TIME:    6/8/2018 3:38 AM    CLINICAL HISTORY:    47 years old, female; Signs and symptoms; Shortness of breath; Additional   info: Aspiration, known or suspected    TECHNIQUE:    Axial computed tomography images of the chest without intravenous contrast.    All CT scans at this facility use one or more dose reduction techniques, viz.:   automated exposure control; ma/kV adjustment per patient size (including   targeted exams where dose is matched to indication; i.e. head); or iterative   reconstruction technique.    Coronal reformatted images were created and reviewed.    COMPARISON:    CT CHEST WO CONTRAST 2018-04-27 11:52    FINDINGS:    Lungs: Bilateral dependent airspace disease/atelectasis.    Pleural space: Moderate left greater than right pleural effusions.    Heart:  Unremarkable.  No cardiomegaly.  No significant pericardial effusion.    Bones/joints:  Unremarkable.  No acute fracture.  No dislocation.    Soft tissues:  Unremarkable.    Vasculature: Atheromatous vascular calcification.  No thoracic aortic   aneurysm.    Lymph nodes:  Unremarkable.  No enlarged lymph nodes.    Other findings: Tracheostomy. Right double lumen catheter. Left subclavian   line. Lower cervical fusion.      Impression:       Bilateral dependent airspace disease/atelectasis with moderate left greater   than right pleural effusions.  Tracheostomy, right double-lumen catheter, left subclavian line.  Atheromatous vascular calcification.  Lower cervical fusion.    THIS DOCUMENT HAS BEEN ELECTRONICALLY SIGNED BY IGOR CATES MD    XR Chest 1 View [[id number]] Collected:  06/08/18 0330     Updated:  06/08/18 0636    Narrative:       EXAM:    XR Chest, 1 View    EXAM  DATE/TIME:    6/8/2018 3:30 AM    CLINICAL HISTORY:    47 years old, female; Device placement; Ett placement (vent status); Patient   HX: Confirm endotracheal tube placement. Deepline placement, dialysis catheter.   Placed at an outside hospital new admit    TECHNIQUE:    Frontal view of the chest.    COMPARISON:    CR - XR CHEST 1 VW 2018-05-04 02:00    FINDINGS:    Lungs: Superimposed left lower lung and retrocardiac infiltrate not excluded.    Pleural space: Mild-moderate left pleural effusion.  No pneumothorax.    Heart:  Unremarkable.  No cardiomegaly.    Mediastinum:  Unremarkable.    Bones/joints:  Unremarkable.    Other findings: Tracheostomy. Right double lumen catheter with tip in the   SVC. Left subclavian line with tip in the SVC. Lower cervical fusion.      Impression:       Mild-moderate left pleural effusion with underlying infiltrate not excluded.  Tracheostomy, right double lumen catheter, left subclavian line.  Lower cervical fusion.    THIS DOCUMENT HAS BEEN ELECTRONICALLY SIGNED BY IGOR CATES MD          Renal Imaging:        IMPRESSION:  ESRD  ANEMIA  SEPSIS      RECOMMENDATIONS:     Dialysis on Monday  Procrit for anemia  Narayan Holguin MD  6/9/2018  11:40 AM

## 2018-06-09 NOTE — PLAN OF CARE
Problem: Patient Care Overview  Goal: Plan of Care Review  Outcome: Ongoing (interventions implemented as appropriate)   06/09/18 4309   Coping/Psychosocial   Plan of Care Reviewed With patient;spouse   OTHER   Outcome Summary OT initial eval completed, extensive chart review completed. NO UE strength identified this during initial eval. Pt would benefit from spinal cord rehab,  reported wanting to take pt home.

## 2018-06-10 NOTE — PLAN OF CARE
Problem: Ventilation, Mechanical Invasive (Adult)  Intervention: Optimize Oxygenation/Ventilation   06/10/18 0505   Respiratory Interventions   Airway/Ventilation Management airway patency maintained;humidification applied

## 2018-06-10 NOTE — PROGRESS NOTES
"NEUROSURGERY PROGRESS NOTE     LOS: 2 days   Patient Care Team:  Luan Gandara MD as PCP - General (Internal Medicine)    Chief Complaint: Cervical wound infection.    POD#: 2 Days Post-Op  Procedures:  Cervical wound debridement, hardware removal, and reclosure.    Interval History:   Patient Complaints: None.  Patient Denies: Significant pain.    Vital Signs: Blood pressure (!) 86/59, pulse 92, temperature 97.5 °F (36.4 °C), temperature source Axillary, resp. rate 16, height 157 cm (61.81\"), weight 61 kg (134 lb 7.7 oz), SpO2 100 %, not currently breastfeeding.  Intake/Output:   Intake/Output Summary (Last 24 hours) at 06/10/18 0745  Last data filed at 06/10/18 0600   Gross per 24 hour   Intake          2299.55 ml   Output             1410 ml   Net           889.55 ml     Drain output: 20/30 mL.    Physical Exam:  The patient is on the ventilator.  She is able to mouth words.  Minimal movement in her extremities.     Data Review:      Results from last 7 days  Lab Units 06/10/18  0449   WBC 10*3/mm3 8.24   HEMOGLOBIN g/dL 7.3*   HEMATOCRIT % 23.8*   PLATELETS 10*3/mm3 192       Results from last 7 days  Lab Units 06/10/18  0449   SODIUM mmol/L 138   POTASSIUM mmol/L 4.3   CHLORIDE mmol/L 104   CO2 mmol/L 21.0   BUN mg/dL 43*   CREATININE mg/dL 1.79*   GLUCOSE mg/dL 114*   CALCIUM mg/dL 8.6*         Assessment/Plan:  1.  Postop posterior cervical wound infection and breakdown status post debridement and reclosure with hardware removal.  2.  Quadriplegia.      Luis Kiser MD  06/10/18  7:45 AM    "

## 2018-06-10 NOTE — PLAN OF CARE
Problem: Ventilation, Mechanical Invasive (Adult)  Goal: Signs and Symptoms of Listed Potential Problems Will be Absent, Minimized or Managed (Ventilation, Mechanical Invasive)  Outcome: Ongoing (interventions implemented as appropriate)      Problem: Mobility, Physical Impaired (Adult)  Goal: Identify Related Risk Factors and Signs and Symptoms  Outcome: Ongoing (interventions implemented as appropriate)      Problem: Hemodialysis (Adult)  Goal: Signs and Symptoms of Listed Potential Problems Will be Absent, Minimized or Managed (Hemodialysis)  Outcome: Ongoing (interventions implemented as appropriate)      Problem: Wound (Includes Pressure Injury) (Adult)  Goal: Signs and Symptoms of Listed Potential Problems Will be Absent, Minimized or Managed (Wound)  Outcome: Ongoing (interventions implemented as appropriate)

## 2018-06-10 NOTE — PLAN OF CARE
Problem: Patient Care Overview  Goal: Plan of Care Review  Outcome: Ongoing (interventions implemented as appropriate)   06/10/18 0455   Coping/Psychosocial   Plan of Care Reviewed With patient;spouse   Plan of Care Review   Progress no change   OTHER   Outcome Summary pt has been afebrile this shift. c/o generalized and head/neck pain. percocet did not help. tramadol ordered and helped. pt noted to have bladder distention. bladder scan showed >999. i/o cath per protocol and got 1150mL of coke colored urine. mazin draining small amounts of sero/sang fluid. dressings cdi. ct draining small amounts of serous fluid. pt occasionally c/o difficulty breathing but sats well and does not appear in distress. will cont. to monitor.        Problem: Ventilation, Mechanical Invasive (Adult)  Goal: Signs and Symptoms of Listed Potential Problems Will be Absent, Minimized or Managed (Ventilation, Mechanical Invasive)  Outcome: Ongoing (interventions implemented as appropriate)   06/10/18 0455   Goal/Outcome Evaluation   Problems Assessed (Mechanical Ventilation, Invasive) all   Problems Present (Mech Vent, Invasive) immobility;situational response       Problem: Mobility, Physical Impaired (Adult)  Goal: Identify Related Risk Factors and Signs and Symptoms  Outcome: Ongoing (interventions implemented as appropriate)   06/10/18 0455   Mobility, Physical Impaired (Adult)   Related Risk Factors (Physical Mobility, Impaired) disease process;fear of falling;functional decline;knowledge deficit;pain/discomfort;respiratory insufficiency;surgery/procedure   Signs and Symptoms (Physical Mobility Impaired) fear/anxiety related to mobility;inability to purposefully move in environment;limited ability to perform gross/fine motor skills;postural instability with activity     Goal: Enhanced Mobility Skills  Outcome: Ongoing (interventions implemented as appropriate)   06/10/18 0455   Mobility, Physical Impaired (Adult)   Enhanced Mobility Skills  other (see comments)  (no change)     Goal: Enhanced Functional Ability  Outcome: Ongoing (interventions implemented as appropriate)   06/10/18 0455   Mobility, Physical Impaired (Adult)   Enhanced Functional Ability other (see comments)  (no change)       Problem: Hemodialysis (Adult)  Goal: Signs and Symptoms of Listed Potential Problems Will be Absent, Minimized or Managed (Hemodialysis)  Outcome: Ongoing (interventions implemented as appropriate)    Goal: Signs and Symptoms of Listed Potential Problems Will be Absent, Minimized or Managed (Hemodialysis)  Outcome: Ongoing (interventions implemented as appropriate)   06/10/18 0455   Goal/Outcome Evaluation   Problems Assessed (Hemodialysis) all   Problems Present (Hemodialysis) electrolyte imbalance;situational response;vascular access complications       Problem: Wound (Includes Pressure Injury) (Adult)  Goal: Signs and Symptoms of Listed Potential Problems Will be Absent, Minimized or Managed (Wound)  Outcome: Ongoing (interventions implemented as appropriate)   06/10/18 0455   Goal/Outcome Evaluation   Problems Assessed (Wound) all   Problems Present (Wound) situational response

## 2018-06-11 NOTE — PROGRESS NOTES
"NEUROSURGERY PROGRESS NOTE    Vital Signs  Blood pressure 126/73, pulse 86, temperature 96.7 °F (35.9 °C), temperature source Oral, resp. rate 20, height 157 cm (61.81\"), weight 61 kg (134 lb 7.7 oz), SpO2 99 %, not currently breastfeeding.    Interval History:   POD# #3 - Posterior cervical wound debridement, hardware removal and closure    Patient able to answer questions by mouthing words. She is able to nod and shake her head. Minimal voluntary movement in the upper extremities at this time.       ASSESSMENT: POD#3 - posterior cervical wound debridement, hardware removal and closure    PLAN:   - Change dressing today  - Remains Quadriplegia      Benita An PA-C  06/11/18  8:54 AM    "

## 2018-06-11 NOTE — PLAN OF CARE
Problem: Patient Care Overview  Goal: Plan of Care Review  Outcome: Ongoing (interventions implemented as appropriate)   06/11/18 5023   Coping/Psychosocial   Plan of Care Reviewed With patient;spouse   Plan of Care Review   Progress improving   OTHER   Outcome Summary Pt with improvement in following commands today; no noted AROM of LEs. Edu  re: appropriate technique with LE ther ex. Will cont PT POC as clinically warranted.

## 2018-06-11 NOTE — THERAPY TREATMENT NOTE
Acute Care - Wound/Debridement Treatment Note  [facility]     Patient Name: Lizet Webster  : 1970  MRN: [id number]  Today's Date: 2018  Onset of Illness/Injury or Date of Surgery: 18   Date of Referral to PT: 18   Referring Physician: KIP Wise MD        Admit Date: 2018    Visit Dx:    ICD-10-CM ICD-9-CM   1. Pleural effusion on left J90 511.9   2. Wound infection, cervical, possible vs CFS leak T14.8XXA 958.3    L08.9    3. Impaired mobility and ADLs Z74.09 799.89   4. Impaired functional mobility, balance, gait, and endurance Z74.09 V49.89       Patient Active Problem List   Diagnosis   • Recent Bacteremia, MSSA   • Metabolic encephalopathy   • Epidural abscess - cervical spine s/p laminectomy and decompression 18   • ESRD (end stage renal disease) on dialysis   • Hypothyroidism   • Chronic systolic heart failure   • Type 2 diabetes mellitus with renal complication   • Neuropathy   • Acute respiratory failure   • Atelectasis of left lung, recurrent   • Quadriplegia   • Pleural effusion on left   • Pressure ulcer of coccygeal region   • Wound infection, cervical, possible vs CFS leak           Rash 18 0900 medial groin macular;papule (Active)   Distribution regional 2018  6:00 AM   Configuration/Shape asymmetric 2018  6:00 AM   Borders distinct 2018  6:00 AM   Characteristics moist 2018  6:00 AM   Color red 2018  6:00 AM       Wound 18 1544 Left arm incision (Active)   Dressing Appearance dry;intact 2018  6:00 AM   Periwound intact;dry;pink 2018  6:00 AM   Edges rolled/closed 2018  6:00 AM   Drainage Amount none 2018  6:00 AM       Wound 18 0215 Right gluteal pressure injury (Active)   Dressing Appearance open to air 2018  6:00 AM   Base pink;red/granulating 2018  6:00 AM   Periwound intact;dry;pink 2018  6:00 AM   Edges irregular 2018  6:00 AM   Drainage Amount none 2018  6:00 AM       Wound  06/08/18 0215 anus pressure injury (Active)   Dressing Appearance open to air 6/11/2018  6:00 AM   Base dry 6/11/2018  6:00 AM   Periwound moist 6/11/2018  6:00 AM   Drainage Amount none 6/11/2018  6:00 AM       Wound 06/08/18 0900 medial coccyx (Active)   Dressing Appearance dry;intact 6/11/2018  1:45 PM   Base black eschar;pink;yellow;slough 6/11/2018  1:45 PM   Periwound intact;moist;pink;redness;blanchable 6/11/2018  1:45 PM   Edges irregular 6/11/2018  1:45 PM   Drainage Characteristics/Odor serosanguineous 6/11/2018  1:45 PM   Drainage Amount moderate 6/11/2018  1:45 PM   Care, Wound irrigated with;sterile normal saline;debrided;honey applied 6/11/2018  1:45 PM   Dressing Care, Wound foam;low-adherent 6/11/2018  1:45 PM       Wound 06/08/18 1550 Other (See comments) cervical spine incision (Active)   Dressing Appearance dry;intact 6/11/2018  6:00 AM   Base dressing in place, unable to visualize 6/11/2018  6:00 AM   Periwound intact;dry;pink 6/11/2018  6:00 AM   Drainage Amount none 6/11/2018  6:00 AM   Wound Output (mL) 10 6/11/2018  6:00 AM         WOUND DEBRIDEMENT  Debridement Site 1  Location- Site 1: Coccyx   Selective Debridement- Site 1: Wound Surface <20cmsq  Instruments- Site 1: #15, scapel, tweezers, scissors  Excised Tissue Description- Site 1: moderate, eschar  Bleeding- Site 1: none                Therapy Treatment          Rehabilitation Treatment Summary     Row Name 06/11/18 5387             Treatment Time/Intention    Discipline physical therapist  -MF      Document Type therapy note (daily note);wound care  -MF      Subjective Information complains of;weakness;fatigue;pain  -MF      Mode of Treatment individual therapy;physical therapy  -      Recorded by [MF] Pantera Nesbitt, PT 06/11/18 9987      Row Name 06/11/18 1314             Positioning and Restraints    Pre-Treatment Position in bed  -      Post Treatment Position bed  -MF      In Bed side lying right;with family/caregiver  -MF       Recorded by [MF] Pantera Nesbitt, PT 06/11/18 1437      Row Name 06/11/18 1345             Pain Assessment    Additional Documentation Pain Scale: FACES Pre/Post-Treatment (Group)  -MF      Recorded by [MF] Pantera Nesbitt, PT 06/11/18 1437      Row Name 06/11/18 1345             Pain Scale: FACES Pre/Post-Treatment    Pain: FACES Scale, Pretreatment 0-->no hurt  -MF      Pain: FACES Scale, Post-Treatment 0-->no hurt  -MF      Pre/Post Treatment Pain Comment Pt anxious with rolling and repositioning   -MF      Recorded by [MF] Pantera Nesbitt, PT 06/11/18 1437      Row Name                Wound 05/25/18 1544 Left arm incision    Wound - Properties Group Date first assessed: 05/25/18 [CE] Time first assessed: 1544 [CE] Side: Left [CE] Location: arm [CE] Type: incision [CE] Recorded by:  [CE] Amauri Peralta RN 05/25/18 1544    Row Name                Wound 06/08/18 0215 Right gluteal pressure injury    Wound - Properties Group Date first assessed: 06/08/18 [LB] Time first assessed: 0215 [LB] Present On Admission : yes [LB] Side: Right [LB] Location: gluteal [LB] Type: pressure injury [LB] Stage, Pressure Injury: Stage 1 [LB] Recorded by:  [LB] Kelli Euceda RN 06/08/18 0517    Row Name                Wound 06/08/18 0215 anus pressure injury    Wound - Properties Group Date first assessed: 06/08/18 [LB] Time first assessed: 0215 [LB] Present On Admission : yes [LB] Location: anus [LB] Type: pressure injury [LB] Stage, Pressure Injury: Stage 2 [LB] Recorded by:  [LB] Kelli Euceda RN 06/08/18 0519    Row Name 06/11/18 1345             Wound 06/08/18 0900 medial coccyx    Wound - Properties Group Date first assessed: 06/08/18 [CP] Time first assessed: 0900 [CP] Present On Admission : yes;picture taken [CP] Orientation: medial [CP] Location: coccyx [CP] Stage, Pressure Injury: unstageable [CP] Recorded by:  [CP] LAYTON Gupta 06/08/18 1117    Dressing Appearance dry;intact  -MF      Base black  eschar;pink;yellow;slough  -MF      Periwound intact;moist;pink;redness;blanchable  -MF      Edges irregular  -MF      Drainage Characteristics/Odor serosanguineous  -MF      Drainage Amount moderate  -MF      Care, Wound irrigated with;sterile normal saline;debrided;honey applied  -MF      Dressing Care, Wound foam;low-adherent  -MF      Recorded by [MF] Pantera Nesbitt, PT 06/11/18 1437      Row Name                Wound 06/08/18 1550 Other (See comments) cervical spine incision    Wound - Properties Group Date first assessed: 06/08/18 [AL] Time first assessed: 1550 [AL] Side: Other (See comments) [AL] Location: cervical spine [AL] Type: incision [AL] Recorded by:  [AL] Jessica Yin RN 06/08/18 1550    Row Name 06/11/18 1345             Coping    Observed Emotional State anxious  -MF      Recorded by [MF] Pantera Nesbitt, PT 06/11/18 1437      Row Name 06/11/18 1345             Plan of Care Review    Plan of Care Reviewed With patient;spouse  -MF      Recorded by [MF] Pantera Nesbitt, PT 06/11/18 1437        User Key  (r) = Recorded By, (t) = Taken By, (c) = Cosigned By    Initials Name Effective Dates Discipline     Pantera Nesbitt, PT 06/19/15 -  PT    ZACKERY Santiago, APRN 04/06/17 - 06/07/18 Nurse    CALLIE Peralta RN 06/16/16 -  Nurse    KAZ Euceda RN 06/16/16 -  Nurse    NIURKA Yin, DEVYN 12/29/17 -  Nurse              Physical Therapy Education     Title: PT OT SLP Therapies (Active)     Topic: Physical Therapy (Done)     Point: Mobility training (Done)    Learning Progress Summary     Learner Status Readiness Method Response Comment Documented by    Patient Done Acceptance E MIKE   06/10/18 0758     Active Acceptance E NR  JERICHO 06/09/18 1415     Done HUMBLE Davidson,MARCO MCKEON   06/09/18 0712     Done Acceptance E NICOLE MCKEON   06/08/18 1306    Family Done Acceptance E NICOLE MCKEON   06/08/18 1306    Significant Other Done Acceptance E MIKE   06/10/18 0758     Done HUMBLE Davidson,MARCO [facility]  06/09/18 0712     Done Acceptance E VU,DU   06/08/18 1306          Point: Home exercise program (Done)    Learning Progress Summary     Learner Status Readiness Method Response Comment Documented by    Patient Done Acceptance E VU   06/10/18 0758     Active Acceptance E NR   06/09/18 1415     Done Eager E,D,TB VU   06/09/18 0712     Done Acceptance E VU,DU   06/08/18 1306    Family Done Acceptance E VU,DU   06/08/18 1306    Significant Other Done Acceptance E VU   06/10/18 0758     Done Eager E,D,TB VU   06/09/18 0712     Done Acceptance E VU,NICOLE   06/08/18 1306          Point: Body mechanics (Done)    Learning Progress Summary     Learner Status Readiness Method Response Comment Documented by    Patient Done Acceptance E VU   06/10/18 0758     Active Acceptance E NR   06/09/18 1415     Done Eager E,D,TB VU   06/09/18 0712     Done Acceptance E VU,[facility] 06/08/18 1306    Family Done Acceptance E VU,[facility] 06/08/18 1306    Significant Other Done Acceptance E VU   06/10/18 0758     Done Eager E,D,TB VU   06/09/18 0712     Done Acceptance E VU,[facility] 06/08/18 1306          Point: Precautions (Done)    Learning Progress Summary     Learner Status Readiness Method Response Comment Documented by    Patient Done Acceptance E VU   06/10/18 0758     Active Acceptance E NR   06/09/18 1415     Done Eager E,D,TB VU   06/09/18 0712     Done Acceptance E VU,DU   06/08/18 1306    Family Done Acceptance E VU,[facility] 06/08/18 1306    Significant Other Done Acceptance E VU   06/10/18 0758     Done Eager E,D,TB VU   06/09/18 0712     Done Acceptance E VU,[facility] 06/08/18 1306                      User Key     Initials Effective Dates Name Provider Type Discipline     06/19/15 -  Alfreda Arita, PT Physical Therapist PT     02/14/17 -  Manju Tyler, RN Registered Nurse Nurse                      PT Recommendation and Plan  Anticipated Discharge Disposition (PT): long term acute care  facility  Planned Therapy Interventions (PT Eval): wound care  Therapy Frequency (PT Clinical Impression): 3 times/wk               Outcome Summary: Pt cont to have significant amounts of this adherent slough and eschar covering wound base. PT will cont with debridement to help decrease necrotic tissue and improve healing potential, but Pt may benefit from decreased freq of tx to help increase autolytic debridement between visits.  PT may see x 1 more visit then decrease freq of tx.     Plan of Care Reviewed With: patient, spouse          Outcome Measures     Row Name 06/09/18 1120 06/09/18 0830          How much help from another person do you currently need...    Turning from your back to your side while in flat bed without using bedrails? 1  -JERICHO  --     Moving from lying on back to sitting on the side of a flat bed without bedrails? 1  -JERICHO  --     Moving to and from a bed to a chair (including a wheelchair)? 1  -JERICHO  --     Standing up from a chair using your arms (e.g., wheelchair, bedside chair)? 1  -JERICHO  --     Climbing 3-5 steps with a railing? 1  -JERICHO  --     To walk in hospital room? 1  -JERICHO  --     AM-PAC 6 Clicks Score 6  -JERICHO  --        How much help from another is currently needed...    Putting on and taking off regular lower body clothing?  -- 1  -JR     Bathing (including washing, rinsing, and drying)  -- 1  -JR     Toileting (which includes using toilet bed pan or urinal)  -- 1  -JR     Putting on and taking off regular upper body clothing  -- 1  -JR     Taking care of personal grooming (such as brushing teeth)  -- 1  -JR     Eating meals  -- 1  -JR     Score  -- 6  -JR        Functional Assessment    Outcome Measure Options AM-PAC 6 Clicks Basic Mobility (PT)  -JERICHO AM-PAC 6 Clicks Daily Activity (OT)  -JR       User Key  (r) = Recorded By, (t) = Taken By, (c) = Cosigned By    Initials Name Provider Type    JERICHO Arita, PT Physical Therapist    JR Radha Salazar, OT Occupational Therapist               Time Calculation        PT Charges     Row Name 06/11/18 1345             Time Calculation    Start Time 1345  -      PT Goal Re-Cert Due Date 06/19/18  -        User Key  (r) = Recorded By, (t) = Taken By, (c) = Cosigned By    Initials Name Provider Type     Pantera Nesbitt, PT Physical Therapist             Therapy Charges for Today     Code Description Service Date Service Provider Modifiers Qty    50486432951 HC GIACOMO DEBRIDE OPEN WOUND UP TO 20CM 6/11/2018 Pantera Nesbitt, PT GP 1            PT G-Codes  Outcome Measure Options: AM-PAC 6 Clicks Basic Mobility (PT)        Pantera Nesbitt, PT  6/11/2018

## 2018-06-11 NOTE — PLAN OF CARE
Problem: Ventilation, Mechanical Invasive (Adult)  Intervention: Prevent Airway Displacement/Mechanical Dysfunction   06/10/18 1643   Prevent Airway Displacement/Mechanical Dysfunction   Airway Safety Measures mask at bedside;manual resuscitator at bedside;suction at bedside

## 2018-06-11 NOTE — PROGRESS NOTES
Adult Nutrition  Assessment/PES    Patient Name:  Lizet Webster  YOB: 1970  MRN: [id number]  Admit Date:  6/8/2018    Assessment Date:  6/11/2018    Comments:  EN support adjusted for quadriplegia; ESRD on HD; surgical wounds and pressure ulcer -poa.  Pro-Stat 1 pckt twice daily ordered this will provide 2.0 gm/kg of IBW (quadriplegia 45 kg). Cont w/ Novasource Renal d/t ESRD for electrolyte, vitamin and mineral  Profile. RDI adjusted for renal disease will NOT be met prescribed volume - advise addition of nephrovit or equivalent, especially w/ additional requirements for wound healing.          Reason for Assessment     Row Name 06/11/18 6550          Reason for Assessment    Reason For Assessment follow-up protocol;TF/PN;per organizational policy   MDR; 45 mins     Diagnosis neurologic conditions;diabetes diagnosis/complications;renal disease;pulmonary disease   Pt s/p cervical wound debridement, removal of hardware and reclosure (6/8), quadriplegia; CRF - vent; pleural effusion w/ CT placement;DM-T2; ESRD -HD; pressure ulcer coccygeal -poa; Hx:trach and PEG     Identified At Risk by Screening Criteria tube feeding or parenteral nutrition;large or nonhealing wound, burn or pressure injury             Nutrition/Diet History     Row Name 06/11/18 1073          Nutrition/Diet History    Factors Affecting Nutritional Intake other (see comments)   RN reports pt tolerating TF well at goal rate               Labs/Tests/Procedures/Meds     Row Name 06/11/18 1164          Labs/Procedures/Meds    Lab Results Reviewed reviewed, pertinent     Lab Results Comments elevated CRP w/ depressed pre-albumin indicative of acute phase inflammatory response        Diagnostic Tests/Procedures    Diagnostic Test/Procedure Reviewed reviewed, pertinent        Medications    Pertinent Medications Reviewed reviewed, pertinent             Physical Findings     Row Name 06/11/18 2651          Physical Findings    Overall Physical  Appearance on ventilator support;tetraplegia (quadriplegia)     Tubes PEG     Skin pressure injury;non-healing wound(s)   present on adm see WOCN notes             Estimated/Assessed Needs     Row Name 06/11/18 1800 06/11/18 1756       Calculation Measurements    Weight Used For Calculations 45 kg (99 lb 3.3 oz)   IBW for quadriplegia 45 kg (99 lb 3.3 oz)   IBW for quadriplegia         Estimated/Assessed Needs    Additional Documentation  -- Protein Requirements (Group)       KCAL/KG    25 Kcal/Kg (kcal) 1125 1125    30 Kcal/Kg (kcal) 1350 1350          Protein Requirements    Est Protein Requirement Amount (gms/kg)  -- 2.0 gm protein    Estimated Protein Requirements (gms/day)  -- 90       Fluid Requirements 300-500  Ml plus UOP         Nutrition Prescription Ordered     Row Name 06/11/18 1759          Nutrition Prescription PO    Current PO Diet NPO        Nutrition Prescription EN    Enteral Route PEG     Product Novasource Renal     Continuous TF Goal Rate (mL/hr) 35 mL/hr     Continuous TF Goal Volume (mL) 700 mL     Water flush (mL)  30 mL     Water Flush Frequency Every 2 hours             Evaluation of Received Nutrient/Fluid Intake     Row Name 06/11/18 1800 06/11/18 1756       Calculation Measurements    Weight Used For Calculations 45 kg (99 lb 3.3 oz)   IBW for quadriplegia 45 kg (99 lb 3.3 oz)   IBW for quadriplegia         Calories Evaluation    Enteral Calories (kcal) 1560  --    Total Calories (kcal) 1560  --    % of Kcal Needs 99  --       Protein Evaluation    Enteral Protein (gm) 70  --    Total Protein (gm) 70  --    % of Protein Needs 77  --       Intake Assessment    Energy/Calorie Requirement Assessment meeting needs  --    Protein Requirement Assessment not meeting needs  --       Fluid Intake Evaluation    Enteral (Free Water) Fluid (mL) 559  --    Free Water Flush Fluid (mL) 428  --    Total Free Water Intake (mL) 987 mL  --       Recommended Daily Intake Evaluation    RDI Not Met  --        EN Evaluation    Number of Days EN Intake Evaluated 3 days  --    EN Average Volume Delivered (mL/day) 780 mL/day  --    % Goal Volume  111 %  --            Evaluation of Prescribed Nutrient/Fluid Intake     Row Name 06/11/18 1800 06/11/18 1756       Calculation Measurements    Weight Used For Calculations 45 kg (99 lb 3.3 oz)   IBW for quadriplegia 45 kg (99 lb 3.3 oz)   IBW for quadriplegia            Problem/Interventions:        Problem 1     Row Name 06/11/18 1803          Nutrition Diagnoses Problem 1    Problem 1 Inadequate Intake/Infusion     Inadequate Intake Type Enteral     Macronutrient Protein     Etiology (related to) Medical Diagnosis     Renal ESRD;Hemodialysis     Skin Non healing wound;Pressure injury;Surgical wound     Signs/Symptoms (evidenced by) PRO     Percent (%) of estimated PRO need 71 %   rx'd protein delivery      Resolved? Yes   ProStat ordered                     Intervention Goal     Row Name 06/11/18 1806          Intervention Goal    General Nutrition support treatment;Meet nutritional needs for age/condition     TF/PN Adjust TF/PN;Tolerate TF at goal             Nutrition Intervention     Row Name 06/11/18 1806          Nutrition Intervention    RD/Tech Action Recommend/ordered;Follow Tx progress;Care plan reviewd     Recommended/Ordered EN             Nutrition Prescription     Row Name 06/11/18 1806          Nutrition Prescription EN    Enteral Prescription Enteral begin/change     Enteral Route PEG     Product Novasource Renal     Modulars Liquid Protein (15 gm/30 mL)     Liquid Protein (15 gm/30 mL) 30 mL/1 packet     Protein Liquid Frequency 2 times a day     TF Delivery Method Continuous     Continuous TF Goal Rate (mL/hr) 35 mL/hr     Continuous TF Goal Volume (mL) 700 mL     Water flush (mL)  30 mL     Water Flush Frequency Every 2 hours     New EN Prescription Ordered? Yes             Education/Evaluation     Row Name 06/11/18 1807          Monitor/Evaluation    Monitor Per  protocol;I&O;Pertinent labs;TF delivery/tolerance;Skin status;Symptoms         Electronically signed by:  Saima Nolen RD  06/11/18 6:08 PM

## 2018-06-11 NOTE — THERAPY TREATMENT NOTE
Acute Care - Speech Language Pathology Treatment Note  [facility]     Patient Name: Lizet Webster  : 1970  MRN: [id number]  Today's Date: 2018         Admit Date: 2018    Visit Dx:      ICD-10-CM ICD-9-CM   1. Pleural effusion on left J90 511.9   2. Wound infection, cervical, possible vs CFS leak T14.8XXA 958.3    L08.9    3. Impaired mobility and ADLs Z74.09 799.89   4. Impaired functional mobility, balance, gait, and endurance Z74.09 V49.89     Patient Active Problem List   Diagnosis   • Recent Bacteremia, MSSA   • Metabolic encephalopathy   • Epidural abscess - cervical spine s/p laminectomy and decompression 18   • ESRD (end stage renal disease) on dialysis   • Hypothyroidism   • Chronic systolic heart failure   • Type 2 diabetes mellitus with renal complication   • Neuropathy   • Acute respiratory failure   • Atelectasis of left lung, recurrent   • Quadriplegia   • Pleural effusion on left   • Pressure ulcer of coccygeal region   • Wound infection, cervical, possible vs CFS leak        Therapy Treatment    Therapy Treatment / Health Promotion    Treatment Time/Intention  Discipline: speech language pathologist (18 1415 : Guilia Helm MS CCC-SLP)  Document Type: therapy note (daily note) (18 1415 : Giulia Helm MS CCC-SLP)  Subjective Information: complains of, fatigue (18 1415 : Giulia Helm MS CCC-SLP)  Mode of Treatment: speech-language pathology, individual therapy (18 1415 : Giulia Helm MS CCC-SLP)  Patient/Family Observations:  at bedside; pt confused (18 1415 : Giulia Helm MS CCC-SLP)  Care Plan Review: evaluation/treatment results reviewed, care plan/treatment goals reviewed, risks/benefits reviewed, current/potential barriers reviewed, patient/other agree to care plan (18 1415 : Giulia Helm MS CCC-SLP)  Care Plan Review, Other Participant(s): spouse (18 1415 : Giulia Helm MS  CCC-SLP)  Patient Effort: adequate (06/11/18 1415 : Giulia Helm MS CCC-SLP)  Plan of Care Review  Plan of Care Reviewed With: patient, spouse (06/11/18 1502 : Giulia Helm MS CCC-SLP)    Vitals/Pain/Safety  Pain Scale: FACES Pre/Post-Treatment  Pain: FACES Scale, Pretreatment: 2-->hurts little bit (06/11/18 1415 : Giulia Helm MS CCC-SLP)  Pain: FACES Scale, Post-Treatment: 2-->hurts little bit (06/11/18 1415 : Giulia Helm MS CCC-SLP)    Cognition, Communication, Swallow  Recommendations  Anticipated Dischage Disposition: long term acute care facility, anticipate therapy at next level of care (06/11/18 1415 : Giulia Helm MS CCC-SLP)    Outcome Summary  Outcome Summary/Treatment Plan (SLP)  Daily Summary of Progress (SLP): progress toward functional goals as expected (06/11/18 1415 : Giulia Helm MS CCC-SLP)  Barriers to Overall Progress (SLP): confused (06/11/18 1415 : Giulia Helm MS CCC-SLP)  Plan for Continued Treatment (SLP): Saw for PMV treatment. Pt tolerated cuff deflation and PMV placement w/ no significant changes in HR or O2 sats (remained stable). Pt able to achieve voicing w/ volitional throat clear and moaning during repositioning, however was not able to voice on command. Pt's articulation was adequate, but no voice achieved. ? confusion impacting, Continue PMV tx. Pt did not want to do FEES today, will f/u for instrumental tomorrow as appropriate.  (06/11/18 1415 : Giulia Helm MS CCC-SLP)  Anticipated Dischage Disposition: long term acute care facility, anticipate therapy at next level of care (06/11/18 1415 : Giulia Helm MS CCC-SLP)  Patient/Family Concerns, Anticipated Discharge Disposition (SLP):  concerned about pt's level of confusion (06/11/18 1415 : Giulia Helm MS CCC-SLP)      EDUCATION  The patient has been educated in the following areas:   Cognitive Impairment Communication Impairment Speaking Valve Dysphagia  (Swallowing Impairment).    SLP Recommendation and Plan           Anticipated Dischage Disposition: long term acute care facility, anticipate therapy at next level of care                Plan of Care Reviewed With: patient, spouse  Plan of Care Review  Plan of Care Reviewed With: patient, spouse  Daily Summary of Progress (SLP): progress toward functional goals as expected  Plan for Continued Treatment (SLP): Saw for PMV treatment. Pt tolerated cuff deflation and PMV placement w/ no significant changes in HR or O2 sats (remained stable). Pt able to achieve voicing w/ volitional throat clear and moaning during repositioning, however was not able to voice on command. Pt's articulation was adequate, but no voice achieved. ? confusion impacting, Continue PMV tx. Pt did not want to do FEES today, will f/u for instrumental tomorrow as appropriate.   Progress: no change          SLP GOALS     Row Name 06/11/18 1415 06/09/18 1100          Tolerate Speaking Valve Placement Goal 1 (SLP)    Tolerate Speaking Valve Placement Goal 1 (SLP) speaking valve >30 min;80%;with minimal cues (75-90%)  -RD speaking valve >30 min;80%;with minimal cues (75-90%)  -AV     Time Frame (Tolerate Speaking Valve Placement Goal 1, SLP) short term goal (STG);by discharge  -RD short term goal (STG);by discharge  -AV     Barriers (Tolerate Speaking Valve Placement 1, SLP) confusion impacting  -RD  --     Progress (Tolerate Speaking Valve Placement Goal 1, SLP) 60%;with minimal cues (75-90%)  -RD  --     Progress/Outcomes (Tolerate Speaking Valve Placement Goal 1, SLP) good progress toward goal  -RD  --     Comment (Tolerate Speaking Valve Placement Goal 1, SLP) Tolerated PMV for 15 minutes. No significant changes in vital signs. Removed as pt was fatigued and wanted to rest. Ok to wear w/ RN supervision  -RD  --        Audible Speech with Speaking Valve Goal 1 (SLP)    Audible Speech Goal 1 (SLP) 1 foot;80%;with minimal cues (75-90%)  -RD 1  foot;80%;with minimal cues (75-90%)  -AV     Time Frame (Audible Speech Goal 1, SLP) by discharge;short term goal (STG)  -RD by discharge;short term goal (STG)  -AV     Progress (Audible Speech Goal 1, SLP) 10%;with moderate cues (50-74%)  -RD  --     Progress/Outcomes (Audible Speech Goal 1, SLP) continuing progress toward goal  -RD  --     Comment (Audible Speech Goal 1, SLP) Able to achieve voicing for throat clear and moaning during repositioning x1, otherwise no audible voicing.   -RD  --       User Key  (r) = Recorded By, (t) = Taken By, (c) = Cosigned By    Initials Name Provider Type    NASRIN Aguilar MS CCC-SLP Speech and Language Pathologist    BUFFY Helm MS CCC-SLP Speech and Language Pathologist                Time Calculation:           Time Calculation- SLP     Row Name 06/11/18 1506             Time Calculation- SLP    SLP Start Time 1415  -RD      SLP Received On 06/11/18  -RD        User Key  (r) = Recorded By, (t) = Taken By, (c) = Cosigned By    Initials Name Provider Type    BUFFY Helm MS CCC-SLP Speech and Language Pathologist          Therapy Charges for Today     Code Description Service Date Service Provider Modifiers Qty    [id number]  ST TREATMENT SPEECH 4 6/11/2018 Giulia Helm MS CCC-SLP GN 1                     MS GELA Kenyon  6/11/2018

## 2018-06-11 NOTE — PROGRESS NOTES
"   LOS: 3 days    Patient Care Team:  Luan Gandara MD as PCP - General (Internal Medicine)    Reason For Visit:  F/U ESRD. SEEN ON DIALYSIS.  Subjective           Review of Systems: UNOBTAINABLE.        Objective       albumin human 12.5 g Intravenous Once   ceFAZolin 2 g Intravenous Q24H   chlorhexidine 15 mL Mouth/Throat Q12H   DULoxetine 20 mg Oral Daily   epoetin mini 10,000 Units Subcutaneous Once per day on Mon Wed Fri   gabapentin 400 mg Oral Nightly   heparin (porcine) 5,000 Units Subcutaneous Q8H   insulin regular 0-14 Units Subcutaneous Q6H   ipratropium-albuterol 3 mL Nebulization 4x Daily - RT   levothyroxine 75 mcg Oral Q AM   lidocaine 1 patch Transdermal Q24H   micafungin (MYCAMINE)  mg Intravenous Q24H   midodrine 5 mg Oral TID   nystatin  Topical Q12H   pantoprazole 40 mg Intravenous Q AM   sertraline 100 mg Oral Daily            Vital Signs:  Blood pressure 126/73, pulse 86, temperature 96.7 °F (35.9 °C), temperature source Oral, resp. rate 20, height 157 cm (61.81\"), weight 61 kg (134 lb 7.7 oz), SpO2 99 %, not currently breastfeeding.    Flowsheet Rows      First Filed Value   Admission Height  157 cm (61.81\") Documented at 06/08/2018 0215   Admission Weight  61 kg (134 lb 7.7 oz) Documented at 06/08/2018 0215          06/10 0701 - 06/11 0700  In: 1861.4 [I.V.:65.4]  Out: 420 [Drains:10]    Physical Exam:    General Appearance: NAD, alert and cooperative, Ox3  Eyes: PER, conjunctivae and sclerae normal, no icterus  Lungs: respirations regular and unlabored, no crepitus, clear to auscultation  Heart/CV: regular rhythm & normal rate, no murmur, no gallop, no rub and no edema  Abdomen: not distended, soft, non-tender, no masses,  bowel sounds present  Skin: No rash, Warm and dry. TUNNELED HD CATH. AVF LUE NO BRUIT.    Radiology:            Labs:    Results from last 7 days  Lab Units 06/11/18  0456 06/10/18  0449 06/09/18  0753   WBC 10*3/mm3 10.23 8.24 8.18   HEMOGLOBIN g/dL 7.7* 7.3* 7.0* "   HEMATOCRIT % 24.6* 23.8* 22.6*   PLATELETS 10*3/mm3 201 192 194       Results from last 7 days  Lab Units 06/11/18  0543 06/10/18  0449 06/09/18  0753 06/08/18  0420 06/07/18  0311   SODIUM mmol/L 136 138 140 139 136   POTASSIUM mmol/L 4.3 4.3 3.2* 4.5 3.4*   CHLORIDE mmol/L 99 104 105 103 98*   CO2 mmol/L 21.0 21.0 23.0 22.0 24.7   BUN mg/dL 63* 43* 31* 59* 46*   CREATININE mg/dL 2.36* 1.79* 1.33* 2.25* 1.99*   CALCIUM mg/dL 8.8 8.6* 8.6* 9.4 9.2   PHOSPHORUS mg/dL 3.9  --  2.5 3.6  3.6 2.7   MAGNESIUM mg/dL 2.4  --  2.1 2.3  2.3 2.3   ALBUMIN g/dL 3.40 3.37 3.35 3.26 3.30*       Results from last 7 days  Lab Units 06/11/18  0543   GLUCOSE mg/dL 209*         Results from last 7 days  Lab Units 06/11/18  0543   ALK PHOS U/L 128*   BILIRUBIN mg/dL 0.3   ALT (SGPT) U/L 2*   AST (SGOT) U/L 15       Results from last 7 days  Lab Units 06/11/18  0446   PH, ARTERIAL pH units 7.331*   PO2 ART mm Hg 89.6   PCO2, ARTERIAL mm Hg 37.9   HCO3 ART mmol/L 20.0             Estimated Creatinine Clearance: 25.8 mL/min (A) (by C-G formula based on SCr of 2.36 mg/dL (H)).      Assessment     Principal Problem:    Acute respiratory failure  Active Problems:    Recent Bacteremia, MSSA    Metabolic encephalopathy    Epidural abscess - cervical spine s/p laminectomy and decompression 4/13/18    ESRD (end stage renal disease) on dialysis    Hypothyroidism    Chronic systolic heart failure    Type 2 diabetes mellitus with renal complication    Neuropathy    Atelectasis of left lung, recurrent    Quadriplegia    Pleural effusion on left    Pressure ulcer of coccygeal region    Wound infection, cervical, possible vs CFS leak            Impression: ESRD. CLOTTED AVF. ANEMIA.            Recommendations: HD TODAY.      Chris Oakley MD  06/11/18  9:04 AM

## 2018-06-11 NOTE — PLAN OF CARE
Problem: Patient Care Overview  Goal: Plan of Care Review  Outcome: Ongoing (interventions implemented as appropriate)      Problem: Ventilation, Mechanical Invasive (Adult)  Goal: Signs and Symptoms of Listed Potential Problems Will be Absent, Minimized or Managed (Ventilation, Mechanical Invasive)  Outcome: Outcome(s) achieved Date Met: 06/11/18      Problem: Mobility, Physical Impaired (Adult)  Goal: Identify Related Risk Factors and Signs and Symptoms  Outcome: Outcome(s) achieved Date Met: 06/11/18    Goal: Enhanced Mobility Skills  Outcome: Ongoing (interventions implemented as appropriate)    Goal: Enhanced Functional Ability  Outcome: Ongoing (interventions implemented as appropriate)      Problem: Hemodialysis (Adult)  Goal: Signs and Symptoms of Listed Potential Problems Will be Absent, Minimized or Managed (Hemodialysis)  Outcome: Outcome(s) achieved Date Met: 06/11/18      Problem: Wound (Includes Pressure Injury) (Adult)  Goal: Signs and Symptoms of Listed Potential Problems Will be Absent, Minimized or Managed (Wound)  Outcome: Outcome(s) achieved Date Met: 06/11/18

## 2018-06-11 NOTE — PLAN OF CARE
Problem: Patient Care Overview  Goal: Plan of Care Review  Outcome: Ongoing (interventions implemented as appropriate)   06/11/18 0421   Coping/Psychosocial   Plan of Care Reviewed With patient;spouse   OTHER   Outcome Summary Pt/family educated on BUE HEP and positioning, noted one rep of RUE elbow flexion/extension in gravity eliminated position. Recommend cont skilled IPOT POC.

## 2018-06-11 NOTE — THERAPY TREATMENT NOTE
Acute Care - Physical Therapy Treatment Note  [facility]     Patient Name: Lizet Webster  : 1970  MRN: [id number]  Today's Date: 2018  Onset of Illness/Injury or Date of Surgery: 18  Date of Referral to PT: 18  Referring Physician: KIP Wise MD     Admit Date: 2018    Visit Dx:    ICD-10-CM ICD-9-CM   1. Pleural effusion on left J90 511.9   2. Wound infection, cervical, possible vs CFS leak T14.8XXA 958.3    L08.9    3. Impaired mobility and ADLs Z74.09 799.89   4. Impaired functional mobility, balance, gait, and endurance Z74.09 V49.89     Patient Active Problem List   Diagnosis   • Recent Bacteremia, MSSA   • Metabolic encephalopathy   • Epidural abscess - cervical spine s/p laminectomy and decompression 18   • ESRD (end stage renal disease) on dialysis   • Hypothyroidism   • Chronic systolic heart failure   • Type 2 diabetes mellitus with renal complication   • Neuropathy   • Acute respiratory failure   • Atelectasis of left lung, recurrent   • Quadriplegia   • Pleural effusion on left   • Pressure ulcer of coccygeal region   • Wound infection, cervical, possible vs CFS leak       Therapy Treatment          Rehabilitation Treatment Summary     Row Name 18 1507 18 1450 18 1415       Treatment Time/Intention    Discipline occupational therapist  -CL physical therapist  -LS speech language pathologist  -RD    Document Type therapy note (daily note)  -CL therapy note (daily note)  -LS therapy note (daily note)  -RD    Subjective Information complains of;pain;fatigue  -CL complains of;pain  -LS complains of;fatigue  -RD    Mode of Treatment  --  -- speech-language pathology;individual therapy  -RD    Patient/Family Observations  --  --  at bedside; pt confused  -RD    Care Plan Review  --  -- evaluation/treatment results reviewed;care plan/treatment goals reviewed;risks/benefits reviewed;current/potential barriers reviewed;patient/other agree to care plan   -RD    Care Plan Review, Other Participant(s)  --  -- spouse  -RD    Patient Effort adequate  -CL good  -LS adequate  -RD    Existing Precautions/Restrictions fall;spinal;oxygen therapy device and L/min;other (see comments)   trach collar, PEG, quadriplegic, s/p cervical I&D, BHAVNA drain  -CL fall;spinal   trach/PEG  -LS  --    Recorded by [CL] Anastasiya Powell, OT 06/11/18 1529 [LS] Kelli Manrique, PT 06/11/18 1543 [RD] Giulia Helm, MS CCC-SLP 06/11/18 1459    Row Name 06/11/18 1345             Treatment Time/Intention    Discipline physical therapist  -MF      Document Type therapy note (daily note);wound care  -MF      Subjective Information complains of;weakness;fatigue;pain  -MF      Mode of Treatment individual therapy;physical therapy  -MF      Recorded by [MF] Pantera Nesbitt, PT 06/11/18 1437      Row Name 06/11/18 1507 06/11/18 1450          Vital Signs    Pre Systolic BP Rehab 141  -  -LS     Pre Treatment Diastolic BP 93  -CL 74  -LS     Post Systolic BP Rehab  -- --   supine tx only; vitals stable thoughout  -LS     Pretreatment Heart Rate (beats/min) 90  -CL 91  -LS     Posttreatment Heart Rate (beats/min) 92  -CL  --     Pre SpO2 (%) 100  -CL 99  -LS     O2 Delivery Pre Treatment supplemental O2  -CL trach collar  -LS     Post SpO2 (%) 99  -CL  --     O2 Delivery Post Treatment supplemental O2  -CL supplemental O2  -LS     Pre Patient Position Supine  -CL Supine  -LS     Intra Patient Position Supine  -CL Supine  -LS     Post Patient Position Supine  -CL Supine  -LS     Recorded by [CL] Anastasiya Powell, OT 06/11/18 1529 [LS] Kelli Manrique, PT 06/11/18 1543     Row Name 06/11/18 1507 06/11/18 1450          Cognitive Assessment/Intervention- PT/OT    Affect/Mental Status (Cognitive) confused  -CL WFL  -LS     Orientation Status (Cognition) oriented to;person;situation  -CL oriented to;person;time   cues for month  -LS     Follows Commands (Cognition) follows one step commands;50-74% accuracy;verbal  cues/prompting required;repetition of directions required  -CL follows one step commands;50-74% accuracy  -LS     Cognitive Function (Cognitive) safety deficit  -CL safety deficit  -LS     Safety Deficit (Cognitive) moderate deficit;awareness of need for assistance;safety precautions awareness  -CL moderate deficit;safety precautions awareness  -LS     Personal Safety Interventions fall prevention program maintained;nonskid shoes/slippers when out of bed;supervised activity;muscle strengthening facilitated  -CL supervised activity;muscle strengthening facilitated  -LS     Recorded by [CL] Anastasiya Powell, OT 06/11/18 1529 [LS] Kelli Manrique, PT 06/11/18 1543     Row Name 06/11/18 1507 06/11/18 1450          Bed Mobility Assessment/Treatment    Comment (Bed Mobility) Deferred this date.   -CL Deferred today; focused upon supine ther ex.   -LS     Recorded by [CL] Anastasiya Powell, OT 06/11/18 1529 [LS] Kelli Manrique, PT 06/11/18 1543     Row Name 06/11/18 1450             Transfer Assessment/Treatment    Comment (Transfers) not approp to assess  -LS      Recorded by [LS] Kelli Manrique, PT 06/11/18 1543      Row Name 06/11/18 1450             Gait/Stairs Assessment/Training    Dexter Level (Gait) not tested  -LS      Recorded by [LS] Kelli Manrique, PT 06/11/18 1543      Row Name 06/11/18 1507             Upper Extremity Seated Therapeutic Exercise    Performed, Seated Upper Extremity (Therapeutic Exercise) shoulder flexion/extension;shoulder external/internal rotation;elbow flexion/extension;forearm supination/pronation;wrist flexion/extension;digit flexion/extension   noted R elbow flexion/extension x1 rep gravity eliminated  -CL      Exercise Type, Seated Upper Extremity (Therapeutic Exercise) PROM (passive range of motion)  -CL      Sets/Reps Detail, Seated Upper Extremity (Therapeutic Exercise) 1/10  -CL      Comment, Seated Upper Extremity (Therapeutic Exercise) BUE, family educated on techniques and positioning   -CL      Recorded by [CL] Anastasiya Powell, OT 06/11/18 1529      Row Name 06/11/18 1450             Lower Extremity Supine Therapeutic Exercise    Performed, Supine Lower Extremity (Therapeutic Exercise) hip flexion/extension;hip abduction/adduction;ankle dorsiflexion/plantarflexion   also calf and hamstring gentle stretch x15 seconds BLE  -LS      Exercise Type, Supine Lower Extremity (Therapeutic Exercise) PROM (passive range of motion)  -LS      Sets/Reps Detail, Supine Lower Extremity (Therapeutic Exercise) 1/10  -LS      Comment, Supine Lower Extremity (Therapeutic Exercise)  present for observation of proper technique in assisting pt with PROM of LEs  -LS      Recorded by [LS] Kelli Manrique, PT 06/11/18 1552      Row Name 06/11/18 1450             Therapeutic Exercise    Comment (Therapeutic Exercise) Also gentle AAROM of c-spine (lateral rot, flex/ext, lateral flex)  -LS      Recorded by [LS] Kelli Manrique, PT 06/11/18 1552      Row Name 06/11/18 1507 06/11/18 1450 06/11/18 1345       Positioning and Restraints    Pre-Treatment Position in bed  -CL in bed  -LS in bed  -MF    Post Treatment Position bed  -CL bed  -LS bed  -MF    In Bed notified nsg;fowlers;call light within reach;encouraged to call for assist;with family/caregiver;RUE elevated;LUE elevated;legs elevated;heels elevated  -CL notified nsg;fowlers;call light within reach;encouraged to call for assist;patient within staff view;SCD pump applied;LUE elevated;RUE elevated;legs elevated;waffle boots/both  -LS side lying right;with family/caregiver  -MF    Recorded by [CL] Anastasiya Powell, OT 06/11/18 1529 [LS] Kelli Manrique, PT 06/11/18 1552 [MF] Pantera Nesbitt, PT 06/11/18 1437    Row Name 06/11/18 1507 06/11/18 1345          Pain Assessment    Additional Documentation Pain Scale 2: FACES Pre/Post-Treatment (Group)  -CL Pain Scale: FACES Pre/Post-Treatment (Group)  -MF     Recorded by [CL] Anastasiya Powell, OT 06/11/18 1529 [MF] Pantera Nesbitt, PT  06/11/18 1437     Row Name 06/11/18 1415 06/11/18 1345          Pain Scale: FACES Pre/Post-Treatment    Pain: FACES Scale, Pretreatment 2-->hurts little bit  -RD 0-->no hurt  -MF     Pain: FACES Scale, Post-Treatment 2-->hurts little bit  -RD 0-->no hurt  -MF     Pre/Post Treatment Pain Comment  -- Pt anxious with rolling and repositioning   -MF     Recorded by [RD] Giulia Helm, MS CCC-SLP 06/11/18 1459 [MF] Pantera Nesbitt, PT 06/11/18 1437     Row Name 06/11/18 1507             Pain Scale 2: FACES Pre/Post-Treatment    Pain 2: FACES Scale, Pretreatment 2-->hurts little bit  -CL      Pain 2: FACES Scale, Post-Treatment 2-->hurts little bit  -CL      Pain Location 2 neck  -CL      Pre/Post Treatment Pain 2 Comment Tolerated.   -CL      Pain Intervention(s) 2 Repositioned;Ambulation/increased activity  -CL      Recorded by [CL] Anastasiya Powell, OT 06/11/18 1529      Row Name                Wound 05/25/18 1544 Left arm incision    Wound - Properties Group Date first assessed: 05/25/18 [CE] Time first assessed: 1544 [CE] Side: Left [CE] Location: arm [CE] Type: incision [CE] Recorded by:  [CE] Amauri Peralta RN 05/25/18 1544    Row Name                Wound 06/08/18 0215 Right gluteal pressure injury    Wound - Properties Group Date first assessed: 06/08/18 [LB] Time first assessed: 0215 [LB] Present On Admission : yes [LB] Side: Right [LB] Location: gluteal [LB] Type: pressure injury [LB] Stage, Pressure Injury: Stage 1 [LB] Recorded by:  [LB] Kelli Euceda RN 06/08/18 0517    Row Name                Wound 06/08/18 0215 anus pressure injury    Wound - Properties Group Date first assessed: 06/08/18 [LB] Time first assessed: 0215 [LB] Present On Admission : yes [LB] Location: anus [LB] Type: pressure injury [LB] Stage, Pressure Injury: Stage 2 [LB] Recorded by:  [LB] Kelli Euceda RN 06/08/18 0519    Row Name 06/11/18 1345             Wound 06/08/18 0900 medial coccyx    Wound - Properties Group Date first  assessed: 06/08/18 [CP] Time first assessed: 0900 [CP] Present On Admission : yes;picture taken [CP] Orientation: medial [CP] Location: coccyx [CP] Stage, Pressure Injury: unstageable [CP] Recorded by:  [CP] LAYTON Gupta 06/08/18 1117    Dressing Appearance dry;intact  -MF      Base black eschar;pink;yellow;slough  -MF      Periwound intact;moist;pink;redness;blanchable  -MF      Edges irregular  -MF      Drainage Characteristics/Odor serosanguineous  -MF      Drainage Amount moderate  -MF      Care, Wound irrigated with;sterile normal saline;debrided;honey applied  -MF      Dressing Care, Wound foam;low-adherent  -MF      Recorded by [MF] Pantera Nesbitt, PT 06/11/18 1437      Row Name                Wound 06/08/18 1550 Other (See comments) cervical spine incision    Wound - Properties Group Date first assessed: 06/08/18 [AL] Time first assessed: 1550 [AL] Side: Other (See comments) [AL] Location: cervical spine [AL] Type: incision [AL] Recorded by:  [AL] Jessica Yin RN 06/08/18 1550    Row Name 06/11/18 1345             Coping    Observed Emotional State anxious  -MF      Recorded by [MF] Pantera Nesbitt, PT 06/11/18 1437      Row Name 06/11/18 1345             Plan of Care Review    Plan of Care Reviewed With patient;spouse  -MF      Recorded by [MF] Pantera Nesbitt PT 06/11/18 1437      Row Name 06/11/18 1415             Outcome Summary/Treatment Plan (SLP)    Daily Summary of Progress (SLP) progress toward functional goals as expected  -RD      Barriers to Overall Progress (SLP) confused  -RD      Plan for Continued Treatment (SLP) Saw for PMV treatment. Pt tolerated cuff deflation and PMV placement w/ no significant changes in HR or O2 sats (remained stable). Pt able to achieve voicing w/ volitional throat clear and moaning during repositioning, however was not able to voice on command. Pt's articulation was adequate, but no voice achieved. ? confusion impacting, Continue PMV tx. Pt did  not want to do FEES today, will f/u for instrumental tomorrow as appropriate.   -RD      Anticipated Dischage Disposition long term acute care facility;anticipate therapy at next level of care  -RD      Patient/Family Concerns, Anticipated Discharge Disposition (SLP)  concerned about pt's level of confusion  -RD      Recorded by [RD] Giulia MULLIGAN Alicja, MS CCC-SLP 06/11/18 9472        User Key  (r) = Recorded By, (t) = Taken By, (c) = Cosigned By    Initials Name Effective Dates Discipline    MF Pantera Nesbitt, PT 06/19/15 -  PT    CP Leighann Santiago, APRN 04/06/17 - 06/07/18 Nurse    LS Kelli Manrique, PT 06/19/15 -  PT    CALLIE Peralta, RN 06/16/16 -  Nurse    KAZ Euceda, RN 06/16/16 -  Nurse    CL Anastasiya Powell, OT 04/03/18 -  OT    RD Giulia MULLIGAN Tamikonadeen, MS CCC-SLP 04/03/18 -  SLP    NIURKA Yin, RN 12/29/17 -  Nurse          Rash 06/08/18 0900 medial groin macular;papule (Active)   Distribution regional 6/11/2018 10:00 AM   Configuration/Shape asymmetric 6/11/2018 10:00 AM   Borders distinct 6/11/2018 10:00 AM   Characteristics moist 6/11/2018 10:00 AM   Color red 6/11/2018 10:00 AM       Wound 05/25/18 1544 Left arm incision (Active)   Dressing Appearance dry;intact 6/11/2018 10:00 AM   Periwound intact;dry;pink 6/11/2018 10:00 AM   Edges rolled/closed 6/11/2018 10:00 AM   Drainage Amount none 6/11/2018 10:00 AM       Wound 06/08/18 0215 Right gluteal pressure injury (Active)   Dressing Appearance open to air 6/11/2018 10:00 AM   Base pink;red/granulating 6/11/2018 10:00 AM   Periwound intact;dry;pink 6/11/2018 10:00 AM   Edges irregular 6/11/2018 10:00 AM   Drainage Amount none 6/11/2018 10:00 AM       Wound 06/08/18 0215 anus pressure injury (Active)   Dressing Appearance open to air 6/11/2018 10:00 AM   Base dry 6/11/2018 10:00 AM   Periwound moist 6/11/2018 10:00 AM   Drainage Amount none 6/11/2018 10:00 AM       Wound 06/08/18 0900 medial coccyx (Active)   Dressing Appearance  dry;intact 6/11/2018  1:45 PM   Base black eschar;pink;yellow;slough 6/11/2018  1:45 PM   Periwound intact;moist;pink;redness;blanchable 6/11/2018  1:45 PM   Edges irregular 6/11/2018  1:45 PM   Drainage Characteristics/Odor serosanguineous 6/11/2018  1:45 PM   Drainage Amount moderate 6/11/2018  1:45 PM   Care, Wound irrigated with;sterile normal saline;debrided;honey applied 6/11/2018  1:45 PM   Dressing Care, Wound foam;low-adherent 6/11/2018  1:45 PM       Wound 06/08/18 1550 Other (See comments) cervical spine incision (Active)   Dressing Appearance dry;intact 6/11/2018 10:00 AM   Base dressing in place, unable to visualize 6/11/2018 10:00 AM   Periwound intact;dry;pink 6/11/2018 10:00 AM   Drainage Amount none 6/11/2018 10:00 AM   Wound Output (mL) 10 6/11/2018  6:00 AM             Physical Therapy Education     Title: PT OT SLP Therapies (Active)     Topic: Physical Therapy (Done)     Point: Mobility training (Done)    Learning Progress Summary     Learner Status Readiness Method Response Comment Documented by    Patient Done Acceptance E,D REDDY MCKEON   06/11/18 1553     Done Acceptance E VU   06/10/18 0758     Active Acceptance E NR   06/09/18 1415     Done Eager ED,TB MIKE   06/09/18 0712     Done Acceptance E NICOLE MCKEON   06/08/18 1306    Family Done Acceptance E NICOLE MCKEON   06/08/18 1306    Significant Other Done Acceptance E,D REDDY MCKEON   06/11/18 1553     Done Acceptance E [facility] 06/10/18 0758     Done Eager E,D,TB [facility] 06/09/18 0712     Done Acceptance E NICOLE MCKEON   06/08/18 1306          Point: Home exercise program (Done)    Learning Progress Summary     Learner Status Readiness Method Response Comment Documented by    Patient Done Acceptance E,D REDDY MCKEON   06/11/18 1553     Done Acceptance E VU   06/10/18 0758     Active Acceptance E NR   06/09/18 1415     Done Eager E,D,TB MIKE   06/09/18 0712     Done Acceptance E NICOLE MCKEON 06/08/18 1306    Family Done Acceptance E NICOLE MCKEON 06/08/18 1303     Significant Other Done Acceptance E,D VU,NR   06/11/18 1553     Done Acceptance E VU   06/10/18 0758     Done Eager E,D,TB VU   06/09/18 0712     Done Acceptance E VU,DU   06/08/18 1306          Point: Body mechanics (Done)    Learning Progress Summary     Learner Status Readiness Method Response Comment Documented by    Patient Done Acceptance E,D VU,NR   06/11/18 1553     Done Acceptance E VU   06/10/18 0758     Active Acceptance E NR   06/09/18 1415     Done Eager E,D,TB VU   06/09/18 0712     Done Acceptance E VU,DU   06/08/18 1306    Family Done Acceptance E VU,DU   06/08/18 1306    Significant Other Done Acceptance E,D VU,NR   06/11/18 1553     Done Acceptance E VU   06/10/18 0758     Done Eager E,D,TB VU   06/09/18 0712     Done Acceptance E VU,DU   06/08/18 1306          Point: Precautions (Done)    Learning Progress Summary     Learner Status Readiness Method Response Comment Documented by    Patient Done Acceptance E,D VU,NR   06/11/18 1553     Done Acceptance E VU   06/10/18 0758     Active Acceptance E NR   06/09/18 1415     Done Eager E,D,TB VU   06/09/18 0712     Done Acceptance E VU,DU   06/08/18 1306    Family Done Acceptance E VU,DU   06/08/18 1306    Significant Other Done Acceptance E,D VU,NR   06/11/18 1553     Done Acceptance E VU   06/10/18 0758     Done Eager E,D,TB VU   06/09/18 0712     Done Acceptance E VU,DU   06/08/18 1306                      User Key     Initials Effective Dates Name Provider Type Discipline     06/19/15 -  Alfreda Arita, PT Physical Therapist PT     06/19/15 -  Kelli Manrique, PT Physical Therapist PT     02/14/17 -  Manju Tyler, RN Registered Nurse Nurse                    PT Recommendation and Plan     Plan of Care Reviewed With: patient, spouse  Progress: improving  Outcome Summary: Pt with improvement in following commands today; no noted AROM of LEs. Edu  re: appropriate technique with LE ther ex.  Will cont PT POC as clinically warranted.           Outcome Measures     Row Name 06/11/18 1507 06/11/18 1450 06/09/18 1120       How much help from another person do you currently need...    Turning from your back to your side while in flat bed without using bedrails?  -- 1  -LS 1  -JERICHO    Moving from lying on back to sitting on the side of a flat bed without bedrails?  -- 1  -LS 1  -JERICHO    Moving to and from a bed to a chair (including a wheelchair)?  -- 1  -LS 1  -JERICHO    Standing up from a chair using your arms (e.g., wheelchair, bedside chair)?  -- 1  -LS 1  -JERICHO    Climbing 3-5 steps with a railing?  -- 1  -LS 1  -JERICHO    To walk in hospital room?  -- 1  -LS 1  -JERICHO    AM-PAC 6 Clicks Score  -- 6  -LS 6  -JERICHO       How much help from another is currently needed...    Putting on and taking off regular lower body clothing? 1  -CL  --  --    Bathing (including washing, rinsing, and drying) 1  -CL  --  --    Toileting (which includes using toilet bed pan or urinal) 1  -CL  --  --    Putting on and taking off regular upper body clothing 1  -CL  --  --    Taking care of personal grooming (such as brushing teeth) 1  -CL  --  --    Eating meals 1  -CL  --  --    Score 6  -CL  --  --       Functional Assessment    Outcome Measure Options AM-PAC 6 Clicks Daily Activity (OT)  -CL AM-PAC 6 Clicks Basic Mobility (PT)  -LS AM-PAC 6 Clicks Basic Mobility (PT)  -JERCIHO    Row Name 06/09/18 0830             How much help from another is currently needed...    Putting on and taking off regular lower body clothing? 1  -JR      Bathing (including washing, rinsing, and drying) 1  -JR      Toileting (which includes using toilet bed pan or urinal) 1  -JR      Putting on and taking off regular upper body clothing 1  -JR      Taking care of personal grooming (such as brushing teeth) 1  -JR      Eating meals 1  -JR      Score 6  -JR         Functional Assessment    Outcome Measure Options AM-PAC 6 Clicks Daily Activity (OT)  -JR        User Key   (r) = Recorded By, (t) = Taken By, (c) = Cosigned By    Initials Name Provider Type    JERICHO Arita, PT Physical Therapist    JR Radha Salazar, OT Occupational Therapist    LS Kelli Manrique, PT Physical Therapist    ELIAZAR Powell, OT Occupational Therapist           Time Calculation:         PT Charges     Row Name 06/11/18 1603 06/11/18 1345          Time Calculation    Start Time 1450  -LS 1345  -     PT Received On 06/11/18  -LS  --     PT Goal Re-Cert Due Date  -- 06/19/18  -        Time Calculation- PT    Total Timed Code Minutes- PT 15 minute(s)  -  --       User Key  (r) = Recorded By, (t) = Taken By, (c) = Cosigned By    Initials Name Provider Type     Pantera Nesbitt, PT Physical Therapist    LS Kelli Manrique, PT Physical Therapist          Therapy Charges for Today     Code Description Service Date Service Provider Modifiers Qty    [id number] HC PT THER PROC EA 15 MIN 6/11/2018 Kelli Manrique, PT GP 1          PT G-Codes  Outcome Measure Options: AM-PAC 6 Clicks Daily Activity (OT)    Kelli Manrique, PT  6/11/2018

## 2018-06-11 NOTE — PLAN OF CARE
Problem: Patient Care Overview  Goal: Plan of Care Review  Outcome: Ongoing (interventions implemented as appropriate)   06/11/18 1034   Coping/Psychosocial   Plan of Care Reviewed With patient;spouse   Plan of Care Review   Progress no change   SLP treatment completed. Will continue to address communication impairment during PMV tx. Pt tolerated cuff deflation and PMV placement w/ no significant changes in HR or O2 sats (remained stable). Pt able to achieve voicing w/ volitional throat clear and moaning during repositioning, however was not able to voice on command. Pt's articulation was adequate, but no voice achieved. ? confusion impacting. Pt did not want to do FEES today as too fatigued, will f/u for instrumental tomorrow as appropriate. Pt okay for PMV w/ RN & SLP supervision only. Please see note for further details and recommendations.

## 2018-06-11 NOTE — PLAN OF CARE
Problem: Patient Care Overview  Goal: Plan of Care Review  Outcome: Ongoing (interventions implemented as appropriate)   06/11/18 3850   Coping/Psychosocial   Plan of Care Reviewed With patient;spouse   OTHER   Outcome Summary Pt cont to have significant amounts of this adherent slough and eschar covering wound base. PT will cont with debridement to help decrease necrotic tissue and improve healing potential, but Pt may benefit from decreased freq of tx to help increase autolytic debridement between visits. PT may see x 1 more visit then decrease freq of tx.

## 2018-06-11 NOTE — PROGRESS NOTES
Continued Stay Note  [facility]     Patient Name: Lizet Webster  MRN: [id number]  Today's Date: 6/11/2018    Admit Date: 6/8/2018          Discharge Plan     Row Name 06/11/18 0909       Plan    Plan TBD    Plan Comments Consult received for SNF placement.  Will discuss with family for their preferences.               Discharge Codes    No documentation.       Expected Discharge Date and Time     Expected Discharge Date Expected Discharge Time    Jun 12, 2018             Ronald Jansen RN

## 2018-06-11 NOTE — PROGRESS NOTES
"[facility] Progress Note    Date of Admission: 2018      Antibiotics:  Cefazolin    CC: No chief complaint on file.  Cervical wound dehiscence    S: Patient chronically ill-appearing hemodynamic stable today no fevers postop day 1 from cervical wound debridement.  18; no events overnight; feels well; no complaints, no diarrhea, rash, sore throat    O:  /92   Pulse 89   Temp 97.5 °F (36.4 °C) (Axillary)   Resp 24   Ht 157 cm (61.81\")   Wt 61 kg (134 lb 7.7 oz)   LMP  (LMP Unknown)   SpO2 99%   BMI 24.75 kg/m²   Temp (24hrs), Av.1 °F (36.7 °C), Min:96.7 °F (35.9 °C), Max:99.8 °F (37.7 °C)      PE:   GENERAL: eyes open, no distress; moving right arm on request  HEENT: Normocephalic, atraumatic.   HEART: RRR; No murmurs rubs or gallops noted.  LUNGS: Diminished at lung bases bilaterally  ABDOMEN: Soft, nondistended. Hypoactive bowel sounds.  EXT:  No cyanosis, clubbing or edema. No cord.   MSK: No joint swelling or erythema  SKIN: Warm and dry without cutaneous eruptions on Inspection/palpation.    NEURO: follows commands has focal deficit below waist; arms propped up on pillows but making no movement       Laboratory Data      Results from last 7 days  Lab Units 18  0456 06/10/18  0449 18  0753   WBC 10*3/mm3 10.23 8.24 8.18   HEMOGLOBIN g/dL 7.7* 7.3* 7.0*   HEMATOCRIT % 24.6* 23.8* 22.6*   PLATELETS 10*3/mm3 201 192 194       Results from last 7 days  Lab Units 18  0543   SODIUM mmol/L 136   POTASSIUM mmol/L 4.3   CHLORIDE mmol/L 99   CO2 mmol/L 21.0   BUN mg/dL 63*   CREATININE mg/dL 2.36*   GLUCOSE mg/dL 209*   CALCIUM mg/dL 8.8       Results from last 7 days  Lab Units 18  0543   ALK PHOS U/L 128*   BILIRUBIN mg/dL 0.3   ALT (SGPT) U/L 2*   AST (SGOT) U/L 15           Results from last 7 days  Lab Units 18  0456   CRP mg/dL 7.33*       Estimated Creatinine Clearance: 25.8 mL/min (A) (by C-G formula based on SCr of 2.36 mg/dL (H)).      Microbiology:  Pleural fluid " negative wound cultures from neck no organisms seen, repeat blood cultures negative    Radiology:  Imaging Results (last 24 hours)     Procedure Component Value Units Date/Time    XR Chest 1 View [[id number]] Collected:  06/11/18 0843     Updated:  06/11/18 1335    Narrative:       EXAMINATION: XR CHEST 1 VW-      INDICATION: Respiratory distress; J00-Nsykdnz effusion, not elsewhere  classified; T14.8XXA-Other injury of unspecified body region, initial  encounter; L08.9-Local infection of the skin and subcutaneous tissue,  unspecified; Z74.09-Other reduced mobility.     COMPARISON: 06/10/2018.     FINDINGS: Portable chest reveals a chest tube identified on the left  unchanged in position with no definite pneumothorax. Lines and tubes are  in satisfactory position. There are small bilateral pleural effusions  identified with increased markings at the lung bases bilaterally.       Impression:       Slight worsening of the aeration at the lung bases in the  interval.     D:  06/11/2018  E:  06/11/2018     This report was finalized on 6/11/2018 1:33 PM by Dr. Briana Stauffer MD.       XR Chest 1 View [[id number]] Collected:  06/10/18 1203     Updated:  06/11/18 1102    Narrative:       EXAMINATION: XR CHEST 1 VW - 6/10/2018     INDICATION: H62-Lqvzanz effusion, not elsewhere classified;  T14.8XXA-Other injury of unspecified body region, initial encounter;  L08.9-Local infection of the skin and subcutaneous tissue, unspecified;  Z74.09-Other reduced mobility.     COMPARISON: 6/9/2018.     FINDINGS: Portable chest reveals lines and tubes to be in satisfactory  position. Heart is enlarged. No focal parenchymal opacification is  present. No definite pleural effusion or pneumothorax. The bony  structures are unremarkable. The pulmonary vascularity is within normal  limits.       Impression:       Stable chest as above.     DICTATED:   6/10/2018  EDITED/ls :   6/10/2018      This report was finalized on 6/11/2018 11:00 AM  by Dr. Briana Stauffer MD.           Impression/Problem list:   chronic respiratory failure on vent  Epidural abscess MSSA 4/13/18  Suspected CSF leak post cervical wound dehiscence with posterior cervical wound debridement 6/8/18  ESRD  Left pleural effusion  S/p cervical spine decompression 4/13/18.  ? Seizure while on meropenem  Sputum cultures growing yeast, mold 5/25/18     Assessment:  Complex case 47-year-old with cervical spine discitis and epidural abscess status post decompression 4/13/18 on long-term IV antibiotics readmitted with cervical wound dehiscence and pleural effusions but normal white blood cell count status post surgery with posterior cervical wound debridement and BHAVNA drain in place stable on cefazolin    PLAN:    Course of cefazolin from 4/13/18 to present   Follow-up all cultures      D/w family  Op cultures ngtd

## 2018-06-11 NOTE — THERAPY TREATMENT NOTE
Acute Care - Occupational Therapy Treatment Note   [place]     Patient Name: Lizet Webster  : 1970  MRN: [id number]  Today's Date: 2018  Onset of Illness/Injury or Date of Surgery: 18  Date of Referral to OT: 18  Referring Physician: KIP Wise MD     Admit Date: 2018       ICD-10-CM ICD-9-CM   1. Pleural effusion on left J90 511.9   2. Wound infection, cervical, possible vs CFS leak T14.8XXA 958.3    L08.9    3. Impaired mobility and ADLs Z74.09 799.89   4. Impaired functional mobility, balance, gait, and endurance Z74.09 V49.89     Patient Active Problem List   Diagnosis   • Recent Bacteremia, MSSA   • Metabolic encephalopathy   • Epidural abscess - cervical spine s/p laminectomy and decompression 18   • ESRD (end stage renal disease) on dialysis   • Hypothyroidism   • Chronic systolic heart failure   • Type 2 diabetes mellitus with renal complication   • Neuropathy   • Acute respiratory failure   • Atelectasis of left lung, recurrent   • Quadriplegia   • Pleural effusion on left   • Pressure ulcer of coccygeal region   • Wound infection, cervical, possible vs CFS leak     Past Medical History:   Diagnosis Date   • CAD (coronary artery disease)     stentsx4    • Diabetes mellitus     ESRD, peripheral neuropathy   • ESRD (end stage renal disease) on dialysis 2018   • Hx of hepatitis    • Hypothyroidism 2018   • Systolic heart failure 2018    EF 40%     Past Surgical History:   Procedure Laterality Date   • ANTERIOR CERVICAL DISCECTOMY W/ FUSION N/A 2018    Procedure: ANTERIOR CERVICAL CORPECTOMY;  Surgeon: Aryan Reed MD;  Location: [facility];  Service: Neurosurgery   • ARTERIOVENOUS FISTULA Left    • ARTERIOVENOUS FISTULA/SHUNT SURGERY Left 2018    Procedure: REMOVAL OF CLOT FROM LEFT ARM GRAFT;  Surgeon: Jeevan Montoya MD;  Location: [facility] OR;  Service: Vascular   • BRONCHOSCOPY N/A 2018    Procedure: BRONCHOSCOPY AT BEDSIDE;  Surgeon:  Della Ca MD;  Location:  [facility];  Service: Pulmonary   • BRONCHOSCOPY N/A 4/24/2018    Procedure: BRONCHOSCOPY AT BEDSIDE;  Surgeon: Nghia Gifford MD;  Location:  [facility];  Service: Pulmonary   • BRONCHOSCOPY N/A 4/26/2018    Procedure: BRONCHOSCOPY AT BEDSIDE;  Surgeon: Denver Capone MD;  Location:  [facility];  Service: Pulmonary   • BRONCHOSCOPY N/A 5/31/2018    Procedure: BRONCHOSCOPY @ BEDSIDE;  Surgeon: Bolivar Mckeon MD;  Location:  [facility];  Service: Pulmonary   • BRONCHOSCOPY N/A 6/7/2018    Procedure: BRONCHOSCOPY @ BEDSIDE;  Surgeon: Bolivar Mckeon MD;  Location:  [facility];  Service: Pulmonary   • CENTRAL VENOUS LINE INSERTION Left 6/2/2018    Procedure: CENTRAL VENOUS LINE INSERTION;  Surgeon: Modesta Ontiveros MD;  Location:  [facility];  Service: General   • CERVICAL LAMINECTOMY DECOMPRESSION POSTERIOR N/A 4/13/2018    Procedure: CERVICAL LAMINECTOMY DECOMPRESSION POSTERIOR;  Surgeon: Aryan Reed MD;  Location:  [facility];  Service: Neurosurgery   • CERVICAL LAMINECTOMY DECOMPRESSION POSTERIOR N/A 6/8/2018    Procedure: POSTERIOR CERVICAL WOUND DEBRIDEMENT AND CLOSURE WITH HARDWARE REMOVAL;  Surgeon: Aryan Reed MD;  Location:  [facility];  Service: Neurosurgery   • CORONARY ANGIOPLASTY WITH STENT PLACEMENT  2011    4 stents   • HYSTERECTOMY     • INSERTION HEMODIALYSIS CATHETER N/A 5/26/2018    Procedure: HEMODIALYSIS CATHETER INSERTION;  Surgeon: Jeevan Montoya MD;  Location:  [facility];  Service: General   • INSERTION HEMODIALYSIS CATHETER Left 5/30/2018    Procedure: REPLACEMENT  OF LEFT CHEST WALL HEMODIALYSIS CATHETER;  Surgeon: Jeevan Montoya MD;  Location:  [facility];  Service: General   • INSERTION HEMODIALYSIS CATHETER Right 6/2/2018    Procedure: HEMODIALYSIS CATHETER INSERTION;  Surgeon: Modesta Ontiveros MD;  Location:  [facility];  Service: General   • REMOVAL PERITONEAL DIALYSIS CATHETER Left 6/2/2018    Procedure: REMOVAL TUNNELED  DIALYSIS CATHETER;  Surgeon: Modesta Ontiveros MD;  Location:  [facility];  Service: General   • TRACHEOSTOMY AND PEG TUBE INSERTION N/A 4/24/2018    Procedure: TRACHEOSTOMY AND PERCUTANEOUS ENDOSCOPIC GASTROSTOMY TUBE INSERTION;  Surgeon: Denver Alves MD;  Location:  [facility];  Service: General       Therapy Treatment          Rehabilitation Treatment Summary     Row Name 06/11/18 1507 06/11/18 1415 06/11/18 1345       Treatment Time/Intention    Discipline occupational therapist  -CL speech language pathologist  -RD physical therapist  -MF    Document Type therapy note (daily note)  -CL therapy note (daily note)  -RD therapy note (daily note);wound care  -MF    Subjective Information complains of;pain;fatigue  -CL complains of;fatigue  -RD complains of;weakness;fatigue;pain  -MF    Mode of Treatment  -- speech-language pathology;individual therapy  -RD individual therapy;physical therapy  -MF    Patient/Family Observations  --  at bedside; pt confused  -RD  --    Care Plan Review  -- evaluation/treatment results reviewed;care plan/treatment goals reviewed;risks/benefits reviewed;current/potential barriers reviewed;patient/other agree to care plan  -RD  --    Care Plan Review, Other Participant(s)  -- spouse  -RD  --    Patient Effort adequate  -CL adequate  -RD  --    Existing Precautions/Restrictions fall;spinal;oxygen therapy device and L/min;other (see comments)   trach collar, PEG, quadriplegic, s/p cervical I&D, BHAVNA drain  -CL  --  --    Recorded by [CL] Anastasiya Powell, OT 06/11/18 1529 [RD] Giulia Helm MS CCC-SLP 06/11/18 1459 [MF] Pantera Nesbitt, PT 06/11/18 1437    Row Name 06/11/18 1507             Vital Signs    Pre Systolic BP Rehab 141  -CL      Pre Treatment Diastolic BP 93  -CL      Pretreatment Heart Rate (beats/min) 90  -CL      Posttreatment Heart Rate (beats/min) 92  -CL      Pre SpO2 (%) 100  -CL      O2 Delivery Pre Treatment supplemental O2  -CL      Post SpO2 (%) 99  -CL       O2 Delivery Post Treatment supplemental O2  -CL      Pre Patient Position Supine  -CL      Intra Patient Position Supine  -CL      Post Patient Position Supine  -CL      Recorded by [CL] Anastasiya Powell OT 06/11/18 1529      Row Name 06/11/18 1507             Cognitive Assessment/Intervention- PT/OT    Affect/Mental Status (Cognitive) confused  -CL      Orientation Status (Cognition) oriented to;person;situation  -CL      Follows Commands (Cognition) follows one step commands;50-74% accuracy;verbal cues/prompting required;repetition of directions required  -CL      Cognitive Function (Cognitive) safety deficit  -CL      Safety Deficit (Cognitive) moderate deficit;awareness of need for assistance;safety precautions awareness  -CL      Personal Safety Interventions fall prevention program maintained;nonskid shoes/slippers when out of bed;supervised activity;muscle strengthening facilitated  -CL      Recorded by [CL] Anastasiya Powell OT 06/11/18 1529      Row Name 06/11/18 1507             Bed Mobility Assessment/Treatment    Comment (Bed Mobility) Deferred this date.   -CL      Recorded by [CL] Anastasiya Powell OT 06/11/18 1529      Row Name 06/11/18 1507             Upper Extremity Seated Therapeutic Exercise    Performed, Seated Upper Extremity (Therapeutic Exercise) shoulder flexion/extension;shoulder external/internal rotation;elbow flexion/extension;forearm supination/pronation;wrist flexion/extension;digit flexion/extension   noted R elbow flexion/extension x1 rep gravity eliminated  -CL      Exercise Type, Seated Upper Extremity (Therapeutic Exercise) PROM (passive range of motion)  -CL      Sets/Reps Detail, Seated Upper Extremity (Therapeutic Exercise) 1/10  -CL      Comment, Seated Upper Extremity (Therapeutic Exercise) BUE, family educated on techniques and positioning  -CL      Recorded by [CL] Anastasiya Powell OT 06/11/18 1529      Row Name 06/11/18 1507 06/11/18 1345          Positioning and Restraints    Pre-Treatment  Position in bed  -CL in bed  -MF     Post Treatment Position bed  -CL bed  -MF     In Bed notified nsg;fowlers;call light within reach;encouraged to call for assist;with family/caregiver;RUE elevated;LUE elevated;legs elevated;heels elevated  -CL side lying right;with family/caregiver  -MF     Recorded by [CL] Anastasiya Powell, OT 06/11/18 1529 [MF] Pantera Nesbitt, PT 06/11/18 1437     Row Name 06/11/18 1507 06/11/18 1345          Pain Assessment    Additional Documentation Pain Scale 2: FACES Pre/Post-Treatment (Group)  -CL Pain Scale: FACES Pre/Post-Treatment (Group)  -MF     Recorded by [CL] Anastasiya Powell, OT 06/11/18 1529 [MF] Pantera Nesbitt, PT 06/11/18 1437     Row Name 06/11/18 1415 06/11/18 1345          Pain Scale: FACES Pre/Post-Treatment    Pain: FACES Scale, Pretreatment 2-->hurts little bit  -RD 0-->no hurt  -MF     Pain: FACES Scale, Post-Treatment 2-->hurts little bit  -RD 0-->no hurt  -MF     Pre/Post Treatment Pain Comment  -- Pt anxious with rolling and repositioning   -MF     Recorded by [RD] Giulia Helm MS CCC-SLP 06/11/18 1459 [MF] Pantera Nesbitt, PT 06/11/18 1437     Row Name 06/11/18 1507             Pain Scale 2: FACES Pre/Post-Treatment    Pain 2: FACES Scale, Pretreatment 2-->hurts little bit  -CL      Pain 2: FACES Scale, Post-Treatment 2-->hurts little bit  -CL      Pain Location 2 neck  -CL      Pre/Post Treatment Pain 2 Comment Tolerated.   -CL      Pain Intervention(s) 2 Repositioned;Ambulation/increased activity  -CL      Recorded by [CL] Anastasiya Powell, OT 06/11/18 1529      Row Name                Wound 05/25/18 1544 Left arm incision    Wound - Properties Group Date first assessed: 05/25/18 [CE] Time first assessed: 1544 [CE] Side: Left [CE] Location: arm [CE] Type: incision [CE] Recorded by:  [CE] Amauri Peralta RN 05/25/18 1544    Row Name                Wound 06/08/18 0215 Right gluteal pressure injury    Wound - Properties Group Date first assessed: 06/08/18 [LB] Time  first assessed: 0215 [LB] Present On Admission : yes [LB] Side: Right [LB] Location: gluteal [LB] Type: pressure injury [LB] Stage, Pressure Injury: Stage 1 [LB] Recorded by:  [LB] Kelli Euceda RN 06/08/18 0517    Row Name                Wound 06/08/18 0215 anus pressure injury    Wound - Properties Group Date first assessed: 06/08/18 [LB] Time first assessed: 0215 [LB] Present On Admission : yes [LB] Location: anus [LB] Type: pressure injury [LB] Stage, Pressure Injury: Stage 2 [LB] Recorded by:  [LB] Kelli Euceda RN 06/08/18 0519    Row Name 06/11/18 1345             Wound 06/08/18 0900 medial coccyx    Wound - Properties Group Date first assessed: 06/08/18 [CP] Time first assessed: 0900 [CP] Present On Admission : yes;picture taken [CP] Orientation: medial [CP] Location: coccyx [CP] Stage, Pressure Injury: unstageable [CP] Recorded by:  [CP] LAYTON Gupta 06/08/18 1117    Dressing Appearance dry;intact  -MF      Base black eschar;pink;yellow;slough  -MF      Periwound intact;moist;pink;redness;blanchable  -MF      Edges irregular  -MF      Drainage Characteristics/Odor serosanguineous  -MF      Drainage Amount moderate  -MF      Care, Wound irrigated with;sterile normal saline;debrided;honey applied  -MF      Dressing Care, Wound foam;low-adherent  -MF      Recorded by [MF] Pantera Nesbitt, PT 06/11/18 1437      Row Name                Wound 06/08/18 1550 Other (See comments) cervical spine incision    Wound - Properties Group Date first assessed: 06/08/18 [AL] Time first assessed: 1550 [AL] Side: Other (See comments) [AL] Location: cervical spine [AL] Type: incision [AL] Recorded by:  [AL] Jessica Yin RN 06/08/18 1550    Row Name 06/11/18 1345             Coping    Observed Emotional State anxious  -MF      Recorded by [MF] Pantera Nesbitt, PT 06/11/18 1437      Row Name 06/11/18 1345             Plan of Care Review    Plan of Care Reviewed With patient;spouse  -MF      Recorded by [MF]  Pantera Nesbitt, PT 06/11/18 1437      Row Name 06/11/18 1415             Outcome Summary/Treatment Plan (SLP)    Daily Summary of Progress (SLP) progress toward functional goals as expected  -RD      Barriers to Overall Progress (SLP) confused  -RD      Plan for Continued Treatment (SLP) Saw for PMV treatment. Pt tolerated cuff deflation and PMV placement w/ no significant changes in HR or O2 sats (remained stable). Pt able to achieve voicing w/ volitional throat clear and moaning during repositioning, however was not able to voice on command. Pt's articulation was adequate, but no voice achieved. ? confusion impacting, Continue PMV tx. Pt did not want to do FEES today, will f/u for instrumental tomorrow as appropriate.   -RD      Anticipated Dischage Disposition long term acute care facility;anticipate therapy at next level of care  -RD      Patient/Family Concerns, Anticipated Discharge Disposition (SLP)  concerned about pt's level of confusion  -RD      Recorded by [RD] Giulia Helm, MS LOPEZ-SLP 06/11/18 3276        User Key  (r) = Recorded By, (t) = Taken By, (c) = Cosigned By    Initials Name Effective Dates Discipline    MF Pantera Nesbitt, PT 06/19/15 -  PT    CP Leighann Santiago, APRN 04/06/17 - 06/07/18 Nurse    CALLIE Peralta, DEVYN 06/16/16 -  Nurse    KAZ Euceda RN 06/16/16 -  Nurse    CL Anastasiya Powell, OT 04/03/18 -  OT    RD Giulia Helm, MS CCC-SLP 04/03/18 -  SLP    NIURKA Yin RN 12/29/17 -  Nurse        Rash 06/08/18 0900 medial groin macular;papule (Active)   Distribution regional 6/11/2018 10:00 AM   Configuration/Shape asymmetric 6/11/2018 10:00 AM   Borders distinct 6/11/2018 10:00 AM   Characteristics moist 6/11/2018 10:00 AM   Color red 6/11/2018 10:00 AM       Wound 05/25/18 1544 Left arm incision (Active)   Dressing Appearance dry;intact 6/11/2018 10:00 AM   Periwound intact;dry;pink 6/11/2018 10:00 AM   Edges rolled/closed 6/11/2018 10:00 AM   Drainage  Amount none 6/11/2018 10:00 AM       Wound 06/08/18 0215 Right gluteal pressure injury (Active)   Dressing Appearance open to air 6/11/2018 10:00 AM   Base pink;red/granulating 6/11/2018 10:00 AM   Periwound intact;dry;pink 6/11/2018 10:00 AM   Edges irregular 6/11/2018 10:00 AM   Drainage Amount none 6/11/2018 10:00 AM       Wound 06/08/18 0215 anus pressure injury (Active)   Dressing Appearance open to air 6/11/2018 10:00 AM   Base dry 6/11/2018 10:00 AM   Periwound moist 6/11/2018 10:00 AM   Drainage Amount none 6/11/2018 10:00 AM       Wound 06/08/18 0900 medial coccyx (Active)   Dressing Appearance dry;intact 6/11/2018  1:45 PM   Base black eschar;pink;yellow;slough 6/11/2018  1:45 PM   Periwound intact;moist;pink;redness;blanchable 6/11/2018  1:45 PM   Edges irregular 6/11/2018  1:45 PM   Drainage Characteristics/Odor serosanguineous 6/11/2018  1:45 PM   Drainage Amount moderate 6/11/2018  1:45 PM   Care, Wound irrigated with;sterile normal saline;debrided;honey applied 6/11/2018  1:45 PM   Dressing Care, Wound foam;low-adherent 6/11/2018  1:45 PM       Wound 06/08/18 1550 Other (See comments) cervical spine incision (Active)   Dressing Appearance dry;intact 6/11/2018 10:00 AM   Base dressing in place, unable to visualize 6/11/2018 10:00 AM   Periwound intact;dry;pink 6/11/2018 10:00 AM   Drainage Amount none 6/11/2018 10:00 AM   Wound Output (mL) 10 6/11/2018  6:00 AM         Occupational Therapy Education     Title: PT OT SLP Therapies (Active)     Topic: Occupational Therapy (Active)     Point: ADL training (Active)     Description: Instruct learner(s) on proper safety adaptation and remediation techniques during self care or transfers.   Instruct in proper use of assistive devices.   Learning Progress Summary     Learner Status Readiness Method Response Comment Documented by    Patient Active Acceptance E NR Pt educated on appropriate safety precautions, positioning, HEP, and benefits of therapy. CL  06/11/18 1529     Done Acceptance E VU   06/10/18 0758     Active Acceptance E NR Educated pt and family regarding role of therapy and continued treatment plan. JR 06/09/18 0943     Done Arthurer ED,TB VU   06/09/18 0712     Done Acceptance E VU,DU   06/08/18 1306    Family Active Acceptance E NR Educated pt and family regarding role of therapy and continued treatment plan. JR 06/09/18 0943     Done Acceptance E VU,DU   06/08/18 1306    Significant Other Done Acceptance E VU   06/10/18 0758     Done Wilfred ED,TB VU   06/09/18 0712     Done Acceptance E VU,DU   06/08/18 1306          Point: Home exercise program (Active)     Description: Instruct learner(s) on appropriate technique for monitoring, assisting and/or progressing therapeutic exercises/activities.   Learning Progress Summary     Learner Status Readiness Method Response Comment Documented by    Patient Active Acceptance E NR Pt educated on appropriate safety precautions, positioning, HEP, and benefits of therapy. CL 06/11/18 1529     Done Acceptance E VU   06/10/18 0758     Done Arthurer ISAAKD,TB VU   06/09/18 0712     Done Acceptance E VU,DU   06/08/18 1306    Family Done Acceptance E VU,DU   06/08/18 1306    Significant Other Done Acceptance E VU   06/10/18 0758     Done HUMBLE Davidson,TB VU   06/09/18 0712     Done Acceptance E VU,DU   06/08/18 1306          Point: Precautions (Active)     Description: Instruct learner(s) on prescribed precautions during self-care and functional transfers.   Learning Progress Summary     Learner Status Readiness Method Response Comment Documented by    Patient Active Acceptance E NR Pt educated on appropriate safety precautions, positioning, HEP, and benefits of therapy. CL 06/11/18 1529     Done Acceptance E VU   06/10/18 0758     Done Wilfred MULLIGAND,TB VU   06/09/18 0712     Done Acceptance E VU,DU   06/08/18 1306    Family Done Acceptance E VU,DU   06/08/18 1306    Significant Other Done Acceptance  E VU   06/10/18 0758     Done Eager E,D,TB VU   06/09/18 0712     Done Acceptance E NICOLE MCKEON   06/08/18 1306                      User Key     Initials Effective Dates Name Provider Type Discipline     06/22/15 -  Radha Salazar, OT Occupational Therapist OT     02/14/17 -  Manju Tyler, RN Registered Nurse Nurse     04/03/18 -  Anastasiya Powell OT Occupational Therapist OT                OT Recommendation and Plan     Plan of Care Review  Plan of Care Reviewed With: patient, spouse  Plan of Care Reviewed With: patient, spouse  Outcome Summary: Pt/family educated on BUE HEP and positioning, noted one rep of RUE elbow flexion/extension in gravity eliminated position. Recommend cont skilled IPOT POC.         Outcome Measures     Row Name 06/11/18 1507 06/09/18 1120 06/09/18 0830       How much help from another person do you currently need...    Turning from your back to your side while in flat bed without using bedrails?  -- 1  -JERICHO  --    Moving from lying on back to sitting on the side of a flat bed without bedrails?  -- 1  -JERICHO  --    Moving to and from a bed to a chair (including a wheelchair)?  -- 1  -JERICHO  --    Standing up from a chair using your arms (e.g., wheelchair, bedside chair)?  -- 1  -JERICHO  --    Climbing 3-5 steps with a railing?  -- 1  -JERICHO  --    To walk in hospital room?  -- 1  -JERICHO  --    AM-PAC 6 Clicks Score  -- 6  -JERICHO  --       How much help from another is currently needed...    Putting on and taking off regular lower body clothing? 1  -CL  -- 1  -JR    Bathing (including washing, rinsing, and drying) 1  -CL  -- 1  -JR    Toileting (which includes using toilet bed pan or urinal) 1  -CL  -- 1  -JR    Putting on and taking off regular upper body clothing 1  -CL  -- 1  -JR    Taking care of personal grooming (such as brushing teeth) 1  -CL  -- 1  -JR    Eating meals 1  -CL  -- 1  -JR    Score 6  -CL  -- 6  -JR       Functional Assessment    Outcome Measure Options AM-PAC 6 Clicks Daily  Activity (OT)  -CL AM-PAC 6 Clicks Basic Mobility (PT)  -JERICHO AM-PAC 6 Clicks Daily Activity (OT)  -JR      User Key  (r) = Recorded By, (t) = Taken By, (c) = Cosigned By    Initials Name Provider Type    JERICHO Alfreda Arita, PT Physical Therapist    JR Radha Salazar, OT Occupational Therapist    CL Anastasiya Powell OT Occupational Therapist           Time Calculation:         Time Calculation- OT     Row Name 06/11/18 1532             Time Calculation- OT    OT Start Time 1507  -CL      Total Timed Code Minutes- OT 11 minute(s)  -CL      OT Received On 06/11/18  -CL      OT Goal Re-Cert Due Date 06/19/18  -CL        User Key  (r) = Recorded By, (t) = Taken By, (c) = Cosigned By    Initials Name Provider Type    CL Anastasiya Powell, OT Occupational Therapist           Therapy Charges for Today     Code Description Service Date Service Provider Modifiers Qty    [id number] HC OT THERAPEUTIC ACT EA 15 MIN 6/11/2018 Anastasiya Powell OT GO 1               Anastasiya Powell OT  6/11/2018

## 2018-06-11 NOTE — PROGRESS NOTES
"Intensive Care Follow-up     Hospital:  LOS: 3 days   Ms. Lizet Webster, 47 y.o. female is followed for:   Acute respiratory failure        Subjective   Interval History:  The chart has been reviewed. The patient remained off the ventilator last evening. She is currently receiving hemodialysis. Hemodynamics have remained stable. All culture data has been reviewed.    The patient's relevant past medical, surgical and social history were reviewed and updated in Epic as appropriate.        Objective     Infusions:     Medications:    albumin human 12.5 g Intravenous Once   ceFAZolin 2 g Intravenous Q24H   chlorhexidine 15 mL Mouth/Throat Q12H   DULoxetine 20 mg Oral Daily   epoetin mini 10,000 Units Subcutaneous Once per day on Mon Wed Fri   gabapentin 400 mg Oral Nightly   heparin (porcine) 5,000 Units Subcutaneous Q8H   insulin regular 0-14 Units Subcutaneous Q6H   ipratropium-albuterol 3 mL Nebulization 4x Daily - RT   levothyroxine 75 mcg Oral Q AM   lidocaine 1 patch Transdermal Q24H   micafungin (MYCAMINE)  mg Intravenous Q24H   midodrine 5 mg Oral TID   nystatin  Topical Q12H   pantoprazole 40 mg Intravenous Q AM   sertraline 100 mg Oral Daily       Vital Sign Min/Max for last 24 hours  Temp  Min: 96.7 °F (35.9 °C)  Max: 99.8 °F (37.7 °C)   BP  Min: 76/52  Max: 132/83   Pulse  Min: 84  Max: 104   Resp  Min: 18  Max: 20   SpO2  Min: 91 %  Max: 100 %   No Data Recorded       Input/Output for last 24 hour shift  06/10 0701 - 06/11 0700  In: 1861.4 [I.V.:65.4]  Out: 420 [Drains:10]   FiO2 (%):  [24 %] 24 %  S RR:  [10] 10  PEEP/CPAP (cm H2O):  [5 cm H20] 5 cm H20  AR SUP:  [0 cm H20] 0 cm H20  MAP (cm H2O):  [8.5-12] 9.6  Objective:  General Appearance:  In no acute distress, ill-appearing and comfortable.    Vital signs: (most recent): Blood pressure 128/83, pulse 85, temperature 98.1 °F (36.7 °C), temperature source Oral, resp. rate 20, height 157 cm (61.81\"), weight 61 kg (134 lb 7.7 oz), SpO2 100 %, not " currently breastfeeding.  No fever.    HEENT: (Tracheostomy tube in place; on TCT.  Right tunneled internal jugular dialysis catheter.  Left internal jugular central venous catheter.  )    Lungs:  Normal effort and normal respiratory rate.  She is not in respiratory distress.  There are decreased breath sounds and rhonchi.  No rales or wheezes.  (Poor air movement bilaterally. Mild coarse basilar sounds. Left thoracostomy tube in place with no air leak.)  Heart: Normal rate.  Regular rhythm.  S1 normal and S2 normal.  No murmur, gallop or friction rub.   Chest: Symmetric chest wall expansion.   Abdomen: Abdomen is soft and non-distended.  Bowel sounds are normal.   There is no abdominal tenderness.   There is no mass. There is no splenomegaly. There is no hepatomegaly.   Extremities: Decreased range of motion.  There is no deformity or dependent edema.  (She is not moving any of her extremities.)  Neurological: Patient is alert.    Pupils:  Pupils are equal, round, and reactive to light.  Pupils are equal.   Skin:  Warm, dry and pale.  No rash.               Results from last 7 days  Lab Units 06/11/18  0456 06/10/18  0449 06/09/18  0753   WBC 10*3/mm3 10.23 8.24 8.18   HEMOGLOBIN g/dL 7.7* 7.3* 7.0*   PLATELETS 10*3/mm3 201 192 194       Results from last 7 days  Lab Units 06/11/18  0543 06/10/18  0449 06/09/18  0753 06/08/18  0420   SODIUM mmol/L 136 138 140 139   POTASSIUM mmol/L 4.3 4.3 3.2* 4.5   CO2 mmol/L 21.0 21.0 23.0 22.0   BUN mg/dL 63* 43* 31* 59*   CREATININE mg/dL 2.36* 1.79* 1.33* 2.25*   MAGNESIUM mg/dL 2.4  --  2.1 2.3  2.3   PHOSPHORUS mg/dL 3.9  --  2.5 3.6  3.6   GLUCOSE mg/dL 209* 114* 175* 86     Estimated Creatinine Clearance: 25.8 mL/min (A) (by C-G formula based on SCr of 2.36 mg/dL (H)).      Results from last 7 days  Lab Units 06/11/18  0446   PH, ARTERIAL pH units 7.331*   PCO2, ARTERIAL mm Hg 37.9   PO2 ART mm Hg 89.6       I reviewed the patient's results and images.      Assessment/Plan   Impression      Principal Problem:    Acute respiratory failure  Active Problems:    Recent Bacteremia, MSSA    Metabolic encephalopathy    Epidural abscess - cervical spine s/p laminectomy and decompression 4/13/18    ESRD (end stage renal disease) on dialysis    Hypothyroidism    Chronic systolic heart failure    Type 2 diabetes mellitus with renal complication    Neuropathy    Atelectasis of left lung, recurrent    Quadriplegia    Pleural effusion on left    Pressure ulcer of coccygeal region    Wound infection, cervical, possible vs CFS leak       Plan        Maintain left thoracostomy tube.  Continue with trach collar trials as tolerated and ventilator at night as needed.  Continue Mycamine.  We will watch for any further cultures should they become positive.  The patient remains critically ill from respiratory failure. She will remain in the intensive care unit today.     Plan of care and goals reviewed with mulitdisciplinary team at daily rounds.   I discussed the patient's findings and my recommendations with patient and nursing staff     Time spent Critical care 34 min (It does not include procedure time).    Denver Capone MD, [facility]  Pulmonary and Critical Care Medicine  06/11/18 11:18 AM

## 2018-06-12 NOTE — PROGRESS NOTES
"NEUROSURGERY PROGRESS NOTE    Vital Signs  Blood pressure 126/79, pulse 90, temperature 98.6 °F (37 °C), temperature source Axillary, resp. rate 22, height 157 cm (61.81\"), weight 61 kg (134 lb 7.7 oz), SpO2 96 %, not currently breastfeeding.    Interval History:   POD# #3 - Posterior cervical wound debridement, hardware removal and closure    Patient was mouthing words. Did not endorse any pain complaints when asked. Dressing was changed this morning on incision. Clean and dry dressing when I visited.     Patient is off of ventilator today.     ASSESSMENT: POD# #3 - Posterior cervical wound debridement, hardware removal and closure    PLAN:   - ID to dose antibiotics for aspergillus growth   - BHAVNA drain out today in neck  - Will continue to follow     Benita An PA-C  06/12/18  2:13 PM    "

## 2018-06-12 NOTE — PROGRESS NOTES
Adult Nutrition  Assessment/PES    Patient Name:  Lizet Webster  YOB: 1970  MRN: [id number]  Admit Date:  6/8/2018    Assessment Date:  6/12/2018    Comments:  Pt tolerating EN support well on continuous feeding via PEG          Reason for Assessment     Row Name 06/12/18 1804          Reason for Assessment    Reason For Assessment follow-up protocol;TF/PN;per organizational policy   MDR; 30 mins     Diagnosis neurologic conditions;diabetes diagnosis/complications;renal disease;pulmonary disease   Pt s/p cervical wound debridement, removal of hardware and reclosure (6/8), quadriplegia; CRF - vent; pleural effusion w/ CT placement;DM-T2; ESRD -HD; pressure ulcer coccygeal -poa; Hx:trach and PEG     Identified At Risk by Screening Criteria tube feeding or parenteral nutrition;large or nonhealing wound, burn or pressure injury             Nutrition/Diet History     Row Name 06/12/18 1806          Nutrition/Diet History    Factors Affecting Nutritional Intake inability to feed self    reports pt did not tolerate bolus feedings  at [facility]  c/o stomach pain; did pass FEES to regular diet  but did not eat d/t transfer here for surgery. RN reports pt tolerating TF well at this time.               Labs/Tests/Procedures/Meds     Row Name 06/12/18 1807          Labs/Procedures/Meds    Lab Results Reviewed reviewed, pertinent        Diagnostic Tests/Procedures    Diagnostic Test/Procedure Reviewed reviewed, pertinent     Diagnostic Test/Procedures Comments FEES pending             Physical Findings     Row Name 06/12/18 1814          Physical Findings    Overall Physical Appearance on ventilator support;tetraplegia (quadriplegia)     Tubes PEG     Skin pressure injury;non-healing wound(s)   present on adm see [facility] notes             Estimated/Assessed Needs     Row Name 06/12/18 3752          Calculation Measurements    Weight Used For Calculations 45 kg (99 lb 3.3 oz)   AIBW for quadriplegia        KCAL/KG    25  Kcal/Kg (kcal) 1125     30 Kcal/Kg (kcal) 1350     35 Kcal/Kg (kcal) 1575                   Nutrition Prescription Ordered     Row Name 06/12/18 1810          Nutrition Prescription PO    Current PO Diet NPO        Nutrition Prescription EN    Enteral Route PEG     Product Novasource Renal     Modulars Liquid Protein (15 gm/30 mL)     Liquid Protein (15 gm/30 mL) 30 mL/1 packet     Protein Liquid Frequency 2 times a day     TF Delivery Method Bolus     Continuous TF Goal Rate (mL/hr) 35 mL/hr     Continuous TF Goal Volume (mL) 700 mL     Water flush (mL)  30 mL     Water Flush Frequency Every 2 hours             Evaluation of Received Nutrient/Fluid Intake     Row Name 06/12/18 1811          Calculation Measurements    Weight Used For Calculations 45 kg (99 lb 3.3 oz)   AIBW for quadriplegia        Nutrient/Fluid Evaluation    Number of Days Evaluated 1 day        Calories Evaluation    Enteral Calories (kcal) 1566     Other Calories (kcal) 100     Total Calories (kcal) 1666     % of Kcal Needs 106        Protein Evaluation    Enteral Protein (gm) 71     Other Protein (gm) 15     Total Protein (gm) 86     % of Protein Needs 96        Intake Assessment    Energy/Calorie Requirement Assessment meeting needs     Protein Requirement Assessment not meeting needs     Tolerance tolerating        Fluid Intake Evaluation    Enteral (Free Water) Fluid (mL) 561     Free Water Flush Fluid (mL) 415     Total Free Water Intake (mL) 976 mL        Recommended Daily Intake Evaluation    RDI Not Met        EN Evaluation    Number of Days EN Intake Evaluated 1 day     EN Average Volume Delivered (mL/day) 783 mL/day     % Goal Volume  112 %     TF Changes Other (comment)   protein ,modular added yesterday             Evaluation of Prescribed Nutrient/Fluid Intake     Row Name 06/12/18 1811          Calculation Measurements    Weight Used For Calculations 45 kg (99 lb 3.3 oz)   AIBW for quadriplegia            Problem/Interventions:                  Intervention Goal     Row Name 06/12/18 1817          Intervention Goal    General Meet nutritional needs for age/condition;Nutrition support treatment     TF/PN Maintain TF/PN;Tolerate TF at goal             Nutrition Intervention     Row Name 06/12/18 1817          Nutrition Intervention    RD/Tech Action Follow Tx progress;Await begin PO;Care plan reviewd             Nutrition Prescription     Row Name 06/12/18 1817 06/11/18 1806       Nutrition Prescription EN    Enteral Prescription Continue same protocol Enteral begin/change    Enteral Route  -- PEG    Product  -- Novasource Renal    Modulars  -- Liquid Protein (15 gm/30 mL)    Liquid Protein (15 gm/30 mL)  -- 30 mL/1 packet    Protein Liquid Frequency  -- 2 times a day    TF Delivery Method  -- Continuous    Continuous TF Goal Rate (mL/hr)  -- 35 mL/hr    Continuous TF Goal Volume (mL)  -- 700 mL    Water flush (mL)   -- 30 mL    Water Flush Frequency  -- Every 2 hours    New EN Prescription Ordered?  -- Yes            Education/Evaluation     Row Name 06/12/18 1817          Monitor/Evaluation    Monitor Per protocol;I&O;Supplement intake;TF delivery/tolerance;Skin status;Symptoms         Electronically signed by:  Saima Nolen RD  06/12/18 6:17 PM

## 2018-06-12 NOTE — PLAN OF CARE
Problem: Patient Care Overview  Goal: Plan of Care Review  Outcome: Ongoing (interventions implemented as appropriate)   06/12/18 2590   Coping/Psychosocial   Plan of Care Reviewed With patient;spouse     SLP treatment completed. Will continue to address PMV training in tx. U/a to tolerate for >5min today w/o slow decline in O2 & c/o SOA. Rec PMV use w/ SLP/RT only @ this time. Required frequent suctioning of oral secretions throughout session. Pt's  hoping pt can have rpt FEES soon now that 4 days post cervical wound debridement. SLP will f/u for possible FEES tomorrow, pending status.. Please see note for further details and recommendations.

## 2018-06-12 NOTE — THERAPY WOUND CARE TREATMENT
Acute Care - Wound/Debridement Treatment Note  [facility]     Patient Name: Lizet Webster  : 1970  MRN: [id number]  Today's Date: 2018  Onset of Illness/Injury or Date of Surgery: 18   Date of Referral to PT: 18   Referring Physician: KIP Wise MD        Admit Date: 2018    Visit Dx:    ICD-10-CM ICD-9-CM   1. Pleural effusion on left J90 511.9   2. Wound infection, cervical, possible vs CFS leak T14.8XXA 958.3    L08.9    3. Impaired mobility and ADLs Z74.09 799.89   4. Impaired functional mobility, balance, gait, and endurance Z74.09 V49.89       Patient Active Problem List   Diagnosis   • Recent Bacteremia, MSSA   • Metabolic encephalopathy   • Epidural abscess - cervical spine s/p laminectomy and decompression 18   • ESRD (end stage renal disease) on dialysis   • Hypothyroidism   • Chronic systolic heart failure   • Type 2 diabetes mellitus with renal complication   • Neuropathy   • Acute respiratory failure   • Atelectasis of left lung, recurrent   • Quadriplegia   • Pleural effusion on left   • Pressure ulcer of coccygeal region   • Wound infection, cervical, possible vs CFS leak           Wound 18 1544 Left arm incision (Active)   Dressing Appearance dry;intact 2018  6:00 AM       Wound 18 0215 Right gluteal pressure injury (Active)   Dressing Appearance open to air 2018  6:00 AM   Base pink;red/granulating 2018  6:00 AM   Periwound intact;dry;pink 2018  6:00 AM       Wound 18 0215 anus pressure injury (Active)   Dressing Appearance dry;intact;no drainage 2018  6:00 AM   Dressing Care, Wound foam 2018  8:00 PM       Wound 18 0900 medial coccyx (Active)   Dressing Appearance intact;moist drainage;other (see comments) 2018 10:15 AM   Base moist;yellow;white;slough;gray 2018 10:15 AM   Black (%), Wound Tissue Color 50 2018 10:15 AM   Red (%), Wound Tissue Color 5 2018 10:15 AM   Yellow (%), Wound Tissue  Color 45 6/12/2018 10:15 AM   Periwound intact;dry;redness;pink 6/12/2018 10:15 AM   Periwound Temperature warm 6/12/2018 10:15 AM   Periwound Skin Turgor soft 6/12/2018 10:15 AM   Edges open;irregular 6/12/2018 10:15 AM   Drainage Characteristics/Odor serosanguineous;yellow;bleeding controlled 6/12/2018 10:15 AM   Drainage Amount small 6/12/2018 10:15 AM   Care, Wound cleansed with;wound cleanser;debrided;honey applied 6/12/2018 10:15 AM   Dressing Care, Wound dressing applied;low-adherent;foam;packed with;other (see comments) 6/12/2018 10:15 AM   Periwound Care, Wound cleansed with pH balanced cleanser;dry periwound area maintained 6/12/2018 10:15 AM       Wound 06/08/18 1550 Other (See comments) cervical spine incision (Active)   Dressing Appearance dry;intact;dried drainage 6/12/2018 10:15 AM   Closure Staples 6/12/2018 10:15 AM   Base clean;dry;closed/resurfaced 6/12/2018 10:15 AM   Drainage Characteristics/Odor sanguineous 6/12/2018 10:15 AM   Drainage Amount scant 6/12/2018 10:15 AM   Care, Wound cleansed with;wound cleanser 6/12/2018 10:15 AM   Dressing Care, Wound dressing changed;border dressing 6/12/2018 10:15 AM   Periwound Care, Wound other (see comments) 6/12/2018 10:15 AM   Sutures Removed Intact Yes 6/12/2018 10:15 AM   Number of Sutures Removed 1 6/12/2018 10:15 AM         WOUND DEBRIDEMENT  Debridement Site 1  Location- Site 1: Coccyx   Selective Debridement- Site 1: Wound Surface <20cmsq  Instruments- Site 1: #10, scapel, tweezers  Excised Tissue Description- Site 1: moderate, eschar, slough  Bleeding- Site 1: seeping, held pressure, 3-5 minutes                Therapy Treatment          Rehabilitation Treatment Summary     Row Name 06/12/18 1015             Treatment Time/Intention    Discipline physical therapist  -      Document Type wound care;therapy note (daily note)  -      Subjective Information complains of;weakness;fatigue  -      Care Plan Review care plan/treatment goals  reviewed;risks/benefits reviewed;patient/other agree to care plan  -      Care Plan Review, Other Participant(s) spouse  -      Existing Precautions/Restrictions fall;spinal;oxygen therapy device and L/min;other (see comments)   trach collar, PEG, quadriplegia  -      Recorded by [MC] Julia Arora, PT 06/12/18 1057      Row Name 06/12/18 1015             Cognitive Assessment/Intervention- PT/OT    Orientation Status (Cognition) oriented to;person;place  -      Personal Safety Interventions fall prevention program maintained  -MC      Recorded by [MC] Julia Arora, PT 06/12/18 1104      Row Name 06/12/18 1015             Bed Mobility Assessment/Treatment    Rolling Left McClain (Bed Mobility) dependent (less than 25% patient effort);2 person assist  -MC      Rolling Right McClain (Bed Mobility) dependent (less than 25% patient effort);2 person assist  -MC      Recorded by [] Julia Arora, PT 06/12/18 1104      Row Name 06/12/18 1015             Positioning and Restraints    Pre-Treatment Position in bed  -      Post Treatment Position bed  -MC      In Bed supine;call light within reach;encouraged to call for assist;with nsg;with other staff;with family/caregiver;waffle boots/both  -MC      Recorded by [MC] Julia Arora, PT 06/12/18 1104      Row Name 06/12/18 1015             Pain Scale: Numbers Pre/Post-Treatment    Pain Scale: Numbers, Pretreatment 0/10 - no pain  -      Pain Scale: Numbers, Post-Treatment 0/10 - no pain  -MC      Recorded by [MC] Julia Arora, PT 06/12/18 1104      Row Name                Wound 05/25/18 1544 Left arm incision    Wound - Properties Group Date first assessed: 05/25/18 [CE] Time first assessed: 1544 [CE] Side: Left [CE] Location: arm [CE] Type: incision [CE] Recorded by:  [CE] Amauri Peralta RN 05/25/18 1544    Row Name                Wound 06/08/18 0215 Right gluteal pressure injury    Wound - Properties Group Date first assessed:  "06/08/18 [LB] Time first assessed: 0215 [LB] Present On Admission : yes [LB] Side: Right [LB] Location: gluteal [LB] Type: pressure injury [LB] Stage, Pressure Injury: Stage 1 [LB] Recorded by:  [LB] Kelli Euceda RN 06/08/18 0517    Row Name                Wound 06/08/18 0215 anus pressure injury    Wound - Properties Group Date first assessed: 06/08/18 [LB] Time first assessed: 0215 [LB] Present On Admission : yes [LB] Location: anus [LB] Type: pressure injury [LB] Stage, Pressure Injury: Stage 2 [LB] Recorded by:  [LB] Kelli Euceda RN 06/08/18 0519    Row Name 06/12/18 1015             Wound 06/08/18 0900 medial coccyx    Wound - Properties Group Date first assessed: 06/08/18 [CP] Time first assessed: 0900 [CP] Present On Admission : yes;picture taken [CP] Orientation: medial [CP] Location: coccyx [CP] Stage, Pressure Injury: unstageable [CP] Recorded by:  [CP] Leighann Santiago, LAYTON 06/08/18 1117    Dressing Appearance intact;moist drainage;other (see comments)   soiled by loose BM  -      Base moist;yellow;white;slough;gray  -MC      Black (%), Wound Tissue Color 50   grey/black/white necrosis  -MC      Red (%), Wound Tissue Color 5  -MC      Yellow (%), Wound Tissue Color 45  -MC      Periwound intact;dry;redness;pink  -      Periwound Temperature warm  -      Periwound Skin Turgor soft  -      Edges open;irregular  -      Drainage Characteristics/Odor serosanguineous;yellow;bleeding controlled  -      Drainage Amount small  -      Care, Wound cleansed with;wound cleanser;debrided;honey applied  -      Dressing Care, Wound dressing applied;low-adherent;foam;packed with;other (see comments)   packed with saline-moist HFB classic, 6\" opti to cover  -      Periwound Care, Wound cleansed with pH balanced cleanser;dry periwound area maintained  -      Recorded by [MC] Julia Arora, PT 06/12/18 1104      Row Name 06/12/18 1015             Wound 06/08/18 1550 Other (See comments) " "cervical spine incision    Wound - Properties Group Date first assessed: 06/08/18 [AL] Time first assessed: 1550 [AL] Side: Other (See comments) [AL] Location: cervical spine [AL] Type: incision [AL] Recorded by:  [AL] Jessica Yin RN 06/08/18 1550    Dressing Appearance dry;intact;dried drainage  -      Closure Staples  -      Base clean;dry;closed/resurfaced   closed by staples, no visible wound base  -      Drainage Characteristics/Odor sanguineous  -      Drainage Amount scant   on dressing  -      Care, Wound cleansed with;wound cleanser  -      Dressing Care, Wound dressing changed;border dressing   covaderm  -      Periwound Care, Wound other (see comments)   applied 4\" optifoam gentle over BHAVNA site  -      Sutures Removed Intact Yes  -      Number of Sutures Removed 1   from BHAVNA site  -      Recorded by [MC] Julia Arora, PT 06/12/18 1104      Row Name 06/12/18 1015             Plan of Care Review    Plan of Care Reviewed With patient;spouse  -      Recorded by [MC] Julia Arora, PT 06/12/18 1104        User Key  (r) = Recorded By, (t) = Taken By, (c) = Cosigned By    Initials Name Effective Dates Discipline    CP LAYTON Gupta 04/06/17 - 06/07/18 Nurse    CALLIE Peralta, DEVYN 06/16/16 -  Nurse     Julia Arora, PT 04/03/18 -  PT    KAZ Euceda RN 06/16/16 -  Nurse    NIURKA Yin RN 12/29/17 -  Nurse              Physical Therapy Education     Title: PT OT SLP Therapies (Active)     Topic: Physical Therapy (Done)     Point: Mobility training (Done)    Learning Progress Summary     Learner Status Readiness Method Response Comment Documented by    Patient Done Acceptance E,D REDDY MCKEON 06/11/18 1553     Done Acceptance E MIKE   06/10/18 0758     Active Acceptance E REDDY ECHAVARRIA 06/09/18 1415     Done Eager E,D,TB MIKE   06/09/18 0712     Done Acceptance E NICOLE MCKEON   06/08/18 1306    Family Done Acceptance E NICOLE MCKEON   06/08/18 1306    Significant Other " Done Acceptance E,D VU,NR   06/11/18 1553     Done Acceptance E VU   06/10/18 0758     Done Eager E,D,TB VU   06/09/18 0712     Done Acceptance E VU,DU   06/08/18 1306          Point: Home exercise program (Done)    Learning Progress Summary     Learner Status Readiness Method Response Comment Documented by    Patient Done Acceptance E,D VU,NR  LS 06/11/18 1553     Done Acceptance E VU   06/10/18 0758     Active Acceptance E NR   06/09/18 1415     Done Eager E,D,TB VU   06/09/18 0712     Done Acceptance E VU,DU   06/08/18 1306    Family Done Acceptance E VU,DU   06/08/18 1306    Significant Other Done Acceptance E,D VU,NR   06/11/18 1553     Done Acceptance E VU   06/10/18 0758     Done Eager E,D,TB VU   06/09/18 0712     Done Acceptance E VU,DU   06/08/18 1306          Point: Body mechanics (Done)    Learning Progress Summary     Learner Status Readiness Method Response Comment Documented by    Patient Done Acceptance E,D VU,NR   06/11/18 1553     Done Acceptance E VU   06/10/18 0758     Active Acceptance E NR   06/09/18 1415     Done Eager E,D,TB VU   06/09/18 0712     Done Acceptance E VU,DU   06/08/18 1306    Family Done Acceptance E VU,DU   06/08/18 1306    Significant Other Done Acceptance E,D VU,NR   06/11/18 1553     Done Acceptance E VU   06/10/18 0758     Done Eager E,D,TB VU   06/09/18 0712     Done Acceptance E VU,DU   06/08/18 1306          Point: Precautions (Done)    Learning Progress Summary     Learner Status Readiness Method Response Comment Documented by    Patient Done Acceptance E,D VU,NR  LS 06/11/18 1553     Done Acceptance E VU   06/10/18 0758     Active Acceptance E NR   06/09/18 1415     Done Eager E,D,TB VU   06/09/18 0712     Done Acceptance E VU,DU   06/08/18 1306    Family Done Acceptance E VU,[facility] 06/08/18 1306    Significant Other Done Acceptance E,D VU,NR  LS 06/11/18 2019     Done Acceptance E MIKE VIDAL 06/10/18 0960     Done  HUMBLE Davidson,TB MIKE   06/09/18 0712     Done Acceptance E NICOLE MCKEON   06/08/18 1306                      User Key     Initials Effective Dates Name Provider Type Discipline     06/19/15 -  Alfreda Arita, PT Physical Therapist PT     06/19/15 -  Kelli Manrique, PT Physical Therapist PT     02/14/17 -  Manju Tyler, RN Registered Nurse Nurse                      PT Recommendation and Plan  Anticipated Discharge Disposition (PT): long term acute care facility  Planned Therapy Interventions (PT Eval): wound care  Therapy Frequency (PT Clinical Impression): 3 times/wk        Progress: no change      Progress: no change  Outcome Summary: After debridement of sacral wound, pt still with full coverage of stringy, spongy necrotic tissue over the wound base. Debridement limited by bleeding today with slow hemostasis. Added hydrofera blue antibacterial dressing to maintain a moist wound environment between treatments. Will decrease frequency of debridement to allow for more autolytic debridement processes between therapy sessions. Assisted RN with neck BHAVNA drain removal and dressing change to posterior neck incision.  Plan of Care Reviewed With: patient, spouse          Outcome Measures     Row Name 06/11/18 1507 06/11/18 1450 06/09/18 1120       How much help from another person do you currently need...    Turning from your back to your side while in flat bed without using bedrails?  -- 1  -LS 1  -JERICHO    Moving from lying on back to sitting on the side of a flat bed without bedrails?  -- 1  -LS 1  -JERICHO    Moving to and from a bed to a chair (including a wheelchair)?  -- 1  -LS 1  -JERICHO    Standing up from a chair using your arms (e.g., wheelchair, bedside chair)?  -- 1  -LS 1  -JERICHO    Climbing 3-5 steps with a railing?  -- 1  -LS 1  -JERICHO    To walk in hospital room?  -- 1  -LS 1  -JERICHO    AM-PAC 6 Clicks Score  -- 6  -LS 6  -JERICHO       How much help from another is currently needed...    Putting on and taking off regular lower  body clothing? 1  -CL  --  --    Bathing (including washing, rinsing, and drying) 1  -CL  --  --    Toileting (which includes using toilet bed pan or urinal) 1  -CL  --  --    Putting on and taking off regular upper body clothing 1  -CL  --  --    Taking care of personal grooming (such as brushing teeth) 1  -CL  --  --    Eating meals 1  -CL  --  --    Score 6  -CL  --  --       Functional Assessment    Outcome Measure Options AM-PAC 6 Clicks Daily Activity (OT)  -CL AM-PAC 6 Clicks Basic Mobility (PT)  -LS AM-PAC 6 Clicks Basic Mobility (PT)  -JERICHO      User Key  (r) = Recorded By, (t) = Taken By, (c) = Cosigned By    Initials Name Provider Type    JERICHO Alfreda Arita, PT Physical Therapist    LS Kelli Manrique, PT Physical Therapist    CL Anastasiya Powell, OT Occupational Therapist              Time Calculation        PT Charges     Row Name 06/12/18 1015             Time Calculation    Start Time 1015  -      PT Goal Re-Cert Due Date 06/19/18  -        User Key  (r) = Recorded By, (t) = Taken By, (c) = Cosigned By    Initials Name Provider Type     Julia Arora, PT Physical Therapist         Therapy Suggested Charges     Code   Minutes Charges    None             Therapy Charges for Today     Code Description Service Date Service Provider Modifiers Qty    [id number] HC GIACOMO DEBRIDE OPEN WOUND UP TO 20CM 6/12/2018 Julia Arora, PT GP 1            PT G-Codes  Outcome Measure Options: AM-PAC 6 Clicks Daily Activity (OT)        Julia Arora, PT  6/12/2018

## 2018-06-12 NOTE — PROGRESS NOTES
"   LOS: 4 days    Patient Care Team:  Luan Gandara MD as PCP - General (Internal Medicine)    Reason For Visit:  F/U ESRD.  Subjective           Review of Systems: UNOBTAINABLE         Objective       albumin human 12.5 g Intravenous Once   ceFAZolin 2 g Intravenous Q24H   chlorhexidine 15 mL Mouth/Throat Q12H   DULoxetine 20 mg Oral Daily   epoetin mini 10,000 Units Subcutaneous Once per day on Mon Wed Fri   gabapentin 400 mg Oral Nightly   heparin (porcine) 5,000 Units Subcutaneous Q8H   insulin regular 0-14 Units Subcutaneous Q6H   ipratropium-albuterol 3 mL Nebulization 4x Daily - RT   levothyroxine 75 mcg Oral Q AM   lidocaine 1 patch Transdermal Q24H   micafungin (MYCAMINE)  mg Intravenous Q24H   midodrine 5 mg Oral TID   nystatin  Topical Q12H   pantoprazole 40 mg Intravenous Q AM   PRO-STAT 1 packet Per G Tube BID   sertraline 100 mg Oral Daily            Vital Signs:  Blood pressure 138/88, pulse 84, temperature 98.1 °F (36.7 °C), temperature source Axillary, resp. rate 16, height 157 cm (61.81\"), weight 61 kg (134 lb 7.7 oz), SpO2 96 %, not currently breastfeeding.    Flowsheet Rows      First Filed Value   Admission Height  157 cm (61.81\") Documented at 06/08/2018 0215   Admission Weight  61 kg (134 lb 7.7 oz) Documented at 06/08/2018 0215          06/11 0701 - 06/12 0700  In: 1491.8 [I.V.:93.8]  Out: 2880 [Drains:15]    Physical Exam:    General Appearance: NAD, alert and cooperative, Ox3  Eyes: PER, conjunctivae and sclerae normal, no icterus  Lungs: respirations regular and unlabored, no crepitus, clear to auscultation  Heart/CV: regular rhythm & normal rate, no murmur, no gallop, no rub and no edema  Abdomen: not distended, soft, non-tender, no masses,  bowel sounds present  Skin: No rash, Warm and dry. TUNNELED HD CATH. CLOTTED AVF.    Radiology:            Labs:    Results from last 7 days  Lab Units 06/12/18  0436 06/11/18  0456 06/10/18  0449   WBC 10*3/mm3 11.70* 10.23 8.24   HEMOGLOBIN " g/dL 8.5* 7.7* 7.3*   HEMATOCRIT % 27.7* 24.6* 23.8*   PLATELETS 10*3/mm3 232 201 192       Results from last 7 days  Lab Units 06/12/18  0436 06/11/18  1756 06/11/18  0543 06/10/18  0449 06/09/18  0753 06/08/18  0420   SODIUM mmol/L 137 137 136 138 140 139   POTASSIUM mmol/L 3.6 3.4* 4.3 4.3 3.2* 4.5   CHLORIDE mmol/L 102 100 99 104 105 103   CO2 mmol/L 21.0 20.0 21.0 21.0 23.0 22.0   BUN mg/dL 36* 24* 63* 43* 31* 59*   CREATININE mg/dL 1.55* 1.31* 2.36* 1.79* 1.33* 2.25*   CALCIUM mg/dL 9.1 8.6* 8.8 8.6* 8.6* 9.4   PHOSPHORUS mg/dL 2.5  --  3.9  --  2.5 3.6  3.6   MAGNESIUM mg/dL 2.2  --  2.4  --  2.1 2.3  2.3   ALBUMIN g/dL 3.99  --  3.40 3.37 3.35 3.26       Results from last 7 days  Lab Units 06/12/18  0436   GLUCOSE mg/dL 141*         Results from last 7 days  Lab Units 06/11/18  0543   ALK PHOS U/L 128*   BILIRUBIN mg/dL 0.3   ALT (SGPT) U/L 2*   AST (SGOT) U/L 15       Results from last 7 days  Lab Units 06/11/18  1538   PH, ARTERIAL pH units 7.461*   PO2 ART mm Hg 134.0*   PCO2, ARTERIAL mm Hg 30.7*   HCO3 ART mmol/L 21.8             Estimated Creatinine Clearance: 39.2 mL/min (A) (by C-G formula based on SCr of 1.55 mg/dL (H)).      Assessment     Principal Problem:    Acute respiratory failure  Active Problems:    Recent Bacteremia, MSSA    Metabolic encephalopathy    Epidural abscess - cervical spine s/p laminectomy and decompression 4/13/18    ESRD (end stage renal disease) on dialysis    Hypothyroidism    Chronic systolic heart failure    Type 2 diabetes mellitus with renal complication    Neuropathy    Atelectasis of left lung, recurrent    Quadriplegia    Pleural effusion on left    Pressure ulcer of coccygeal region    Wound infection, cervical, possible vs CFS leak            Impression: ESRD. ANEMIA            Recommendations: HD 6/13/18.      Chris Oakley MD  06/12/18  9:08 AM

## 2018-06-12 NOTE — PLAN OF CARE
Problem: Patient Care Overview  Goal: Plan of Care Review  Outcome: Ongoing (interventions implemented as appropriate)   06/12/18 1015   Coping/Psychosocial   Plan of Care Reviewed With patient;spouse   Plan of Care Review   Progress no change   OTHER   Outcome Summary After debridement of sacral wound, pt still with full coverage of stringy, spongy necrotic tissue over the wound base. Debridement limited by bleeding today with slow hemostasis. Added hydrofera blue antibacterial dressing to maintain a moist wound environment between treatments. Will decrease frequency of debridement to allow for more autolytic debridement processes between therapy sessions. Assisted RN with neck BHAVNA drain removal.      06/12/18 1015   Coping/Psychosocial   Plan of Care Reviewed With patient;spouse   Plan of Care Review   Progress no change   OTHER   Outcome Summary After debridement of sacral wound, pt still with full coverage of stringy, spongy necrotic tissue over the wound base. Debridement limited by bleeding today with slow hemostasis. Added hydrofera blue antibacterial dressing to maintain a moist wound environment between treatments. Will decrease frequency of debridement to allow for more autolytic debridement processes between therapy sessions. Assisted RN with neck BHAVNA drain removal and dressing change to posterior neck incision.

## 2018-06-12 NOTE — PROGRESS NOTES
"[facility] Progress Note    Date of Admission: 2018      Antibiotics:  Cefazolin    CC: No chief complaint on file.  Cervical wound dehiscence    S: Patient chronically ill-appearing hemodynamic stable today no fevers postop day 1 from cervical wound debridement.  18; no events overnight; feels well; no complaints, no diarrhea, rash, sore throat  18; doing well; no events overnight; confused per , afebrile hemodynamically stable; ros unobtainable    O:  /79   Pulse 90   Temp 98.6 °F (37 °C) (Axillary)   Resp 22   Ht 157 cm (61.81\")   Wt 61 kg (134 lb 7.7 oz)   LMP  (LMP Unknown)   SpO2 96%   BMI 24.75 kg/m²   Temp (24hrs), Av.6 °F (37 °C), Min:98.1 °F (36.7 °C), Max:99.2 °F (37.3 °C)      PE:   GENERAL: eyes open, no distress; moving right arm on request  HEENT: Normocephalic, atraumatic.   HEART: RRR; No murmurs rubs or gallops noted.  LUNGS: Diminished at lung bases bilaterally  ABDOMEN: Soft, nondistended. Hypoactive bowel sounds.  EXT:  No cyanosis, clubbing or edema. No cord.   MSK: No joint swelling or erythema  SKIN: Warm and dry without cutaneous eruptions on Inspection/palpation.    NEURO: follows commands has focal deficit below waist; arms propped up on pillows but making no movement       Laboratory Data      Results from last 7 days  Lab Units 18  0436 18  0456 06/10/18  0449   WBC 10*3/mm3 11.70* 10.23 8.24   HEMOGLOBIN g/dL 8.5* 7.7* 7.3*   HEMATOCRIT % 27.7* 24.6* 23.8*   PLATELETS 10*3/mm3 232 201 192       Results from last 7 days  Lab Units 18  0436   SODIUM mmol/L 137   POTASSIUM mmol/L 3.6   CHLORIDE mmol/L 102   CO2 mmol/L 21.0   BUN mg/dL 36*   CREATININE mg/dL 1.55*   GLUCOSE mg/dL 141*   CALCIUM mg/dL 9.1       Results from last 7 days  Lab Units 18  0543   ALK PHOS U/L 128*   BILIRUBIN mg/dL 0.3   ALT (SGPT) U/L 2*   AST (SGOT) U/L 15           Results from last 7 days  Lab Units 18  1757   CRP mg/dL 4.66*       Estimated " Creatinine Clearance: 39.2 mL/min (A) (by C-G formula based on SCr of 1.55 mg/dL (H)).      Microbiology:  Pleural fluid negative wound cultures from neck no organisms seen, repeat blood cultures negative    Radiology:  Imaging Results (last 24 hours)     Procedure Component Value Units Date/Time    XR Chest 1 View [[id number]] Collected:  06/12/18 1119     Updated:  06/12/18 1119    Narrative:       EXAMINATION: XR CHEST 1 VW-06/12/2018:      INDICATION: Left pleural effusion; E36-Wjiienl effusion, not elsewhere  classified; T14.8XXA-Other injury of unspecified body region, initial  encounter; L08.9-Local infection of the skin and subcutaneous tissue,  unspecified; Z74.09-Other reduced mobility; Z74.09-Other reduced  mobility.      COMPARISON: 06/11/2018.     FINDINGS: Tracheostomy tube, right IJ catheter, left subclavian line,  and left pleural drain remain in place. There appears to be a left lower  neck/upper chest BHAVNA-type drain. The heart is enlarged. The vasculature  appears upper limits of normal. The lungs are moderately well expanded.  Bibasilar atelectasis appears stable.       Impression:       Stable bibasilar atelectasis, left greater than right. No  evidence of pneumothorax or other new chest disease.     D:  06/12/2018  E:  06/12/2018                 Impression/Problem list:   chronic respiratory failure on vent  Epidural abscess MSSA 4/13/18  Suspected CSF leak post cervical wound dehiscence with posterior cervical wound debridement 6/8/18  ESRD  Left pleural effusion  S/p cervical spine decompression 4/13/18.  ? Seizure while on meropenem  Sputum cultures growing yeast, mold 5/25/18   neck wound with nonfumigatus aspergillosis species; mold isolated 5/25/18 from sputum as well  Assessment:  Complex case 47-year-old with cervical spine discitis and epidural abscess status post decompression 4/13/18 on long-term IV antibiotics readmitted with cervical wound dehiscence and pleural effusions but normal  white blood cell count status post surgery with posterior cervical wound debridement and BHAVNA drain in place stable on cefazolin    PLAN:    Course of cefazolin from 4/13/18 to present   Follow-up all cultures  D/w Dr. Price, Pharmacist; neck wound is overall improving; aspergillosis skin/soft tissue infection probably uncommon; suspect this is colonization of wound.        D/w family  Op cultures of hardware  ngtd

## 2018-06-12 NOTE — THERAPY TREATMENT NOTE
Acute Care - Speech Language Pathology Treatment Note  [facility]     Patient Name: Lizet Webster  : 1970  MRN: [id number]  Today's Date: 2018         Admit Date: 2018    Visit Dx:      ICD-10-CM ICD-9-CM   1. Pleural effusion on left J90 511.9   2. Wound infection, cervical, possible vs CFS leak T14.8XXA 958.3    L08.9    3. Impaired mobility and ADLs Z74.09 799.89   4. Impaired functional mobility, balance, gait, and endurance Z74.09 V49.89   5. Dysphagia, unspecified type R13.10 787.20   6. Acute respiratory failure with hypoxia J96.01 518.81     Patient Active Problem List   Diagnosis   • Recent Bacteremia, MSSA   • Metabolic encephalopathy   • Epidural abscess - cervical spine s/p laminectomy and decompression 18   • ESRD (end stage renal disease) on dialysis   • Hypothyroidism   • Chronic systolic heart failure   • Type 2 diabetes mellitus with renal complication   • Neuropathy   • Acute respiratory failure   • Atelectasis of left lung, recurrent   • Quadriplegia   • Pleural effusion on left   • Pressure ulcer of coccygeal region   • Wound infection, cervical, possible vs CFS leak        Therapy Treatment    Therapy Treatment / Health Promotion    Treatment Time/Intention  Discipline: speech language pathologist (18 1145 : Constanza Short MS CCC-SLP)  Document Type: therapy note (daily note) (18 1145 : Constanza Short MS CCC-SLP)  Subjective Information: complains of, weakness, fatigue (18 1145 : Constanza Short MS CCC-SLP)  Patient/Family Observations: Pt's  @ bedside. (18 1145 : Constanza Short MS CCC-SLP)  Care Plan Review: evaluation/treatment results reviewed, care plan/treatment goals reviewed, risks/benefits reviewed, current/potential barriers reviewed, patient/other agree to care plan (18 1145 : Constanza Short MS CCC-SLP)  Care Plan Review, Other Participant(s): spouse (18 1145 : Constanza Short MS CCC-SLP)  Therapy Frequency (Swallow): 5 days  per week (06/12/18 1145 : Constanza Short MS CCC-SLP)  Patient Effort: adequate (06/12/18 1145 : Constanza Short MS CCC-SLP)  Plan of Care Review  Plan of Care Reviewed With: patient, spouse (06/12/18 1530 : Constanza Short MS CCC-SLP)    Vitals/Pain/Safety  Pain Scale: Numbers Pre/Post-Treatment  Pain Scale: Numbers, Pretreatment: 0/10 - no pain (06/12/18 1145 : Constanza Short MS CCC-SLP)  Pain Scale: Numbers, Post-Treatment: 0/10 - no pain (06/12/18 1145 : Constanza Short MS CCC-SLP)    Cognition, Communication, Swallow  Recommendations  Therapy Frequency (Swallow): 5 days per week (06/12/18 1145 : Constanza Short MS CCC-SLP)  Anticipated Dischage Disposition: long term acute care facility, anticipate therapy at next level of care (06/12/18 1145 : Constanza Short MS CCC-SLP)    Outcome Summary  Outcome Summary/Treatment Plan (SLP)  Daily Summary of Progress (SLP): progress toward functional goals is gradual (06/12/18 1145 : Constanza Short MS CCC-SLP)  Barriers to Overall Progress (SLP): Cognitive status. (06/12/18 1145 : Constanza Short MS CCC-SLP)  Plan for Continued Treatment (SLP): Continue PMV trials w/ SLP/RT only. Pt would benefit from cont'd PMV tx. Pt's  eager for pt to have repeat FEES. SLP will f/u tomorrow to attempt FEES, if pt's respiratory status appropriate.  (06/12/18 1145 : Constanza Short MS CCC-SLP)  Anticipated Dischage Disposition: long term acute care facility, anticipate therapy at next level of care (06/12/18 1145 : Constanza Short MS CCC-SLP)      EDUCATION  The patient has been educated in the following areas:   Speaking Valve.    SLP Recommendation and Plan  Anticipated Dischage Disposition: long term acute care facility, anticipate therapy at next level of care     Therapy Frequency (Swallow): 5 days per week    Plan of Care Reviewed With: patient, spouse  Plan of Care Review  Plan of Care Reviewed With: patient, spouse  Daily Summary of Progress (SLP): progress toward functional goals is  gradual  Plan for Continued Treatment (SLP): Continue PMV trials w/ SLP/RT only. Pt would benefit from cont'd PMV tx. Pt's  eager for pt to have repeat FEES. SLP will f/u tomorrow to attempt FEES, if pt's respiratory status appropriate.           SLP GOALS     Row Name 06/12/18 1145 06/11/18 141          Tolerate Speaking Valve Placement Goal 1 (SLP)    Tolerate Speaking Valve Placement Goal 1 (SLP) speaking valve >30 min;80%;with minimal cues (75-90%)  -AC speaking valve >30 min;80%;with minimal cues (75-90%)  -RD     Time Frame (Tolerate Speaking Valve Placement Goal 1, SLP) short term goal (STG);by discharge  -AC short term goal (STG);by discharge  -RD     Barriers (Tolerate Speaking Valve Placement 1, SLP)  -- confusion impacting  -RD     Progress (Tolerate Speaking Valve Placement Goal 1, SLP) 10%;with minimal cues (75-90%)  -AC 60%;with minimal cues (75-90%)  -RD     Progress/Outcomes (Tolerate Speaking Valve Placement Goal 1, SLP) unable to make needed progress  -AC good progress toward goal  -RD     Comment (Tolerate Speaking Valve Placement Goal 1, SLP) Pt u/a to tolerate PMV >5min today w/o slow decline in SpO2. Started @ 96%, slowly declined to 89% w/ PMV placed. Pt reported SOA w/ PMV placed.  -AC Tolerated PMV for 15 minutes. No significant changes in vital signs. Removed as pt was fatigued and wanted to rest. Ok to wear w/ RN supervision  -RD        Audible Speech with Speaking Valve Goal 1 (SLP)    Audible Speech Goal 1 (SLP) 1 foot;80%;with minimal cues (75-90%)  -AC 1 foot;80%;with minimal cues (75-90%)  -RD     Time Frame (Audible Speech Goal 1, SLP) short term goal (STG);by discharge  -AC by discharge;short term goal (STG)  -RD     Progress (Audible Speech Goal 1, SLP) 0%;with moderate cues (50-74%)  -AC 10%;with moderate cues (50-74%)  -RD     Progress/Outcomes (Audible Speech Goal 1, SLP) progress slower than expected  -AC continuing progress toward goal  -RD     Comment (Audible Speech  "Goal 1, SLP) Pt's  reported pt able to produce phonation, even w/o PMV placed, when she \"gets worked up;\" however, pt u/a to produce phonation @ all today w/ & w/o PMV placement.   -AC Able to achieve voicing for throat clear and moaning during repositioning x1, otherwise no audible voicing.   -RD       User Key  (r) = Recorded By, (t) = Taken By, (c) = Cosigned By    Initials Name Provider Type    JAMAICA Short MS CCC-SLP Speech and Language Pathologist    BUFFY Helm MS CCC-SLP Speech and Language Pathologist            Time Calculation:           Time Calculation- SLP     Row Name 06/12/18 1533             Time Calculation- SLP    SLP Start Time 1145  -      SLP Received On 06/12/18  -        User Key  (r) = Recorded By, (t) = Taken By, (c) = Cosigned By    Initials Name Provider Type    JAMAICA Short MS CCC-SLP Speech and Language Pathologist          Therapy Charges for Today     Code Description Service Date Service Provider Modifiers Qty    [id number] [facility] TREATMENT SPEECH 4 6/12/2018 Constanza Short MS CCC-SLP GN 1                     Constanza Short MS CCC-SLP  6/12/2018    "

## 2018-06-12 NOTE — PROGRESS NOTES
"Intensive Care Follow-up     Hospital:  LOS: 4 days   Ms. Lizet Webster, 47 y.o. female is followed for:   Acute respiratory failure        Subjective   Interval History:  Chart has been reviewed. The patient has remained off the ventilator. Secretions are minimal. She has not been using her [place] valve. She seems to be tolerating her tube feedings without difficulty.    The patient's relevant past medical, surgical and social history were reviewed and updated in Epic as appropriate.        Objective     Infusions:     Medications:    albumin human 12.5 g Intravenous Once   ceFAZolin 2 g Intravenous Q24H   chlorhexidine 15 mL Mouth/Throat Q12H   DULoxetine 20 mg Oral Daily   epoetin mini 10,000 Units Subcutaneous Once per day on Mon Wed Fri   gabapentin 400 mg Oral Nightly   heparin (porcine) 5,000 Units Subcutaneous Q8H   insulin regular 0-14 Units Subcutaneous Q6H   ipratropium-albuterol 3 mL Nebulization 4x Daily - RT   levothyroxine 75 mcg Oral Q AM   lidocaine 1 patch Transdermal Q24H   micafungin (MYCAMINE)  mg Intravenous Q24H   midodrine 5 mg Oral TID   nystatin  Topical Q12H   pantoprazole 40 mg Intravenous Q AM   PRO-STAT 1 packet Per G Tube BID   sertraline 100 mg Oral Daily       Vital Sign Min/Max for last 24 hours  Temp  Min: 97.5 °F (36.4 °C)  Max: 99.2 °F (37.3 °C)   BP  Min: 106/87  Max: 147/88   Pulse  Min: 84  Max: 100   Resp  Min: 16  Max: 28   SpO2  Min: 90 %  Max: 99 %   No Data Recorded       Input/Output for last 24 hour shift  06/11 0701 - 06/12 0700  In: 1491.8 [I.V.:93.8]  Out: 2880 [Drains:15]      Objective:  General Appearance:  In no acute distress, ill-appearing and comfortable.    Vital signs: (most recent): Blood pressure 106/87, pulse 100, temperature 98.6 °F (37 °C), temperature source Axillary, resp. rate 16, height 157 cm (61.81\"), weight 61 kg (134 lb 7.7 oz), SpO2 97 %, not currently breastfeeding.  No fever.    HEENT: (Tracheostomy tube in place; on TCT.  Right " tunneled internal jugular dialysis catheter.  Left internal jugular central venous catheter.  )    Lungs:  Normal effort and normal respiratory rate.  She is not in respiratory distress.  There are decreased breath sounds and rhonchi.  No rales or wheezes.  (Poor air movement bilaterally. Mild coarse basilar sounds. Left thoracostomy tube in place with no air leak.)  Heart: Normal rate.  Regular rhythm.  S1 normal and S2 normal.  No murmur, gallop or friction rub.   Chest: Symmetric chest wall expansion.   Abdomen: Abdomen is soft and non-distended.  Bowel sounds are normal.   There is no abdominal tenderness.   There is no mass. There is no splenomegaly. There is no hepatomegaly.   Extremities: Decreased range of motion.  There is no deformity or dependent edema.  (She is not moving any of her extremities.)  Neurological: Patient is alert.    Pupils:  Pupils are equal, round, and reactive to light.  Pupils are equal.   Skin:  Warm, dry and pale.  No rash.               Results from last 7 days  Lab Units 06/12/18  0436 06/11/18  0456 06/10/18  0449   WBC 10*3/mm3 11.70* 10.23 8.24   HEMOGLOBIN g/dL 8.5* 7.7* 7.3*   PLATELETS 10*3/mm3 232 201 192       Results from last 7 days  Lab Units 06/12/18  0436 06/11/18  1756 06/11/18  0543  06/09/18  0753   SODIUM mmol/L 137 137 136  < > 140   POTASSIUM mmol/L 3.6 3.4* 4.3  < > 3.2*   CO2 mmol/L 21.0 20.0 21.0  < > 23.0   BUN mg/dL 36* 24* 63*  < > 31*   CREATININE mg/dL 1.55* 1.31* 2.36*  < > 1.33*   MAGNESIUM mg/dL 2.2  --  2.4  --  2.1   PHOSPHORUS mg/dL 2.5  --  3.9  --  2.5   GLUCOSE mg/dL 141* 242* 209*  < > 175*   < > = values in this interval not displayed.  Estimated Creatinine Clearance: 39.2 mL/min (A) (by C-G formula based on SCr of 1.55 mg/dL (H)).      Results from last 7 days  Lab Units 06/11/18  1538   PH, ARTERIAL pH units 7.461*   PCO2, ARTERIAL mm Hg 30.7*   PO2 ART mm Hg 134.0*         I reviewed the patient's results and images.     Assessment/Plan    Impression      Principal Problem:    Acute respiratory failure  Active Problems:    Recent Bacteremia, MSSA    Metabolic encephalopathy    Epidural abscess - cervical spine s/p laminectomy and decompression 4/13/18    ESRD (end stage renal disease) on dialysis    Hypothyroidism    Chronic systolic heart failure    Type 2 diabetes mellitus with renal complication    Neuropathy    Atelectasis of left lung, recurrent    Quadriplegia    Pleural effusion on left    Pressure ulcer of coccygeal region    Wound infection, cervical, possible vs CFS leak       Plan        She appears to have aspergillus growing from her culture of her neck wound. I will defer this to infectious disease.  Continue good pulmonary hygiene. She seems to be tolerating her time off the ventilator without difficulty and has been off for greater than 24 hours.  Secretions been very minimal.  BHAVNA drain in the neck will be discontinued today.  Thoracostomy tube in the left chest will remain due to high outflow.  Hemodialysis tomorrow.  Continue with tube feedings.  She will remain in the intensive care unit today. If she remains stable, we will transition her to the floor tomorrow.  Labs have been placed.    Plan of care and goals reviewed with mulitdisciplinary team at daily rounds.   I discussed the patient's findings and my recommendations with patient, family and nursing staff         Denver Capone MD, [facility]  Pulmonary and Critical Care Medicine  06/12/18 10:58 AM

## 2018-06-12 NOTE — PROGRESS NOTES
Continued Stay Note  [facility]     Patient Name: Lizet Webster  MRN: [id number]  Today's Date: 6/12/2018    Admit Date: 6/8/2018          Discharge Plan     Row Name 06/12/18 1513       Plan    Plan [facility]    Patient/Family in Agreement with Plan yes    Plan Comments Spoke c Mr. Webster and we discussed d/c plan for Mrs. Webster.  He is going to discuss this with her.  He would like for her to transfer to [facility] if possible.  Call to [place] [facility] at [facility].  She stated they would accept her back pending MD accepts her.   Goal is transfer back [facility].      Final Discharge Disposition Code 63 - [facility]              Discharge Codes    No documentation.       Expected Discharge Date and Time     Expected Discharge Date Expected Discharge Time    Jun 16, 2018             Ronald Jansen RN

## 2018-06-13 NOTE — PLAN OF CARE
Problem: Patient Care Overview  Goal: Plan of Care Review  Outcome: Ongoing (interventions implemented as appropriate)   06/13/18 1205   Coping/Psychosocial   Plan of Care Reviewed With patient;spouse   Plan of Care Review   Progress improving   OTHER   Outcome Summary Patient follow up for groin rash and heels-PT Wound Care debriding sacral wound. Groin with yeast noted-have been using Nystatin powder-still moist and red-InterDry applied to bilateral groin folds. Heels pink/boggy but all blanching-black heel protector boots in place. Dolphin bed in use. All interventions in place and implemented. WOC will continue to follow. Please notify for any questions or concerns.        Problem: Wound (Includes Pressure Injury) (Adult)  Goal: Signs and Symptoms of Listed Potential Problems Will be Absent, Minimized or Managed (Wound)  Outcome: Ongoing (interventions implemented as appropriate)   06/13/18 1205   Goal/Outcome Evaluation   Problems Assessed (Wound) all   Problems Present (Wound) situational response

## 2018-06-13 NOTE — THERAPY TREATMENT NOTE
Acute Care - Speech Language Pathology Treatment Note  [facility]     Patient Name: Lizet Webster  : 1970  MRN: [id number]  Today's Date: 2018         Admit Date: 2018    Visit Dx:      ICD-10-CM ICD-9-CM   1. Pleural effusion on left J90 511.9   2. Wound infection, cervical, possible vs CFS leak T14.8XXA 958.3    L08.9    3. Impaired mobility and ADLs Z74.09 799.89   4. Impaired functional mobility, balance, gait, and endurance Z74.09 V49.89   5. Dysphagia, unspecified type R13.10 787.20   6. Acute respiratory failure with hypoxia J96.01 518.81     Patient Active Problem List   Diagnosis   • Recent Bacteremia, MSSA   • Metabolic encephalopathy   • Epidural abscess - cervical spine s/p laminectomy and decompression 18   • ESRD (end stage renal disease) on dialysis   • Hypothyroidism   • Chronic systolic heart failure   • Type 2 diabetes mellitus with renal complication   • Neuropathy   • Acute respiratory failure   • Atelectasis of left lung, recurrent   • Quadriplegia   • Pleural effusion on left   • Pressure ulcer of coccygeal region   • Wound infection, cervical, possible vs CFS leak        Therapy Treatment    Therapy Treatment / Health Promotion    Treatment Time/Intention  Discipline: speech language pathologist (18 1400 : Julissa Melvin MS CCC-SLP)  Document Type: therapy note (daily note) (18 1400 : Julissa Melvin MS CCC-SLP)  Subjective Information: no complaints (18 1400 : Julissa Melvin MS CCC-SLP)  Mode of Treatment: speech-language pathology, individual therapy (18 1400 : Julissa Melvin MS CCC-SLP)  Therapy Frequency (Swallow): 5 days per week (18 1400 : Julissa Melvin MS CCC-SLP)  Treatment Considerations/Comments: Pt alert, but unable to produce voice despite max cues. No issues w/ tolerance of valve or secretions.  (18 1400 : Julissa Melvin MS  CCC-SLP)    Vitals/Pain/Safety       Cognition, Communication, Swallow  Recommendations  Therapy Frequency (Swallow): 5 days per week (06/13/18 1400 : Julissa Mclainmiroslava Melvin MS CCC-SLP)  Anticipated Dischage Disposition: long term acute care facility, anticipate therapy at next level of care (06/13/18 1400 : Julissa Longoria MS Ngozi CCC-SLP)    Outcome Summary  Outcome Summary/Treatment Plan (SLP)  Daily Summary of Progress (SLP): progress toward functional goals is gradual (06/13/18 1400 : Julissa Mclainmiroslava Melvin MS CCC-SLP)  Barriers to Overall Progress (SLP): Cog statues (06/13/18 1400 : Julissa Melvin MS CCC-SLP)  Plan for Continued Treatment (SLP): continue PMV trials w/ SLP or RT/RN supervision only. Unable to complete FEES this pm 2' pt's ? neuro changes and pending testing. SLP to f/u tomorrow for further assessment (06/13/18 1400 : Julissa Swati Melvin MS CCC-SLP)  Anticipated Dischage Disposition: long term acute care facility, anticipate therapy at next level of care (06/13/18 1400 : Julissa Mclainmiroslava Melvin MS CCC-SLP)  Patient/Family Concerns, Anticipated Discharge Disposition (SLP): Vision changes this pm, further neuro work-up pending.  (06/13/18 1400 : Julissa Mclainmiroslava Melvin MS CCC-SLP)      EDUCATION  The patient has been educated in the following areas:   Cognitive Impairment Communication Impairment.    SLP Recommendation and Plan           Anticipated Dischage Disposition: long term acute care facility, anticipate therapy at next level of care     Therapy Frequency (Swallow): 5 days per week             Daily Summary of Progress (SLP): progress toward functional goals is gradual  Plan for Continued Treatment (SLP): continue PMV trials w/ SLP or RT/RN supervision only. Unable to complete FEES this pm 2' pt's ? neuro changes and pending testing. SLP to f/u tomorrow for further assessment          SLP GOALS     Row Name 06/13/18 1400 06/12/18 4339  06/11/18 1415       Tolerate Speaking Valve Placement Goal 1 (SLP)    Tolerate Speaking Valve Placement Goal 1 (SLP) speaking valve >30 min;80%;with minimal cues (75-90%)  -SG speaking valve >30 min;80%;with minimal cues (75-90%)  -AC speaking valve >30 min;80%;with minimal cues (75-90%)  -RD    Time Frame (Tolerate Speaking Valve Placement Goal 1, SLP) short term goal (STG);by discharge  -SG short term goal (STG);by discharge  -AC short term goal (STG);by discharge  -RD    Barriers (Tolerate Speaking Valve Placement 1, SLP)  --  -- confusion impacting  -RD    Progress (Tolerate Speaking Valve Placement Goal 1, SLP) 50%;with moderate cues (50-74%)  -SG 10%;with minimal cues (75-90%)  -AC 60%;with minimal cues (75-90%)  -RD    Progress/Outcomes (Tolerate Speaking Valve Placement Goal 1, SLP) progress slower than expected  -SG unable to make needed progress  -AC good progress toward goal  -RD    Comment (Tolerate Speaking Valve Placement Goal 1, SLP) Better tolerance w/ PMSV this date. Able to maintain 02 at 100-97% throughout valve placement w/ stable HR. Pt tolerated for >10 minutes.   -SG Pt u/a to tolerate PMV >5min today w/o slow decline in SpO2. Started @ 96%, slowly declined to 89% w/ PMV placed. Pt reported SOA w/ PMV placed.  -AC Tolerated PMV for 15 minutes. No significant changes in vital signs. Removed as pt was fatigued and wanted to rest. Ok to wear w/ RN supervision  -RD       Audible Speech with Speaking Valve Goal 1 (SLP)    Audible Speech Goal 1 (SLP) 1 foot;80%;with minimal cues (75-90%)  -SG 1 foot;80%;with minimal cues (75-90%)  -AC 1 foot;80%;with minimal cues (75-90%)  -RD    Time Frame (Audible Speech Goal 1, SLP) short term goal (STG);by discharge  -SG short term goal (STG);by discharge  -AC by discharge;short term goal (STG)  -RD    Progress (Audible Speech Goal 1, SLP) 0%;with maximum cues (25-49%)  -SG 0%;with moderate cues (50-74%)  -AC 10%;with moderate cues (50-74%)  -RD     "Progress/Outcomes (Audible Speech Goal 1, SLP) progress slower than expected  -SG progress slower than expected  -AC continuing progress toward goal  -RD    Comment (Audible Speech Goal 1, SLP) Despite max verbal cues from pt and , pt unable to produce voicing. No issues w/ PMV tolerance, ? breath holding during phonation attempts per palpation. Con't to monitor in tx  -SG Pt's  reported pt able to produce phonation, even w/o PMV placed, when she \"gets worked up;\" however, pt u/a to produce phonation @ all today w/ & w/o PMV placement.   -AC Able to achieve voicing for throat clear and moaning during repositioning x1, otherwise no audible voicing.   -RD      User Key  (r) = Recorded By, (t) = Taken By, (c) = Cosigned By    Initials Name Provider Type     Julissa Melvin, MS CCC-SLP Speech and Language Pathologist    JAMAICA Short, MS CCC-SLP Speech and Language Pathologist    BUFFY Helm, MS CCC-SLP Speech and Language Pathologist                Time Calculation:           Time Calculation- SLP     Row Name 06/13/18 1458             Time Calculation- SLP    SLP Start Time 1400  -      SLP Received On 06/13/18  -        User Key  (r) = Recorded By, (t) = Taken By, (c) = Cosigned By    Initials Name Provider Type    MANNY Melvin, MS CCC-SLP Speech and Language Pathologist          Therapy Charges for Today     Code Description Service Date Service Provider Modifiers Qty    [id number]  ST TREATMENT SPEECH 4 6/13/2018 Julissa Melvin MS CCC-SLP GN 1                     MS GELA Walton  6/13/2018    "

## 2018-06-13 NOTE — PROGRESS NOTES
"   LOS: 5 days    Patient Care Team:  Luan Gandara MD as PCP - General (Internal Medicine)    Reason For Visit:  F/U ESRD. SEEN ON DIALYSIS.  Subjective           Review of Systems: UNOBTAINABLE       Objective       albumin human 12.5 g Intravenous Once   ceFAZolin 2 g Intravenous Q24H   chlorhexidine 15 mL Mouth/Throat Q12H   DULoxetine 20 mg Oral Daily   epoetin mini 10,000 Units Subcutaneous Once per day on Mon Wed Fri   gabapentin 400 mg Oral Nightly   heparin (porcine) 5,000 Units Subcutaneous Q8H   insulin regular 0-14 Units Subcutaneous Q6H   ipratropium-albuterol 3 mL Nebulization 4x Daily - RT   levothyroxine 75 mcg Oral Q AM   lidocaine 1 patch Transdermal Q24H   micafungin (MYCAMINE)  mg Intravenous Q24H   midodrine 5 mg Oral TID   nystatin  Topical Q12H   pantoprazole 40 mg Intravenous Q AM   PRO-STAT 1 packet Per G Tube BID   sertraline 100 mg Oral Daily            Vital Signs:  Blood pressure 109/72, pulse 78, temperature 97.5 °F (36.4 °C), temperature source Axillary, resp. rate 24, height 157 cm (61.81\"), weight 61 kg (134 lb 7.7 oz), SpO2 98 %, not currently breastfeeding.    Flowsheet Rows      First Filed Value   Admission Height  157 cm (61.81\") Documented at 06/08/2018 0215   Admission Weight  61 kg (134 lb 7.7 oz) Documented at 06/08/2018 0215          06/12 0701 - 06/13 0700  In: 1569 [I.V.:75]  Out: 560 [Urine:150; Drains:10]    Physical Exam:    General Appearance: NAD.LETHARGIC. + TRACH.  Eyes: PER, conjunctivae and sclerae normal, no icterus  Lungs: RHONCHI  Heart/CV: regular rhythm & normal rate, no murmur, no gallop, no rub and no edema  Abdomen: not distended, soft, non-tender, no masses,  bowel sounds present  Skin: No rash, Warm and dry. TUNNELED HD CATH.    Radiology:            Labs:    Results from last 7 days  Lab Units 06/13/18  0429 06/12/18  0436 06/11/18  0456   WBC 10*3/mm3 15.12* 11.70* 10.23   HEMOGLOBIN g/dL 8.3* 8.5* 7.7*   HEMATOCRIT % 26.9* 27.7* 24.6* "   PLATELETS 10*3/mm3 263 232 201       Results from last 7 days  Lab Units 06/13/18  0429 06/12/18  0436 06/11/18  1756 06/11/18  0543 06/10/18  0449 06/09/18  0753   SODIUM mmol/L 133 137 137 136 138 140   POTASSIUM mmol/L 3.3* 3.6 3.4* 4.3 4.3 3.2*   CHLORIDE mmol/L 100 102 100 99 104 105   CO2 mmol/L 22.0 21.0 20.0 21.0 21.0 23.0   BUN mg/dL 61* 36* 24* 63* 43* 31*   CREATININE mg/dL 2.03* 1.55* 1.31* 2.36* 1.79* 1.33*   CALCIUM mg/dL 8.9 9.1 8.6* 8.8 8.6* 8.6*   PHOSPHORUS mg/dL 3.0 2.5  --  3.9  --  2.5   MAGNESIUM mg/dL 2.3 2.2  --  2.4  --  2.1   ALBUMIN g/dL 3.71 3.99  --  3.40 3.37 3.35       Results from last 7 days  Lab Units 06/13/18  0429   GLUCOSE mg/dL 155*         Results from last 7 days  Lab Units 06/11/18  0543   ALK PHOS U/L 128*   BILIRUBIN mg/dL 0.3   ALT (SGPT) U/L 2*   AST (SGOT) U/L 15       Results from last 7 days  Lab Units 06/11/18  1538   PH, ARTERIAL pH units 7.461*   PO2 ART mm Hg 134.0*   PCO2, ARTERIAL mm Hg 30.7*   HCO3 ART mmol/L 21.8             Estimated Creatinine Clearance: 29.9 mL/min (A) (by C-G formula based on SCr of 2.03 mg/dL (H)).      Assessment     Principal Problem:    Acute respiratory failure  Active Problems:    Recent Bacteremia, MSSA    Metabolic encephalopathy    Epidural abscess - cervical spine s/p laminectomy and decompression 4/13/18    ESRD (end stage renal disease) on dialysis    Hypothyroidism    Chronic systolic heart failure    Type 2 diabetes mellitus with renal complication    Neuropathy    Atelectasis of left lung, recurrent    Quadriplegia    Pleural effusion on left    Pressure ulcer of coccygeal region    Wound infection, cervical, possible vs CFS leak            Impression: ESRD. OLIGURIC. ANEMIA            Recommendations: HD TODAY.      Chris Oakley MD  06/13/18  8:53 AM

## 2018-06-13 NOTE — PLAN OF CARE
Problem: Patient Care Overview  Goal: Plan of Care Review  Outcome: Ongoing (interventions implemented as appropriate)   06/13/18 4379   Coping/Psychosocial   Plan of Care Reviewed With patient;spouse   OTHER   Outcome Summary Pt session limited d/t lethargy, followed zero commands this date. Pt's spouse educated on PROM HEP, positioning, and techniques to improve alertness. Recommend cont skilled IPOT POC.

## 2018-06-13 NOTE — PROGRESS NOTES
"NEUROSURGERY PROGRESS NOTE    Vital Signs  Blood pressure 130/87, pulse 83, temperature 97.5 °F (36.4 °C), temperature source Axillary, resp. rate 20, height 157 cm (61.81\"), weight 61 kg (134 lb 7.7 oz), SpO2 100 %, not currently breastfeeding.    Interval History:   POD #5: Cervical wound debridement and reclosure.    Cranial nerves - motor function intact. No evidence of nystagmus.   Pupils fixed and dilated at 5  Patient reports seeing shadows    - Per patient's  and nurses patient experienced an episode of delirium two days ago. At the time, this was worked up for metabolic reasons. Nurse also reports that patient has been experiencing vision loss at night, but it resolved quickly.       ASSESSMENT: POD #5: Cervical wound debridement and reclosure. With New onset of vision loss    - CT head without:   Occipital lobe hypodensity is right greater than left. This could  represent cytotoxic edema (from stroke) or vasogenic edema (from PRES)  as well as artifact from volume averaging    PLAN:   - After consulting with Dr. Reed I changed the MRA to with contrast. Patient's  is aware of the risk and was hesitant to go through with test due to contrast and patient's ESRD, but I stressed to him that we may not be able to do any interventions if we do not have the MRA with. He understands and is willing to proceed with MRA.       Benita nA PA-C  06/13/18  4:10 PM    "

## 2018-06-13 NOTE — PROGRESS NOTES
"NEUROSURGERY PROGRESS NOTE    Vital Signs  Blood pressure 112/72, pulse 84, temperature 98.1 °F (36.7 °C), temperature source Oral, resp. rate 16, height 157 cm (61.81\"), weight 61 kg (134 lb 7.7 oz), SpO2 100 %, not currently breastfeeding.    Interval History:   POD #5: Cervical wound debridement and reclosure.    Drain output.  No wound healing problems at present.  Dressing dry.    Off the ventilator.    Aspergillus growth noted, considered contaminant by Dr. Marsh, ID.  Continue cefazolin IV.    No change in dense quadriparesis status.      ASSESSMENT: POD #5: Cervical wound debridement and reclosure.  No wound healing problem at present.    PLAN: Continuing to follow and watch cervical wound healing.    Aryan Reed MD  06/13/18  6:34 AM    "

## 2018-06-13 NOTE — PROGRESS NOTES
Continued Stay Note  [facility]     Patient Name: Lizet Webster  MRN: [id number]  Today's Date: 6/13/2018    Admit Date: 6/8/2018          Discharge Plan     Row Name 06/13/18 1121       Plan    Plan Comments Faxed updated clinical packet to Christina boston Admissions at [facility] Continue Care L/TACH.  Hope to transfer there when medically ready.               Discharge Codes    No documentation.       Expected Discharge Date and Time     Expected Discharge Date Expected Discharge Time    Jun 16, 2018             Ronald Jansen RN

## 2018-06-13 NOTE — SIGNIFICANT NOTE
I was informed of the patient is now complaining of having some difficulty seeing. Apparently she is able to see shadows but nothing else. This is been intermittent through the day apparently and her  states that this was happening yesterday as well. I do note that this morning she did have pupillary reflexes. They are now equal but only very minimally reactive. Per the patient's request, going to in touch with neurology or neurosurgery regarding possible further workup of this.    Denver Capone MD

## 2018-06-13 NOTE — PROGRESS NOTES
Adult Nutrition  Assessment/PES    Patient Name:  Lizet Webster  YOB: 1970  MRN: [id number]  Admit Date:  6/8/2018    Assessment Date:  6/13/2018    Comments:  Maintain TF; delivery goal is 700 ml/d; may need to reduce infusion if delivery consistently exceeds goal; will maintain increased protein delivery w/ wound issues.          Reason for Assessment     Row Name 06/13/18 1507          Reason for Assessment    Reason For Assessment follow-up protocol;TF/PN;per organizational policy   MDR; 30 mins     Diagnosis neurologic conditions;diabetes diagnosis/complications;renal disease;pulmonary disease   Pt s/p cervical wound debridement, removal of hardware and reclosure (6/8), quadriplegia; CRF - vent; pleural effusion w/ CT placement;DM-T2; ESRD -HD; pressure ulcer coccygeal -poa; Hx:trach and PEG     Identified At Risk by Screening Criteria tube feeding or parenteral nutrition;large or nonhealing wound, burn or pressure injury             Nutrition/Diet History     Row Name 06/13/18 1507          Nutrition/Diet History    Factors Affecting Nutritional Intake inability to feed self   RN reports pt tolerating TF except she has diarrhea ; MD to f/u w/ poss c.diff               Labs/Tests/Procedures/Meds     Row Name 06/13/18 1509          Labs/Procedures/Meds    Lab Results Reviewed reviewed, pertinent     Lab Results Comments elev glucose noted        Diagnostic Tests/Procedures    Diagnostic Test/Procedure Reviewed reviewed, pertinent     Diagnostic Test/Procedures Comments FEES deferred; pt w/ visual/ possible neuro changes             Physical Findings     Row Name 06/13/18 1510          Physical Findings    Overall Physical Appearance tetraplegia (quadriplegia)   vent dc'd      Tubes PEG     Skin pressure injury;non-healing wound(s)             Estimated/Assessed Needs     Row Name 06/13/18 1511          Calculation Measurements    Weight Used For Calculations 45 kg (99 lb 3.3 oz)   AIBW for  quadriplegia        KCAL/KG    25 Kcal/Kg (kcal) 1125     30 Kcal/Kg (kcal) 1350           Fluid Requirements    300-500 ml plus UOP              Nutrition Prescription Ordered     Row Name 06/13/18 1510          Nutrition Prescription PO    Current PO Diet NPO        Nutrition Prescription EN    Enteral Route PEG     Product Novasource Renal     Modulars Liquid Protein (15 gm/30 mL)     Liquid Protein (15 gm/30 mL) 30 mL/1 packet     Protein Liquid Frequency 2 times a day     TF Delivery Method Bolus     Continuous TF Goal Rate (mL/hr) 35 mL/hr     Continuous TF Goal Volume (mL) 700 mL     Water flush (mL)  30 mL     Water Flush Frequency Every 2 hours             Evaluation of Received Nutrient/Fluid Intake     Row Name 06/13/18 1511          Calculation Measurements    Weight Used For Calculations 45 kg (99 lb 3.3 oz)   AIBW for quadriplegia        Nutrient/Fluid Evaluation    Number of Days Evaluated 1 day        Calories Evaluation    Enteral Calories (kcal) 1668     Other Calories (kcal) 200     Total Calories (kcal) 1868     % of Kcal Needs 118        Protein Evaluation    Enteral Protein (gm) 75     Other Protein (gm) 30     Total Protein (gm) 105     % of Protein Needs 86        Intake Assessment    Energy/Calorie Requirement Assessment exceeds needs     Protein Requirement Assessment meeting needs     Tolerance tolerating        Fluid Intake Evaluation    Enteral (Free Water) Fluid (mL) 598     Free Water Flush Fluid (mL) 460     Total Free Water Intake (mL) 1058 mL        Recommended Daily Intake Evaluation    RDI Not Met   consider addition of nephrovit        EN Evaluation    Number of Days EN Intake Evaluated 1 day     EN Average Volume Delivered (mL/day) 834 mL/day     % Goal Volume  119 %             Evaluation of Prescribed Nutrient/Fluid Intake     Row Name 06/13/18 1511          Calculation Measurements    Weight Used For Calculations 45 kg (99 lb 3.3 oz)   AIBW for quadriplegia            Problem/Interventions:          Problem 2     Row Name 06/13/18 1516          Nutrition Diagnoses Problem 2    Problem 2 Excessive Nutrient Intake     Macronutrient Kcal     Etiology (related to) MNT for Treatment/Condition     Signs/Symptoms (evidenced by) EN Intake Delivery     Percent (%) of EN goal 118 %                   Intervention Goal     Row Name 06/13/18 1517          Intervention Goal    General Nutrition support treatment;Meet nutritional needs for age/condition     TF/PN Maintain TF/PN     Deliver % of Goal 100 %   700 ml/d             Nutrition Intervention     Row Name 06/13/18 1517          Nutrition Intervention    RD/Tech Action Follow Tx progress;Care plan reviewd   Will reduce infusion rate if delivery continues to be greater than needs     Recommended/Ordered EN             Nutrition Prescription     Row Name 06/13/18 1519 06/12/18 1817       Nutrition Prescription EN    Enteral Prescription Continue same protocol Continue same protocol    Row Name 06/11/18 1806          Nutrition Prescription EN    Enteral Prescription Enteral begin/change     Enteral Route PEG     Product Novasource Renal     Modulars Liquid Protein (15 gm/30 mL)     Liquid Protein (15 gm/30 mL) 30 mL/1 packet     Protein Liquid Frequency 2 times a day     TF Delivery Method Continuous     Continuous TF Goal Rate (mL/hr) 35 mL/hr     Continuous TF Goal Volume (mL) 700 mL     Water flush (mL)  30 mL     Water Flush Frequency Every 2 hours     New EN Prescription Ordered? Yes             Education/Evaluation     Row Name 06/13/18 1519          Monitor/Evaluation    Monitor Per protocol;I&O;Pertinent labs;TF delivery/tolerance;Symptoms         Electronically signed by:  Saima Nolen RD  06/13/18 3:19 PM

## 2018-06-13 NOTE — PLAN OF CARE
Problem: Patient Care Overview  Goal: Plan of Care Review   06/13/18 1500   Coping/Psychosocial   Plan of Care Reviewed With patient   Plan of Care Review   Progress no change     SLP treatment completed. Will continue to address PMV and dysphagia. Please see note for further details and recommendations.

## 2018-06-13 NOTE — THERAPY TREATMENT NOTE
Acute Care - Occupational Therapy Treatment Note  [facility]     Patient Name: Lizet Webster  : 1970  MRN: [id number]  Today's Date: 2018  Onset of Illness/Injury or Date of Surgery: 18  Date of Referral to OT: 18  Referring Physician: KIP Wise MD     Admit Date: 2018       ICD-10-CM ICD-9-CM   1. Pleural effusion on left J90 511.9   2. Wound infection, cervical, possible vs CFS leak T14.8XXA 958.3    L08.9    3. Impaired mobility and ADLs Z74.09 799.89   4. Impaired functional mobility, balance, gait, and endurance Z74.09 V49.89   5. Dysphagia, unspecified type R13.10 787.20   6. Acute respiratory failure with hypoxia J96.01 518.81     Patient Active Problem List   Diagnosis   • Recent Bacteremia, MSSA   • Metabolic encephalopathy   • Epidural abscess - cervical spine s/p laminectomy and decompression 18   • ESRD (end stage renal disease) on dialysis   • Hypothyroidism   • Chronic systolic heart failure   • Type 2 diabetes mellitus with renal complication   • Neuropathy   • Acute respiratory failure   • Atelectasis of left lung, recurrent   • Quadriplegia   • Pleural effusion on left   • Pressure ulcer of coccygeal region   • Wound infection, cervical, possible vs CFS leak     Past Medical History:   Diagnosis Date   • CAD (coronary artery disease)     stentsx4    • Diabetes mellitus     ESRD, peripheral neuropathy   • ESRD (end stage renal disease) on dialysis 2018   • Hx of hepatitis    • Hypothyroidism 2018   • Systolic heart failure 2018    EF 40%     Past Surgical History:   Procedure Laterality Date   • ANTERIOR CERVICAL DISCECTOMY W/ FUSION N/A 2018    Procedure: ANTERIOR CERVICAL CORPECTOMY;  Surgeon: Aryan Reed MD;  Location: [facility];  Service: Neurosurgery   • ARTERIOVENOUS FISTULA Left    • ARTERIOVENOUS FISTULA/SHUNT SURGERY Left 2018    Procedure: REMOVAL OF CLOT FROM LEFT ARM GRAFT;  Surgeon: Jeevan Montoya MD;  Location: [facility]  OR;  Service: Vascular   • BRONCHOSCOPY N/A 4/21/2018    Procedure: BRONCHOSCOPY AT BEDSIDE;  Surgeon: Della Ca MD;  Location:  [facility];  Service: Pulmonary   • BRONCHOSCOPY N/A 4/24/2018    Procedure: BRONCHOSCOPY AT BEDSIDE;  Surgeon: Nghia Gifford MD;  Location:  [facility];  Service: Pulmonary   • BRONCHOSCOPY N/A 4/26/2018    Procedure: BRONCHOSCOPY AT BEDSIDE;  Surgeon: Denver Capone MD;  Location:  [facility];  Service: Pulmonary   • BRONCHOSCOPY N/A 5/31/2018    Procedure: BRONCHOSCOPY @ BEDSIDE;  Surgeon: Bolivar Mckeon MD;  Location:  [facility];  Service: Pulmonary   • BRONCHOSCOPY N/A 6/7/2018    Procedure: BRONCHOSCOPY @ BEDSIDE;  Surgeon: Bolivar Mckeon MD;  [facility]:  [facility];  Service: Pulmonary   • CENTRAL VENOUS LINE INSERTION Left 6/2/2018    Procedure: CENTRAL VENOUS LINE INSERTION;  Surgeon: Modesta Ontiveros MD;  Location:  [facility];  Service: General   • CERVICAL LAMINECTOMY DECOMPRESSION POSTERIOR N/A 4/13/2018    Procedure: CERVICAL LAMINECTOMY DECOMPRESSION POSTERIOR;  Surgeon: Aryan Reed MD;  Location:  [facility];  Service: Neurosurgery   • CERVICAL LAMINECTOMY DECOMPRESSION POSTERIOR N/A 6/8/2018    Procedure: POSTERIOR CERVICAL WOUND DEBRIDEMENT AND CLOSURE WITH HARDWARE REMOVAL;  Surgeon: Aryan Reed MD;  Location:  [facility];  Service: Neurosurgery   • CORONARY ANGIOPLASTY WITH STENT PLACEMENT  2011    4 stents   • HYSTERECTOMY     • INSERTION HEMODIALYSIS CATHETER N/A 5/26/2018    Procedure: HEMODIALYSIS CATHETER INSERTION;  Surgeon: Jeevan Montoya MD;  Location:  [facility];  Service: General   • INSERTION HEMODIALYSIS CATHETER Left 5/30/2018    Procedure: REPLACEMENT  OF LEFT CHEST WALL HEMODIALYSIS CATHETER;  Surgeon: Jeevan Montoya MD;  [facility];  Service: General   • INSERTION HEMODIALYSIS CATHETER Right 6/2/2018    Procedure: HEMODIALYSIS CATHETER INSERTION;  Surgeon: Modesta Ontiveros MD;  [facility];   Service: General   • REMOVAL PERITONEAL DIALYSIS CATHETER Left 6/2/2018    Procedure: REMOVAL TUNNELED DIALYSIS CATHETER;  Surgeon: Modesta Ontiveros MD;  [facility];  Service: General   • TRACHEOSTOMY AND PEG TUBE INSERTION N/A 4/24/2018    Procedure: TRACHEOSTOMY AND PERCUTANEOUS ENDOSCOPIC GASTROSTOMY TUBE INSERTION;  Surgeon: Denver Alves MD;  Location:  [facility];  Service: General       Therapy Treatment          Rehabilitation Treatment Summary     Row Name 06/13/18 1135             Treatment Time/Intention    Discipline occupational therapist  -CL      Document Type therapy note (daily note)  -CL      Subjective Information complains of;fatigue  -CL      Care Plan Review, Other Participant(s) spouse  -CL      Patient Effort poor  -CL      Existing Precautions/Restrictions fall;spinal;oxygen therapy device and L/min   trach, PEG, quadriplegic, s/p cervical I&D  -CL      Treatment Considerations/Comments Pt very lethargic this date, s/p HD. Pt followed zero commands and would not open eyes this date. Pt has HD MWF.   -CL      Recorded by [CL] Anastasiya Powell OT 06/13/18 1347      Row Name 06/13/18 1135             Vital Signs    Pre Systolic BP Rehab 135  -CL      Pre Treatment Diastolic BP 83  -CL      Pretreatment Heart Rate (beats/min) 80  -CL      Posttreatment Heart Rate (beats/min) 80  -CL      Pre SpO2 (%) 100  -CL      O2 Delivery Pre Treatment trach collar  -CL      Post SpO2 (%) 100  -CL      O2 Delivery Post Treatment supplemental O2  -CL      Pre Patient Position Supine  -CL      Intra Patient Position Supine  -CL      Post Patient Position Supine  -CL      Recorded by [CL] Anastasiya Powell OT 06/13/18 1347      Row Name 06/13/18 1135             Cognitive Assessment/Intervention- PT/OT    Affect/Mental Status (Cognitive) low arousal/lethargic   Pt educated on techniques to improve arousal and sleep/wake   -CL      Orientation Status (Cognition) oriented to;person  -CL      Follows Commands  (Cognition) follows one step commands;0-24% accuracy;verbal cues/prompting required;repetition of directions required  -CL      Cognitive Function (Cognitive) safety deficit  -CL      Safety Deficit (Cognitive) severe deficit;awareness of need for assistance;safety precautions awareness  -CL      Personal Safety Interventions fall prevention program maintained;supervised activity  -CL      Recorded by [CL] Anastasiya Powell OT 06/13/18 1347      Row Name 06/13/18 1135             Bed Mobility Assessment/Treatment    Comment (Bed Mobility) Deferred.   -CL      Recorded by [CL] Anastasiya Powell OT 06/13/18 1347      Row Name 06/13/18 1135             Transfer Assessment/Treatment    Comment (Transfers) Deferred d/t lethargy.   -CL      Recorded by [CL] Anastasiya Powell OT 06/13/18 1347      Row Name 06/13/18 1135             Motor Skills Assessment/Interventions    Additional Documentation Therapeutic Exercise (Group)  -CL      Recorded by [CL] Anastasiya Powell OT 06/13/18 1351      Row Name 06/13/18 1135             Therapeutic Exercise    21643 - OT Therapeutic Exercise Minutes 10  -CL      Recorded by [CL] Anastasiya Powell OT 06/13/18 1351      Row Name 06/13/18 1135             Upper Extremity Seated Therapeutic Exercise    Performed, Seated Upper Extremity (Therapeutic Exercise) shoulder flexion/extension;shoulder external/internal rotation;scapular protraction/retraction;elbow flexion/extension;forearm supination/pronation;wrist flexion/extension;digit flexion/extension   HOB elevated  -CL      Exercise Type, Seated Upper Extremity (Therapeutic Exercise) PROM (passive range of motion)  -CL      Sets/Reps Detail, Seated Upper Extremity (Therapeutic Exercise) 1/10  -CL      Comment, Seated Upper Extremity (Therapeutic Exercise) BUE  -CL      Recorded by [CL] Anastasiya Powell OT 06/13/18 1347      Row Name 06/13/18 1135             Positioning and Restraints    Pre-Treatment Position in bed  -CL      Post Treatment Position bed  -CL       In Bed notified nsg;fowlers;call light within reach;encouraged to call for assist;with family/caregiver;RUE elevated;LUE elevated;legs elevated;heels elevated  -CL      Recorded by [CL] Anastasiya Powell, TIANNA 06/13/18 1347      Row Name 06/13/18 1135             Pain Scale 2: FACES Pre/Post-Treatment    Pain 2: FACES Scale, Pretreatment 0-->no hurt  -CL      Pain 2: FACES Scale, Post-Treatment 0-->no hurt  -CL      Recorded by [CL] Anastasiya Powell, TIANNA 06/13/18 1347      Row Name                Wound 05/25/18 1544 Left arm incision    Wound - Properties Group Date first assessed: 05/25/18 [CE] Time first assessed: 1544 [CE] Side: Left [CE] Location: arm [CE] Type: incision [CE] Recorded by:  [CE] Amauri Peralta RN 05/25/18 1544    Row Name                Wound 06/08/18 0215 Right gluteal pressure injury    Wound - Properties Group Date first assessed: 06/08/18 [LB] Time first assessed: 0215 [LB] Present On Admission : yes [LB] Side: Right [LB] Location: gluteal [LB] Type: pressure injury [LB] Stage, Pressure Injury: Stage 1 [LB] Recorded by:  [LB] Kelli Euceda RN 06/08/18 0517    Row Name                Wound 06/08/18 0215 anus pressure injury    Wound - Properties Group Date first assessed: 06/08/18 [LB] Time first assessed: 0215 [LB] Present On Admission : yes [LB] Location: anus [LB] Type: pressure injury [LB] Stage, Pressure Injury: Stage 2 [LB] Recorded by:  [LB] Kelli Euceda RN 06/08/18 0519    Row Name                Wound 06/08/18 0900 medial coccyx    Wound - Properties Group Date first assessed: 06/08/18 [CP] Time first assessed: 0900 [CP] Present On Admission : yes;picture taken [CP] Orientation: medial [CP] Location: coccyx [CP] Stage, Pressure Injury: unstageable [CP] Recorded by:  [CP] LAYTON Gupta 06/08/18 1117    Row Name                Wound 06/08/18 1550 Other (See comments) cervical spine incision    Wound - Properties Group Date first assessed: 06/08/18 [AL] Time first assessed: 1550 [AL]  Side: Other (See comments) [AL] Location: cervical spine [AL] Type: incision [AL] Recorded by:  [AL] Jessica Yin RN 06/08/18 1550      User Key  (r) = Recorded By, (t) = Taken By, (c) = Cosigned By    Initials Name Effective Dates Discipline    CP Leighann Santiago, LAYTON 04/06/17 - 06/07/18 Nurse    CALLIE Peralta, DEVYN 06/16/16 -  Nurse    KAZ Euceda RN 06/16/16 -  Nurse    ELIAZAR Powell, OT 04/03/18 -  OT    AL Jessica Yin RN 12/29/17 -  Nurse        Rash 06/08/18 0900 medial groin macular;papule (Active)   Distribution regional 6/13/2018 12:05 PM   Configuration/Shape asymmetric 6/13/2018 12:05 PM   Borders irregular 6/13/2018 12:05 PM   Characteristics moist 6/13/2018 12:05 PM   Color red 6/13/2018 12:05 PM   Care, Rash cleansed with;skin cleanser;antimicrobial agent applied;open to air 6/13/2018 12:05 PM       Wound 05/25/18 1544 Left arm incision (Active)   Dressing Appearance dry;intact 6/13/2018  4:00 AM   Periwound intact;dry;pink 6/13/2018  4:00 AM   Drainage Amount none 6/13/2018  4:00 AM       Wound 06/08/18 0215 Right gluteal pressure injury (Active)   Dressing Appearance open to air 6/13/2018  4:00 AM   Drainage Characteristics/Odor clear 6/13/2018  4:00 AM   Care, Wound cleansed with;soap and water;barrier applied 6/12/2018  8:00 PM       Wound 06/08/18 0215 anus pressure injury (Active)   Dressing Appearance dry;intact;no drainage 6/13/2018  4:00 AM   Care, Wound cleansed with;soap and water 6/12/2018  8:00 PM       Wound 06/08/18 0900 medial coccyx (Active)   Dressing Appearance dried drainage 6/13/2018  4:00 AM   Care, Wound cleansed with;soap and water;honey applied 6/12/2018  8:00 PM   Dressing Care, Wound dressing changed 6/12/2018  8:00 PM       Wound 06/08/18 1550 Other (See comments) cervical spine incision (Active)   Dressing Appearance dry;intact;dried drainage 6/13/2018  4:00 AM         Occupational Therapy Education     Title: PT OT SLP Therapies (Active)     Topic:  Occupational Therapy (Active)     Point: ADL training (Active)     Description: Instruct learner(s) on proper safety adaptation and remediation techniques during self care or transfers.   Instruct in proper use of assistive devices.   Learning Progress Summary     Learner Status Readiness Method Response Comment Documented by    Patient Active Acceptance E NR Pt educated on appropriate safety precautions, positioning, BUE PROM HEP, techniques to improve alertness, and benefits of therapy.  06/13/18 1348     Active Acceptance E NR Pt educated on appropriate safety precautions, positioning, HEP, and benefits of therapy.  06/11/18 1529     Done Acceptance E VU   06/10/18 0758     Active Acceptance E NR Educated pt and family regarding role of therapy and continued treatment plan.  06/09/18 0943     Done HUMBLE Davidson,TB MIKE   06/09/18 0712     Done Acceptance E VU,NICOLE   06/08/18 1306    Family Active Acceptance E NR Educated pt and family regarding role of therapy and continued treatment plan.  06/09/18 0943     Done Acceptance E VU,NICOLE   06/08/18 1306    Significant Other Done Acceptance E VU   06/10/18 0758     Done HUMBLE Davidson,TB [facility] 06/09/18 0712     Done Acceptance E VU,NICOLE   06/08/18 1306          Point: Home exercise program (Active)     Description: Instruct learner(s) on appropriate technique for monitoring, assisting and/or progressing therapeutic exercises/activities.   Learning Progress Summary     Learner Status Readiness Method Response Comment Documented by    Patient Active Acceptance E NR Pt educated on appropriate safety precautions, positioning, BUE PROM HEP, techniques to improve alertness, and benefits of therapy.  06/13/18 1348     Active Acceptance E NR Pt educated on appropriate safety precautions, positioning, HEP, and benefits of therapy.  06/11/18 1529     Done Acceptance E VU   06/10/18 0758     Done HUMBLE Davidson,TB [facility] 06/09/18 0712     Done Acceptance E VU,NICOLE    06/08/18 1306    Family Done Acceptance E VU,DU  JW 06/08/18 1306    Significant Other Done Acceptance E VU   06/10/18 0758     Done Wilfred MULLIGAND,TB VU   06/09/18 0712     Done Acceptance E VU,DU   06/08/18 1306          Point: Precautions (Active)     Description: Instruct learner(s) on prescribed precautions during self-care and functional transfers.   Learning Progress Summary     Learner Status Readiness Method Response Comment Documented by    Patient Active Acceptance E NR Pt educated on appropriate safety precautions, positioning, BUE PROM HEP, techniques to improve alertness, and benefits of therapy. CL 06/13/18 1348     Active Acceptance E NR Pt educated on appropriate safety precautions, positioning, HEP, and benefits of therapy.  06/11/18 1529     Done Acceptance E VU   06/10/18 0758     Done HUMBLE Davidson,TB VU   06/09/18 0712     Done Acceptance E VU,DU   06/08/18 1306    Family Done Acceptance E MIKE,DU   06/08/18 1306    Significant Other Done Acceptance E VU   06/10/18 0758     Done HUMBLE Davidson,TB VU   06/09/18 0712     Done Acceptance E VU,NICOLE   06/08/18 1306                      User Key     Initials Effective Dates Name Provider Type Discipline     06/22/15 -  Radha Salazar OT Occupational Therapist OT     02/14/17 -  Manju Tyler RN Registered Nurse Nurse     04/03/18 -  Anastasiya Powell OT Occupational Therapist OT                OT Recommendation and Plan     Plan of Care Review  Plan of Care Reviewed With: patient, spouse  Plan of Care Reviewed With: patient, spouse  Outcome Summary: Pt session limited d/t lethargy, followed zero commands this date. Pt's spouse educated on PROM HEP, positioning, and techniques to improve alertness. Recommend cont skilled IPOT POC.         Outcome Measures     Row Name 06/13/18 1135 06/11/18 1507 06/11/18 1450       How much help from another person do you currently need...    Turning from your back to your side while in flat bed without  using bedrails?  --  -- 1  -LS    Moving from lying on back to sitting on the side of a flat bed without bedrails?  --  -- 1  -LS    Moving to and from a bed to a chair (including a wheelchair)?  --  -- 1  -LS    Standing up from a chair using your arms (e.g., wheelchair, bedside chair)?  --  -- 1  -LS    Climbing 3-5 steps with a railing?  --  -- 1  -LS    To walk in hospital room?  --  -- 1  -LS    AM-PAC 6 Clicks Score  --  -- 6  -LS       How much help from another is currently needed...    Putting on and taking off regular lower body clothing? 1  -CL 1  -CL  --    Bathing (including washing, rinsing, and drying) 1  -CL 1  -CL  --    Toileting (which includes using toilet bed pan or urinal) 1  -CL 1  -CL  --    Putting on and taking off regular upper body clothing 1  -CL 1  -CL  --    Taking care of personal grooming (such as brushing teeth) 1  -CL 1  -CL  --    Eating meals 1  -CL 1  -CL  --    Score 6  -CL 6  -CL  --       Functional Assessment    Outcome Measure Options AM-PAC 6 Clicks Daily Activity (OT)  -CL AM-PAC 6 Clicks Daily Activity (OT)  -CL AM-PAC 6 Clicks Basic Mobility (PT)  -LS      User Key  (r) = Recorded By, (t) = Taken By, (c) = Cosigned By    Initials Name Provider Type    LS Kelli Manrique, PT Physical Therapist    CL Anastasiya Powell, OT Occupational Therapist           Time Calculation:         Time Calculation- OT     Row Name 06/13/18 1351 06/13/18 1135          Time Calculation- OT    OT Start Time 1135  -CL  --     OT Received On 06/13/18  -CL  --     OT Goal Re-Cert Due Date 06/19/18  -CL  --        Timed Charges    23727 - OT Therapeutic Exercise Minutes  -- 10  -CL       User Key  (r) = Recorded By, (t) = Taken By, (c) = Cosigned By    Initials Name Provider Type    CL Anastasiya Powell, OT Occupational Therapist           Therapy Suggested Charges     Code   Minutes Charges    88242 (CPT®) Hc Ot Neuromusc Re Education Ea 15 Min      26182 (CPT®) Hc Ot Ther Proc Ea 15 Min 10 1    63606 (CPT®)  Hc Gait Training Ea 15 Min      73948 (CPT®) Hc Ot Therapeutic Act Ea 15 Min      71696 (CPT®) Hc Ot Manual Therapy Ea 15 Min      37643 (CPT®) Hc Ot Iontophoresis Ea 15 Min      48565 (CPT®) Hc Ot Elec Stim Ea-Per 15 Min      39984 (CPT®) Hc Ot Ultrasound Ea 15 Min      99781 (CPT®) Hc Ot Self Care/Mgmt/Train Ea 15 Min      Total  10 1        Therapy Charges for Today     Code Description Service Date Service Provider Modifiers Qty    [id number] HC OT THER PROC EA 15 MIN 6/13/2018 Anastasiya Powell OT GO 1               Anastasiya Powell OT  6/13/2018

## 2018-06-13 NOTE — PROGRESS NOTES
"[facility] Progress Note    Date of Admission: 2018      Antibiotics:  Cefazolin    CC: No chief complaint on file.  Cervical wound dehiscence    S: Patient chronically ill-appearing hemodynamic stable today no fevers postop day 1 from cervical wound debridement.  18; no events overnight; feels well; no complaints, no diarrhea, rash, sore throat  18; doing well; no events overnight; confused per , afebrile hemodynamically stable; ros unobtainable  18; telling her  she cannot see ; her eyes are stuck up in a fixed position; ros unobtainable  O:  /85   Pulse 80   Temp 97.5 °F (36.4 °C) (Axillary)   Resp 20   Ht 157 cm (61.81\")   Wt 61 kg (134 lb 7.7 oz)   LMP  (LMP Unknown)   SpO2 100%   BMI 24.75 kg/m²   Temp (24hrs), Av.4 °F (36.9 °C), Min:97.5 °F (36.4 °C), Max:100 °F (37.8 °C)      PE:   GENERAL: eyes open, no distress; moving right arm on request  HEENT: Normocephalic, atraumatic. Both eyes looking up and to the right.   HEART: RRR; No murmurs rubs   LUNGS: Diminished at lung bases bilaterally  ABDOMEN: Soft, nondistended. Hypoactive bowel sounds.  EXT:  No cyanosis, clubbing or edema. No cord.   MSK: No joint swelling or erythema  SKIN: Warm and dry without cutaneous eruptions on Inspection/palpation.    NEURO: follows commands has focal deficit below waist; arms propped up on pillows but making no movement       Laboratory Data      Results from last 7 days  Lab Units 18  0429 18  0436 18  0456   WBC 10*3/mm3 15.12* 11.70* 10.23   HEMOGLOBIN g/dL 8.3* 8.5* 7.7*   HEMATOCRIT % 26.9* 27.7* 24.6*   PLATELETS 10*3/mm3 263 232 201       Results from last 7 days  Lab Units 18  0429   SODIUM mmol/L 133   POTASSIUM mmol/L 3.3*   CHLORIDE mmol/L 100   CO2 mmol/L 22.0   BUN mg/dL 61*   CREATININE mg/dL 2.03*   GLUCOSE mg/dL 155*   CALCIUM mg/dL 8.9       Results from last 7 days  Lab Units 18  0543   ALK PHOS U/L 128*   BILIRUBIN mg/dL 0.3   ALT " (SGPT) U/L 2*   AST (SGOT) U/L 15           Results from last 7 days  Lab Units 06/11/18  1757   CRP mg/dL 4.66*       Estimated Creatinine Clearance: 29.9 mL/min (A) (by C-G formula based on SCr of 2.03 mg/dL (H)).      Microbiology:  Pleural fluid negative wound cultures from neck no organisms seen, repeat blood cultures negative    Radiology:  Imaging Results (last 24 hours)     Procedure Component Value Units Date/Time    XR Chest 1 View [[id number]] Collected:  06/13/18 1116     Updated:  06/13/18 1301    Narrative:       EXAMINATION: XR CHEST 1 VW- 06/13/2018     INDICATION:  Shortness of breath     TECHNIQUE:  Single view frontal chest.     COMPARISONS: 06/12/2018     FINDINGS:  Stable support apparatus. Left greater than right basilar  opacities are unchanged. Unchanged cardiomediastinal silhouette. No  pneumothorax.       Impression:       Stable exam.     D:  06/13/2018  E:  06/13/2018     This report was finalized on 6/13/2018 12:59 PM by Jaron Serra.       Fiberoptic Endo (fees) [[id number]] Resulted:  06/13/18 1013     Updated:  06/13/18 1013    XR Chest 1 View [[id number]] Collected:  06/12/18 1119     Updated:  06/12/18 2131    Narrative:       EXAMINATION: XR CHEST 1 VW-06/12/2018:      INDICATION: Left pleural effusion; Y50-Gihbxig effusion, not elsewhere  classified; T14.8XXA-Other injury of unspecified body region, initial  encounter; L08.9-Local infection of the skin and subcutaneous tissue,  unspecified; Z74.09-Other reduced mobility; Z74.09-Other reduced  mobility.      COMPARISON: 06/11/2018.     FINDINGS: Tracheostomy tube, right IJ catheter, left subclavian line,  and left pleural drain remain in place. There appears to be a left lower  neck/upper chest BHAVNA-type drain. The heart is enlarged. The vasculature  appears upper limits of normal. The lungs are moderately well expanded.  Bibasilar atelectasis appears stable.       Impression:       Stable bibasilar atelectasis, left greater than right.  No  evidence of pneumothorax or other new chest disease.     D:  06/12/2018  E:  06/12/2018     This report was finalized on 6/12/2018 9:29 PM by DR. Rahul Cobb MD.           Impression/Problem list:   chronic respiratory failure on vent  Epidural abscess MSSA 4/13/18  Suspected CSF leak post cervical wound dehiscence with posterior cervical wound debridement 6/8/18  ESRD  Left pleural effusion  S/p cervical spine decompression 4/13/18.  ? Seizure while on meropenem  Sputum cultures growing yeast, mold 5/25/18   neck wound with nonfumigatus aspergillosis species; mold isolated 5/25/18 from sputum as well  Assessment:  Complex case 47-year-old with cervical spine discitis and epidural abscess status post decompression 4/13/18 on long-term IV antibiotics readmitted with cervical wound dehiscence and pleural effusions but normal white blood cell count status post surgery with posterior cervical wound debridement and BHAVNA drain in place stable on cefazolin    PLAN:    Course of cefazolin from 4/13/18 to present   Follow-up all cultures    Unclear if patient is having oculogyric crises with eyes stuck in a upward right gaze;    Observe off antifungal therapy currently        D/w family  D/w Dr. Price

## 2018-06-13 NOTE — PROGRESS NOTES
"Intensive Care Follow-up     Hospital:  LOS: 5 days   Ms. Lizet Webster, 47 y.o. female is followed for:   Acute respiratory failure        Subjective   Interval History:  The chart is been reviewed. The patient is tolerating trach collar still. This morning apparently she complained of some shortness of breath and chest pressure although this has resolved. Of note, this was during hemodialysis. She continues to have diarrhea. She has remained afebrile. The patient's relevant past medical, surgical and social history were reviewed and updated in Epic as appropriate.        Objective     Infusions:     Medications:    albumin human 12.5 g Intravenous Once   ceFAZolin 2 g Intravenous Q24H   chlorhexidine 15 mL Mouth/Throat Q12H   DULoxetine 20 mg Oral Daily   epoetin mini 10,000 Units Subcutaneous Once per day on Mon Wed Fri   gabapentin 400 mg Oral Nightly   heparin (porcine) 5,000 Units Subcutaneous Q8H   insulin regular 0-14 Units Subcutaneous Q6H   ipratropium-albuterol 3 mL Nebulization 4x Daily - RT   levothyroxine 75 mcg Oral Q AM   lidocaine 1 patch Transdermal Q24H   micafungin (MYCAMINE)  mg Intravenous Q24H   midodrine 5 mg Oral TID   nystatin  Topical Q12H   pantoprazole 40 mg Intravenous Q AM   PRO-STAT 1 packet Per G Tube BID   sertraline 100 mg Oral Daily       Vital Sign Min/Max for last 24 hours  Temp  Min: 97.5 °F (36.4 °C)  Max: 100 °F (37.8 °C)   BP  Min: 105/66  Max: 134/79   Pulse  Min: 73  Max: 92   Resp  Min: 16  Max: 28   SpO2  Min: 93 %  Max: 100 %   Flow (L/min)  Min: 8  Max: 8       Input/Output for last 24 hour shift  06/12 0701 - 06/13 0700  In: 1569 [I.V.:75]  Out: 560 [Urine:150; Drains:10]      Objective:  General Appearance:  In no acute distress, ill-appearing and comfortable.    Vital signs: (most recent): Blood pressure 124/88, pulse 75, temperature 97.5 °F (36.4 °C), temperature source Axillary, resp. rate 20, height 157 cm (61.81\"), weight 61 kg (134 lb 7.7 oz), SpO2 100 %, " not currently breastfeeding.  No fever.    HEENT: (Tracheostomy tube in place; on TCT.  Right tunneled internal jugular dialysis catheter.  Left internal jugular central venous catheter.  )    Lungs:  Normal effort and normal respiratory rate.  She is not in respiratory distress.  There are decreased breath sounds.  No rales, wheezes or rhonchi.  (Poor air movement bilaterally. Mild coarse basilar sounds. Left thoracostomy tube in place with no air leak.)  Heart: Normal rate.  Regular rhythm.  S1 normal and S2 normal.  No murmur, gallop or friction rub.   Chest: Symmetric chest wall expansion.   Abdomen: Abdomen is soft and non-distended.  Bowel sounds are normal.   There is no abdominal tenderness.   There is no mass. There is no splenomegaly. There is no hepatomegaly.   Extremities: Decreased range of motion.  There is no deformity or dependent edema.  (She is not moving any of her extremities.)  Neurological: Patient is alert.    Pupils:  Pupils are equal, round, and reactive to light.  Pupils are equal.   Skin:  Warm, dry and pale.  No rash.               Results from last 7 days  Lab Units 06/13/18  0429 06/12/18  0436 06/11/18  0456   WBC 10*3/mm3 15.12* 11.70* 10.23   HEMOGLOBIN g/dL 8.3* 8.5* 7.7*   PLATELETS 10*3/mm3 263 232 201       Results from last 7 days  Lab Units 06/13/18  0429 06/12/18  0436 06/11/18  1756 06/11/18  0543   SODIUM mmol/L 133 137 137 136   POTASSIUM mmol/L 3.3* 3.6 3.4* 4.3   CO2 mmol/L 22.0 21.0 20.0 21.0   BUN mg/dL 61* 36* 24* 63*   CREATININE mg/dL 2.03* 1.55* 1.31* 2.36*   MAGNESIUM mg/dL 2.3 2.2  --  2.4   PHOSPHORUS mg/dL 3.0 2.5  --  3.9   GLUCOSE mg/dL 155* 141* 242* 209*     Estimated Creatinine Clearance: 29.9 mL/min (A) (by C-G formula based on SCr of 2.03 mg/dL (H)).      Results from last 7 days  Lab Units 06/11/18  1538   PH, ARTERIAL pH units 7.461*   PCO2, ARTERIAL mm Hg 30.7*   PO2 ART mm Hg 134.0*       I reviewed the patient's results and images.      Assessment/Plan   Impression      Principal Problem:    Acute respiratory failure  Active Problems:    Recent Bacteremia, MSSA    Metabolic encephalopathy    Epidural abscess - cervical spine s/p laminectomy and decompression 4/13/18    ESRD (end stage renal disease) on dialysis    Hypothyroidism    Chronic systolic heart failure    Type 2 diabetes mellitus with renal complication    Neuropathy    Atelectasis of left lung, recurrent    Quadriplegia    Pleural effusion on left    Pressure ulcer of coccygeal region    Wound infection, cervical, possible vs CFS leak       Plan        The patient has tolerated several days off even the nocturnal vent and secretions been minimal.  I suspect that she may be ready for transfer back to the LTAC at some point in the next several days.  We will follow the patient's complaint of chest pain though this has not recurred. Her troponin is marginally elevated and this may be related to her chronic renal failure.  Antibiotics per the infectious disease service.  We will check on stool leukocytes for any insight into her chronic diarrhea. I will not check a C. difficile colitis panel at this time.  Follow up orders have been placed.    Plan of care and goals reviewed with mulitdisciplinary team at daily rounds.   I discussed the patient's findings and my recommendations with patient and nursing staff       Denver Capoen MD, [facility]  Pulmonary and Critical Care Medicine  06/13/18 11:51 AM

## 2018-06-13 NOTE — PLAN OF CARE
Problem: Patient Care Overview  Goal: Plan of Care Review  Outcome: Ongoing (interventions implemented as appropriate)   06/12/18 2134   Coping/Psychosocial   Plan of Care Reviewed With patient;family   Plan of Care Review   Progress improving   OTHER   Outcome Summary patient remains on trach collar at this time, denies needs will continue to monitor       Problem: Mobility, Physical Impaired (Adult)  Goal: Enhanced Mobility Skills  Outcome: Ongoing (interventions implemented as appropriate)   06/12/18 2134   Mobility, Physical Impaired (Adult)   Enhanced Mobility Skills making progress toward outcome

## 2018-06-14 PROBLEM — I63.9 CVA (CEREBRAL VASCULAR ACCIDENT) (HCC): Status: ACTIVE | Noted: 2018-01-01

## 2018-06-14 PROBLEM — D64.9 ANEMIA: Status: ACTIVE | Noted: 2018-01-01

## 2018-06-14 NOTE — PROGRESS NOTES
Clinical Nutrition     Nutrition Support Followup  Reason for Visit:   MDR, Follow-up protocol, EN      Patient Name: Lizet Webster  YOB: 1970  MRN: [id number]  Date of Encounter: 06/14/18 11:47 AM  Admission date: 6/8/2018    Comments: EN regimen adjusted to standard polymeric fiber containing formula. Hopefully adjustments in EN formula will assist in decreasing incidence of diarrhea. RD to continue to follow.       Nutrition Assessment   Assessment       Hospital Problem List  Principal Problem:    Acute respiratory failure  Active Problems:    Recent Bacteremia, MSSA    Metabolic encephalopathy    Epidural abscess - cervical spine s/p laminectomy and decompression 4/13/18    ESRD (end stage renal disease) on dialysis    Hypothyroidism    Chronic systolic heart failure    Type 2 diabetes mellitus with renal complication    Neuropathy    Atelectasis of left lung, recurrent    Quadriplegia    Pleural effusion on left    Pressure ulcer of coccygeal region    Wound infection, cervical, possible vs CFS leak    CVA (cerebral vascular accident)    Anemia      PMH: She  has a past medical history of CAD (coronary artery disease); Diabetes mellitus; ESRD (end stage renal disease) on dialysis (4/12/2018); hepatitis; Hypothyroidism (4/12/2018); and Systolic heart failure (04/12/2018).   PSxH: She  has a past surgical history that includes Coronary angioplasty with stent (2011); Hysterectomy; AV fistula placement (Left); cervical laminectomy decompression posterior (N/A, 4/13/2018); Anterior cervical discectomy w/ fusion (N/A, 4/13/2018); Bronchoscopy (N/A, 4/21/2018); tracheostomy and peg tube insertion (N/A, 4/24/2018); Bronchoscopy (N/A, 4/24/2018); Bronchoscopy (N/A, 4/26/2018); arteriovenous fistula/shunt surgery (Left, 5/25/2018); Hemodialysis Catheter (N/A, 5/26/2018); Hemodialysis Catheter (Left, 5/30/2018); Bronchoscopy (N/A, 5/31/2018); Hemodialysis Catheter (Right, 6/2/2018); Central  venous catheter insertion (Left, 6/2/2018); Peritoneal Dialysis Catheter Removal (Left, 6/2/2018); Bronchoscopy (N/A, 6/7/2018); and cervical laminectomy decompression posterior (N/A, 6/8/2018).     Other nutrition related factors:  Hx of Severe peripheral neuropathy/wheelchair bound, quadriplegia  Chronic LLE DVT  ESRD- HD MWF     Admitted to OSH on (4/9/18), transferred to [facility] on (4/12/18), d/c'd to [facility] on (5/4/18), and  transferred back to [facility] on (5/8).   S/p recent cervical laminectomy decompression, anterior cervical corpectomy (4/13)  S/p trach/PEG placement (4/24)  S/p multiple bronchoscopies (4/21, 4/24, 4/26, 5/31, 6/7)  Passed SLP MBS at OSH on (6/4)- rec regular texture with thin liquids     A/c resp failure  Vent, chronic trach on adm  Now trach collar only  Posterior cervical wound dehiscence  Plan for wound debridement and reclosure, removal of hardware today (6/8)  L pleural effusion, s/p chest tube placement (6/8)  (  6/14) recent MRIs do show multiple strokes which could be implicated in the patient's vision changes     WOCN (6/8)- Pt has unstageable PI (POA) with 100% eschar coccyx. PT Wound Care consulted to assess for debridement. Pt has stage 1 PI (POA) right buttocks. Bilateral groin areas with MASD and yeast.      Reported/Observed/Food/Nutrition Related History:     Dialysis planned tomorrow. EEG today. Neurology to see today. RN reports diarrhea continues. RD notes diarrhea has been an issue in the past with multiple formulas including Suplena. Intensivist provided verbal approval to adjust EN per RD recommendations.        Anthropometrics     Height: 62 in  Weight: 151 lb per bed scale wt from [facility] on (6/7) per medical records. Pt's  reports that bed scale wt of 134 lb on (6/8) is not accurate.    BMI: 27.6  BMI classification: Overweight: 25.0-29.9kg/m2      Adjusted IBW: 42.5kg-45kg  Weight change: Per medical records from [facility], pt's weights have been between 150-165 lb (5/9=  164 lb, 6/7= 151 lb).  reports that pt's wt has been trending down.      Date Weight (kg) Weight (lbs) Weight Method   6/8/2018 61 kg 134 lb 7.7 oz Bed scale   5/3/2018 78.7 kg 173 lb 8 oz -   4/30/2018 88.5 kg 195 lb 1.7 oz Bed scale   4/29/2018 89.2 kg 196 lb 10.4 oz -   4/27/2018 78.7 kg 173 lb 8 oz -   4/12/2018 65 kg 143 lb 4.8 oz Bed scale     RD notes no new weight taken since admission      Labs reviewed       Results from last 7 days  Lab Units 06/14/18 0442 06/13/18  0429 06/12/18  0436  06/11/18  0543 06/10/18  0449 06/09/18  0753   GLUCOSE mg/dL 114* 155* 141*  < > 209* 114* 175*   BUN mg/dL 38* 61* 36*  < > 63* 43* 31*   CREATININE mg/dL 1.42* 2.03* 1.55*  < > 2.36* 1.79* 1.33*   SODIUM mmol/L 138 133 137  < > 136 138 140   CHLORIDE mmol/L 105 100 102  < > 99 104 105   POTASSIUM mmol/L 3.8 3.3* 3.6  < > 4.3 4.3 3.2*   PHOSPHORUS mg/dL 2.5 3.0 2.5  --  3.9  --  2.5   MAGNESIUM mg/dL  --  2.3 2.2  --  2.4  --  2.1   ALT (SGPT) U/L  --   --   --   --  2* 5* 7   < > = values in this interval not displayed.    Results from last 7 days  Lab Units 06/14/18  0442 06/13/18  0429 06/12/18  0436 06/11/18  1757 06/11/18  0543 06/11/18  0456  06/08/18  0420  06/08/18  0416   ALBUMIN g/dL 3.46 3.71 3.99  --  3.40  --   < > 3.26  --   --    PREALBUMIN mg/dL  --   --   --   --   --  7.1*  --   --   --   --    CRP mg/dL  --   --   --  4.66*  --  7.33*  --   --   --  4.82*   IONIZED CALCIUM mmol/L 1.29 1.30 1.28  --   --  1.25  --  1.27  < >  --    CHOLESTEROL mg/dL  --   --   --   --   --   --   --  105  --   --    TRIGLYCERIDES mg/dL  --   --   --   --  124  --   --  188*  --   --    < > = values in this interval not displayed.      Results from last 7 days  Lab Units 06/14/18  1112 06/14/18  0553 06/13/18  2301 06/13/18  1848 06/13/18  1137 06/13/18  0607   GLUCOSE mg/dL 147* 93 170* 206* 188* 205*           Medications reviewed   Pertinent:  SSI    Applicable tests/procedures:    Per SLP 6/13:  Plan for  Continued Treatment (SLP): continue PMV trials w/ SLP or RT/RN supervision only. Unable to complete FEES this pm 2' pt's ? neuro changes and pending testing. SLP to f/u tomorrow for further assessment    Intake/Ouptut 24 hrs (7:00AM - 6:59 AM)     Intake & Output (last day)       06/13 0701 - 06/14 0700 06/14 0701 - 06/15 0700    I.V. (mL/kg) 30 (0.5)     Other 560 90    NG/ 102    IV Piggyback 100     Total Intake(mL/kg) 1427 (23.5) 192 (3.2)    Urine (mL/kg/hr)      Drains      Other 2340 (1.6)     Stool 0 (0)     Chest Tube 240 (0.2) 40 (0.1)    Total Output 2580 40    Net -1153 +152          Unmeasured Stool Occurrence 4 x               Current Nutrition Prescription     PO: NPO Diet    Active Supplement Orders      Diet, Tube Feeding Tube Feeding Formula: Novasource Renal (Electrolyte Restricted)     Goal rate: 35mL/hr  Route: PEG (POA)  Free water: 30mL Q2hrs  Modulars: 1 prostat twice daily    Intake/Evaluation of Received Nutrient/Fluid Intake: 1 day     At Target Goal Volume  Received per I/O's    % Est needs   Volume  700ml 737mL  105%    Modulars   2 prostat     Energy/kcals 1600kcals 1674kcals 108%   Protein  94g pro 97g pro 105%   Potassium  17.8mEq 18.7mEq    Phos 673mg 704mg     Fiber 0 0    Fluid   W/Free water 502ml  802ml 528mL  1088mL      20hr goal volume used based on anticipated interruptions in EN delivery/administration in ICU patient.              Nutrition Diagnosis     6/14  Problem Less than optimal enteral nutrition composition or modality    Etiology EN formula    Signs/Symptoms electrolyte restricted formula not indicated for current clinical status, fiber free formula        6/11  Nutrition Diagnoses Problem 1      Problem 1 Inadequate Intake/Infusion       Inadequate Intake Type Enteral       Macronutrient Protein       Etiology (related to) Medical Diagnosis       Renal ESRD;Hemodialysis       Skin Non healing wound;Pressure injury;Surgical wound       Signs/Symptoms  (evidenced by) PRO       Percent (%) of estimated PRO need 71 %   rx'd protein delivery        Resolved? Yes   ProStat ordered        6/8  Problem Inadequate energy intake   Etiology Diet order   Signs/Symptoms npo   Status: resolved, EN order in place       Nutrition Intervention   1.  Follow treatment progress, Care plan reviewed   2. EN regimen adjusted: Isosource 1.5, initiate @ 45mL/hr, goal rate 45mL/hr. Provide 1 packet prostat twice daily.     Route: PEG  Free water: 10mL/hr    Per ASPEN guidelines, nutrition recommendations for adults with kidney disease receiving HD; potassium restriction of <40mg/kg/d if serum level is elevated, Phosphate restriction of 800-1000mg/d.    Consider adjusting EN formula back to electrolyte restricted formula pending lab values     At Target Goal Volume     % Est needs   Volume  900ml     Energy/kcals 1550kcals 100%   Protein 91g pro 99%   Fiber 13g    Potassium 51mEq    Phosphorus 1018mg    Fluid via EN 700mL    Total Fluid  900mL    Meets 100% RDI No    20hr goal volume used based on anticipated interruptions in EN delivery/administration in ICU patient.     3. Recommendation: Add Renal MVI, nephrovite or equivalent       Goal:   General: Nutrition support treatment    EN/PN: Adjust EN    Additional goals:   · decrease frequency of bowel movements/diarrhea   · Meet 100% RDI      Monitoring/Evaluation:   Per protocol, I&O, EN delivery/tolerance, GI status      Will Continue to follow per protocol      Annette Shelley RDN, LD  Time Spent: 45min

## 2018-06-14 NOTE — PROGRESS NOTES
"Intensive Care Follow-up     Hospital:  LOS: 6 days   Ms. Lizet Webster, 47 y.o. female is followed for:   Acute respiratory failure        Subjective   Interval History:  The chart has been reviewed. The patient reports that she has not had any return of her vision. Hemodynamics have remained stable.    The patient's relevant past medical, surgical and social history were reviewed and updated in Epic as appropriate.        Objective     Infusions:     Medications:    albumin human 12.5 g Intravenous Once   aspirin 81 mg Oral Daily   ceFAZolin 2 g Intravenous Q24H   chlorhexidine 15 mL Mouth/Throat Q12H   DULoxetine 20 mg Oral Daily   epoetin mini 10,000 Units Subcutaneous Once per day on Mon Wed Fri   gabapentin 400 mg Oral Nightly   heparin (porcine) 5,000 Units Subcutaneous Q8H   insulin regular 0-14 Units Subcutaneous Q6H   ipratropium-albuterol 3 mL Nebulization 4x Daily - RT   levothyroxine 75 mcg Oral Q AM   lidocaine 1 patch Transdermal Q24H   micafungin (MYCAMINE)  mg Intravenous Q24H   midodrine 5 mg Oral TID   nystatin  Topical Q12H   pantoprazole 40 mg Intravenous Q AM   PRO-STAT 1 packet Per G Tube BID   sertraline 100 mg Oral Daily   valproate sodium 1,800 mg Intravenous Once   [START ON 6/15/2018] valproate sodium 300 mg Intravenous Q8H       Vital Sign Min/Max for last 24 hours  Temp  Min: 96.8 °F (36 °C)  Max: 97.8 °F (36.6 °C)   BP  Min: 95/55  Max: 136/92   Pulse  Min: 68  Max: 84   Resp  Min: 16  Max: 22   SpO2  Min: 94 %  Max: 100 %   Flow (L/min)  Min: 9  Max: 9       Input/Output for last 24 hour shift  06/13 0701 - 06/14 0700  In: 1427 [I.V.:30]  Out: 2580       Objective:  General Appearance:  In no acute distress, ill-appearing and comfortable.    Vital signs: (most recent): Blood pressure 101/62, pulse 70, temperature 97.8 °F (36.6 °C), temperature source Axillary, resp. rate 16, height 157 cm (61.81\"), weight 61 kg (134 lb 7.7 oz), SpO2 100 %, not currently breastfeeding.  No fever.  "   HEENT: (Tracheostomy tube in place; on TCT.  Right tunneled internal jugular dialysis catheter.  Left internal jugular central venous catheter.  )    Lungs:  Normal effort and normal respiratory rate.  She is not in respiratory distress.  There are decreased breath sounds.  No rales, wheezes or rhonchi.  (Poor air movement bilaterally.  Left thoracostomy tube in place with no air leak.)  Heart: Normal rate.  Regular rhythm.  S1 normal and S2 normal.  No murmur, gallop or friction rub.   Chest: Symmetric chest wall expansion.   Abdomen: Abdomen is soft and non-distended.  Bowel sounds are normal.   There is no abdominal tenderness.   There is no mass. There is no splenomegaly. There is no hepatomegaly.   Extremities: Decreased range of motion.  There is no deformity or dependent edema.  (She is not moving any of her extremities.)  Neurological: Patient is alert.  (Currently on trach collar and is mouthing some words appropriately.).    Pupils:  Pupils are equal. (Pupils appear to be fixed at approximately 3 mm and are equal bilaterally. They do not respond to light. I am not able to elicit a corneal reflex.).    Skin:  Warm, dry and pale.  No rash or cyanosis.               Results from last 7 days  Lab Units 06/14/18  0442 06/13/18  0429 06/12/18  0436   WBC 10*3/mm3 12.79* 15.12* 11.70*   HEMOGLOBIN g/dL 7.0* 8.3* 8.5*   PLATELETS 10*3/mm3 266 263 232       Results from last 7 days  Lab Units 06/14/18  0442 06/13/18  0429 06/12/18  0436  06/11/18  0543   SODIUM mmol/L 138 133 137  < > 136   POTASSIUM mmol/L 3.8 3.3* 3.6  < > 4.3   CO2 mmol/L 23.0 22.0 21.0  < > 21.0   BUN mg/dL 38* 61* 36*  < > 63*   CREATININE mg/dL 1.42* 2.03* 1.55*  < > 2.36*   MAGNESIUM mg/dL  --  2.3 2.2  --  2.4   PHOSPHORUS mg/dL 2.5 3.0 2.5  --  3.9   GLUCOSE mg/dL 114* 155* 141*  < > 209*   < > = values in this interval not displayed.  Estimated Creatinine Clearance: 42.8 mL/min (A) (by C-G formula based on SCr of 1.42 mg/dL  (H)).      Results from last 7 days  Lab Units 06/11/18  1538   PH, ARTERIAL pH units 7.461*   PCO2, ARTERIAL mm Hg 30.7*   PO2 ART mm Hg 134.0*       I reviewed the patient's results and images.     Assessment/Plan   Impression      Principal Problem:    Acute respiratory failure  Active Problems:    CVA (cerebral vascular accident)    Recent Bacteremia, MSSA    Metabolic encephalopathy    Epidural abscess - cervical spine s/p laminectomy and decompression 4/13/18    ESRD (end stage renal disease) on dialysis    Hypothyroidism    Chronic systolic heart failure    Type 2 diabetes mellitus with renal complication    Neuropathy    Atelectasis of left lung, recurrent    Quadriplegia    Pleural effusion on left    Pressure ulcer of coccygeal region    Wound infection, cervical, possible vs CFS leak    Anemia       Plan        Further review of the recent MRIs do show multiple strokes which could be implicated in the patient's vision changes.  EEG is pending. Neurology will see the patient today.  I also note that the patient has been having steadily decreasing hemoglobin levels and we will continue to follow these as she has remained hemodynamically stable. An iron profile has been ordered for tomorrow. If she continues to drop, transfusion may be necessary.  I will discuss this with her  when he is available as I initially reported to him this morning that the MRI was negative for stroke when in fact I was only reviewing the report of the MRA.  We will maintain the left thoracostomy tube although it does appear that the drainage is decreasing. We may be able to remove this within the next day or so.    She remains critically ill from respiratory failure as well as this cluster of strokes. This represents a worsening of her already poor prognosis and depending upon the evolution of the next day or so we may need to move more towards palliative approach given the severity of her illnesses.    Plan of care and  goals reviewed with mulitdisciplinary team at daily rounds.   I discussed the patient's findings and my recommendations with patient and nursing staff     Time spent Critical care 35 min (It does not include procedure time).    Denver Capone MD, [facility]  Pulmonary and Critical Care Medicine  06/14/18 11:19 AM

## 2018-06-14 NOTE — PLAN OF CARE
Problem: Patient Care Overview  Goal: Plan of Care Review  Outcome: Ongoing (interventions implemented as appropriate)   06/13/18 8225   Coping/Psychosocial   Plan of Care Reviewed With patient   Plan of Care Review   Progress declining   OTHER   Outcome Summary patient had a new stroke develop, NP discussed outcomes with . and no new orders recieved at this time

## 2018-06-14 NOTE — PROGRESS NOTES
"NEUROSURGERY PROGRESS NOTE    Vital Signs  Blood pressure 98/51, pulse 73, temperature 97.5 °F (36.4 °C), temperature source Axillary, resp. rate 16, height 157 cm (61.81\"), weight 60.8 kg (134 lb), SpO2 94 %, not currently breastfeeding.    OK for anticoagulation for a neurosurgery standpoint.     Aryan Reed MD  06/14/18  3:47 PM    "

## 2018-06-14 NOTE — PROGRESS NOTES
Continued Stay Note  [facility]     Patient Name: Lizet Webster  MRN: [id number]  Today's Date: 6/14/2018    Admit Date: 6/8/2018          Discharge Plan     Row Name 06/14/18 0820       Plan    Plan TBD    Plan Comments [facility] can not meet needs of Mrs. Webster.  Transfer has been cancelled.  Will discuss other options with  and Mrs. Webster.                Discharge Codes    No documentation.       Expected Discharge Date and Time     Expected Discharge Date Expected Discharge Time    Jun 16, 2018             Ronald Jansen RN

## 2018-06-14 NOTE — SIGNIFICANT NOTE
"Notified of MRI findings    \"Subtle areas of diffusion restriction right and left medial occipital lobes, consistent with acute infarct and small punctate acute infarcts of the posterior right centrum semiovale, and splenomegaly left corpus  Callosum\"    NS has been contacted by RN who do not have a surgical option at this time.      Will load the patient w/ ASA and schedule 81 mg daily.  Given ESRD will defer starting high dose statin therapy considering the patients lipid panel was WNL excepting low HDLs.  Defer to primary service whether Neurology consult is required.  ECHO from 4/18 did not reveal at that point possible sources for cardioembolic CVA.  Results discussed w/ family and patient.  "

## 2018-06-14 NOTE — CONSULTS
Neurology    Referring Provider: Dr. Capone    Reason for Consultation: encephalopathy, visual changes      Chief complaint: respiratory failure    Subjective .     History of present illness:  Ms. 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  She has been followed by ID and neurosurgery during this stay.  Neurology was consulted for encephalopathy and new complaint of bilateral visual loss.    Review of Systems: Unable to fully obtain given mental status    History  Past Medical History:   Diagnosis Date   • CAD (coronary artery disease)     stentsx4 2011   • Diabetes mellitus     ESRD, peripheral neuropathy   • ESRD (end stage renal disease) on dialysis 4/12/2018   • Hx of hepatitis    • Hypothyroidism 4/12/2018   • Systolic heart failure 04/12/2018    EF 40%   ,   Past Surgical History:   Procedure Laterality Date   • ANTERIOR CERVICAL DISCECTOMY W/ FUSION N/A 4/13/2018    Procedure: ANTERIOR CERVICAL CORPECTOMY;  Surgeon: Aryan Reed MD;  Location:  [facility];  Service: Neurosurgery   • ARTERIOVENOUS FISTULA Left    • ARTERIOVENOUS FISTULA/SHUNT SURGERY Left 5/25/2018    Procedure: REMOVAL OF CLOT FROM LEFT ARM GRAFT;  Surgeon: Jeevan Montoya MD;  [facility]:  [facility];  Service: Vascular   • BRONCHOSCOPY N/A 4/21/2018    Procedure: BRONCHOSCOPY AT BEDSIDE;  Surgeon: Della Ca MD;  Location:  [facility];  Service: Pulmonary   • BRONCHOSCOPY N/A 4/24/2018    Procedure: BRONCHOSCOPY AT BEDSIDE;  Surgeon: Nghia Gifford MD;  Location:  [facility];  Service: Pulmonary   • BRONCHOSCOPY N/A 4/26/2018    Procedure: BRONCHOSCOPY AT BEDSIDE;  Surgeon: Denver Capone MD;  Location:  [facility];   Service: Pulmonary   • BRONCHOSCOPY N/A 5/31/2018    Procedure: BRONCHOSCOPY @ BEDSIDE;  Surgeon: Bolivar Mckeon MD;  [facility]:  [facility];  Service: Pulmonary   • BRONCHOSCOPY N/A 6/7/2018    Procedure: BRONCHOSCOPY @ BEDSIDE;  Surgeon: Bolivar Mckeon MD;  [facility]:  [facility];  Service: Pulmonary   • CENTRAL VENOUS LINE INSERTION Left 6/2/2018    Procedure: CENTRAL VENOUS LINE INSERTION;  Surgeon: Modesta Ontiveros MD;  [facility]:  [facility];  Service: General   • CERVICAL LAMINECTOMY DECOMPRESSION POSTERIOR N/A 4/13/2018    Procedure: CERVICAL LAMINECTOMY DECOMPRESSION POSTERIOR;  Surgeon: Aryan Reed MD;  Location:  [facility];  Service: Neurosurgery   • CERVICAL LAMINECTOMY DECOMPRESSION POSTERIOR N/A 6/8/2018    Procedure: POSTERIOR CERVICAL WOUND DEBRIDEMENT AND CLOSURE WITH HARDWARE REMOVAL;  Surgeon: Aryan Reed MD;  Location:  [facility];  Service: Neurosurgery   • CORONARY ANGIOPLASTY WITH STENT PLACEMENT  2011    4 stents   • HYSTERECTOMY     • INSERTION HEMODIALYSIS CATHETER N/A 5/26/2018    Procedure: HEMODIALYSIS CATHETER INSERTION;  Surgeon: Jeevan Montoya MD;  [facility];  Service: General   • INSERTION HEMODIALYSIS CATHETER Left 5/30/2018    Procedure: REPLACEMENT  OF LEFT CHEST WALL HEMODIALYSIS CATHETER;  Surgeon: Jeevan Montoya MD;  [facility];  Service: General   • INSERTION HEMODIALYSIS CATHETER Right 6/2/2018    Procedure: HEMODIALYSIS CATHETER INSERTION;  Surgeon: Modesta Ontiveros MD;  [facility];  Service: General   • REMOVAL PERITONEAL DIALYSIS CATHETER Left 6/2/2018    Procedure: REMOVAL TUNNELED DIALYSIS CATHETER;  Surgeon: Modesta Ontiveros MD;  [facility];  Service: General   • TRACHEOSTOMY AND PEG TUBE INSERTION N/A 4/24/2018    Procedure: TRACHEOSTOMY AND PERCUTANEOUS ENDOSCOPIC GASTROSTOMY TUBE INSERTION;  Surgeon: Denver Alves MD;  Location:  [facility];  Service: General   ,   Family History   Problem Relation Age of Onset   • Family history  unknown: Yes   ,   Social History   Substance Use Topics   • Smoking status: Never Smoker   • Smokeless tobacco: Never Used   • Alcohol use No   ,   Prescriptions Prior to Admission   Medication Sig Dispense Refill Last Dose   • acetaminophen (TYLENOL) 650 MG suppository Insert 650 mg into the rectum Every 4 (Four) Hours As Needed for Mild Pain  or Fever (temp > 101F).      • dakin's (HYSEPT) 0.25 % solution topical solution Apply  topically Daily As Needed.      • DAPTOmycin in sodium chloride 0.9 % 50 mL Infuse 500 mg into a venous catheter 3 (Three) Times a Week. Tuesday, Thursday, Saturday 6/7/2018 at 6/19/2018   • diphenhydrAMINE (BENADRYL) 25 mg capsule Take 25 mg by mouth Every 6 (Six) Hours As Needed for Itching.      • diphenhydrAMINE-zinc acetate 2-0.1 % cream Apply 1 application topically Daily.      • epoetin mini (EPOGEN,PROCRIT) 00062 UNIT/ML injection Inject 1 mL under the skin 3 (Three) Times a Week.      • famotidine (PEPCID) 20 MG tablet Take 40 mg by mouth 2 (Two) Times a Day.      • Heparin Sodium, Porcine, (HEPARIN, PORCINE,) 5000 UNIT/ML injection Inject 1 mL under the skin Every 8 (Eight) Hours.      • hydrocortisone 2.5 % cream Apply 1 application topically 2 (Two) Times a Day As Needed.      • hydrOXYzine (ATARAX) 25 MG tablet Take 25 mg by mouth Every 8 (Eight) Hours As Needed for Itching.      • insulin glargine (LANTUS) 100 UNIT/ML injection Inject 10 Units under the skin Daily.      • insulin lispro (humaLOG) 100 UNIT/ML injection Inject 0-6 Units under the skin Every 6 (Six) Hours. Sugar 120-159 - give 0 units  Sugar 160-199 - give 2 units  Sugar 200-239 - give 3 units  Sugar 240-279 - give 4 units  Sugar 280-319 - give 5 units  Sugar 320-349 - give 6 Units      • ipratropium-albuterol (DUONEB) 0.5-2.5 mg/3 ml nebulizer Take 3 mL by nebulization Every 4 (Four) Hours As Needed for Wheezing.      • ipratropium-albuterol (DUONEB) 0.5-2.5 mg/3 ml nebulizer Take 3 mL by nebulization Every  4 (Four) Hours.      • lactulose (CHRONULAC) 10 GM/15ML solution Take 10 g by mouth Daily.      • levETIRAcetam (KEPPRA) 100 MG/ML solution Take 250 mg by mouth 2 (Two) Times a Day.      • levothyroxine (SYNTHROID, LEVOTHROID) 75 MCG tablet Take 75 mcg by mouth Daily.      • lidocaine (LIDODERM) 5 % Place 1 patch on the skin Daily. Remove & Discard patch within 12 hours or as directed by MD      • lidocaine-prilocaine (EMLA) 2.5-2.5 % cream Apply 1 application topically 3 (Three) Times a Week.      • loperamide (IMODIUM) 2 MG capsule Take 2 mg by mouth 2 (Two) Times a Day As Needed for Diarrhea.      • [] micafungin sodium 100 mg in sodium chloride 0.9 % 100 mL IVPB Infuse 100 mg into a venous catheter Daily.      • midodrine (PROAMATINE) 5 MG tablet Take 10 mg by mouth Daily.      • nitroglycerin (NITROSTAT) 0.4 MG SL tablet Place 0.4 mg under the tongue Every 5 (Five) Minutes As Needed for Chest Pain. Take no more than 3 doses in 15 minutes.      • ondansetron (ZOFRAN) 4 MG tablet Take 4 mg by mouth Every 6 (Six) Hours As Needed for Nausea or Vomiting.      • potassium chloride (MICRO-K) 10 MEQ CR capsule Take 40 mEq by mouth Daily As Needed (If K < 3.5 Please give 40 MeQ per G-tube. dissolve in 120 mL of water or juice).      • sennosides-docusate sodium (SENOKOT-S) 8.6-50 MG tablet Take 2 tablets by mouth 2 (Two) Times a Day As Needed for Constipation.      • sertraline (ZOLOFT) 100 MG tablet Take 100 mg by mouth Daily.      • tigecycline in sodium chloride 0.9 % 100 mL IVPB Infuse 50 mg into a venous catheter Every 12 (Twelve) Hours.      • vitamin D (ERGOCALCIFEROL) 50973 units capsule capsule Take 50,000 Units by mouth 1 (One) Time Per Week.      • Amino Acids-Protein Hydrolys (PRO-STAT) liquid 1 packet by Per G Tube route 2 (Two) Times a Day.      • DULoxetine (CYMBALTA) 20 MG capsule Take 20 mg by mouth Daily.      • gabapentin (NEURONTIN) 400 MG capsule Take 400 mg by mouth every night at bedtime.    "   • insulin regular (humuLIN R,novoLIN R) 100 UNIT/ML injection Inject 0-24 Units under the skin Every 6 (Six) Hours.  12    • insulin regular (humuLIN R,novoLIN R) 100 UNIT/ML injection Inject 7 Units under the skin Every 6 (Six) Hours.  12    • loratadine (CLARITIN) 10 MG tablet Take 10 mg by mouth Daily.      • pantoprazole (PROTONIX) 40 MG EC tablet Take 40 mg by mouth Daily.      • [] sodium chloride 0.9 % solution 250 mL with nafcillin 2 g reconstituted solution 6 g Infuse 6 g into a venous catheter Every 12 (Twelve) Hours for 82 doses.      • traMADol (ULTRAM) 50 MG tablet Take 50 mg by mouth 2 (Two) Times a Day.       and Allergies:  Zosyn [piperacillin sod-tazobactam so]    Objective     Vital Signs   Blood pressure 101/62, pulse 73, temperature 97.8 °F (36.6 °C), temperature source Axillary, resp. rate 16, height 157 cm (61.81\"), weight 61 kg (134 lb 7.7 oz), SpO2 100 %, not currently breastfeeding.    Physical Exam:      Gen: Lying in bed with eyes closed   Eyes: Right pupil 3 mm, left pupil 4-5 mm and both nonreactive.  Conjunctivae normal   ENT: External canals normal bilaterally.  Incomplete dentition   Neck: Supple.  Tracheostomy in place   Respiratory: CTA bilaterally. Respirations unlabored   CV: RRR, S1 and S2 nml. Radial pulses 2+ bilaterally.    Skin: No rashes noted on exposed skin. Normal tugor.   MSK: Normal bulk and tone.  Decreased ROM     Neurologic:   Mental status: Obtunded (recently had benzodiazepine) but intermittently opens eyes to voice.  Intermittently following commands per nursing    CN: Limited exam, mild anisocoria and pupils nonreactive, face symmetric    Motor: Quadriplegia    Reflexes: Decreased throughout   Sensory: Unable to assess   Coordination: Unable to assess   Gait: Unable to assess        Results Reviewed:     Labs reviewed.  A1c 4.9, LDL 24  EEG report reviewed and discussed with interpreting neurologist  MRI brain personally reviewed.  There are small areas " of restricted diffusion seen in the bilateral medial occipital lobes, left splenium, and right frontoparietal region  MRA head report reviewed      TTE and NICHOLAS (April 2018) reports reviewed             Assessment/Plan     1.  Acute ischemic stroke = MRI brain revealed bilateral occipital lobe infarcts, as well as small infarcts in the right frontoparietal region and left splenium. Mechanism suspicious for embolic process. MRA head report showed 50-70% stenosis of the LICA intracranially, but no vertebrobasilar stenosis appreciated. Suspect possible paroxysmal A. fib.  Recommend Eliquis 5 mg twice a day if okay from neurosurgical perspective.  If unable to administer tablet anticoagulation (notably patient has PEG tube), recommend low intensity heparin protocol (goal PTT 40-60). Recent echo reports negative for intracardiac shunt. Carotid duplex pending. Will start low-dose atorvastatin.    2.  Encephalopathy = EEG positive for frequent sharp-slow waves. Will load Depacon and start maintenance dose. VPA level in AM. Repeat EEG in AM.        Мария Suárez MD  06/14/18  1:19 PM

## 2018-06-14 NOTE — PROGRESS NOTES
"NEUROSURGERY PROGRESS NOTE    Vital Signs  Blood pressure 102/61, pulse 72, temperature 97.4 °F (36.3 °C), temperature source Axillary, resp. rate 18, height 157 cm (61.81\"), weight 61 kg (134 lb 7.7 oz), SpO2 99 %, not currently breastfeeding.    Interval History:   Episode of apparent diminished vision and poorly reactive pupils yesterday.  CT and MRI of the head with MRA performed.  There is no evidence that I can see of well-defined vertebrobasilar ischemia either in the occipital lobes or the brainstem, although the interpretation of the CT yesterday suggested occipital lobe ischemic problem.    Today her pupils are slowly reactive.  On trying to ocular movement and pupil reactivity she elevates her eyes vertically, but I see no evidence of true extraocular movement palsy.    Cervical incision inspected and is healing well.  Bandage will be changed today.    She is also ventilator on a trach collar, tube feeding per PEG.  She is densely quadriparetic and has been so for 2 months, indicating this is a permanent condition.      ASSESSMENT: No clear evidence of vertebrobasilar ischemia that I can see.  Incision healing satisfactorily.    PLAN: No change to recommend in supportive care at this time.    Aryan Reed MD  06/14/18  7:56 AM    "

## 2018-06-14 NOTE — PROGRESS NOTES
"   LOS: 6 days    Patient Care Team:  Luan Gandara MD as PCP - General (Internal Medicine)    Reason For Visit:  F/U ESRD.  Subjective           Review of Systems: UNOBTAINABLE         Objective       albumin human 12.5 g Intravenous Once   aspirin 81 mg Oral Daily   ceFAZolin 2 g Intravenous Q24H   chlorhexidine 15 mL Mouth/Throat Q12H   DULoxetine 20 mg Oral Daily   epoetin imni 10,000 Units Subcutaneous Once per day on Mon Wed Fri   gabapentin 400 mg Oral Nightly   heparin (porcine) 5,000 Units Subcutaneous Q8H   insulin regular 0-14 Units Subcutaneous Q6H   ipratropium-albuterol 3 mL Nebulization 4x Daily - RT   levothyroxine 75 mcg Oral Q AM   lidocaine 1 patch Transdermal Q24H   micafungin (MYCAMINE)  mg Intravenous Q24H   midodrine 5 mg Oral TID   nystatin  Topical Q12H   pantoprazole 40 mg Intravenous Q AM   PRO-STAT 1 packet Per G Tube BID   sertraline 100 mg Oral Daily            Vital Signs:  Blood pressure 100/55, pulse 73, temperature 97.4 °F (36.3 °C), temperature source Axillary, resp. rate 18, height 157 cm (61.81\"), weight 61 kg (134 lb 7.7 oz), SpO2 99 %, not currently breastfeeding.    Flowsheet Rows      First Filed Value   Admission Height  157 cm (61.81\") Documented at 06/08/2018 0215   Admission Weight  61 kg (134 lb 7.7 oz) Documented at 06/08/2018 0215          06/13 0701 - 06/14 0700  In: 1427 [I.V.:30]  Out: 2580     Physical Exam:    General Appearance: INTUBATED. UNRESPONSIVE.  Eyes: PER, conjunctivae and sclerae normal, no icterus  Lungs: + TRACH. RHONCHI.  Heart/CV: regular rhythm & normal rate, no murmur, no gallop, no rub and no edema  Abdomen: not distended, soft, non-tender, no masses,  bowel sounds present  Skin: No rash, Warm and dry. TUNNELED HD CATH.    Radiology:            Labs:    Results from last 7 days  Lab Units 06/14/18  0442 06/13/18  0429 06/12/18  0436   WBC 10*3/mm3 12.79* 15.12* 11.70*   HEMOGLOBIN g/dL 7.0* 8.3* 8.5*   HEMATOCRIT % 24.3* 26.9* 27.7* "   PLATELETS 10*3/mm3 266 263 232       Results from last 7 days  Lab Units 06/14/18  0442 06/13/18  0429 06/12/18  0436 06/11/18  1756 06/11/18  0543  06/09/18  0753   SODIUM mmol/L 138 133 137 137 136  < > 140   POTASSIUM mmol/L 3.8 3.3* 3.6 3.4* 4.3  < > 3.2*   CHLORIDE mmol/L 105 100 102 100 99  < > 105   CO2 mmol/L 23.0 22.0 21.0 20.0 21.0  < > 23.0   BUN mg/dL 38* 61* 36* 24* 63*  < > 31*   CREATININE mg/dL 1.42* 2.03* 1.55* 1.31* 2.36*  < > 1.33*   CALCIUM mg/dL 8.8 8.9 9.1 8.6* 8.8  < > 8.6*   PHOSPHORUS mg/dL 2.5 3.0 2.5  --  3.9  --  2.5   MAGNESIUM mg/dL  --  2.3 2.2  --  2.4  --  2.1   ALBUMIN g/dL 3.46 3.71 3.99  --  3.40  < > 3.35   < > = values in this interval not displayed.    Results from last 7 days  Lab Units 06/14/18  0442   GLUCOSE mg/dL 114*         Results from last 7 days  Lab Units 06/11/18  0543   ALK PHOS U/L 128*   BILIRUBIN mg/dL 0.3   ALT (SGPT) U/L 2*   AST (SGOT) U/L 15       Results from last 7 days  Lab Units 06/11/18  1538   PH, ARTERIAL pH units 7.461*   PO2 ART mm Hg 134.0*   PCO2, ARTERIAL mm Hg 30.7*   HCO3 ART mmol/L 21.8             Estimated Creatinine Clearance: 42.8 mL/min (A) (by C-G formula based on SCr of 1.42 mg/dL (H)).      Assessment     Principal Problem:    Acute respiratory failure  Active Problems:    Recent Bacteremia, MSSA    Metabolic encephalopathy    Epidural abscess - cervical spine s/p laminectomy and decompression 4/13/18    ESRD (end stage renal disease) on dialysis    Hypothyroidism    Chronic systolic heart failure    Type 2 diabetes mellitus with renal complication    Neuropathy    Atelectasis of left lung, recurrent    Quadriplegia    Pleural effusion on left    Pressure ulcer of coccygeal region    Wound infection, cervical, possible vs CFS leak            Impression: ESRD. ANEMIA.            Recommendations: HD 6/15/18.      Chris Oakley MD  06/14/18  8:51 AM

## 2018-06-14 NOTE — SIGNIFICANT NOTE
06/14/18 1522   SLP Deferred Reason   SLP Deferred Reason Routine  (Spoke to RN this PM. Pt not appropriate for repeat dysphagia evaluation today given cont'd weakness and confusion. SLP will f/u for further eval and cont'd PMV treatment as appropriate for pt. )

## 2018-06-15 NOTE — PROGRESS NOTES
Continued Stay Note  [facility]     Patient Name: Lizet Webster  MRN: [id number]  Today's Date: 6/15/2018    Admit Date: 6/8/2018          Discharge Plan     Row Name 06/15/18 0912       Plan    Plan Comments Mrs. Webster has expressed to to family that she is ready to die.  CM will follow.              Discharge Codes    No documentation.       Expected Discharge Date and Time     Expected Discharge Date Expected Discharge Time    Jun 16, 2018             Ronald Jansen RN

## 2018-06-15 NOTE — SIGNIFICANT NOTE
06/15/18 1448   SLP Deferred Reason   SLP Deferred Reason Patient unavailable for evaluation  (Reviewed chart & spoke to RN. Pt now working w/ Palliative team & made AND. RN requested holding eval today. SLP will f/u as appropriate, pending POC.)

## 2018-06-15 NOTE — CONSULTS
Luan Gandara MD - PCP      HPI:   47 y.o. female presented with acute respiratory failure and possible wound infection. Admitted to ICU on 6/8/18 and started on mechanical ventilation, meropenem, micafungin, and neurosurgery along with nephrology were asked to see the patient in consult.  Infectious disease was also asked to see the patient.  Neurosurgery discuss plans for exploration of surgical wound with the plan to attempt trach collar trials after surgery.  She underwent wound debridement with removal of facet screws and closure.  By hospital day #1 the patient was noted to have approximately 1100 mL chest tube drainage.  She was doing well on trach collar trials during the day and requested use of Passy-Dre valve.  Cultures were followed and cefazolin was initiated by infectious disease.  Dialysis was planned for 6/12/18.  Hospital day #2 the patient was noted to have bladder distention with I/O catheterization and 1150 mL of urine was drained.  She had previously been and uric.  Tracheostomy collar during the day with vent support at night continued.  Day #4 neck wound culture grew Aspergillus which was being treated by infectious disease.  She remained off the ventilator over 24 hours with hemodialysis planned for the following day.  On hospital day #5 the patient reported difficulty with her vision.  She was noted to have pupillary reflexes though they were minimally reactive.  MRI was obtained which showed acute infarct and neurosurgery/neurology was notified.  EEG was ordered.  MRI/MRA suspicious for embolic process per neurology report.  EEG was positive for sharp slow waves and Depacon was initiated.  Anticoagulation was also recommended and initiated per neurology and neurosurgery.  By hospital day #7 the patient remained in ICU without improvement in her vision, and overall worsening of condition.  Palliative medicine asked to see the patient in consult as the patient expressed her wishes that she  "was \"not interested\" in further invasive therapies.      Past Medical History:   Diagnosis Date   • CAD (coronary artery disease)     stentsx4 2011   • Diabetes mellitus     ESRD, peripheral neuropathy   • ESRD (end stage renal disease) on dialysis 4/12/2018   • Hx of hepatitis    • Hypothyroidism 4/12/2018   • Systolic heart failure 04/12/2018    EF 40%     Past Surgical History:   Procedure Laterality Date   • ANTERIOR CERVICAL DISCECTOMY W/ FUSION N/A 4/13/2018    Procedure: ANTERIOR CERVICAL CORPECTOMY;  Surgeon: Aryan Reed MD;  Location:  [facility];  Service: Neurosurgery   • ARTERIOVENOUS FISTULA Left    • ARTERIOVENOUS FISTULA/SHUNT SURGERY Left 5/25/2018    Procedure: REMOVAL OF CLOT FROM LEFT ARM GRAFT;  Surgeon: Jeevan Montoya MD;  [facility]:  [facility];  Service: Vascular   • BRONCHOSCOPY N/A 4/21/2018    Procedure: BRONCHOSCOPY AT BEDSIDE;  Surgeon: Della Ca MD;  Location:  [facility];  Service: Pulmonary   • BRONCHOSCOPY N/A 4/24/2018    Procedure: BRONCHOSCOPY AT BEDSIDE;  Surgeon: Nghia Gifford MD;  Location:  [facility];  Service: Pulmonary   • BRONCHOSCOPY N/A 4/26/2018    Procedure: BRONCHOSCOPY AT BEDSIDE;  Surgeon: Denver Capone MD;  [facility]:  [facility];  Service: Pulmonary   • BRONCHOSCOPY N/A 5/31/2018    Procedure: BRONCHOSCOPY @ BEDSIDE;  Surgeon: Bolivar Mckeon MD;  [facility];  Service: Pulmonary   • BRONCHOSCOPY N/A 6/7/2018    Procedure: BRONCHOSCOPY @ BEDSIDE;  Surgeon: Bolivar Mckeon MD;  [facility];  Service: Pulmonary   • CENTRAL VENOUS LINE INSERTION Left 6/2/2018    Procedure: CENTRAL VENOUS LINE INSERTION;  Surgeon: Modesta Ontiveros MD;  [facility];  Service: General   • CERVICAL LAMINECTOMY DECOMPRESSION POSTERIOR N/A 4/13/2018    Procedure: CERVICAL LAMINECTOMY DECOMPRESSION POSTERIOR;  Surgeon: Aryan Reed MD;  [facility];  Service: Neurosurgery   • CERVICAL LAMINECTOMY DECOMPRESSION " POSTERIOR N/A 6/8/2018    Procedure: POSTERIOR CERVICAL WOUND DEBRIDEMENT AND CLOSURE WITH HARDWARE REMOVAL;  Surgeon: Aryan Reed MD;  [facility];  Service: Neurosurgery   • CORONARY ANGIOPLASTY WITH STENT PLACEMENT  2011    4 stents   • HYSTERECTOMY     • INSERTION HEMODIALYSIS CATHETER N/A 5/26/2018    Procedure: HEMODIALYSIS CATHETER INSERTION;  Surgeon: Jeevan Montoya MD;  [facility];  Service: General   • INSERTION HEMODIALYSIS CATHETER Left 5/30/2018    Procedure: REPLACEMENT  OF LEFT CHEST WALL HEMODIALYSIS CATHETER;  Surgeon: Jeevan Montoya MD;  [facility];  Service: General   • INSERTION HEMODIALYSIS CATHETER Right 6/2/2018    Procedure: HEMODIALYSIS CATHETER INSERTION;  Surgeon: Modesta Ontiveros MD;  [facility];  Service: General   • REMOVAL PERITONEAL DIALYSIS CATHETER Left 6/2/2018    Procedure: REMOVAL TUNNELED DIALYSIS CATHETER;  Surgeon: Modesta Ontiveros MD;  [facility];  Service: General   • TRACHEOSTOMY AND PEG TUBE INSERTION N/A 4/24/2018    Procedure: TRACHEOSTOMY AND PERCUTANEOUS ENDOSCOPIC GASTROSTOMY TUBE INSERTION;  Surgeon: Denver Alves MD;  Location:  [facility];  Service: General     Current Facility-Administered Medications   Medication Dose Route Frequency Provider Last Rate Last Dose   • acetaminophen (TYLENOL) tablet 650 mg  650 mg Oral Q4H PRN Jyothi Carrasco, APRN       • albumin human 25 % IV SOLN 12.5 g  12.5 g Intravenous PRN Chris Oakley MD       • ALPRAZolam (XANAX) tablet 0.5 mg  0.5 mg Oral TID PRN Narinder Arroyo MD   0.5 mg at 06/14/18 2128   • apixaban (ELIQUIS) tablet 2.5 mg  2.5 mg Oral Q12H Chris Tyler IV, [facility]   2.5 mg at 06/15/18 1041   • aspirin chewable tablet 81 mg  81 mg Oral Daily Gene Lopez, APRN   81 mg at 06/15/18 1041   • atorvastatin (LIPITOR) tablet 20 mg  20 mg Oral Nightly Мария Suárez MD   20 mg at 06/14/18 2009   • ceFAZolin in dextrose (ANCEF) IVPB solution 2 g  2 g  Intravenous Q24H Chris Tyler IV, RPH   2 g at 06/14/18 1535   • chlorhexidine (PERIDEX) 0.12 % solution 15 mL  15 mL Mouth/Throat Q12H LAYTON Souza   15 mL at 06/15/18 0809   • DULoxetine (CYMBALTA) DR capsule 20 mg  20 mg Oral Daily LAYTON Souza   20 mg at 06/14/18 0842   • epoetin mini (EPOGEN,PROCRIT) injection 10,000 Units  10,000 Units Subcutaneous Once per day on Mon Wed Fri LAYTON Souza   10,000 Units at 06/15/18 0808   • ferric gluconate (FERRLECIT) 125 mg in sodium chloride 0.9 % 110 mL IVPB  125 mg Intravenous Daily Chris Oakley MD 55 mL/hr at 06/15/18 1042 125 mg at 06/15/18 1042   • gabapentin (NEURONTIN) capsule 400 mg  400 mg Oral Nightly LAYTON Souza   400 mg at 06/14/18 2009   • heparin (porcine) injection 1,000 Units  1,000 Units Intracatheter PRN Narayan Holugin MD   1,000 Units at 06/13/18 0945   • heparin (porcine) injection 1,000 Units  1,000 Units Intracatheter PRN Chris Oakley MD       • hydrALAZINE (APRESOLINE) injection 5 mg  5 mg Intravenous Q10 Min PRN Aguila Roman CRNA       • insulin regular (humuLIN R,novoLIN R) injection 0-14 Units  0-14 Units Subcutaneous Q6H LAYTON Souza   3 Units at 06/14/18 2340   • ipratropium-albuterol (DUO-NEB) nebulizer solution 3 mL  3 mL Nebulization 4x Daily - RT Ap Wise MD   3 mL at 06/15/18 1229   • ipratropium-albuterol (DUO-NEB) nebulizer solution 3 mL  3 mL Nebulization Once PRN Aguila Roman CRNA       • labetalol (NORMODYNE,TRANDATE) injection 5 mg  5 mg Intravenous Q5 Min PRN Aguila Roman CRNA       • lactated ringers bolus 500 mL  500 mL Intravenous Once PRN Aguila Roman CRNA       • levothyroxine (SYNTHROID, LEVOTHROID) tablet 75 mcg  75 mcg Oral Q AM LAYTON Souza   75 mcg at 06/15/18 0545   • lidocaine (LIDODERM) 5 % 1 patch  1 patch Transdermal Q24H LAYTON Souza   1 patch at 06/15/18 0809   • lidocaine PF 1% (XYLOCAINE) injection 0.5 mL  0.5 mL Injection  Once PRN Luis Lawson MD       • micafungin 100 mg/100 mL 0.9% NS IVPB (mbp)  100 mg Intravenous Q24H Jericho Marsh MD   100 mg at 06/14/18 1138   • midodrine (PROAMATINE) tablet 5 mg  5 mg Oral TID Narayan Holguin MD   5 mg at 06/15/18 0809   • Morphine PF injection 2 mg  2 mg Intravenous Q4H PRN Keith Villalta APRN   2 mg at 06/15/18 0431   • naloxone (NARCAN) injection 0.4 mg  0.4 mg Intravenous PRN Aguila Roman CRNA       • nystatin (MYCOSTATIN) powder   Topical Q12H Ap Wise MD       • ondansetron (ZOFRAN) injection 4 mg  4 mg Intravenous Once PRN Aguila Roman CRNA       • oxyCODONE (ROXICODONE) 5 MG/5ML solution 5 mg  5 mg Per G Tube Q4H PRN Denver Capone MD       • pantoprazole (PROTONIX) injection 40 mg  40 mg Intravenous Q AM Eulalio Prajapati, H   40 mg at 06/15/18 0545   • PRO-STAT 1 packet  1 packet Per G Tube BID Saima Nolen, RD   1 packet at 06/15/18 1042   • promethazine (PHENERGAN) injection 6.25 mg  6.25 mg Intravenous Once PRN Aguila Roman CRNA        Or   • promethazine (PHENERGAN) injection 6.25 mg  6.25 mg Intramuscular Once PRN Aguila Roman CRNA        Or   • promethazine (PHENERGAN) suppository 25 mg  25 mg Rectal Once PRN Aguila Roman CRNA        Or   • promethazine (PHENERGAN) tablet 25 mg  25 mg Oral Once PRN Aguila Roman CRNA       • sertraline (ZOLOFT) tablet 100 mg  100 mg Oral Daily LAYTON Souza   100 mg at 06/15/18 1041   • sodium chloride 0.9 % flush 1-10 mL  1-10 mL Intravenous PRN Jyothi Carrasco, APRN       • sodium chloride 0.9 % flush 1-10 mL  1-10 mL Intravenous PRN Luis Lawson MD       • sodium chloride 0.9 % flush 1-10 mL  1-10 mL Intravenous PRN Aguila Roman CRNA       • traMADol (ULTRAM) tablet 50 mg  50 mg Oral Q6H PRN LAYTON Souza   50 mg at 06/14/18 2128   • valproate (DEPACON) 1,000 mg in sodium chloride 0.9 % 100 mL IVPB  1,000 mg Intravenous Once Мария Suárez MD       • valproate (DEPACON) 500 mg in  "sodium chloride 0.9 % 50 mL IVPB  500 mg Intravenous Q8H Мария Suárez MD            •  acetaminophen **OR** [DISCONTINUED] acetaminophen  •  albumin human  •  ALPRAZolam  •  heparin (porcine)  •  heparin (porcine)  •  hydrALAZINE  •  ipratropium-albuterol  •  labetalol  •  lactated ringers  •  lidocaine PF 1%  •  Morphine  •  naloxone  •  ondansetron  •  oxyCODONE  •  promethazine **OR** promethazine **OR** promethazine **OR** promethazine  •  sodium chloride  •  sodium chloride  •  sodium chloride  •  traMADol  Allergies   Allergen Reactions   • Zosyn [Piperacillin Sod-Tazobactam So] Itching     Family History   Problem Relation Age of Onset   • Family history unknown: Yes     Social History     Social History   • Marital status:      Spouse name: N/A   • Number of children: 2   • Years of education: N/A     Occupational History   •  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  Has been in a wheelchair because of severe peripheral neuropathy.      Review of Systems - unable to obtain due to AMS      /67 (BP Location: Right arm, Patient Position: Lying)   Pulse 79   Temp 97.6 °F (36.4 °C) (Oral)   Resp 16   Ht 157 cm (61.81\")   Wt 60.8 kg (134 lb)   LMP  (LMP Unknown)   SpO2 100%   BMI 24.66 kg/m²     Intake/Output Summary (Last 24 hours) at 06/15/18 1238  Last data filed at 06/15/18 1050   Gross per 24 hour   Intake             2050 ml   Output             2590 ml   Net             -540 ml     Physical Exam:      General: middle aged F, in bed, ill-appearing, NAD    Head/Neck:  NC/AT, trach tube in place  EENT: eyes closed, patent nares, oral mucosa moist  Lungs:  Normal effort and respiratory rate.  Decreased breath sounds. Left " thoracostomy tube in place.  Heart: Normal rate.  Regular rhythm. No murmur.   Abdomen: soft, nontender, and non-distended.   Extremities: No edema.  Not moving extremities.  Neurological: Does not arouse or interact for my exam  Psych: Calm, no evidence of discomfort or restlessness    Skin:  Warm, dry and pale.         Results from last 7 days  Lab Units 06/15/18  0337   WBC 10*3/mm3 18.51*   HEMOGLOBIN g/dL 8.6*   HEMATOCRIT % 28.8*   PLATELETS 10*3/mm3 310       Results from last 7 days  Lab Units 06/15/18  0337  06/11/18  0543   SODIUM mmol/L 134  < > 136   POTASSIUM mmol/L 4.0  < > 4.3   CHLORIDE mmol/L 104  < > 99   CO2 mmol/L 20.0  < > 21.0   BUN mg/dL 49*  < > 63*   CREATININE mg/dL 2.14*  < > 2.36*   CALCIUM mg/dL 8.7  < > 8.8   BILIRUBIN mg/dL  --   --  0.3   ALK PHOS U/L  --   --  128*   ALT (SGPT) U/L  --   --  2*   AST (SGOT) U/L  --   --  15   GLUCOSE mg/dL 174*  < > 209*   < > = values in this interval not displayed.    Results from last 7 days  Lab Units 06/15/18  0337   SODIUM mmol/L 134   POTASSIUM mmol/L 4.0   CHLORIDE mmol/L 104   CO2 mmol/L 20.0   BUN mg/dL 49*   CREATININE mg/dL 2.14*   GLUCOSE mg/dL 174*   CALCIUM mg/dL 8.7     Imaging Results (last 72 hours)     Procedure Component Value Units Date/Time    XR Chest 1 View [[id number]] Collected:  06/15/18 1034     Updated:  06/15/18 1034    Narrative:       EXAMINATION: XR CHEST 1 VW- 06/15/2018     INDICATION: T17-Adipxjh effusion, not elsewhere classified;  T14.8XXA-Other injury of unspecified body region, initial encounter;  L08.9-Local infection of the skin and subcutaneous tissue, unspecified;  Z74.09-Other reduced mobility; R13.10-Dysphagia, unspecified;  J96.01-Acute respiratory failure with hypoxia      COMPARISON: 06/13/2018     FINDINGS: Portable chest reveals heart to be enlarged. Lines and tubes  are unchanged. Small left pleural effusion with no definite pneumothorax  on the left. The right lung is clear.        Impression:        Improvement seen in aeration of the right lung base. Mild  increased markings remaining at the left lung base with small left  pleural effusion.     D:  06/15/2018  E:  06/15/2018          MRI Angiogram Head Without Contrast [[id number]] Collected:  06/13/18 1946     Updated:  06/14/18 2228    Narrative:       EXAMINATION: MRI ANGIOGRAM HEAD WO CONTRAST - 6/13/2018     INDICATION: B97-Nbnlvcz effusion, not elsewhere classified;  T14.8XXA-Other injury of unspecified body region, initial encounter;  L08.9-Local infection of the skin and subcutaneous tissue, unspecified;  Z74.09-Other reduced mobility; R13.10-Dysphagia, unspecified;  J96.01-Acute respiratory failure with hypoxia.     TECHNIQUE: 3D time-of-flight unenhanced images through the brain with  attention to the major intracranial arteries with 3D angiographic  reconstructions.     COMPARISON: None     FINDINGS: Distal vertebral arteries, basilar artery and posterior  cerebral arteries appear normal. The intracranial portion of the right  ICA appears normal up to the supraclinoid segment where there is signal  dropout, possibly turbulence related due to sharp angulation of the  carotid here. There is, however, a more focal and well-defined stricture  just proximal to the intracavernous portion of carotid, estimated at  between 50 and 75%. The anterior and middle cerebral arteries and  proximal branches appear grossly normal.       Impression:       1. 50-75% stenosis of the left ICA at or just proximal to the cavernous  segment.  2. Flow signal abnormality in the right supraclinoid ICA, equivocal for  stenosis versus flow-related artifact. Artifact is favored.      DICTATED:   6/13/2018  EDITED/ls :   6/13/2018      This report was finalized on 6/14/2018 10:26 PM by DR. Rahul Cobb MD.       MRI Brain Without Contrast [[id number]] Collected:  06/13/18 1949     Updated:  06/13/18 2336    Narrative:       EXAMINATION: MRI BRAIN WO CONTRAST - 6/13/2018      INDICATION:  J72-Rzyqswy effusion, not elsewhere classified;  T14.8XXA-Other injury of unspecified body region, initial encounter;  L08.9-Local infection of the skin and subcutaneous tissue, unspecified;   Z74.09-Other reduced mobility; R13.10-Dysphagia, unspecified;  J96.01-Acute respiratory failure with hypoxia.     TECHNIQUE: Sagittal and axial T1 and axial T2 FLAIR and  diffusion-weighted images through the brain without contrast.     COMPARISON: Head CT scan of same date.     FINDINGS: Previous exam report indicates occipital lobe hypodensity,  right greater than left, possibly edema.     MRI does in fact show subtle patchy T2 changes in both medial occipital  lobes, with well-defined 1 cm area of diffusion restriction on the  right, and more ill-defined change on the left, but again favored to  show diffusion restriction. Moreover, there are a couple of punctate but  well-defined areas of diffusion restriction, respectively in the  posterior right centrum semiovale, and splenium of the left corpus  callosum. No restricted diffusion is appreciated elsewhere.     FLAIR images show patchy white matter and subcortical changes throughout  a more extensive area of the medial occipital and parietal lobes,  suggesting early edema without infarct. Similar but much milder changes  are seen in the posterior right frontal lobe, at the skull vertex. A few  old punctate infarcts are seen of the left centrum semiovale and corona  radiata. No evidence of mass, mass effect, hemorrhage, hydrocephalus, or  abnormal extra-axial collection is seen.       Impression:       1. Subtle areas of diffusion restriction in the  right and left medial  occipital lobes, consistent with acute infarcts, and small punctate  acute infarcts of the posterior right centrum semiovale, and splenium of  the left corpus callosum..  2. Subtle patchy areas of increased T2 signal in the occipital lobes and  posterior right frontal lobe, possibly early  ischemia without infarct.     DICTATED:   6/13/2018  EDITED/ls :   6/13/2018      This report was finalized on 6/13/2018 11:34 PM by DR. Rahul Cobb MD.       CT Head Without Contrast [[id number]] Collected:  06/13/18 1520     Updated:  06/13/18 1636    Narrative:       EXAMINATION: CT HEAD WO CONTRAST - 6/13/2018     INDICATION:  Visual disturbance.     TECHNIQUE:  Axial CT data of the head were acquired helically without  contrast. Multiplanar reformatted images were generated and reviewed.  The radiation dose reduction device was turned on for each scan per the  ALARA (As Low as Reasonably Achievable) protocol     COMPARISONS:  4/13/2018.     FINDINGS: Subtle hypodensity is seen in the occipital lobes, right  greater than left, possibly indicating volume averaging artifact  although cytotoxic edema from stroke or vasogenic edema from PRES is  also possible. Old lacunar infarcts are noted of the right cerebellar  hemisphere. No intracranial hemorrhage or hydrocephalus. No calvarial  destructive lesion or fracture. Bilateral mastoid effusions and probably  secretions noted. Soft tissue emphysema is noted about the C1 ring  posteriorly with partial visualization of some skin closure staples in  the posterior neck.       Impression:       Occipital lobe hypodensity, right greater than left. This  could represent cytotoxic edema (from stroke) or vasogenic edema (from  PRES) as well as artifact from volume averaging. Recommend MRI.     DICTATED:   6/13/2018  EDITED/ls :   6/13/2018       This report was finalized on 6/13/2018 4:34 PM by Jaron Serra.       XR Chest 1 View [[id number]] Collected:  06/13/18 1116     Updated:  06/13/18 1301    Narrative:       EXAMINATION: XR CHEST 1 VW- 06/13/2018     INDICATION:  Shortness of breath     TECHNIQUE:  Single view frontal chest.     COMPARISONS: 06/12/2018     FINDINGS:  Stable support apparatus. Left greater than right basilar  opacities are unchanged. Unchanged  cardiomediastinal silhouette. No  pneumothorax.       Impression:       Stable exam.     D:  06/13/2018  E:  06/13/2018     This report was finalized on 6/13/2018 12:59 PM by Jaron Serra.       XR Chest 1 View [[id number]] Collected:  06/12/18 1119     Updated:  06/12/18 2131    Narrative:       EXAMINATION: XR CHEST 1 VW-06/12/2018:      INDICATION: Left pleural effusion; I59-Oulgnrs effusion, not elsewhere  classified; T14.8XXA-Other injury of unspecified body region, initial  encounter; L08.9-Local infection of the skin and subcutaneous tissue,  unspecified; Z74.09-Other reduced mobility; Z74.09-Other reduced  mobility.      COMPARISON: 06/11/2018.     FINDINGS: Tracheostomy tube, right IJ catheter, left subclavian line,  and left pleural drain remain in place. There appears to be a left lower  neck/upper chest BHAVNA-type drain. The heart is enlarged. The vasculature  appears upper limits of normal. The lungs are moderately well expanded.  Bibasilar atelectasis appears stable.       Impression:       Stable bibasilar atelectasis, left greater than right. No  evidence of pneumothorax or other new chest disease.     D:  06/12/2018  E:  06/12/2018     This report was finalized on 6/12/2018 9:29 PM by DR. Rahul Cobb MD.           Principal Problem:    Acute respiratory failure  Active Problems:    CVA (cerebral vascular accident)    Recent Bacteremia, MSSA    Metabolic encephalopathy    Epidural abscess - cervical spine s/p laminectomy and decompression 4/13/18    ESRD (end stage renal disease) on dialysis    Hypothyroidism    Chronic systolic heart failure    Type 2 diabetes mellitus with renal complication    Neuropathy    Atelectasis of left lung, recurrent    Quadriplegia    Pleural effusion on left    Pressure ulcer of coccygeal region    Wound infection, cervical, possible vs CFS leak    Anemia     Symptoms:  Encephalopathy  Debility  Blindness  MSK pain    Plan:  Family conference held with Palliative team RN  Ines, ICU RN Ada,  Ugo, as well as pt's father, 2 brothers and sister-in-law.  Lengthy discussion about pt's current condition, prognosis and wishes.  Family all in agreement that pt is uncomfortable, tired, and not going to get better.     All in agreement to change code status to AND/DNR at this time.  Otherwise, will continue current care with abx, dialysis, and tube feed.  Will continue to follow and support family with ongoing discussions about care and appropriate interventions.    Michelle Robles MD  06/15/18  12:38 PM

## 2018-06-15 NOTE — PROGRESS NOTES
"   LOS: 7 days    Patient Care Team:  Luan Gandara MD as PCP - General (Internal Medicine)    Reason For Visit:  F/U ESRD. SEEN ON DIALYSIS.  Subjective           Review of Systems: UNOBTAINABLE.      Objective       apixaban 2.5 mg Oral Q12H   aspirin 81 mg Oral Daily   atorvastatin 20 mg Oral Nightly   ceFAZolin 2 g Intravenous Q24H   chlorhexidine 15 mL Mouth/Throat Q12H   DULoxetine 20 mg Oral Daily   epoetin mini 10,000 Units Subcutaneous Once per day on Mon Wed Fri   ferric gluconate (FERRLECIT) IVPB 125 mg Intravenous Daily   gabapentin 400 mg Oral Nightly   insulin regular 0-14 Units Subcutaneous Q6H   ipratropium-albuterol 3 mL Nebulization 4x Daily - RT   levothyroxine 75 mcg Oral Q AM   lidocaine 1 patch Transdermal Q24H   micafungin (MYCAMINE)  mg Intravenous Q24H   midodrine 5 mg Oral TID   nystatin  Topical Q12H   pantoprazole 40 mg Intravenous Q AM   PRO-STAT 1 packet Per G Tube BID   sertraline 100 mg Oral Daily   valproate sodium 300 mg Intravenous Q8H            Vital Signs:  Blood pressure 104/64, pulse 77, temperature 97.7 °F (36.5 °C), temperature source Oral, resp. rate 16, height 157 cm (61.81\"), weight 60.8 kg (134 lb), SpO2 100 %, not currently breastfeeding.    Flowsheet Rows      First Filed Value   Admission Height  157 cm (61.81\") Documented at 06/08/2018 0215   Admission Weight  61 kg (134 lb 7.7 oz) Documented at 06/08/2018 0215          06/14 0701 - 06/15 0700  In: 1772 [I.V.:55]  Out: 330     Physical Exam:    General Appearance: NAD. LETHARGIC.  Eyes: PER, conjunctivae and sclerae normal, no icterus  Lungs: + TRACH. RHONCHI  Heart/CV: regular rhythm & normal rate, no murmur, no gallop, no rub and no edema  Abdomen: not distended, soft, non-tender, no masses,  bowel sounds present  Skin: No rash, Warm and dry. TUNNELED HD CATH.    Radiology:            Labs: FE SAT 9 %.    Results from last 7 days  Lab Units 06/15/18  0337 06/14/18  0442 06/13/18  0429   WBC 10*3/mm3 18.51* " 12.79* 15.12*   HEMOGLOBIN g/dL 8.6* 7.0* 8.3*   HEMATOCRIT % 28.8* 24.3* 26.9*   PLATELETS 10*3/mm3 310 266 263       Results from last 7 days  Lab Units 06/15/18  0337 06/14/18  0442 06/13/18  0429 06/12/18  0436  06/11/18  0543   SODIUM mmol/L 134 138 133 137  < > 136   POTASSIUM mmol/L 4.0 3.8 3.3* 3.6  < > 4.3   CHLORIDE mmol/L 104 105 100 102  < > 99   CO2 mmol/L 20.0 23.0 22.0 21.0  < > 21.0   BUN mg/dL 49* 38* 61* 36*  < > 63*   CREATININE mg/dL 2.14* 1.42* 2.03* 1.55*  < > 2.36*   CALCIUM mg/dL 8.7 8.8 8.9 9.1  < > 8.8   PHOSPHORUS mg/dL  --  2.5 3.0 2.5  --  3.9   MAGNESIUM mg/dL 2.2  --  2.3 2.2  --  2.4   ALBUMIN g/dL  --  3.46 3.71 3.99  --  3.40   < > = values in this interval not displayed.    Results from last 7 days  Lab Units 06/15/18  0337   GLUCOSE mg/dL 174*         Results from last 7 days  Lab Units 06/11/18  0543   ALK PHOS U/L 128*   BILIRUBIN mg/dL 0.3   ALT (SGPT) U/L 2*   AST (SGOT) U/L 15       Results from last 7 days  Lab Units 06/11/18  1538   PH, ARTERIAL pH units 7.461*   PO2 ART mm Hg 134.0*   PCO2, ARTERIAL mm Hg 30.7*   HCO3 ART mmol/L 21.8             Estimated Creatinine Clearance: 28.4 mL/min (A) (by C-G formula based on SCr of 2.14 mg/dL (H)).      Assessment     Principal Problem:    Acute respiratory failure  Active Problems:    Recent Bacteremia, MSSA    Metabolic encephalopathy    Epidural abscess - cervical spine s/p laminectomy and decompression 4/13/18    ESRD (end stage renal disease) on dialysis    Hypothyroidism    Chronic systolic heart failure    Type 2 diabetes mellitus with renal complication    Neuropathy    Atelectasis of left lung, recurrent    Quadriplegia    Pleural effusion on left    Pressure ulcer of coccygeal region    Wound infection, cervical, possible vs CFS leak    CVA (cerebral vascular accident)    Anemia            Impression: ESRD. ANEMIA WITH LOW FE STORES.            Recommendations: HD TODAY. IV FE.      Chris Oakley,  MD  06/15/18  9:10 AM

## 2018-06-15 NOTE — PROGRESS NOTES
"NEUROSURGERY PROGRESS NOTE    Vital Signs  Blood pressure 97/59, pulse 71, temperature 97.6 °F (36.4 °C), temperature source Axillary, resp. rate 18, height 157 cm (61.81\"), weight 60.8 kg (134 lb), SpO2 99 %, not currently breastfeeding.    Interval History:   Incision dry.  No change in neurologic condition.  Remains on O2 trach collar for breathing.      ASSESSMENT: Cervical incision seems to be healing adequately at this point.    PLAN: Incision staples to be removed in 1 week.    Aryan Reed MD  06/15/18  6:51 AM    "

## 2018-06-15 NOTE — PROGRESS NOTES
INFECTIOUS DISEASE CONSULT/INITIAL HOSPITAL VISIT    [facility]  1970  [id number]    Date of Consult: 6/15/2018    Admission Date: 6/8/2018      Requesting Provider: Della Ca MD  Evaluating Physician: Jericho Marsh III, MD    Reason for Consultation: MSSA bacteremia, C3-C7 cervical epidural abscess and C5-C6 discitis    History of present illness:    Patient is a 47 y.o. female with h/o T2DM, ESRD/HD, severe peripheral neuropathy/wheelchair bound wjp we saw 4/13/18; for epidural abscess, MSSA bacteremia which was evacuated by neurosurgery in OR with C3-C7 laminectomy, facet screw fixation and posterior lateral fusion of C5-C7, anterior cervical corpectomy C6, abscess drainage and vertebral body replacement.  Patient was treated with cefazolin followed by nafcillin but had continual fevers despite antifungal coverage, line changes.  Patient treated for capnocytophaga pneumonia.  Ultimately had trach/peg and was transferred to LTAC in [place] and followed by Dr. Ordoñez.    Patient treated with meropenem at [place], ultimately had seizure and was changed to tigecycline. Patient had new wound in posterior neck with clear drainage  Patient transferred back here to evaluate for CSF leak;  To have surgery today.     remarks that she is intermittently moving both arms.  Per Dr. Jones large left pleural effusion has been treated with a pigtail drain.      Patient is afebrile, hemodynamically stable    Patient awake on vent, voices words on request.  ROS unobtainable.    6/15/18;  Asleep on vent, no movement, in no distress; family meeting for goals of care today;    Past Medical History:   Diagnosis Date   • CAD (coronary artery disease)     stentsx4 2011   • Diabetes mellitus     ESRD, peripheral neuropathy   • ESRD (end stage renal disease) on dialysis 4/12/2018   • Hx of hepatitis    • Hypothyroidism 4/12/2018   • Systolic heart failure 04/12/2018    EF 40%       Past Surgical History:   Procedure  Laterality Date   • ANTERIOR CERVICAL DISCECTOMY W/ FUSION N/A 4/13/2018    Procedure: ANTERIOR CERVICAL CORPECTOMY;  Surgeon: Aryan Reed MD;  Location:  [facility];  Service: Neurosurgery   • ARTERIOVENOUS FISTULA Left    • ARTERIOVENOUS FISTULA/SHUNT SURGERY Left 5/25/2018    Procedure: REMOVAL OF CLOT FROM LEFT ARM GRAFT;  Surgeon: Jeevan Montoya MD;  [facility]:  [facility];  Service: Vascular   • BRONCHOSCOPY N/A 4/21/2018    Procedure: BRONCHOSCOPY AT BEDSIDE;  Surgeon: Dlela Ca MD;  Location:  [facility];  Service: Pulmonary   • BRONCHOSCOPY N/A 4/24/2018    Procedure: BRONCHOSCOPY AT BEDSIDE;  Surgeon: Nghia Gifford MD;  Location:  [facility];  Service: Pulmonary   • BRONCHOSCOPY N/A 4/26/2018    Procedure: BRONCHOSCOPY AT BEDSIDE;  Surgeon: Denver Capone MD;  Location:  [facility];  Service: Pulmonary   • BRONCHOSCOPY N/A 5/31/2018    Procedure: BRONCHOSCOPY @ BEDSIDE;  Surgeon: Bolivar Mckeon MD;  [facility]:  [facility];  Service: Pulmonary   • BRONCHOSCOPY N/A 6/7/2018    Procedure: BRONCHOSCOPY @ BEDSIDE;  Surgeon: Bolivar Mckeon MD;  [facility]  [facility];  Service: Pulmonary   • CENTRAL VENOUS LINE INSERTION Left 6/2/2018    Procedure: CENTRAL VENOUS LINE INSERTION;  Surgeon: Modesta Ontiveros MD;  [facility]:  [facility];  Service: General   • CERVICAL LAMINECTOMY DECOMPRESSION POSTERIOR N/A 4/13/2018    Procedure: CERVICAL LAMINECTOMY DECOMPRESSION POSTERIOR;  Surgeon: Aryan Reed MD;  [facility]:  [facility];  Service: Neurosurgery   • CERVICAL LAMINECTOMY DECOMPRESSION POSTERIOR N/A 6/8/2018    Procedure: POSTERIOR CERVICAL WOUND DEBRIDEMENT AND CLOSURE WITH HARDWARE REMOVAL;  Surgeon: Aryan Reed MD;  [facility]:  [facility];  Service: Neurosurgery   • CORONARY ANGIOPLASTY WITH STENT PLACEMENT  2011    4 stents   • HYSTERECTOMY     • INSERTION HEMODIALYSIS CATHETER N/A 5/26/2018    Procedure: HEMODIALYSIS CATHETER INSERTION;  Surgeon: Jeevan Montoya MD;  [facility]:   [facility];  Service: General   • INSERTION HEMODIALYSIS CATHETER Left 5/30/2018    Procedure: REPLACEMENT  OF LEFT CHEST WALL HEMODIALYSIS CATHETER;  Surgeon: Jeevan Montoya MD;  [facility];  Service: General   • INSERTION HEMODIALYSIS CATHETER Right 6/2/2018    Procedure: HEMODIALYSIS CATHETER INSERTION;  Surgeon: Modesta Ontiveros MD;  [facility];  Service: General   • REMOVAL PERITONEAL DIALYSIS CATHETER Left 6/2/2018    Procedure: REMOVAL TUNNELED DIALYSIS CATHETER;  Surgeon: Modesta Ontiveros MD;  [facility]  [facility];  Service: General   • TRACHEOSTOMY AND PEG TUBE INSERTION N/A 4/24/2018    Procedure: TRACHEOSTOMY AND PERCUTANEOUS ENDOSCOPIC GASTROSTOMY TUBE INSERTION;  Surgeon: Denver Alves MD;  Location:  [facility];  Service: General       Family History   Problem Relation Age of Onset   • Family history unknown: Yes       Social History     Social History   • Marital status:      Spouse name: N/A   • Number of children: 2   • Years of education: N/A     Occupational History   •  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  Has been in a wheelchair because of severe peripheral neuropathy.        Allergies   Allergen Reactions   • Zosyn [Piperacillin Sod-Tazobactam So] Itching         Medication:    Current Facility-Administered Medications:   •  acetaminophen (TYLENOL) tablet 650 mg, 650 mg, Oral, Q4H PRN **OR** [DISCONTINUED] acetaminophen (TYLENOL) suppository 650 mg, 650 mg, Rectal, Q4H PRN, LAYTON Souza  •  albumin human 25 % IV SOLN 12.5 g, 12.5 g, Intravenous, PRN, Chris Oakley MD  •  ALPRAZolam (XANAX) tablet 0.5 mg, 0.5 mg, Oral, TID PRN, Narinder Arroyo MD, 0.5 mg at 06/14/18 2120  •   apixaban (ELIQUIS) tablet 2.5 mg, 2.5 mg, Oral, Q12H, Chris Tyler IV, RPH, 2.5 mg at 06/15/18 1041  •  aspirin chewable tablet 81 mg, 81 mg, Oral, Daily, Gene Lopez, APRN, 81 mg at 06/15/18 1041  •  atorvastatin (LIPITOR) tablet 20 mg, 20 mg, Oral, Nightly, Мария Suárez MD, 20 mg at 06/14/18 2009  •  ceFAZolin in dextrose (ANCEF) IVPB solution 2 g, 2 g, Intravenous, Q24H, Chris Tyler IV, RPH, 2 g at 06/14/18 1535  •  chlorhexidine (PERIDEX) 0.12 % solution 15 mL, 15 mL, Mouth/Throat, Q12H, LAYTON Souza, 15 mL at 06/15/18 0809  •  DULoxetine (CYMBALTA) DR capsule 20 mg, 20 mg, Oral, Daily, LAYTON Souza, 20 mg at 06/14/18 0842  •  epoetin mnii (EPOGEN,PROCRIT) injection 10,000 Units, 10,000 Units, Subcutaneous, Once per day on Mon Wed Fri, LAYTON Souza, 10,000 Units at 06/15/18 0808  •  ferric gluconate (FERRLECIT) 125 mg in sodium chloride 0.9 % 110 mL IVPB, 125 mg, Intravenous, Daily, Chris Oakley MD, Last Rate: 55 mL/hr at 06/15/18 1042, 125 mg at 06/15/18 1042  •  gabapentin (NEURONTIN) capsule 400 mg, 400 mg, Oral, Nightly, Jyothi Carrasco APRN, 400 mg at 06/14/18 2009  •  heparin (porcine) injection 1,000 Units, 1,000 Units, Intracatheter, PRN, Narayan Holguin MD, 1,000 Units at 06/13/18 0945  •  heparin (porcine) injection 1,000 Units, 1,000 Units, Intracatheter, PRN, Chris Oakley MD  •  hydrALAZINE (APRESOLINE) injection 5 mg, 5 mg, Intravenous, Q10 Min PRN, Aguila Roman CRNA  •  insulin regular (humuLIN R,novoLIN R) injection 0-14 Units, 0-14 Units, Subcutaneous, Q6H, Jyothi Carrasco, LAYTON, 3 Units at 06/15/18 1251  •  ipratropium-albuterol (DUO-NEB) nebulizer solution 3 mL, 3 mL, Nebulization, 4x Daily - RT, Ap Wise MD, 3 mL at 06/15/18 1229  •  ipratropium-albuterol (DUO-NEB) nebulizer solution 3 mL, 3 mL, Nebulization, Once PRN, Aguila Roman CRNA  •  labetalol (NORMODYNE,TRANDATE) injection 5 mg, 5 mg, Intravenous, Q5 Min PRN, Aguila  R Roman, CRNA  •  lactated ringers bolus 500 mL, 500 mL, Intravenous, Once PRN, Aguila Roman CRNA  •  levothyroxine (SYNTHROID, LEVOTHROID) tablet 75 mcg, 75 mcg, Oral, Q AM, Jyothi Carrasco, APRN, 75 mcg at 06/15/18 0545  •  lidocaine (LIDODERM) 5 % 1 patch, 1 patch, Transdermal, Q24H, Jyothi Carrasco, APRN, 1 patch at 06/15/18 0809  •  lidocaine PF 1% (XYLOCAINE) injection 0.5 mL, 0.5 mL, Injection, Once PRN, Luis Lawson MD  •  micafungin 100 mg/100 mL 0.9% NS IVPB (mbp), 100 mg, Intravenous, Q24H, Jericho Marsh MD, 100 mg at 06/15/18 1340  •  midodrine (PROAMATINE) tablet 5 mg, 5 mg, Oral, TID, Narayan Holguin MD, 5 mg at 06/15/18 0809  •  Morphine PF injection 2 mg, 2 mg, Intravenous, Q4H PRN, Keith Villalta, APRN, 2 mg at 06/15/18 1243  •  naloxone (NARCAN) injection 0.4 mg, 0.4 mg, Intravenous, PRN, Aguila Roman CRNA  •  nystatin (MYCOSTATIN) powder, , Topical, Q12H, Ap Wise MD  •  ondansetron (ZOFRAN) injection 4 mg, 4 mg, Intravenous, Once PRN, Aguila Roman CRNA  •  oxyCODONE (ROXICODONE) 5 MG/5ML solution 5 mg, 5 mg, Per G Tube, Q4H PRN, Denver Capone MD  •  pantoprazole (PROTONIX) injection 40 mg, 40 mg, Intravenous, Q AM, Eulalio Prajapati, [facility], 40 mg at 06/15/18 0545  •  PRO-STAT 1 packet, 1 packet, Per G Tube, BID, Saima Nolen, RD, 1 packet at 06/15/18 1042  •  promethazine (PHENERGAN) injection 6.25 mg, 6.25 mg, Intravenous, Once PRN **OR** promethazine (PHENERGAN) injection 6.25 mg, 6.25 mg, Intramuscular, Once PRN **OR** promethazine (PHENERGAN) suppository 25 mg, 25 mg, Rectal, Once PRN **OR** promethazine (PHENERGAN) tablet 25 mg, 25 mg, Oral, Once PRN, Aguila Roman CRNA  •  sertraline (ZOLOFT) tablet 100 mg, 100 mg, Oral, Daily, LAYTON Souza, 100 mg at 06/15/18 1041  •  sodium chloride 0.9 % flush 1-10 mL, 1-10 mL, Intravenous, PRN, Jyothi Carrasco, APRN  •  sodium chloride 0.9 % flush 1-10 mL, 1-10 mL, Intravenous, PRN, Luis Lawson MD  •  sodium  chloride 0.9 % flush 1-10 mL, 1-10 mL, Intravenous, PRN, Aguila Roman, CRNA  •  traMADol (ULTRAM) tablet 50 mg, 50 mg, Oral, Q6H PRN, LAYTON Souza, 50 mg at 18  •  Valproic Acid (DEPAKENE) syrup 500 mg, 500 mg, Per G Tube, Q8H, Мария Suárez MD    Antibiotics:  Anti-Infectives     Ordered     Dose/Rate Route Frequency Start Stop    18 1029  ceFAZolin in dextrose (ANCEF) IVPB solution 2 g     Ordering Provider:  Chris Tyler IV, RPH    2 g  over 30 Minutes Intravenous Every 24 Hours 18 1600 18 1559    18 0356  micafungin 100 mg/100 mL 0.9% NS IVPB (mbp)     Jericho Marsh MD reviewed the order on 18 1441.   Ordering Provider:  Jericho Marsh MD    100 mg  over 60 Minutes Intravenous Every 24 Hours 18 1200 18 1432            Review of Systems:  Unable to obtain secondary to sedation/vent.    Fh/sh unobtainable  Physical Exam:   Vital Signs  Temp (24hrs), Av.6 °F (36.4 °C), Min:96.9 °F (36.1 °C), Max:98.3 °F (36.8 °C)    Temp  Min: 96.9 °F (36.1 °C)  Max: 98.3 °F (36.8 °C)  BP  Min: 86/53  Max: 121/75  Pulse  Min: 70  Max: 84  Resp  Min: 16  Max: 20  SpO2  Min: 88 %  Max: 100 %    GENERAL: sedated on mechanical vent through trach in neck  HEENT: Normocephalic, atraumatic. No conjunctival injection. No icterus. Oral vent      HEART: RRR; No murmur,  LUNGS: Diminished at lung bases bilaterally  ABDOMEN: Soft, nondistended. Hypoactive bowel sounds.  EXT:  No cyanosis, clubbing or edema. No cord.    MSK: No joint swelling or erythema  SKIN: Warm and dry without cutaneous eruptions on Inspection/palpation.    NEURO: follows commands has focal deficit below waist; arms propped up on pillows but making no movement    Laboratory Data      Results from last 7 days  Lab Units 06/15/18  0337 18  0442 18  0429   WBC 10*3/mm3 18.51* 12.79* 15.12*   HEMOGLOBIN g/dL 8.6* 7.0* 8.3*   HEMATOCRIT % 28.8* 24.3* 26.9*   PLATELETS 10*3/mm3 310  266 263       Results from last 7 days  Lab Units 06/15/18  0337   SODIUM mmol/L 134   POTASSIUM mmol/L 4.0   CHLORIDE mmol/L 104   CO2 mmol/L 20.0   BUN mg/dL 49*   CREATININE mg/dL 2.14*   GLUCOSE mg/dL 174*   CALCIUM mg/dL 8.7       Results from last 7 days  Lab Units 06/11/18  0543   ALK PHOS U/L 128*   BILIRUBIN mg/dL 0.3   ALT (SGPT) U/L 2*   AST (SGOT) U/L 15       Results from last 7 days  Lab Units 06/14/18  0933   SED RATE mm/hr 7       Results from last 7 days  Lab Units 06/11/18  1757   CRP mg/dL 4.66*       Results from last 7 days  Lab Units 06/11/18  1756   LACTATE mmol/L 0.7             Estimated Creatinine Clearance: 28.4 mL/min (A) (by C-G formula based on SCr of 2.14 mg/dL (H)).      Microbiology:  Blood Culture   Date Value Ref Range Status   04/13/2018 No growth at less than 24 hours  Preliminary   04/13/2018 No growth at less than 24 hours  Preliminary           Neck surgical abscess cx (4/13) pending                 Radiology:  Imaging Results (last 72 hours)     Procedure Component Value Units Date/Time    CT Head Without Contrast [[id number]] Updated:  04/13/18 1220    XR Chest 1 View [[id number]] Collected:  04/13/18 0823     Updated:  04/13/18 0826    Narrative:          EXAMINATION: XR CHEST 1 VW-      INDICATION: et placement; G06.1-Intraspinal abscess and granuloma      COMPARISON: 4/12/2018     FINDINGS: The heart size is normal. There are areas of bibasilar  atelectasis. A central venous catheter is well-positioned. An  endotracheal tube is well-positioned. There has been interval surgery in  the lower cervical spine.           Impression:       Endotracheal tube is well-positioned. There is bibasilar  atelectasis.     This report was finalized on 4/13/2018 8:24 AM by Dr. Anthony Perera MD.       XR Spine Cervical Lateral [[id number]] Collected:  04/13/18 0812     Updated:  04/13/18 0812    Narrative:       EXAMINATION: XR SPINE CERVICAL LATERAL-      INDICATION: ACDF; G06.1-Intraspinal  abscess and granuloma.      COMPARISON: None.     FINDINGS: Portable lateral cervical spine reveals a radiopaque marker  seen anteriorly at the C6-C7 disc space.  Please see the procedural  report for full details.       Impression:       Anterior localization of the C5-C6 disc space.     D:  04/13/2018  E:  04/13/2018       XR Spine Cervical Lateral [[id number]] Collected:  04/13/18 0812     Updated:  04/13/18 0812    Narrative:       EXAMINATION: XR SPINE CERVICAL LATERAL-      INDICATION: Cervical laminectomy decompression posterior with  fusion/ACDF; G06.1-Intraspinal abscess and granuloma.      COMPARISON: 04/13/2018.     FINDINGS: Portable lateral cervical spine reveals anterior fusion of the  C5, C6, and C7 levels. No definite malalignment. Lines and tubes  overlying the soft tissues. Postsurgical changes seen within the soft  tissues.           Impression:       Anterior fusion of C5 through C7.     D:  04/13/2018  E:  04/13/2018          XR Abdomen KUB [[id number]] Collected:  04/13/18 0001     Updated:  04/13/18 0111    Narrative:       EXAM:    XR Abdomen, 1 View    CLINICAL HISTORY:    47 years old, female; Intraspinal abscess and granuloma; Device placement; Gi   device; Nasogastric tube; Additional info: Ng tube placement    TECHNIQUE:    Frontal supine view of the abdomen/pelvis.    COMPARISON:    No relevant prior studies available.    FINDINGS:     Tip of NASOGASTRIC/ENTERIC tube distal to the gastroesophageal junction in   the gastric antrum/pylorus.      Impression:         As above.    THIS DOCUMENT HAS BEEN ELECTRONICALLY SIGNED BY ANDI KHANNA MD    XR Chest 1 View [[id number]] Collected:  04/12/18 2342     Updated:  04/13/18 0110    Narrative:       EXAM:    XR Chest, 1 View    CLINICAL HISTORY:    47 years old, female; Intraspinal abscess and granuloma; Signs and symptoms;   Shortness of breath; Additional info: Metabolic encephalopathy, resp failure    TECHNIQUE:    Frontal view of the  chest.    COMPARISON:    No relevant prior studies available.    FINDINGS:    Decreased lung volumes bilaterally, predominantly RIGHT lung base   atelectasis, crowding of the vessels without evidence of consolidation.   Otherwise normal appearing lungs and mediastinum. No effusion or pneumothorax.   Cardiomediastinal silhouette is normal.       LEFT sided CENTRAL VENOUS line with its tip in the SVC.  Stent within the   LEFT axilla/upper arm. Tip of NASOGASTRIC/ENTERIC tube is not visualized and   extends beyond the gastroesophageal junction into the LEFT upper abdomen.       Impression:         No active lung parenchymal lesion.     THIS DOCUMENT HAS BEEN ELECTRONICALLY SIGNED BY ANDI KHANNA MD            Impression:   chronic respiratory failure on vent  Epidural abscess MSSA 4/13/18  ? CSF leak  ESRD  Left pleural effusion  S/p decompression 4/13/18.  ? Seizure while on meropenem  Sputum cultures growing yeast, mold 5/25/18    Patient looks markedly better than last admission;    Other than risk of fevers; patient should finish 8 week  Course of cefazolin from 4/13/18 (~6-8-18)  Thereafter we ususally offer 3-6 months of oral abx Keflex      Cont cefazolin 2 g iv q24h (dosed for ESRD)  F/u  Pleural cultures; appear transudative by studies    Poor prognosis; change made to AND status;    If no hope for improvement will need to address other long term goals dialysis etc.

## 2018-06-15 NOTE — PLAN OF CARE
Problem: Patient Care Overview  Goal: Plan of Care Review  Outcome: Ongoing (interventions implemented as appropriate)  Family at bedside, all questions and concerns answered. No signs of distress and will continue to monitor   06/14/18 2052   Coping/Psychosocial   Plan of Care Reviewed With patient;family   Plan of Care Review   Progress no change       Problem: Wound (Includes Pressure Injury) (Adult)  Goal: Signs and Symptoms of Listed Potential Problems Will be Absent, Minimized or Managed (Wound)  Outcome: Ongoing (interventions implemented as appropriate)  Wound  Care done per orders, no new wounds and will continue to monitor   06/14/18 2052   Goal/Outcome Evaluation   Problems Assessed (Wound) all   Problems Present (Wound) situational response

## 2018-06-15 NOTE — PROGRESS NOTES
"Intensive Care Follow-up     Hospital:  LOS: 7 days   Ms. Lizet Webster, 47 y.o. female is followed for:   Acute respiratory failure        Subjective   Interval History:  The chart has been reviewed. The patient has been less interactive today. She is currently on hemodialysis.    The patient's relevant past medical, surgical and social history were reviewed and updated in Epic as appropriate.        Objective     Infusions:     Medications:    apixaban 2.5 mg Oral Q12H   aspirin 81 mg Oral Daily   atorvastatin 20 mg Oral Nightly   ceFAZolin 2 g Intravenous Q24H   chlorhexidine 15 mL Mouth/Throat Q12H   DULoxetine 20 mg Oral Daily   epoetin mini 10,000 Units Subcutaneous Once per day on Mon Wed Fri   ferric gluconate (FERRLECIT) IVPB 125 mg Intravenous Daily   gabapentin 400 mg Oral Nightly   insulin regular 0-14 Units Subcutaneous Q6H   ipratropium-albuterol 3 mL Nebulization 4x Daily - RT   levothyroxine 75 mcg Oral Q AM   lidocaine 1 patch Transdermal Q24H   micafungin (MYCAMINE)  mg Intravenous Q24H   midodrine 5 mg Oral TID   nystatin  Topical Q12H   pantoprazole 40 mg Intravenous Q AM   PRO-STAT 1 packet Per G Tube BID   sertraline 100 mg Oral Daily   valproate sodium 300 mg Intravenous Q8H       Vital Sign Min/Max for last 24 hours  Temp  Min: 96.9 °F (36.1 °C)  Max: 98.3 °F (36.8 °C)   BP  Min: 86/53  Max: 113/55   Pulse  Min: 70  Max: 84   Resp  Min: 16  Max: 20   SpO2  Min: 88 %  Max: 100 %   Flow (L/min)  Min: 8  Max: 8       Input/Output for last 24 hour shift  06/14 0701 - 06/15 0700  In: 1772 [I.V.:55]  Out: 330       Objective:  General Appearance:  In no acute distress, ill-appearing and uncomfortable.    Vital signs: (most recent): Blood pressure 108/67, pulse 80, temperature 97.6 °F (36.4 °C), temperature source Oral, resp. rate 20, height 157 cm (61.81\"), weight 60.8 kg (134 lb), SpO2 100 %, not currently breastfeeding.  No fever.    HEENT: (Tracheostomy tube in place; on TCT.  Right tunneled " internal jugular dialysis catheter.  Left internal jugular central venous catheter.  )    Lungs:  Normal effort and normal respiratory rate.  She is not in respiratory distress.  There are decreased breath sounds.  No rales, wheezes or rhonchi.  (Poor air movement bilaterally.  Left thoracostomy tube in place with no air leak.)  Heart: Normal rate.  Regular rhythm.  S1 normal and S2 normal.  No murmur, gallop or friction rub.   Chest: Symmetric chest wall expansion.   Abdomen: Abdomen is soft and non-distended.  Bowel sounds are normal.   There is no abdominal tenderness.   There is no mass. There is no splenomegaly. There is no hepatomegaly.   Extremities: Decreased range of motion.  There is no deformity or dependent edema.  (She is not moving any of her extremities.)  Neurological: Patient is alert.  (The patient does arouse but is not interactive with me.).    Pupils:  Pupils are equal. (The left pupil is up roughly 5 mm and the right pupil is 3 mm. Neither are reactive.).    Skin:  Warm, dry and pale.  No rash or cyanosis.               Results from last 7 days  Lab Units 06/15/18  0337 06/14/18  0442 06/13/18  0429   WBC 10*3/mm3 18.51* 12.79* 15.12*   HEMOGLOBIN g/dL 8.6* 7.0* 8.3*   PLATELETS 10*3/mm3 310 266 263       Results from last 7 days  Lab Units 06/15/18  0337 06/14/18 0442 06/13/18 0429 06/12/18  0436   SODIUM mmol/L 134 138 133 137   POTASSIUM mmol/L 4.0 3.8 3.3* 3.6   CO2 mmol/L 20.0 23.0 22.0 21.0   BUN mg/dL 49* 38* 61* 36*   CREATININE mg/dL 2.14* 1.42* 2.03* 1.55*   MAGNESIUM mg/dL 2.2  --  2.3 2.2   PHOSPHORUS mg/dL  --  2.5 3.0 2.5   GLUCOSE mg/dL 174* 114* 155* 141*     Estimated Creatinine Clearance: 28.4 mL/min (A) (by C-G formula based on SCr of 2.14 mg/dL (H)).      Results from last 7 days  Lab Units 06/11/18  1538   PH, ARTERIAL pH units 7.461*   PCO2, ARTERIAL mm Hg 30.7*   PO2 ART mm Hg 134.0*         I reviewed the patient's results and images.     Assessment/Plan   Impression       Principal Problem:    Acute respiratory failure  Active Problems:    CVA (cerebral vascular accident)    Recent Bacteremia, MSSA    Metabolic encephalopathy    Epidural abscess - cervical spine s/p laminectomy and decompression 4/13/18    ESRD (end stage renal disease) on dialysis    Hypothyroidism    Chronic systolic heart failure    Type 2 diabetes mellitus with renal complication    Neuropathy    Atelectasis of left lung, recurrent    Quadriplegia    Pleural effusion on left    Pressure ulcer of coccygeal region    Wound infection, cervical, possible vs CFS leak    Anemia       Plan        The patient has had overall worsening of her condition. There does not appear to be any improvement in her vision.  With the permission of the , I will involve palliative care to help determine goals of care. At this point, she may best be served through a more comfort-based approach as she herself indicates that she is not interested in further invasive therapies.  Continue with hemodialysis for now.  Continue antimicrobials.  Continue with trach collar.  Follow-up orders have been placed.    Plan of care and goals reviewed with mulitdisciplinary team at daily rounds.   I discussed the patient's findings and my recommendations with patient and nursing staff         Denver Capone MD, [facility]  Pulmonary and Critical Care Medicine  06/15/18 11:11 AM

## 2018-06-15 NOTE — PROGRESS NOTES
Continued Stay Note  [facility]     Patient Name: Lizet Webster  MRN: [id number]  Today's Date: 6/15/2018    Admit Date: 6/8/2018          Discharge Plan     Row Name 06/15/18 1518       Plan    Plan Comments [facility] L/T[facility] is following Mrs. Webster if she needs L/TACH.                Discharge Codes    No documentation.       Expected Discharge Date and Time     Expected Discharge Date Expected Discharge Time    Jun 16, 2018             Ronald Jansen RN

## 2018-06-15 NOTE — PATIENT CARE CONFERENCE
ICU Rounds: Pt's family to meet w/ palliative this date. Pt is no longer appropriate for skilled IP OT/PT intervention, will DC from services.

## 2018-06-15 NOTE — PROGRESS NOTES
Clinical Nutrition Note      Patient Name: Lizet Webster  MRN: [id number]  Admission date: 6/8/2018      Reason for Assessment:  Multidisciplinary Rounds    Additional information obtained during MDR:  RN reports  wanting to move toward palliative after new strokes; MD palliative consult today.    Current diet: NPO Diet    Diet, Tube Feeding Tube Feeding Formula: Isosource 1.5 (Calorically Dense)      PRO-STAT 1 packet    Pertinent medical data reviewed    Intervention:  Plan of care and goals reviewed    Monitor:  RD to follow per protocol      Saima Nolen RD  06/15/18 2:20 PM  Time: 20min

## 2018-06-15 NOTE — CONSULTS
PALLIATIVE CARE:    Consult received.  Chart reviewed.  Pt seen.    Family conference held with Palliative team RN Ines, ICU RN Ada,  Ugo, as well as pt's father, 2 brothers and sister-in-law.  Lengthy discussion about pt's current condition, prognosis and wishes.  Family all in agreement that pt is uncomfortable, tired, and not going to get better.    All in agreement to change code status to AND/DNR at this time.  Otherwise, will continue current care with abx, dialysis, and tube feed.  Will continue to follow and support family with ongoing discussions about care and appropriate interventions.    Thank you.  Full consult note to uday and will see again this weekend.    Michelle Robles MD  6/15/18

## 2018-06-15 NOTE — PROGRESS NOTES
Daily Progress Note  Neurology     LOS: 7 days     Subjective     Chief Complaint: Respiratory failure    Interval History:  No acute events overnight    ROS: Negative for fever    Objective     Vital signs in last 24 hours:  Temp:  [96.9 °F (36.1 °C)-98.3 °F (36.8 °C)] 97.6 °F (36.4 °C)  Heart Rate:  [70-84] 79  Resp:  [16-20] 16  BP: ()/(42-67) 108/67      Physical Exam:   General: Lying in bed with eyes closed. In NAD.     Respiratory: Respirations unlabored.  Tracheostomy   CV: RRR       Neurologic Exam:   Mental status: Somnolent but intermittently arousable   CN: mild anisocoria, pupils nonreactive, face symmetric                           Results from last 7 days  Lab Units 06/15/18  0337   WBC 10*3/mm3 18.51*   HEMOGLOBIN g/dL 8.6*   HEMATOCRIT % 28.8*   PLATELETS 10*3/mm3 310           Results Review:    Labs reviewed  Carotid duplex report reviewed      Assessment/Plan     Principal Problem:    Acute respiratory failure  Active Problems:    Recent Bacteremia, MSSA    Metabolic encephalopathy    Epidural abscess - cervical spine s/p laminectomy and decompression 4/13/18    ESRD (end stage renal disease) on dialysis    Hypothyroidism    Chronic systolic heart failure    Type 2 diabetes mellitus with renal complication    Neuropathy    Atelectasis of left lung, recurrent    Quadriplegia    Pleural effusion on left    Pressure ulcer of coccygeal region    Wound infection, cervical, possible vs CFS leak    CVA (cerebral vascular accident)    Anemia        1.  Acute ischemic stroke = MRI brain revealed bilateral occipital lobe infarcts, as well as small infarcts in the right frontoparietal region and left splenium. Mechanism suspicious for embolic process. On low-dose Eliquis, ASA, and atorvastatin.     2.  Encephalopathy = EEG relatively unchanged today. VPA level low at 41. Will reload with Depacon 1000mg IV once and increase her maintenance to 500mg Q8 hours. VPA level in AM.      Мария Suárez,  MD  06/15/18  2:02 PM

## 2018-06-15 NOTE — PLAN OF CARE
Problem: Patient Care Overview  Goal: Interprofessional Rounds/Family Conf  Outcome: Ongoing (interventions implemented as appropriate)  Palliative Team Conference: MENG Corona RN, CHPN; FADI Suero RN, CHPN; YAZAN Sorensen RN, CHPN; KEN Robles MD; LAYTON Jain   06/15/18 1424   Interdisciplinary Rounds/Family Conf   Summary Palliative consult for GOC, EOL decision-making. Conference today with pt's spouse, father, 2 brothers, sister-in-law. Reviewed course illness and treatment, current status, prognosis per multiple specialties considered poor, and pt's statements over the past 2 days that she is ready to die. Family members provided opportunity to express thoughts/feelings, ask questions about EOL care and expectations. Pt's spouse, Ugo, stated that he needs the support of family to face decisions related to pt's continued decline and direction of care. Family present verbalized support to Ugo in decision to change code status to AND (DNR/DNI), with no escalation of interventions with continued decline. No interventions currently in place discontinued; however, Ugo verbalized considering discontinuation of HD as it seems to cause pt increased pain and discomfort, wants her to be comfortable and not suffer. Pallaitive team will follow, continue discussion about specific interventions and GOC/POC, support to family and patient.       Problem: Palliative Care (Adult)  Intervention: Minimize Discomfort   06/15/18 1424   Promote Oxygenation/Perfusion   Pain Management Interventions pain management plan reviewed with patient/caregiver;see MAR     Intervention: Optimize Function   06/15/18 1424   Musculoskeletal Interventions   Fatigue Management fatigue-related activity identified   Pain/Comfort/Sleep Interventions   Sleep/Rest Enhancement awakenings minimized;family presence promoted   Cognitive Interventions   Sensory Stimulation Regulation auditory stimulation minimized;music/television provided for relaxation      Intervention: Promote Informed Decision Making and Goal Setting   06/15/18 1424   Coping/Psychosocial Interventions   Life Transition/Adjustment palliative care discussed;palliative care initiated;other (see comments)  (Family conference for GOC)     Intervention: Support/Optimize Psychosocial Response   06/15/18 1424   Coping/Psychosocial Interventions   Supportive Measures active listening utilized;decision-making supported;goal setting facilitated;positive reinforcement provided;verbalization of feelings encouraged   Spiritual Activities Assistance spiritual support provided  ( office notified of status)   Grieving Process Facilitation family/significant other support facilitated;reaction to loss explored   Psychosocial Support   Family/Support System Care caregiver stress acknowledged;family care conference arranged;involvement promoted;presence promoted;self-care encouraged;support provided       Goal: Identify Related Risk Factors and Signs and Symptoms  Outcome: Ongoing (interventions implemented as appropriate)   06/15/18 1424   Palliative Care (Adult)   Palliative Care: Related Risk Factors chronic illness;condition is progressive;end-stage disease;worsening symptoms;other (see comments)  (new onset CVAs)   Palliative Care: Signs and Symptoms fatigue;pain     Goal: Maximized Comfort  Outcome: Ongoing (interventions implemented as appropriate)   06/15/18 1424   Palliative Care (Adult)   Maximized Comfort making progress toward outcome     Goal: Enhanced Quality of Life  Outcome: Ongoing (interventions implemented as appropriate)   06/15/18 1424   Palliative Care (Adult)   Enhanced Quality of Life making progress toward outcome

## 2018-06-15 NOTE — THERAPY DISCHARGE NOTE
Acute Care - Occupational Therapy Treatment Note/Discharge  [facility]     Patient Name: Lizet Webster  : 1970  MRN: [id number]  Today's Date: 6/15/2018  Onset of Illness/Injury or Date of Surgery: 18  Date of Referral to OT: 18  Referring Physician: KIP Wise MD       Admit Date: 2018    Visit Dx:     ICD-10-CM ICD-9-CM   1. Pleural effusion on left J90 511.9   2. Wound infection, cervical, possible vs CFS leak T14.8XXA 958.3    L08.9    3. Impaired mobility and ADLs Z74.09 799.89   4. Impaired functional mobility, balance, gait, and endurance Z74.09 V49.89   5. Dysphagia, unspecified type R13.10 787.20   6. Acute respiratory failure with hypoxia J96.01 518.81     Patient Active Problem List   Diagnosis   • Recent Bacteremia, MSSA   • Metabolic encephalopathy   • Epidural abscess - cervical spine s/p laminectomy and decompression 18   • ESRD (end stage renal disease) on dialysis   • Hypothyroidism   • Chronic systolic heart failure   • Type 2 diabetes mellitus with renal complication   • Neuropathy   • Acute respiratory failure   • Atelectasis of left lung, recurrent   • Quadriplegia   • Pleural effusion on left   • Pressure ulcer of coccygeal region   • Wound infection, cervical, possible vs CFS leak   • CVA (cerebral vascular accident)   • Anemia       Therapy Treatment          Rehabilitation Treatment Summary     Row Name                Wound 18 0215 Right gluteal pressure injury    Wound - Properties Group Date first assessed: 18 [LB] Time first assessed: 0215 [LB] Present On Admission : yes [LB] Side: Right [LB] Location: gluteal [LB] Type: pressure injury [LB] Stage, Pressure Injury: Stage 1 [LB] Recorded by:  [LB] Kelli Euceda RN 18 0517    Row Name                Wound 18 0215 anus pressure injury    Wound - Properties Group Date first assessed: 18 [LB] Time first assessed: 0215 [LB] Present On Admission : yes [LB] Location: anus [LB] Type:  pressure injury [LB] Stage, Pressure Injury: Stage 2 [LB] Recorded by:  [LB] Kelli Euceda RN 06/08/18 0519    Row Name                Wound 06/08/18 0900 medial coccyx    Wound - Properties Group Date first assessed: 06/08/18 [CP] Time first assessed: 0900 [CP] Present On Admission : yes;picture taken [CP] Orientation: medial [CP] Location: coccyx [CP] Stage, Pressure Injury: unstageable [CP] Recorded by:  [CP] Leighann Santiago, LAYTON 06/08/18 1117    Row Name                Wound 06/08/18 1550 Other (See comments) cervical spine incision    Wound - Properties Group Date first assessed: 06/08/18 [AL] Time first assessed: 1550 [AL] Side: Other (See comments) [AL] Location: cervical spine [AL] Type: incision [AL] Recorded by:  [AL] Jessica Yin RN 06/08/18 1550      User Key  (r) = Recorded By, (t) = Taken By, (c) = Cosigned By    Initials Name Effective Dates Discipline    CP Leighann Santiago, LAYTON 04/06/17 - 06/07/18 Nurse    LB Kelli Euceda RN 06/16/16 -  Nurse    AL Jessica Yin RN 12/29/17 -  Nurse        Rash 06/08/18 0900 medial groin macular;papule (Active)   Distribution regional 6/15/2018  2:00 PM   Configuration/Shape asymmetric 6/15/2018  2:00 PM   Borders irregular 6/15/2018  2:00 PM   Characteristics moist 6/15/2018  2:00 PM   Color red 6/15/2018  2:00 PM   Care, Rash cleansed with;skin cleanser;other (see comments) 6/14/2018 10:00 PM       Wound 06/08/18 0215 Right gluteal pressure injury (Active)   Dressing Appearance open to air 6/15/2018  2:00 PM   Drainage Amount none 6/15/2018  2:00 PM   Care, Wound cleansed with;wound cleanser;barrier applied 6/14/2018 10:00 PM   Dressing Care, Wound open to air 6/14/2018 10:00 PM       Wound 06/08/18 0215 anus pressure injury (Active)   Dressing Appearance dry;intact;no drainage 6/15/2018  2:00 PM   Periwound moist 6/15/2018  6:00 AM   Care, Wound cleansed with;wound cleanser;barrier applied 6/14/2018 10:00 PM       Wound 06/08/18 0900 medial  coccyx (Active)   Dressing Appearance dry;intact 6/15/2018  2:00 PM   Base black eschar 6/15/2018  6:00 AM   Edges open;irregular 6/15/2018  6:00 AM   Drainage Characteristics/Odor serosanguineous 6/15/2018  6:00 AM   Drainage Amount small 6/15/2018  6:00 AM   Care, Wound cleansed with;barrier applied;honey applied;dressing removed 6/14/2018 10:00 PM   Dressing Care, Wound foam;non-adherent 6/14/2018 10:00 PM       Wound 06/08/18 1550 Other (See comments) cervical spine incision (Active)   Dressing Appearance dry;intact;dried drainage 6/15/2018  2:00 PM   Base dressing in place, unable to visualize 6/15/2018  2:00 PM   Periwound intact;dry;pink 6/15/2018  6:00 AM   Drainage Amount none 6/15/2018  6:00 AM             OT Rehab Goals     Row Name 06/15/18 1500             ROM Goal 1 (OT)    Progress/Outcome (ROM Goal 1, OT) medical status inhibiting progress;goal no longer appropriate  -CL         Strength Goal 1 (OT)    Progress/Outcome (Strength Goal 1, OT) medical status inhibiting progress;goal no longer appropriate  -CL         Caregiver Training Goal 1 (OT)    Progress/Outcomes (Caregiver Training Goal 1, OT) medical status inhibiting progress;goal no longer appropriate  -CL         Problem Specific Goal 1 (OT)    Progress/Outcome (Problem Specific Goal 1, OT) medical status inhibiting progress;goal no longer appropriate  -CL         Problem Specific Goal 2 (OT)    Progress/Outcome (Problem Specific Goal 2, OT) medical status inhibiting progress;goal no longer appropriate  -CL        User Key  (r) = Recorded By, (t) = Taken By, (c) = Cosigned By    Initials Name Provider Type Discipline    CL Anastasiya Powell OT Occupational Therapist OT          Occupational Therapy Education     Title: PT OT SLP Therapies (Active)     Topic: Occupational Therapy (Active)     Point: ADL training (Active)     Description: Instruct learner(s) on proper safety adaptation and remediation techniques during self care or transfers.    Instruct in proper use of assistive devices.   Learning Progress Summary     Learner Status Readiness Method Response Comment Documented by    Patient Active Acceptance E NR Pt educated on appropriate safety precautions, positioning, BUE PROM HEP, techniques to improve alertness, and benefits of therapy.  06/13/18 1348     Active Acceptance E NR Pt educated on appropriate safety precautions, positioning, HEP, and benefits of therapy.  06/11/18 1529     Done Acceptance E VU   06/10/18 0758     Active Acceptance E NR Educated pt and family regarding role of therapy and continued treatment plan.  06/09/18 0943     Done HUMBLE Davidson,TB MIKE   06/09/18 0712     Done Acceptance E VU,[facility] 06/08/18 1306    Family Active Acceptance E NR Educated pt and family regarding role of therapy and continued treatment plan.  06/09/18 0943     Done Acceptance E VU,[facility] 06/08/18 1306    Significant Other Done Acceptance E VU   06/10/18 0758     Done HUMBLE Davidson,TB [facility] 06/09/18 0712     [facility]          Point: Home exercise program (Active)     Description: Instruct learner(s) on appropriate technique for monitoring, assisting and/or progressing therapeutic exercises/activities.   Learning Progress Summary     Learner Status Readiness Method Response Comment Documented by    Patient Active Acceptance E NR Pt educated on appropriate safety precautions, positioning, BUE PROM HEP, techniques to improve alertness, and benefits of therapy.  06/13/18 1348     Active Acceptance E NR Pt educated on appropriate safety precautions, positioning, HEP, and benefits of therapy.  06/11/18 1529     Done Acceptance E VU   06/10/18 0758     Done HUMBLE Davidson,TB VU   06/09/18 0712     Done Acceptance E VU,DU   06/08/18 1306    Family Done Acceptance E VU,[facility] 06/08/18 1306    Significant Other Done Acceptance E VU   06/10/18 0758     Done HUMBLE Davidson,TB [facility] 06/09/18 0712     Done Acceptance E  NICOLE MCKEON   06/08/18 1306          Point: Precautions (Active)     Description: Instruct learner(s) on prescribed precautions during self-care and functional transfers.   Learning Progress Summary     Learner Status Readiness Method Response Comment Documented by    Patient Active Acceptance E NR Pt educated on appropriate safety precautions, positioning, BUE PROM HEP, techniques to improve alertness, and benefits of therapy.  06/13/18 1348     Active Acceptance E NR Pt educated on appropriate safety precautions, positioning, HEP, and benefits of therapy.  06/11/18 1529     Done Acceptance E VU   06/10/18 0758     Done Eager E,D,TB VU   06/09/18 0712     Done Acceptance E VU,NICOLE   06/08/18 1306    Family Done Acceptance E VU,[facility] 06/08/18 1306    Significant Other Done Acceptance E VU   06/10/18 0758     Done Eager E,D,TB VU   06/09/18 0712     Done Acceptance E VU,NICOLE   06/08/18 1306                      User Key     Initials Effective Dates Name Provider Type Discipline     06/22/15 -  Radha Salazar, OT Occupational Therapist OT     02/14/17 -  Manju Tyler, RN Registered Nurse Nurse     04/03/18 -  Anastasiya Powell, OT Occupational Therapist OT                OT Recommendation and Plan  Outcome Summary/Treatment Plan (OT)  Anticipated Discharge Disposition (OT): other (see comments) (Hospice, decline in medical status)  Plan of Care Review  Plan of Care Reviewed With: patient, spouse  Plan of Care Reviewed With: patient, spouse  Outcome Summary: Pt session limited d/t lethargy, followed zero commands this date. Pt's spouse educated on PROM HEP, positioning, and techniques to improve alertness. Recommend cont skilled IPOT POC.           Outcome Measures     Row Name 06/13/18 6024             How much help from another is currently needed...    Putting on and taking off regular lower body clothing? 1  -CL      Bathing (including washing, rinsing, and drying) 1  -CL      Toileting (which  includes using toilet bed pan or urinal) 1  -CL      Putting on and taking off regular upper body clothing 1  -CL      Taking care of personal grooming (such as brushing teeth) 1  -CL      Eating meals 1  -CL      Score 6  -CL         Functional Assessment    Outcome Measure Options AM-PAC 6 Clicks Daily Activity (OT)  -CL        User Key  (r) = Recorded By, (t) = Taken By, (c) = Cosigned By    Initials Name Provider Type    CL Anastasiya Powell OT Occupational Therapist           Time Calculation:      Therapy Suggested Charges     Code   Minutes Charges    38534 (CPT®) Hc Ot Neuromusc Re Education Ea 15 Min      34843 (CPT®) Hc Ot Ther Proc Ea 15 Min 10 1    85120 (CPT®) Hc Gait Training Ea 15 Min      27480 (CPT®) Hc Ot Therapeutic Act Ea 15 Min      23530 (CPT®) Hc Ot Manual Therapy Ea 15 Min      46455 (CPT®) Hc Ot Iontophoresis Ea 15 Min      41859 (CPT®) Hc Ot Elec Stim Ea-Per 15 Min      20934 (CPT®) Hc Ot Ultrasound Ea 15 Min      81558 (CPT®) Hc Ot Self Care/Mgmt/Train Ea 15 Min      Total  10 1                 OT Discharge Summary  Anticipated Discharge Disposition (OT): other (see comments) (Hospice, decline in medical status)  Reason for Discharge: Change in medical status, Unable to participate, Per MD order  Outcomes Achieved: Unable to tolerate or actively participate in therapy  Discharge Destination: other (comment) (Hospice)    Anastasiya Powell OT  6/15/2018

## 2018-06-15 NOTE — THERAPY DISCHARGE NOTE
Acute Care - Physical Therapy Discharge Summary  [facility]       Patient Name: Lizet Webster  : 1970  MRN: [id number]    Today's Date: 6/15/2018  Onset of Illness/Injury or Date of Surgery: 18    Date of Referral to PT: 18  Referring Physician: KIP Wise MD       Admit Date: 2018      PT Recommendation and Plan    Visit Dx:    ICD-10-CM ICD-9-CM   1. Pleural effusion on left J90 511.9   2. Wound infection, cervical, possible vs CFS leak T14.8XXA 958.3    L08.9    3. Impaired mobility and ADLs Z74.09 799.89   4. Impaired functional mobility, balance, gait, and endurance Z74.09 V49.89   5. Dysphagia, unspecified type R13.10 787.20   6. Acute respiratory failure with hypoxia J96.01 518.81             Outcome Measures     Row Name 18 1135             How much help from another is currently needed...    Putting on and taking off regular lower body clothing? 1  -CL      Bathing (including washing, rinsing, and drying) 1  -CL      Toileting (which includes using toilet bed pan or urinal) 1  -CL      Putting on and taking off regular upper body clothing 1  -CL      Taking care of personal grooming (such as brushing teeth) 1  -CL      Eating meals 1  -CL      Score 6  -CL         Functional Assessment    Outcome Measure Options AM-PAC 6 Clicks Daily Activity (OT)  -CL        User Key  (r) = Recorded By, (t) = Taken By, (c) = Cosigned By    Initials Name Provider Type    CL Anastasiya Powell OT Occupational Therapist            Therapy Suggested Charges     Code   Minutes Charges    None                   PT Rehab Goals     Row Name 06/15/18 0900             Bed Mobility Goal 1 (PT)    Progress/Outcomes (Bed Mobility Goal 1, PT) medical status inhibited participation;goal not met  -LS         Strength Goal 1 (PT)    Progress/Outcome (Strength Goal 1, PT) medical status inhibited participation;goal not met  -LS        User Key  (r) = Recorded By, (t) = Taken By, (c) = Cosigned By    Initials Name  Provider Type Discipline    LS Kelli Manrique, PT Physical Therapist PT              PT Discharge Summary  Reason for Discharge: Unable to participate  Outcomes Achieved: Unable to tolerate or actively participate in therapy, Refer to plan of care for updates on goals achieved      Kelli Manrique, PT   6/15/2018

## 2018-06-16 NOTE — PROGRESS NOTES
"   LOS: 8 days   Patient Care Team:  Luan Gandara MD as PCP - General (Internal Medicine)    Chief Complaint: resp failure    Subjective     History of Present Illness    Subjective    History taken from: chart family Patient unable to give history due to patient sedation status.    No events overnight.  Pt currently sedated but arouses to voice after receiving morphine this am    Objective     Vital Signs  Temp:  [96.9 °F (36.1 °C)-97.9 °F (36.6 °C)] 97.8 °F (36.6 °C)  Heart Rate:  [71-80] 72  Resp:  [16-20] 16  BP: ()/(54-75) 96/54    Objective:  Vital signs: (most recent): Blood pressure 96/54, pulse 72, temperature 97.8 °F (36.6 °C), temperature source Axillary, resp. rate 16, height 157 cm (61.81\"), weight 60.8 kg (134 lb), SpO2 98 %, not currently breastfeeding.    Pupils:  Pupils are equal, round, and reactive to light.            Neurological Exam    Mental Status  The patient is arousable by verbal stimuli. She has poor attention and poor concentration. She follows one step commands.     Cranial Nerves    CN III, IV, VI: The patient's pupils are equally round and reactive to light and ocular movements are normal.  CN V: The patient has normal facial sensation  CN VII:  The patient has symmetric facial movement  CN VIII:  The patient's hearing is normal.  CN IX, X: The patient has symmetric palate movement and normal gag reflex.  CN XI: The patient's shoulder shrug strength is normal.  CN XII: The patient's tongue is midline without atrophy or fasciculations.    Motor  The patient has decreased muscle bulk throughout. Her overall muscle tone is decreased throughout. Specifically, the tone is decreased in the right arm, decreased in the left arm, decreased in the right leg, decreased in the left leg.      Results Review:     I reviewed the patient's new clinical results.  I reviewed the patient's new imaging results and agree with the interpretation.  I reviewed the patient's other test results and " agree with the interpretation     VPA 41 6/15/18     EEG - Generalized pseudo-periodic epileptiform discharges, greatest over the right temporoparietal head leads.  These discharges appear unchanged from yesterday's study, and the addition of Depakote appears to have had no effective impact.     Underlying background is mild-moderate generalized slow     No electrographic seizures are seen    Assessment/Plan     Principal Problem:    Acute respiratory failure  Active Problems:    Recent Bacteremia, MSSA    Metabolic encephalopathy    Epidural abscess - cervical spine s/p laminectomy and decompression 4/13/18    ESRD (end stage renal disease) on dialysis    Hypothyroidism    Chronic systolic heart failure    Type 2 diabetes mellitus with renal complication    Neuropathy    Atelectasis of left lung, recurrent    Quadriplegia    Pleural effusion on left    Pressure ulcer of coccygeal region    Wound infection, cervical, possible vs CFS leak    CVA (cerebral vascular accident)    Anemia      Assessment & Plan    1.  Stroke - likely embolic continue Elliquis, ASA, statin  2.  Encephalopathy - EEG with generalized slowing and pseudo epileptiform discharges.  Multi factorial due to stroke, CKD, infection.  Palliative care consulted for comfort measures      Aryan Velazquez MD  06/16/18  7:32 AM    Time:

## 2018-06-16 NOTE — SIGNIFICANT NOTE
06/16/18 1258   SLP Deferred Reason   SLP Deferred Reason Patient unavailable for evaluation  (Pt. not alert enough for PO trials for comfort diet recs at this time per RN. )

## 2018-06-16 NOTE — PROGRESS NOTES
"Intensive Care Follow-up     Hospital:  LOS: 8 days   Ms. Lizet Webster, 47 y.o. female is followed for:   Acute respiratory failure        Subjective   Interval History:  The chart is been reviewed. The patient has remained afebrile overnight. She continues to have no vision.    The patient's relevant past medical, surgical and social history were reviewed and updated in Epic as appropriate.        Objective     Infusions:     Medications:    apixaban 2.5 mg Oral Q12H   aspirin 81 mg Oral Daily   atorvastatin 20 mg Oral Nightly   ceFAZolin 2 g Intravenous Q24H   chlorhexidine 15 mL Mouth/Throat Q12H   DULoxetine 20 mg Oral Daily   epoetin mini 10,000 Units Subcutaneous Once per day on Mon Wed Fri   ferric gluconate (FERRLECIT) IVPB 125 mg Intravenous Daily   gabapentin 400 mg Oral Nightly   insulin regular 0-14 Units Subcutaneous Q6H   ipratropium-albuterol 3 mL Nebulization 4x Daily - RT   levothyroxine 75 mcg Oral Q AM   lidocaine 1 patch Transdermal Q24H   micafungin (MYCAMINE)  mg Intravenous Q24H   midodrine 5 mg Oral TID   nystatin  Topical Q12H   pantoprazole 40 mg Intravenous Q AM   PRO-STAT 1 packet Per G Tube BID   sertraline 100 mg Oral Daily   Valproic Acid 500 mg Per G Tube Q8H       Vital Sign Min/Max for last 24 hours  Temp  Min: 96.9 °F (36.1 °C)  Max: 98 °F (36.7 °C)   BP  Min: 96/54  Max: 121/75   Pulse  Min: 71  Max: 80   Resp  Min: 16  Max: 20   SpO2  Min: 86 %  Max: 100 %   Flow (L/min)  Min: 8  Max: 8       Input/Output for last 24 hour shift  06/15 0701 - 06/16 0700  In: 1051 [I.V.:236]  Out: 2400       Objective:  General Appearance:  In no acute distress, ill-appearing, uncomfortable and not in pain.    Vital signs: (most recent): Blood pressure 102/64, pulse 72, temperature 98 °F (36.7 °C), temperature source Axillary, resp. rate 16, height 157 cm (61.81\"), weight 60.8 kg (134 lb), SpO2 93 %, not currently breastfeeding.  No fever.    HEENT: (Tracheostomy tube in place; on TCT.  Right " tunneled internal jugular dialysis catheter.  Left internal jugular central venous catheter.  )    Lungs:  Normal effort and normal respiratory rate.  She is not in respiratory distress.  There are decreased breath sounds.  No rales, wheezes or rhonchi.  (Left thoracostomy tube in place with no air leak.)  Heart: Normal rate.  Regular rhythm.  S1 normal and S2 normal.  No murmur, gallop or friction rub.   Chest: Symmetric chest wall expansion.   Abdomen: Abdomen is soft and non-distended.  Bowel sounds are normal.   There is no abdominal tenderness.   There is no mass. There is no splenomegaly. There is no hepatomegaly.   Extremities: Decreased range of motion.  There is no deformity or dependent edema.  (She is not moving any of her extremities.)  Neurological: Patient is alert.  (The patient does arouse but is not interactive with me.).    Pupils:  Pupils are equal, round, and reactive to light.  Pupils are equal. (Nonreactive however equal pupils.).    Skin:  Warm, dry and pale.  No rash or cyanosis.               Results from last 7 days  Lab Units 06/16/18  0927 06/15/18  0337 06/14/18  0442   WBC 10*3/mm3 12.11* 18.51* 12.79*   HEMOGLOBIN g/dL 7.8* 8.6* 7.0*   PLATELETS 10*3/mm3 247 310 266       Results from last 7 days  Lab Units 06/16/18  0927 06/15/18  0337 06/14/18  0442 06/13/18  0429 06/12/18  0436   SODIUM mmol/L 138 134 138 133 137   POTASSIUM mmol/L 3.8 4.0 3.8 3.3* 3.6   CO2 mmol/L 24.0 20.0 23.0 22.0 21.0   BUN mg/dL 41* 49* 38* 61* 36*   CREATININE mg/dL 1.36* 2.14* 1.42* 2.03* 1.55*   MAGNESIUM mg/dL  --  2.2  --  2.3 2.2   PHOSPHORUS mg/dL 3.4  --  2.5 3.0 2.5   GLUCOSE mg/dL 85 174* 114* 155* 141*     Estimated Creatinine Clearance: 44.6 mL/min (A) (by C-G formula based on SCr of 1.36 mg/dL (H)).      Results from last 7 days  Lab Units 06/11/18  1538   PH, ARTERIAL pH units 7.461*   PCO2, ARTERIAL mm Hg 30.7*   PO2 ART mm Hg 134.0*       I reviewed the patient's results and images.      Assessment/Plan   Impression      Principal Problem:    Acute respiratory failure  Active Problems:    CVA (cerebral vascular accident)    Recent Bacteremia, MSSA    Metabolic encephalopathy    Epidural abscess - cervical spine s/p laminectomy and decompression 4/13/18    ESRD (end stage renal disease) on dialysis    Hypothyroidism    Chronic systolic heart failure    Type 2 diabetes mellitus with renal complication    Neuropathy    Atelectasis of left lung, recurrent    Quadriplegia    Pleural effusion on left    Pressure ulcer of coccygeal region    Wound infection, cervical, possible vs CFS leak    Anemia       Plan        Continue with current support.  Per nursing, the  would like to continue with dialysis for now. I have not spoken to him myself however.  Palliative care will continue to discuss goals of care with the family.  Continue with trach collar trials.  She will remain in the intensive care unit today.    Plan of care and goals reviewed with mulitdisciplinary team at daily rounds.   I discussed the patient's findings and my recommendations with patient and nursing staff         Denver Capone MD, [facility]  Pulmonary and Critical Care Medicine  06/16/18 10:36 AM

## 2018-06-16 NOTE — PLAN OF CARE
Problem: Patient Care Overview  Goal: Plan of Care Review  Outcome: Ongoing (interventions implemented as appropriate)   06/16/18 1030   Coping/Psychosocial   Plan of Care Reviewed With family   Plan of Care Review   Progress no change   OTHER   Outcome Summary Nursing performing daily dressing changes for sacral wound. PT initially consulted for debridement, but will d/c at this time due to decline in status and change to comfort measures/AND. Discussed with family. Nursing to continue with dressing changes. Please reconsult if needed.

## 2018-06-16 NOTE — THERAPY DISCHARGE NOTE
[id number] Care -  Wound/Debridement Discharge Summary  [facility]     Patient Name: Lizet Webster  : 1970  MRN: [id number]  Today's Date: 2018   Onset of Illness/Injury or Date of Surgery: 18  Date of Referral to PT: 18  Referring Physician: KIP Wise MD        Admit Date: 2018    Visit Dx:    ICD-10-CM ICD-9-CM   1. Pleural effusion on left J90 511.9   2. Wound infection, cervical, possible vs CFS leak T14.8XXA 958.3    L08.9    3. Impaired mobility and ADLs Z74.09 799.89   4. Impaired functional mobility, balance, gait, and endurance Z74.09 V49.89   5. Dysphagia, unspecified type R13.10 787.20   6. Acute respiratory failure with hypoxia J96.01 518.81       Patient Active Problem List   Diagnosis   • Recent Bacteremia, MSSA   • Metabolic encephalopathy   • Epidural abscess - cervical spine s/p laminectomy and decompression 18   • ESRD (end stage renal disease) on dialysis   • Hypothyroidism   • Chronic systolic heart failure   • Type 2 diabetes mellitus with renal complication   • Neuropathy   • Acute respiratory failure   • Atelectasis of left lung, recurrent   • Quadriplegia   • Pleural effusion on left   • Pressure ulcer of coccygeal region   • Wound infection, cervical, possible vs CFS leak   • CVA (cerebral vascular accident)   • Anemia             Therapy Treatment    Rash 18 0900 medial groin macular;papule (Active)   Distribution regional 2018 12:00 PM   Configuration/Shape asymmetric 2018 12:00 PM   Borders irregular 2018 12:00 PM   Characteristics moist 2018 12:00 PM   Color red 2018 12:00 PM       Wound 18 0215 Right gluteal pressure injury (Active)   Dressing Appearance dry;intact 2018 12:00 PM   Base necrotic 6/15/2018  8:00 PM   Periwound intact;dry;pink 6/15/2018  8:00 PM   Edges irregular 6/15/2018  8:00 PM   Drainage Amount scant 2018  6:00 AM   Care, Wound honey applied 2018  6:00 AM   Dressing Care,  Wound dressing changed 6/16/2018  6:00 AM       Wound 06/08/18 0215 anus pressure injury (Active)   Dressing Appearance dry;intact;no drainage 6/16/2018 12:00 PM   Periwound moist 6/16/2018 12:00 PM       Wound 06/08/18 0900 medial coccyx (Active)   Dressing Appearance dry;intact 6/16/2018 12:00 PM   Base black eschar 6/16/2018 12:00 PM   Edges open;irregular 6/16/2018 12:00 PM   Drainage Characteristics/Odor serosanguineous 6/16/2018 12:00 PM   Drainage Amount small 6/16/2018 12:00 PM       Wound 06/08/18 1550 Other (See comments) cervical spine incision (Active)   Dressing Appearance dry;intact;dried drainage 6/16/2018 12:00 PM   Base dressing in place, unable to visualize 6/16/2018 12:00 PM   Periwound intact;dry;pink 6/16/2018 12:00 PM   Drainage Amount none 6/16/2018 12:00 PM             PT IRF GOALS     Row Name 06/16/18 1030             Wound Care Goal 1 (PT)    Wound Care Goal 1 (PT) Decrease eschar/ non viable tissue by at least 25%; to promote granulation tissue formation.   -JM      Time Frame (Wound Care Goal 1, PT) 10 days  -JM      Barriers (Wound Care Goal 1, PT) Pt now comfort measures/AND  -JM      Progress/Outcome (Wound Care Goal 1, PT) goal no longer appropriate;medical status inhibiting progress  -        User Key  (r) = Recorded By, (t) = Taken By, (c) = Cosigned By    Initials Name Provider Type     Radha Corona, PT Physical Therapist          Physical Therapy Education     Title: PT OT SLP Therapies (Active)     Topic: Physical Therapy (Done)     Point: Mobility training (Done)    Learning Progress Summary     Learner Status Readiness Method Response Comment Documented by    Patient Done Acceptance E,D REDDY MCKEON 06/11/18 1553     Done Acceptance E MIKE   06/10/18 0758     Active Acceptance E NR  JERICHO 06/09/18 1415     Done Eager ISAAKD,TB MIKE   06/09/18 0712     Done Acceptance E NICOLE MCKEON 06/08/18 1306    Family Done Acceptance E NICOLE MCKEON   06/08/18 1306    Significant Other Done  Acceptance E,D VU,NR   06/11/18 1553     Done Acceptance E VU   06/10/18 0758     Done Eager E,D,TB VU   06/09/18 0712     Done Acceptance E VU,DU   06/08/18 1306          Point: Home exercise program (Done)    Learning Progress Summary     Learner Status Readiness Method Response Comment Documented by    Patient Done Acceptance E,D VU,NR   06/11/18 1553     Done Acceptance E VU   06/10/18 0758     Active Acceptance E NR   06/09/18 1415     Done Eager E,D,TB VU   06/09/18 0712     Done Acceptance E VU,DU   06/08/18 1306    Family Done Acceptance E VU,Counts include 234 beds at the [facility] 06/08/18 1306    Significant Other Done Acceptance E,D VU,NR   06/11/18 1553     Done Acceptance E VU   06/10/18 0758     Done Eager E,D,TB VU   06/09/18 0712     Done Acceptance E VU,Counts include 234 beds at the [facility] 06/08/18 1306          Point: Body mechanics (Done)    Learning Progress Summary     Learner Status Readiness Method Response Comment Documented by    Patient Done Acceptance E,D VU,NR   06/11/18 1553     Done Acceptance E VU   06/10/18 0758     Active Acceptance E NR   06/09/18 1415     Done Eager E,D,TB VU   06/09/18 0712     Done Acceptance E VU,DU   06/08/18 1306    Family Done Acceptance E VU,DU   06/08/18 1306    Significant Other Done Acceptance E,D VU,NR   06/11/18 1553     Done Acceptance E VU   06/10/18 0758     Done Eager E,D,TB VU   06/09/18 0712     Done Acceptance E VU,DU   06/08/18 1306          Point: Precautions (Done)    Learning Progress Summary     Learner Status Readiness Method Response Comment Documented by    Patient Done Acceptance E,D VU,NR   06/11/18 1553     Done Acceptance E VU   06/10/18 0758     Active Acceptance E NR   06/09/18 1415     Done Eager E,D,TB VU   06/09/18 0712     Done Acceptance E VU,DU   06/08/18 1306    Family Done Acceptance E VU,Counts include 234 beds at the [facility] 06/08/18 1306    Significant Other Done Acceptance E,D VU,NR   06/11/18 5844     Done Acceptance E MIKE VIDAL 06/10/18 0752     Done Eager  E,D,TB MIKE   06/09/18 0712     Done Acceptance E MIKENICOLE   06/08/18 1306                      User Key     Initials Effective Dates Name Provider Type Discipline    JERICHO 06/19/15 -  Alfreda Arita, PT Physical Therapist PT    LS 06/19/15 -  Kelli Manrique, PT Physical Therapist PT     02/14/17 -  Manju Tyler, RN Registered Nurse Nurse                   PT ASSESSMENT (last 72 hours)      IRF Physical Therapy Evaluation    No documentation.           PT Recommendation and Plan  Anticipated Discharge Disposition (PT): long term acute care facility  Planned Therapy Interventions (PT Eval): wound care  Therapy Frequency (PT Clinical Impression): 3 times/wk    Plan of Care Reviewed With: family   Progress: no change   Outcome Summary/Treatment Plan (PT)  Anticipated Discharge Disposition (PT): [facility] facility            Time Calculation      Therapy Suggested Charges     Code   Minutes Charges    None                   PT G-Codes  Outcome Measure Options: AM-PAC 6 Clicks Daily Activity (OT)      PT Discharge Summary  Anticipated Discharge Disposition (PT): long [facility] acute care facility  Reason for Discharge: Patient/Caregiver request  Outcomes Achieved: Unable to make functional progress toward goals at this time, Refer to plan of care for updates on goals achieved  Discharge Destination: [facility]        Radha Corona, PT  6/16/2018

## 2018-06-16 NOTE — PROGRESS NOTES
"   LOS: 8 days    Patient Care Team:  Luan Gandara MD as PCP - General (Internal Medicine)    Reason For Visit:  F/U ESRD.  Subjective           Review of Systems: UNOBTAINABLE         Objective       apixaban 2.5 mg Oral Q12H   aspirin 81 mg Oral Daily   atorvastatin 20 mg Oral Nightly   ceFAZolin 2 g Intravenous Q24H   chlorhexidine 15 mL Mouth/Throat Q12H   DULoxetine 20 mg Oral Daily   epoetin mini 10,000 Units Subcutaneous Once per day on Mon Wed Fri   ferric gluconate (FERRLECIT) IVPB 125 mg Intravenous Daily   gabapentin 400 mg Oral Nightly   insulin regular 0-14 Units Subcutaneous Q6H   ipratropium-albuterol 3 mL Nebulization 4x Daily - RT   levothyroxine 75 mcg Oral Q AM   lidocaine 1 patch Transdermal Q24H   micafungin (MYCAMINE)  mg Intravenous Q24H   midodrine 5 mg Oral TID   nystatin  Topical Q12H   pantoprazole 40 mg Intravenous Q AM   PRO-STAT 1 packet Per G Tube BID   sertraline 100 mg Oral Daily   Valproic Acid 500 mg Per G Tube Q8H            Vital Signs:  Blood pressure 96/54, pulse 72, temperature 97.8 °F (36.6 °C), temperature source Axillary, resp. rate 16, height 157 cm (61.81\"), weight 60.8 kg (134 lb), SpO2 98 %, not currently breastfeeding.    Flowsheet Rows      First Filed Value   Admission Height  157 cm (61.81\") Documented at 06/08/2018 0215   Admission Weight  61 kg (134 lb 7.7 oz) Documented at 06/08/2018 0215          06/15 0701 - 06/16 0700  In: 1051 [I.V.:236]  Out: 2400     Physical Exam:    General Appearance: NAD. UNRESPONSIVE.  Eyes: PER, conjunctivae and sclerae normal, no icterus  Lungs: FEW RHONCHI.  Heart/CV: regular rhythm & normal rate, no murmur, no gallop, no rub and no edema  Abdomen: not distended, soft, non-tender, no masses,  bowel sounds present  Skin: No rash, Warm and dry. TUNNELED HD CATH.    Radiology:            Labs:    Results from last 7 days  Lab Units 06/15/18  0337 06/14/18  0442 06/13/18  0429   WBC 10*3/mm3 18.51* 12.79* 15.12*   HEMOGLOBIN g/dL " 8.6* 7.0* 8.3*   HEMATOCRIT % 28.8* 24.3* 26.9*   PLATELETS 10*3/mm3 310 266 263       Results from last 7 days  Lab Units 06/15/18  0337 06/14/18  0442 06/13/18  0429 06/12/18  0436  06/11/18  0543   SODIUM mmol/L 134 138 133 137  < > 136   POTASSIUM mmol/L 4.0 3.8 3.3* 3.6  < > 4.3   CHLORIDE mmol/L 104 105 100 102  < > 99   CO2 mmol/L 20.0 23.0 22.0 21.0  < > 21.0   BUN mg/dL 49* 38* 61* 36*  < > 63*   CREATININE mg/dL 2.14* 1.42* 2.03* 1.55*  < > 2.36*   CALCIUM mg/dL 8.7 8.8 8.9 9.1  < > 8.8   PHOSPHORUS mg/dL  --  2.5 3.0 2.5  --  3.9   MAGNESIUM mg/dL 2.2  --  2.3 2.2  --  2.4   ALBUMIN g/dL  --  3.46 3.71 3.99  --  3.40   < > = values in this interval not displayed.    Results from last 7 days  Lab Units 06/15/18  0337   GLUCOSE mg/dL 174*         Results from last 7 days  Lab Units 06/11/18  0543   ALK PHOS U/L 128*   BILIRUBIN mg/dL 0.3   ALT (SGPT) U/L 2*   AST (SGOT) U/L 15       Results from last 7 days  Lab Units 06/11/18  1538   PH, ARTERIAL pH units 7.461*   PO2 ART mm Hg 134.0*   PCO2, ARTERIAL mm Hg 30.7*   HCO3 ART mmol/L 21.8             Estimated Creatinine Clearance: 28.4 mL/min (A) (by C-G formula based on SCr of 2.14 mg/dL (H)).      Assessment     Principal Problem:    Acute respiratory failure  Active Problems:    Recent Bacteremia, MSSA    Metabolic encephalopathy    Epidural abscess - cervical spine s/p laminectomy and decompression 4/13/18    ESRD (end stage renal disease) on dialysis    Hypothyroidism    Chronic systolic heart failure    Type 2 diabetes mellitus with renal complication    Neuropathy    Atelectasis of left lung, recurrent    Quadriplegia    Pleural effusion on left    Pressure ulcer of coccygeal region    Wound infection, cervical, possible vs CFS leak    CVA (cerebral vascular accident)    Anemia            Impression: ESRD. CVA. ANEMIA.            Recommendations: NEXT HD 0N 6/18/18.      Chris Oakley MD  06/16/18  8:39 AM

## 2018-06-16 NOTE — PLAN OF CARE
Problem: Patient Care Overview  Goal: Interprofessional Rounds/Family Conf  Outcome: Ongoing (interventions implemented as appropriate)  Palliative Team Conference: MENG Corona RN, CHPN; KEN Robles MD   06/16/18 8427   Interdisciplinary Rounds/Family Conf   Summary High residuals with TF; decreased rate, monitoring residual. Pt denies pain when asked; staff and spouse report pain with any movement/repositioning. Supportive presence to spouse, discussion about continued decline and monitoring burden/benefit of interventions. Continue current level of intervention. Ugo stated he feels HD causing pt discomfort, but is hesitant to discontinue due to concerns for maintaining comfort also. Palliative following for continued support to family and pt, continued GOC discussion.

## 2018-06-16 NOTE — PLAN OF CARE
Problem: Patient Care Overview  Goal: Plan of Care Review  Outcome: Ongoing (interventions implemented as appropriate)    Goal: Discharge Needs Assessment  Outcome: Ongoing (interventions implemented as appropriate)    Goal: Interprofessional Rounds/Family Conf  Outcome: Ongoing (interventions implemented as appropriate)      Problem: Mobility, Physical Impaired (Adult)  Goal: Enhanced Mobility Skills  Outcome: Ongoing (interventions implemented as appropriate)    Goal: Enhanced Functional Ability  Outcome: Ongoing (interventions implemented as appropriate)      Problem: Hemodialysis (Adult)  Goal: Signs and Symptoms of Listed Potential Problems Will be Absent, Minimized or Managed (Hemodialysis)  Outcome: Ongoing (interventions implemented as appropriate)      Problem: Wound (Includes Pressure Injury) (Adult)  Goal: Signs and Symptoms of Listed Potential Problems Will be Absent, Minimized or Managed (Wound)  Outcome: Ongoing (interventions implemented as appropriate)

## 2018-06-17 NOTE — PROGRESS NOTES
"   LOS: 9 days    Patient Care Team:  Luan Gandara MD as PCP - General (Internal Medicine)    Reason For Visit:  F/U ESRD.  Subjective           Review of Systems: UNOBTAINABLE.      Objective       apixaban 2.5 mg Oral Q12H   aspirin 81 mg Oral Daily   atorvastatin 20 mg Oral Nightly   ceFAZolin 2 g Intravenous Q24H   chlorhexidine 15 mL Mouth/Throat Q12H   DULoxetine 20 mg Oral Daily   epoetin mini 10,000 Units Subcutaneous Once per day on Mon Wed Fri   ferric gluconate (FERRLECIT) IVPB 125 mg Intravenous Daily   gabapentin 400 mg Oral Nightly   insulin regular 0-14 Units Subcutaneous Q6H   ipratropium-albuterol 3 mL Nebulization 4x Daily - RT   levothyroxine 75 mcg Oral Q AM   lidocaine 1 patch Transdermal Q24H   micafungin (MYCAMINE)  mg Intravenous Q24H   midodrine 5 mg Oral TID   nystatin  Topical Q12H   pantoprazole 40 mg Intravenous Q AM   PRO-STAT 1 packet Per G Tube BID   sertraline 100 mg Oral Daily   Valproic Acid 500 mg Per G Tube Q8H            Vital Signs:  Blood pressure 95/57, pulse 74, temperature 98 °F (36.7 °C), temperature source Axillary, resp. rate 14, height 157 cm (61.81\"), weight 60.8 kg (134 lb), SpO2 93 %, not currently breastfeeding.    Flowsheet Rows      First Filed Value   Admission Height  157 cm (61.81\") Documented at 06/08/2018 0215   Admission Weight  61 kg (134 lb 7.7 oz) Documented at 06/08/2018 0215          06/16 0701 - 06/17 0700  In: 1329 [I.V.:130]  Out: 230     Physical Exam:    General Appearance: AWAKE. NOT VERBAL NOW.  Eyes: PER, conjunctivae and sclerae normal, no icterus  Lungs: RHONCHI.  Heart/CV: regular rhythm & normal rate, no murmur, no gallop, no rub and no edema  Abdomen: not distended, soft, non-tender, no masses,  bowel sounds present  Skin: No rash, Warm and dry. TUNNELED HD CATH.    Radiology:            Labs:    Results from last 7 days  Lab Units 06/16/18  0927 06/15/18  0337 06/14/18  0442   WBC 10*3/mm3 12.11* 18.51* 12.79*   HEMOGLOBIN g/dL 7.8* " 8.6* 7.0*   HEMATOCRIT % 26.0* 28.8* 24.3*   PLATELETS 10*3/mm3 247 310 266       Results from last 7 days  Lab Units 06/16/18  0927 06/15/18  0337 06/14/18  0442 06/13/18  0429 06/12/18  0436  06/11/18  0543   SODIUM mmol/L 138 134 138 133 137  < > 136   POTASSIUM mmol/L 3.8 4.0 3.8 3.3* 3.6  < > 4.3   CHLORIDE mmol/L 102 104 105 100 102  < > 99   CO2 mmol/L 24.0 20.0 23.0 22.0 21.0  < > 21.0   BUN mg/dL 41* 49* 38* 61* 36*  < > 63*   CREATININE mg/dL 1.36* 2.14* 1.42* 2.03* 1.55*  < > 2.36*   CALCIUM mg/dL 8.6* 8.7 8.8 8.9 9.1  < > 8.8   PHOSPHORUS mg/dL 3.4  --  2.5 3.0 2.5  --  3.9   MAGNESIUM mg/dL  --  2.2  --  2.3 2.2  --  2.4   ALBUMIN g/dL 3.80  --  3.46 3.71 3.99  --  3.40   < > = values in this interval not displayed.    Results from last 7 days  Lab Units 06/16/18  0927   GLUCOSE mg/dL 85         Results from last 7 days  Lab Units 06/11/18  0543   ALK PHOS U/L 128*   BILIRUBIN mg/dL 0.3   ALT (SGPT) U/L 2*   AST (SGOT) U/L 15       Results from last 7 days  Lab Units 06/11/18  1538   PH, ARTERIAL pH units 7.461*   PO2 ART mm Hg 134.0*   PCO2, ARTERIAL mm Hg 30.7*   HCO3 ART mmol/L 21.8             Estimated Creatinine Clearance: 44.6 mL/min (A) (by C-G formula based on SCr of 1.36 mg/dL (H)).      Assessment     Principal Problem:    Acute respiratory failure  Active Problems:    Recent Bacteremia, MSSA    Metabolic encephalopathy    Epidural abscess - cervical spine s/p laminectomy and decompression 4/13/18    ESRD (end stage renal disease) on dialysis    Hypothyroidism    Chronic systolic heart failure    Type 2 diabetes mellitus with renal complication    Neuropathy    Atelectasis of left lung, recurrent    Quadriplegia    Pleural effusion on left    Pressure ulcer of coccygeal region    Wound infection, cervical, possible vs CFS leak    CVA (cerebral vascular accident)    Anemia            Impression: ESRD. ANEMIA. CVA.            Recommendations: HD 6/18/18. STOPPING RRT IS CERTAINLY REASONABLE  CONSIDERING THE OVERALL SITUATION IF FAMILY AGREEABLE.       Chris Oakley MD  06/17/18  7:29 AM

## 2018-06-17 NOTE — PROGRESS NOTES
Intensive Care Follow-up     Hospital:  LOS: 9 days   Ms. Lizet Webster, 47 y.o. female is followed for:   Acute respiratory failure        Subjective   Interval History:  The chart has been reviewed. The patient remains blind. I also reviewed the outflow of the left thoracostomy tube and has greater than 200 mL of fluid in the past 24 hours. She has remained afebrile. She is minimally responsive to command.    The patient's relevant past medical, surgical and social history were reviewed and updated in Epic as appropriate.        Objective     Infusions:     Medications:    apixaban 2.5 mg Oral Q12H   aspirin 81 mg Oral Daily   atorvastatin 20 mg Oral Nightly   ceFAZolin 2 g Intravenous Q24H   chlorhexidine 15 mL Mouth/Throat Q12H   DULoxetine 20 mg Oral Daily   epoetin mini 10,000 Units Subcutaneous Once per day on Mon Wed Fri   ferric gluconate (FERRLECIT) IVPB 125 mg Intravenous Daily   gabapentin 400 mg Oral Nightly   insulin regular 0-14 Units Subcutaneous Q6H   ipratropium-albuterol 3 mL Nebulization 4x Daily - RT   levothyroxine 75 mcg Oral Q AM   lidocaine 1 patch Transdermal Q24H   micafungin (MYCAMINE)  mg Intravenous Q24H   midodrine 5 mg Oral TID   nystatin  Topical Q12H   pantoprazole 40 mg Intravenous Q AM   PRO-STAT 1 packet Per G Tube BID   sertraline 100 mg Oral Daily   Valproic Acid 500 mg Per G Tube Q8H       Vital Sign Min/Max for last 24 hours  Temp  Min: 97.5 °F (36.4 °C)  Max: 98.5 °F (36.9 °C)   BP  Min: 95/57  Max: 115/70   Pulse  Min: 71  Max: 81   Resp  Min: 12  Max: 20   SpO2  Min: 90 %  Max: 100 %   Flow (L/min)  Min: 8  Max: 10       Input/Output for last 24 hour shift  06/16 0701 - 06/17 0700  In: 1329 [I.V.:130]  Out: 290       Objective:  General Appearance:  In no acute distress, ill-appearing, uncomfortable and not in pain.    Vital signs: (most recent): Blood pressure 101/58, pulse 71, temperature 97.5 °F (36.4 °C), temperature source Axillary, resp. rate 12, height 157 cm  "(61.81\"), weight 60.8 kg (134 lb), SpO2 97 %, not currently breastfeeding.  No fever.    HEENT: (Tracheostomy tube in place; on TCT.  Right tunneled internal jugular dialysis catheter.  Left internal jugular central venous catheter.  )    Lungs:  Normal effort and normal respiratory rate.  She is not in respiratory distress.  There are decreased breath sounds.  No rales, wheezes or rhonchi.  (Left thoracostomy tube in place with no air leak.)  Heart: Normal rate.  Regular rhythm.  S1 normal and S2 normal.  No murmur, gallop or friction rub.   Chest: Symmetric chest wall expansion.   Abdomen: Abdomen is soft and non-distended.  Bowel sounds are normal.   There is no abdominal tenderness.   There is no mass. There is no splenomegaly. There is no hepatomegaly.   Extremities: Decreased range of motion.  There is no deformity or dependent edema.  (She is not moving any of her extremities.)  Neurological: (The patient does arouse but is not interactive with me.).    Pupils:  Pupils are equal. (Left pupil is 5 mm the right pupil is 2-3 mm. Neither is reactive.).    Skin:  Warm, dry and pale.  No rash or cyanosis.               Results from last 7 days  Lab Units 06/17/18  0726 06/16/18  0927 06/15/18  0337   WBC 10*3/mm3 14.92* 12.11* 18.51*   HEMOGLOBIN g/dL 8.1* 7.8* 8.6*   PLATELETS 10*3/mm3 284 247 310       Results from last 7 days  Lab Units 06/17/18  0726 06/16/18  0927 06/15/18  0337 06/14/18  0442 06/13/18  0429 06/12/18  0436   SODIUM mmol/L 133 138 134 138 133 137   POTASSIUM mmol/L 4.6 3.8 4.0 3.8 3.3* 3.6   CO2 mmol/L 22.0 24.0 20.0 23.0 22.0 21.0   BUN mg/dL 57* 41* 49* 38* 61* 36*   CREATININE mg/dL 1.76* 1.36* 2.14* 1.42* 2.03* 1.55*   MAGNESIUM mg/dL  --   --  2.2  --  2.3 2.2   PHOSPHORUS mg/dL  --  3.4  --  2.5 3.0 2.5   GLUCOSE mg/dL 112* 85 174* 114* 155* 141*     Estimated Creatinine Clearance: 34.5 mL/min (A) (by C-G formula based on SCr of 1.76 mg/dL (H)).      Results from last 7 days  Lab Units " 06/11/18  1538   PH, ARTERIAL pH units 7.461*   PCO2, ARTERIAL mm Hg 30.7*   PO2 ART mm Hg 134.0*       I reviewed the patient's results and images.     Assessment/Plan   Impression      Principal Problem:    Acute respiratory failure  Active Problems:    CVA (cerebral vascular accident)    Recent Bacteremia, MSSA    Metabolic encephalopathy    Epidural abscess - cervical spine s/p laminectomy and decompression 4/13/18    ESRD (end stage renal disease) on dialysis    Hypothyroidism    Chronic systolic heart failure    Type 2 diabetes mellitus with renal complication    Neuropathy    Atelectasis of left lung, recurrent    Quadriplegia    Pleural effusion on left    Pressure ulcer of coccygeal region    Wound infection, cervical, possible vs CFS leak    Anemia       Plan        Continue with good pulmonary hygiene.  There has been no improvement in her loss of vision which is suspected secondary to her recent strokes.  She is only minimally responsive and I suspect that at some point hemodialysis should be held palliatively.  Follow-up labs and orders have been placed for tomorrow morning.  At this point, there is very little I can add to the patient's care.    Plan of care and goals reviewed with mulitdisciplinary team at daily rounds.   I discussed the patient's findings and my recommendations with patient, family and nursing staff         Denver Capone MD, [facility]  Pulmonary and Critical Care Medicine  06/17/18 12:03 PM

## 2018-06-17 NOTE — PROGRESS NOTES
INFECTIOUS DISEASE CONSULT/INITIAL HOSPITAL VISIT    [facility]  1970  [id number]    Date of Consult: 6/17/2018    Admission Date: 6/8/2018      Requesting Provider: Della Ca MD  Evaluating Physician: Jericho Marsh III, MD    Reason for Consultation: MSSA bacteremia, C3-C7 cervical epidural abscess and C5-C6 discitis    History of present illness:    Patient is a 47 y.o. female with h/o T2DM, ESRD/HD, severe peripheral neuropathy/wheelchair bound wjp we saw 4/13/18; for epidural abscess, MSSA bacteremia which was evacuated by neurosurgery in OR with C3-C7 laminectomy, facet screw fixation and posterior lateral fusion of C5-C7, anterior cervical corpectomy C6, abscess drainage and vertebral body replacement.  Patient was treated with cefazolin followed by nafcillin but had continual fevers despite antifungal coverage, line changes.  Patient treated for capnocytophaga pneumonia.  Ultimately had trach/peg and was transferred to LTAC in [place] and followed by Dr. Ordoñez.    Patient treated with meropenem at [place], ultimately had seizure and was changed to tigecycline. Patient had new wound in posterior neck with clear drainage  Patient transferred back here to evaluate for CSF leak;  To have surgery today.     remarks that she is intermittently moving both arms.  Per Dr. Jones large left pleural effusion has been treated with a pigtail drain.      Patient is afebrile, hemodynamically stable    Patient awake on vent, voices words on request.  ROS unobtainable.    6/15/18;  Asleep on vent, no movement, in no distress; family meeting for goals of care today;    6/17/18; afebrile hemodynamically stable resting quietly in room; no family currently present ros unobtainable     Past Medical History:   Diagnosis Date   • CAD (coronary artery disease)     stentsx4 2011   • Diabetes mellitus     ESRD, peripheral neuropathy   • ESRD (end stage renal disease) on dialysis 4/12/2018   • Hx of hepatitis    •  Hypothyroidism 4/12/2018   • Systolic heart failure 04/12/2018    EF 40%       Past Surgical History:   Procedure Laterality Date   • ANTERIOR CERVICAL DISCECTOMY W/ FUSION N/A 4/13/2018    Procedure: ANTERIOR CERVICAL CORPECTOMY;  Surgeon: Aryan Reed MD;  Location:  [facility];  Service: Neurosurgery   • ARTERIOVENOUS FISTULA Left    • ARTERIOVENOUS FISTULA/SHUNT SURGERY Left 5/25/2018    Procedure: REMOVAL OF CLOT FROM LEFT ARM GRAFT;  Surgeon: Jeevan Montoya MD;  Location:  [facility];  Service: Vascular   • BRONCHOSCOPY N/A 4/21/2018    Procedure: BRONCHOSCOPY AT BEDSIDE;  Surgeon: Della Ca MD;  Location:  [facility];  Service: Pulmonary   • BRONCHOSCOPY N/A 4/24/2018    Procedure: BRONCHOSCOPY AT BEDSIDE;  Surgeon: Nghia Gifford MD;  Location:  [facility];  Service: Pulmonary   • BRONCHOSCOPY N/A 4/26/2018    Procedure: BRONCHOSCOPY AT BEDSIDE;  Surgeon: Denver Capone MD;  Location:  [facility];  Service: Pulmonary   • BRONCHOSCOPY N/A 5/31/2018    Procedure: BRONCHOSCOPY @ BEDSIDE;  Surgeon: Bolivar Mckeon MD;  [facility]:  [facility];  Service: Pulmonary   • BRONCHOSCOPY N/A 6/7/2018    Procedure: BRONCHOSCOPY @ BEDSIDE;  Surgeon: Bolivar Mckeon MD;  [facility]:  [facility];  Service: Pulmonary   • CENTRAL VENOUS LINE INSERTION Left 6/2/2018    Procedure: CENTRAL VENOUS LINE INSERTION;  Surgeon: Modesta Ontiveros MD;  [facility]:  [facility];  Service: General   • CERVICAL LAMINECTOMY DECOMPRESSION POSTERIOR N/A 4/13/2018    Procedure: CERVICAL LAMINECTOMY DECOMPRESSION POSTERIOR;  Surgeon: Aryan Reed MD;  [facility]:  [facility];  Service: Neurosurgery   • CERVICAL LAMINECTOMY DECOMPRESSION POSTERIOR N/A 6/8/2018    Procedure: POSTERIOR CERVICAL WOUND DEBRIDEMENT AND CLOSURE WITH HARDWARE REMOVAL;  Surgeon: Aryan Reed MD;  [facility]:  [facility];  Service: Neurosurgery   • CORONARY ANGIOPLASTY WITH STENT PLACEMENT  2011    4 stents   • HYSTERECTOMY     • INSERTION  HEMODIALYSIS CATHETER N/A 5/26/2018    Procedure: HEMODIALYSIS CATHETER INSERTION;  Surgeon: Jeevan Montoya MD;  [facility]:  [facility];  Service: General   • INSERTION HEMODIALYSIS CATHETER Left 5/30/2018    Procedure: REPLACEMENT  OF LEFT CHEST WALL HEMODIALYSIS CATHETER;  Surgeon: Jeevan Montoya MD;  [facility]:  [facility];  Service: General   • INSERTION HEMODIALYSIS CATHETER Right 6/2/2018    Procedure: HEMODIALYSIS CATHETER INSERTION;  Surgeon: Modesta Ontiveros MD;  [facility]  [facility];  Service: General   • REMOVAL PERITONEAL DIALYSIS CATHETER Left 6/2/2018    Procedure: REMOVAL TUNNELED DIALYSIS CATHETER;  Surgeon: Modesta Ontiveros MD;  [facility]:  [facility];  Service: General   • TRACHEOSTOMY AND PEG TUBE INSERTION N/A 4/24/2018    Procedure: TRACHEOSTOMY AND PERCUTANEOUS ENDOSCOPIC GASTROSTOMY TUBE INSERTION;  Surgeon: Denver Alves MD;  [facility]:  [facility];  Service: General       Family History   Problem Relation Age of Onset   • Family history unknown: Yes       Social History     Social History   • Marital status:      Spouse name: N/A   • Number of children: 2   • Years of education: N/A     Occupational History   •  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  Has been in a wheelchair because of severe peripheral neuropathy.        Allergies   Allergen Reactions   • Zosyn [Piperacillin Sod-Tazobactam So] Itching         Medication:    Current Facility-Administered Medications:   •  acetaminophen (TYLENOL) tablet 650 mg, 650 mg, Oral, Q4H PRN **OR** [DISCONTINUED] acetaminophen (TYLENOL) suppository 650 mg, 650 mg, Rectal, Q4H PRN, LAYTON Souza  •  ALPRAZolam (XANAX) tablet 0.5 mg, 0.5 mg, Oral, TID PRN, Narinder Arroyo  MD, 0.5 mg at 06/16/18 2257  •  apixaban (ELIQUIS) tablet 2.5 mg, 2.5 mg, Oral, Q12H, Chris Tyler IV, RPH, 2.5 mg at 06/17/18 0821  •  aspirin chewable tablet 81 mg, 81 mg, Oral, Daily, Gene Lopez, APRN, 81 mg at 06/17/18 0817  •  atorvastatin (LIPITOR) tablet 20 mg, 20 mg, Oral, Nightly, Мария Suárez MD, 20 mg at 06/16/18 2257  •  ceFAZolin in dextrose (ANCEF) IVPB solution 2 g, 2 g, Intravenous, Q24H, Chris Tyler IV, RPH, 2 g at 06/17/18 1642  •  chlorhexidine (PERIDEX) 0.12 % solution 15 mL, 15 mL, Mouth/Throat, Q12H, Jyothi Carrasco, APRN, 15 mL at 06/17/18 0821  •  DULoxetine (CYMBALTA) DR capsule 20 mg, 20 mg, Oral, Daily, Jyothi Carrasco APRN, 20 mg at 06/17/18 0817  •  epoetin mini (EPOGEN,PROCRIT) injection 10,000 Units, 10,000 Units, Subcutaneous, Once per day on Mon Wed Fri, LAYTON Souza, 10,000 Units at 06/15/18 0808  •  ferric gluconate (FERRLECIT) 125 mg in sodium chloride 0.9 % 110 mL IVPB, 125 mg, Intravenous, Daily, Chris Oakley MD, Last Rate: 55 mL/hr at 06/17/18 1000, 125 mg at 06/17/18 1000  •  gabapentin (NEURONTIN) capsule 400 mg, 400 mg, Oral, Nightly, Jyothi Carrasco APRN, 400 mg at 06/16/18 2257  •  heparin (porcine) injection 1,000 Units, 1,000 Units, Intracatheter, PRN, Narayan Holguin MD, 1,000 Units at 06/13/18 0945  •  heparin (porcine) injection 1,000 Units, 1,000 Units, Intracatheter, PRN, Chris Oakley MD  •  hydrALAZINE (APRESOLINE) injection 5 mg, 5 mg, Intravenous, Q10 Min PRN, Aguila Roman CRNA  •  insulin regular (humuLIN R,novoLIN R) injection 0-14 Units, 0-14 Units, Subcutaneous, Q6H, LAYTON Souza, 3 Units at 06/15/18 1251  •  ipratropium-albuterol (DUO-NEB) nebulizer solution 3 mL, 3 mL, Nebulization, 4x Daily - RT, Ap Wise MD, 3 mL at 06/17/18 1606  •  ipratropium-albuterol (DUO-NEB) nebulizer solution 3 mL, 3 mL, Nebulization, Once PRN, Aguila Roman CRNA  •  labetalol (NORMODYNE,TRANDATE) injection 5 mg, 5  mg, Intravenous, Q5 Min PRN, Aguila Roman CRNA  •  lactated ringers bolus 500 mL, 500 mL, Intravenous, Once PRN, Aguila Roman CRNA  •  levothyroxine (SYNTHROID, LEVOTHROID) tablet 75 mcg, 75 mcg, Oral, Q AM, Jyothi Carrasco, APRN, 75 mcg at 06/17/18 0646  •  lidocaine (LIDODERM) 5 % 1 patch, 1 patch, Transdermal, Q24H, Jyothi Carrasco APRN, 1 patch at 06/17/18 0818  •  lidocaine PF 1% (XYLOCAINE) injection 0.5 mL, 0.5 mL, Injection, Once PRN, Luis Lawson MD  •  micafungin 100 mg/100 mL 0.9% NS IVPB (mbp), 100 mg, Intravenous, Q24H, Jericho Marsh MD, 100 mg at 06/17/18 1152  •  Morphine PF injection 2 mg, 2 mg, Intravenous, Q4H PRN, Keith Villalta, APRN, 2 mg at 06/17/18 0646  •  naloxone (NARCAN) injection 0.4 mg, 0.4 mg, Intravenous, PRN, Aguila Roman CRNA  •  nystatin (MYCOSTATIN) powder, , Topical, Q12H, Ap Wise MD  •  ondansetron (ZOFRAN) injection 4 mg, 4 mg, Intravenous, Once PRN, Aguila Roman CRNA  •  oxyCODONE (ROXICODONE) 5 MG/5ML solution 5 mg, 5 mg, Per G Tube, Q4H PRN, Denver Capone MD, 5 mg at 06/17/18 0646  •  pantoprazole (PROTONIX) injection 40 mg, 40 mg, Intravenous, Q AM, Eulalio Prajapati, [facility], 40 mg at 06/17/18 0646  •  PRO-STAT 1 packet, 1 packet, Per G Tube, BID, Saima Nolen, RD, 1 packet at 06/17/18 0822  •  promethazine (PHENERGAN) injection 6.25 mg, 6.25 mg, Intravenous, Once PRN **OR** promethazine (PHENERGAN) injection 6.25 mg, 6.25 mg, Intramuscular, Once PRN **OR** promethazine (PHENERGAN) suppository 25 mg, 25 mg, Rectal, Once PRN **OR** promethazine (PHENERGAN) tablet 25 mg, 25 mg, Oral, Once PRN, Aguila Roman CRNA  •  sertraline (ZOLOFT) tablet 100 mg, 100 mg, Oral, Daily, Jyothi Carrasco, LAYTON, 100 mg at 06/17/18 0818  •  sodium chloride 0.9 % flush 1-10 mL, 1-10 mL, Intravenous, PRN, Jyothi Carrasco, APRN  •  sodium chloride 0.9 % flush 1-10 mL, 1-10 mL, Intravenous, PRN, Luis Lawson MD  •  sodium chloride 0.9 % flush 1-10 mL, 1-10 mL,  Intravenous, PRN, Aguila Roman, CRNA  •  traMADol (ULTRAM) tablet 50 mg, 50 mg, Oral, Q6H PRN, Jyothi Carrasco, APRN, 50 mg at 18 2128  •  Valproic Acid (DEPAKENE) syrup 500 mg, 500 mg, Per G Tube, Q8H, Мария Suárez MD, 500 mg at 18 1411    Antibiotics:  Anti-Infectives     Ordered     Dose/Rate Route Frequency Start Stop    18 1029  ceFAZolin in dextrose (ANCEF) IVPB solution 2 g     Ordering Provider:  Chris Tyler IV, RPH    2 g  over 30 Minutes Intravenous Every 24 Hours 18 1600 18 1559    18 0356  micafungin 100 mg/100 mL 0.9% NS IVPB (mbp)     Jericho Marsh MD reviewed the order on 18 1441.   Ordering Provider:  Jericho Marsh MD    100 mg  over 60 Minutes Intravenous Every 24 Hours 18 1200 18 1432            Review of Systems:  Unable to obtain secondary to sedation/vent.    Fh/sh unobtainable  Physical Exam:   Vital Signs  Temp (24hrs), Av °F (36.7 °C), Min:97.5 °F (36.4 °C), Max:98.5 °F (36.9 °C)    Temp  Min: 97.5 °F (36.4 °C)  Max: 98.5 °F (36.9 °C)  BP  Min: 92/62  Max: 115/70  Pulse  Min: 70  Max: 81  Resp  Min: 12  Max: 20  SpO2  Min: 90 %  Max: 100 %    GENERAL: sedated on mechanical vent through trach in neck  HEENT: Normocephalic, atraumatic. No conjunctival injection. No icterus. Oral vent      HEART: RRR; No murmur,  LUNGS: Diminished at lung bases bilaterally  ABDOMEN: Soft, nondistended. Hypoactive bowel sounds.  EXT:  No cyanosis, clubbing or edema. No cord.    MSK: No joint swelling or erythema  SKIN: Warm and dry without cutaneous eruptions on Inspection/palpation.      Laboratory Data      Results from last 7 days  Lab Units 18  0726 18  0927 06/15/18  0337   WBC 10*3/mm3 14.92* 12.11* 18.51*   HEMOGLOBIN g/dL 8.1* 7.8* 8.6*   HEMATOCRIT % 26.9* 26.0* 28.8*   PLATELETS 10*3/mm3 284 247 310       Results from last 7 days  Lab Units 18  0726   SODIUM mmol/L 133   POTASSIUM mmol/L 4.6   CHLORIDE  mmol/L 102   CO2 mmol/L 22.0   BUN mg/dL 57*   CREATININE mg/dL 1.76*   GLUCOSE mg/dL 112*   CALCIUM mg/dL 8.7       Results from last 7 days  Lab Units 06/11/18  0543   ALK PHOS U/L 128*   BILIRUBIN mg/dL 0.3   ALT (SGPT) U/L 2*   AST (SGOT) U/L 15       Results from last 7 days  Lab Units 06/14/18  0933   SED RATE mm/hr 7       Results from last 7 days  Lab Units 06/11/18  1757   CRP mg/dL 4.66*       Results from last 7 days  Lab Units 06/11/18  1756   LACTATE mmol/L 0.7             Estimated Creatinine Clearance: 34.5 mL/min (A) (by C-G formula based on SCr of 1.76 mg/dL (H)).      Microbiology:  Blood Culture   Date Value Ref Range Status   04/13/2018 No growth at less than 24 hours  Preliminary   04/13/2018 No growth at less than 24 hours  Preliminary           Neck surgical abscess cx (4/13) pending                 Radiology:  Imaging Results (last 72 hours)     Procedure Component Value Units Date/Time    CT Head Without Contrast [[id number]] Updated:  04/13/18 1220    XR Chest 1 View [[id number]] Collected:  04/13/18 0823     Updated:  04/13/18 0826    Narrative:          EXAMINATION: XR CHEST 1 VW-      INDICATION: et placement; G06.1-Intraspinal abscess and granuloma      COMPARISON: 4/12/2018     FINDINGS: The heart size is normal. There are areas of bibasilar  atelectasis. A central venous catheter is well-positioned. An  endotracheal tube is well-positioned. There has been interval surgery in  the lower cervical spine.           Impression:       Endotracheal tube is well-positioned. There is bibasilar  atelectasis.     This report was finalized on 4/13/2018 8:24 AM by Dr. Anthony Perera MD.       XR Spine Cervical Lateral [[id number]] Collected:  04/13/18 0812     Updated:  04/13/18 0812    Narrative:       EXAMINATION: XR SPINE CERVICAL LATERAL-      INDICATION: ACDF; G06.1-Intraspinal abscess and granuloma.      COMPARISON: None.     FINDINGS: Portable lateral cervical spine reveals a radiopaque  marker  seen anteriorly at the C6-C7 disc space.  Please see the procedural  report for full details.       Impression:       Anterior localization of the C5-C6 disc space.     D:  04/13/2018  E:  04/13/2018       XR Spine Cervical Lateral [[id number]] Collected:  04/13/18 0812     Updated:  04/13/18 0812    Narrative:       EXAMINATION: XR SPINE CERVICAL LATERAL-      INDICATION: Cervical laminectomy decompression posterior with  fusion/ACDF; G06.1-Intraspinal abscess and granuloma.      COMPARISON: 04/13/2018.     FINDINGS: Portable lateral cervical spine reveals anterior fusion of the  C5, C6, and C7 levels. No definite malalignment. Lines and tubes  overlying the soft tissues. Postsurgical changes seen within the soft  tissues.           Impression:       Anterior fusion of C5 through C7.     D:  04/13/2018  E:  04/13/2018          XR Abdomen KUB [[id number]] Collected:  04/13/18 0001     Updated:  04/13/18 0111    Narrative:       EXAM:    XR Abdomen, 1 View    CLINICAL HISTORY:    47 years old, female; Intraspinal abscess and granuloma; Device placement; Gi   device; Nasogastric tube; Additional info: Ng tube placement    TECHNIQUE:    Frontal supine view of the abdomen/pelvis.    COMPARISON:    No relevant prior studies available.    FINDINGS:     Tip of NASOGASTRIC/ENTERIC tube distal to the gastroesophageal junction in   the gastric antrum/pylorus.      Impression:         As above.    THIS DOCUMENT HAS BEEN ELECTRONICALLY SIGNED BY ANDI KHANNA MD    XR Chest 1 View [[id number]] Collected:  04/12/18 2342     Updated:  04/13/18 0110    Narrative:       EXAM:    XR Chest, 1 View    CLINICAL HISTORY:    47 years old, female; Intraspinal abscess and granuloma; Signs and symptoms;   Shortness of breath; Additional info: Metabolic encephalopathy, resp failure    TECHNIQUE:    Frontal view of the chest.    COMPARISON:    No relevant prior studies available.    FINDINGS:    Decreased lung volumes bilaterally,  predominantly RIGHT lung base   atelectasis, crowding of the vessels without evidence of consolidation.   Otherwise normal appearing lungs and mediastinum. No effusion or pneumothorax.   Cardiomediastinal silhouette is normal.       LEFT sided CENTRAL VENOUS line with its tip in the SVC.  Stent within the   LEFT axilla/upper arm. Tip of NASOGASTRIC/ENTERIC tube is not visualized and   extends beyond the gastroesophageal junction into the LEFT upper abdomen.       Impression:         No active lung parenchymal lesion.     THIS DOCUMENT HAS BEEN ELECTRONICALLY SIGNED BY ANDI KHANNA MD            Impression:   chronic respiratory failure on vent  Epidural abscess MSSA 4/13/18  ? CSF leak  ESRD  Left pleural effusion  S/p decompression 4/13/18.  ? Seizure while on meropenem  Sputum cultures growing yeast, mold 5/25/18    Patient looks markedly better than last admission;    Other than risk of fevers; patient should finish 8 week  Course of cefazolin from 4/13/18 (~6-8-18)  Thereafter we ususally offer 3-6 months of oral abx Keflex      Cont cefazolin 2 g iv q24h (dosed for ESRD); this can be stopped at anytime  F/u  Pleural cultures; appear transudative by studies    Poor prognosis; change made to AND status;    I agree that patient has poor prognosis and with vision loss, ESRD, quadriplegia very reasonable to ask family to stop dialysis given that patient has little hope for recovery.

## 2018-06-17 NOTE — PROGRESS NOTES
"Palliative Care Progress Note    Date of Admission: 6/8/2018    Subjective:   Had \"a good day yesterday\" per  with visit from 1 and 3 yo grandbabies.  Pt was interactive, laughed and smiled for them.  Today, however, pt has slept all day, not interacting at all.  No response even with turning and personal care.  TF rate down to 20cc due to residuals.  Coccyx wound also possibly more foul in odor.  NO respiratory distress.  Sats and VS stable.    Objective: BP 98/60   Pulse 69   Temp 98.1 °F (36.7 °C) (Oral)   Resp 16   Ht 157 cm (61.81\")   Wt 60.8 kg (134 lb)   LMP  (LMP Unknown)   SpO2 98%   BMI 24.66 kg/m²      Intake/Output Summary (Last 24 hours) at 06/17/18 1858  Last data filed at 06/17/18 1800   Gross per 24 hour   Intake             1541 ml   Output              600 ml   Net              941 ml     Physical Exam:  General: middle aged F, in bed, ill-appearing, NAD    Head/Neck:  NC/AT, trach tube in place  EENT: eyes closed, patent nares, oral mucosa moist  Lungs:  Normal effort and respiratory rate.  Decreased breath sounds. Left thoracostomy tube in place.  Heart: Normal rate.  Regular rhythm. No murmur.   Abdomen: soft, nontender, and non-distended.   Extremities: No edema.  Not moving extremities.  Neurological: Does not arouse or interact for my exam  Psych: Calm, no evidence of discomfort or restlessness    Skin:  Warm, dry and pale.         Results from last 7 days  Lab Units 06/17/18  0726   WBC 10*3/mm3 14.92*   HEMOGLOBIN g/dL 8.1*   HEMATOCRIT % 26.9*   PLATELETS 10*3/mm3 284       Results from last 7 days  Lab Units 06/17/18  0726  06/11/18  0543   SODIUM mmol/L 133  < > 136   POTASSIUM mmol/L 4.6  < > 4.3   CHLORIDE mmol/L 102  < > 99   CO2 mmol/L 22.0  < > 21.0   BUN mg/dL 57*  < > 63*   CREATININE mg/dL 1.76*  < > 2.36*   CALCIUM mg/dL 8.7  < > 8.8   BILIRUBIN mg/dL  --   --  0.3   ALK PHOS U/L  --   --  128*   ALT (SGPT) U/L  --   --  2*   AST (SGOT) U/L  --   --  15   GLUCOSE " "mg/dL 112*  < > 209*   < > = values in this interval not displayed.    Principal Problem:    Acute respiratory failure  Active Problems:    CVA (cerebral vascular accident)    Recent Bacteremia, MSSA    Metabolic encephalopathy    Epidural abscess - cervical spine s/p laminectomy and decompression 4/13/18    ESRD (end stage renal disease) on dialysis    Hypothyroidism    Chronic systolic heart failure    Type 2 diabetes mellitus with renal complication    Neuropathy    Atelectasis of left lung, recurrent    Quadriplegia    Pleural effusion on left    Pressure ulcer of coccygeal region    Wound infection, cervical, possible vs CFS leak    Anemia    Symptoms:  Encephalopathy  Debility  Blindness  MSK pain    Plan:  Sat with  and brother for long long discussion.  Ugo knows pt has declined even further (even less interaction, wound, poor tolerance of TF), which is hard to reconcile with her \"good day\" yesterday and stable vital signs.  Says \"I know where this is going.\"    Ugo reiterates he knows pt is dying and wants to make decisions for care that will keep pt comfortable. Biggest concern for him is dialysis.  Sees that it has drained her energy and caused discomfort before but also has seen pt \"swell up\" and worried about pt \"drowning in fluid.\"  Having a hard time getting past what symptoms pt might have w/o dialysis.  He sees the pt still has steady chest tube output and understands she is getting some fluid bc of TF and IV abx.    Reviewed pt's prognosis and decline.  Worked to educate Ugo about what we might see with discontinuation of dialysis and how we would treat symptoms with medication.  Touched on other treatments of TF and IV abx and lessening fluid if those interventions were also discontinued.      At this point, Ugo would like to proceed with planned dialysis Monday to see how pt tolerates it.  Also interested in ID input and whether wound has worsened and deveoped infection despite pt " being on abx.  Provided support and family very appreciate of discussion today.  Continuing to take things day by day.      Total time: 60 min  Face to face time: 45 min    Michelle Robles MD  06/17/18  6:58 PM

## 2018-06-17 NOTE — PLAN OF CARE
Problem: Patient Care Overview  Goal: Plan of Care Review  Outcome: Ongoing (interventions implemented as appropriate)  Pt has become less responsive throughout the day, but VS remain stable. Family has decided to continue to HD despite the poor prognosis and will re-evaluate GOC day by day.

## 2018-06-18 NOTE — PROGRESS NOTES
Continued Stay Note  [facility]     Patient Name: Lizet Webster  MRN: [id number]  Today's Date: 6/18/2018    Admit Date: 6/8/2018          Discharge Plan     Row Name 06/18/18 1347       Plan    Plan TBD    Patient/Family in Agreement with Plan yes    Plan Comments Patient has poor prognosis. Patient is being followed by Palliative Care.              Discharge Codes    No documentation.       Expected Discharge Date and Time     Expected Discharge Date Expected Discharge Time    Jun 16, 2018             Della Baptiste RN

## 2018-06-18 NOTE — PROGRESS NOTES
Pulmonary Service Follow-up      LOS: 10 days     Ms. Lizet Webster, 47 y.o. female is followed for: Acute respiratory failure     Subjective - Interval History     Remains obtunded and noncommunicative  On tracheostomy collar  Currently undergoing hemodialysis    The patient's relevant past medical, surgical and social history were reviewed and updated in Epic as appropriate.     Objective     Medications:    apixaban 2.5 mg Oral Q12H   aspirin 81 mg Oral Daily   atorvastatin 20 mg Oral Nightly   ceFAZolin 2 g Intravenous Q24H   chlorhexidine 15 mL Mouth/Throat Q12H   DULoxetine 20 mg Oral Daily   epoetin mini 10,000 Units Subcutaneous Once per day on Mon Wed Fri   ferric gluconate (FERRLECIT) IVPB 125 mg Intravenous Daily   gabapentin 400 mg Oral Nightly   insulin regular 0-14 Units Subcutaneous Q6H   ipratropium-albuterol 3 mL Nebulization 4x Daily - RT   levothyroxine 75 mcg Oral Q AM   lidocaine 1 patch Transdermal Q24H   micafungin (MYCAMINE)  mg Intravenous Q24H   midodrine 5 mg Oral TID AC   nystatin  Topical Q12H   pantoprazole 40 mg Intravenous Q AM   PRO-STAT 1 packet Per G Tube BID   sertraline 100 mg Oral Daily   Valproic Acid 500 mg Per G Tube Q8H     Intake/Output       06/17/18 0700 - 06/18/18 0659 06/18/18 0700 - 06/19/18 0659    Intake (ml) 1813 0    Output (ml) 680 110    Net (ml) 1133 -110        Vital Sign Min/Max for last 24 hours  Temp  Min: 97.4 °F (36.3 °C)  Max: 98.1 °F (36.7 °C)   BP  Min: 83/50  Max: 112/61   Pulse  Min: 68  Max: 81   Resp  Min: 12  Max: 22   SpO2  Min: 91 %  Max: 100 %   No Data Recorded        Physical Exam:   GENERAL: Not responsive, no distress   HEENT: Tunneled dialysis catheter site okay, left subclavian vein deep line site okay, trach site, okay   LUNGS: Decreased breath sounds bilaterally without wheezes   HEART: Regular rate and rhythm with distant heart sounds   ABDOMEN: Soft.  Quiet.  PEG site okay   EXTREMITIES: Trace edema.  No cyanosis   NEURO/PSYCH:  Unresponsive.  Doesn't follow commands      Results from last 7 days  Lab Units 06/18/18  0610 06/17/18  0726 06/16/18  0927   WBC 10*3/mm3 14.99* 14.92* 12.11*   HEMOGLOBIN g/dL 8.4* 8.1* 7.8*   PLATELETS 10*3/mm3 255 284 247       Results from last 7 days  Lab Units 06/18/18  0610 06/17/18  0726 06/16/18  0927 06/15/18  0337 06/14/18  0442 06/13/18  0429   SODIUM mmol/L 134 133 138 134 138 133   POTASSIUM mmol/L 5.3 4.6 3.8 4.0 3.8 3.3*   CO2 mmol/L 21.0 22.0 24.0 20.0 23.0 22.0   BUN mg/dL 72* 57* 41* 49* 38* 61*   CREATININE mg/dL 2.23* 1.76* 1.36* 2.14* 1.42* 2.03*   MAGNESIUM mg/dL 2.3  --   --  2.2  --  2.3   PHOSPHORUS mg/dL  --   --  3.4  --  2.5 3.0   GLUCOSE mg/dL 129* 112* 85 174* 114* 155*     Estimated Creatinine Clearance: 27.2 mL/min (A) (by C-G formula based on SCr of 2.23 mg/dL (H)).      Results from last 7 days  Lab Units 06/11/18  1538   PH, ARTERIAL pH units 7.461*   PCO2, ARTERIAL mm Hg 30.7*   PO2 ART mm Hg 134.0*            Images: Chest x-ray continues to show basilar atelectasis and effusions    I reviewed the patient's results and images.     Impression      Hospital Problem List     * (Principal)Acute respiratory failure    Recent Bacteremia, MSSA    Metabolic encephalopathy    Epidural abscess - cervical spine s/p laminectomy and decompression 4/13/18    ESRD (end stage renal disease) on dialysis    Hypothyroidism    Chronic systolic heart failure    Type 2 diabetes mellitus with renal complication    Neuropathy    Atelectasis of left lung, recurrent    Overview Deleted 6/8/2018  3:51 AM by LAYTON Souza            Quadriplegia    Pleural effusion on left, s/p pigtail CT, with persistent drainage    Pressure ulcer of coccygeal region    Wound infection, cervical, possible vs CFS leak    CVA (cerebral vascular accident)    Anemia               Plan        Continue current level of care  Continue to discuss goals of care with the patient's   At this point barely tolerating  hemodialysis due to marginal blood pressure  Prognosis grim and likelihood of significant improvement unlikely    I discussed the patient's findings and my recommendations with nursing staff and consulting provider       FABIANA Arroyo MD  Pulmonary and Critical Care Medicine

## 2018-06-18 NOTE — PROGRESS NOTES
"   LOS: 10 days    Patient Care Team:  Luan Gandara MD as PCP - General (Internal Medicine)    Reason For Visit:  F/U ESRD. SEEN ON DIALYSIS.  Subjective           Review of Systems: UNOBTAINABLE.      Objective       apixaban 2.5 mg Oral Q12H   aspirin 81 mg Oral Daily   atorvastatin 20 mg Oral Nightly   ceFAZolin 2 g Intravenous Q24H   chlorhexidine 15 mL Mouth/Throat Q12H   DULoxetine 20 mg Oral Daily   epoetin mini 10,000 Units Subcutaneous Once per day on Mon Wed Fri   ferric gluconate (FERRLECIT) IVPB 125 mg Intravenous Daily   gabapentin 400 mg Oral Nightly   insulin regular 0-14 Units Subcutaneous Q6H   ipratropium-albuterol 3 mL Nebulization 4x Daily - RT   levothyroxine 75 mcg Oral Q AM   lidocaine 1 patch Transdermal Q24H   micafungin (MYCAMINE)  mg Intravenous Q24H   midodrine 5 mg Oral TID AC   nystatin  Topical Q12H   pantoprazole 40 mg Intravenous Q AM   PRO-STAT 1 packet Per G Tube BID   sertraline 100 mg Oral Daily   Valproic Acid 500 mg Per G Tube Q8H            Vital Signs:  Blood pressure 101/58, pulse 71, temperature 97.8 °F (36.6 °C), temperature source Oral, resp. rate 22, height 157 cm (61.81\"), weight 60.8 kg (134 lb), SpO2 99 %, not currently breastfeeding.    Flowsheet Rows      First Filed Value   Admission Height  157 cm (61.81\") Documented at 06/08/2018 0215   Admission Weight  61 kg (134 lb 7.7 oz) Documented at 06/08/2018 0215          06/17 0701 - 06/18 0700  In: 1813 [I.V.:103]  Out: 680     Physical Exam:    General Appearance: NAD, alert and cooperative, Ox3  Eyes: PER, conjunctivae and sclerae normal, no icterus  Lungs: + TRACH. RHONCHI.  Heart/CV: regular rhythm & normal rate, no murmur, no gallop, no rub and + DEPENDENT edema  Abdomen: not distended, soft, non-tender, no masses,  bowel sounds present  Skin: No rash, Warm and dry. TUNNELED HD CATH.    Radiology:            Labs:    Results from last 7 days  Lab Units 06/18/18  0610 06/17/18  0726 06/16/18  0927   WBC " 10*3/mm3 14.99* 14.92* 12.11*   HEMOGLOBIN g/dL 8.4* 8.1* 7.8*   HEMATOCRIT % 27.7* 26.9* 26.0*   PLATELETS 10*3/mm3 255 284 247       Results from last 7 days  Lab Units 06/18/18  0610 06/17/18  0726 06/16/18  0927 06/15/18  0337 06/14/18  0442 06/13/18  0429 06/12/18  0436   SODIUM mmol/L 134 133 138 134 138 133 137   POTASSIUM mmol/L 5.3 4.6 3.8 4.0 3.8 3.3* 3.6   CHLORIDE mmol/L 99 102 102 104 105 100 102   CO2 mmol/L 21.0 22.0 24.0 20.0 23.0 22.0 21.0   BUN mg/dL 72* 57* 41* 49* 38* 61* 36*   CREATININE mg/dL 2.23* 1.76* 1.36* 2.14* 1.42* 2.03* 1.55*   CALCIUM mg/dL 8.7 8.7 8.6* 8.7 8.8 8.9 9.1   PHOSPHORUS mg/dL  --   --  3.4  --  2.5 3.0 2.5   MAGNESIUM mg/dL 2.3  --   --  2.2  --  2.3 2.2   ALBUMIN g/dL 3.59  --  3.80  --  3.46 3.71 3.99       Results from last 7 days  Lab Units 06/18/18  0610   GLUCOSE mg/dL 129*         Results from last 7 days  Lab Units 06/18/18  0610   ALK PHOS U/L 80   BILIRUBIN mg/dL 0.2*   ALT (SGPT) U/L 1*   AST (SGOT) U/L 12       Results from last 7 days  Lab Units 06/11/18  1538   PH, ARTERIAL pH units 7.461*   PO2 ART mm Hg 134.0*   PCO2, ARTERIAL mm Hg 30.7*   HCO3 ART mmol/L 21.8             Estimated Creatinine Clearance: 27.2 mL/min (A) (by C-G formula based on SCr of 2.23 mg/dL (H)).      Assessment     Principal Problem:    Acute respiratory failure  Active Problems:    Recent Bacteremia, MSSA    Metabolic encephalopathy    Epidural abscess - cervical spine s/p laminectomy and decompression 4/13/18    ESRD (end stage renal disease) on dialysis    Hypothyroidism    Chronic systolic heart failure    Type 2 diabetes mellitus with renal complication    Neuropathy    Atelectasis of left lung, recurrent    Quadriplegia    Pleural effusion on left, s/p pigtail CT, with persistent drainage    Pressure ulcer of coccygeal region    Wound infection, cervical, possible vs CFS leak    CVA (cerebral vascular accident)    Anemia            Impression: ESRD.  ANEMIA.            Recommendations: HD TODAY. NOT SURE HOW MUCH LONGER SHE CAN TOLERATE HD TX'S WITH HEMODYNAMIC INSTABILITY. PROGNOSIS TERRIBLE.      Chris Oakley MD  06/18/18  8:57 AM

## 2018-06-18 NOTE — PROGRESS NOTES
"Neurology       Patient Care Team:  Luan Gandara MD as PCP - General (Internal Medicine)    Chief complaint encephalopathy      Subjective .     History:  No hx obtainable from pt. Chart r/v'd, d/w nurse and . Got dialysis this morning. On trach collar.    has reported some arm movement.    ROS: not obtainable    Objective     Vital Signs   Blood pressure 117/63, pulse 74, temperature 98.4 °F (36.9 °C), temperature source Oral, resp. rate 21, height 157 cm (61.81\"), weight 60.8 kg (134 lb), SpO2 98 %, not currently breastfeeding.    Physical Exam:              Neuro: maww lying with eyes closed, raises brows but does not open eyes to voice. When eyes passively opened, does not regard. Does not follow any verbal commands (eg look this way).   Symmetric grimace. Pupils 3mm reactive, EOMI  Quadraplegic, tone diminished  Toes no response    Results Review:              Brain MRI pers reviewed, agree with interpretation.  Cr 2.23  crp 6.89, WCC 14.99K    Assessment/Plan     1. Stroke -- multiterritory, likely embolic, on eliquis, asa, statin  2. Encephalopathy -- multifactorial including stroke, kidney ds, infection.   3. quadraplegia - s/p cervical epidural abscess resection.  Prognosis very poor.  4. sz while on meropenem -- no recurrence, VPA therapeutic 6/16. EEG with pseudoperiodic discharges.    I discussed the patients findings and my recommendations with patient, family and nursing staff    Ally Jeffries MD  06/18/18  12:48 PM    "

## 2018-06-18 NOTE — PROGRESS NOTES
"Palliative Care Progress Note    Date of Admission: 6/8/2018    Subjective:   Pt seen with many, many family members at bedside, including father and brother.    Not very interactive but has been more awake today.  Mouthed \"Dad.\"  Tolerated dialysis today w/o any vital sign instability.   did report pt had a \"rough episode\" last night but today has been ok.    MEDS reviewed.  Getting PRN xanax an avg of once daily, morphine x2 yesterday, and OxyIR x2 in last 24 hrs.    Objective: /71   Pulse 75   Temp 96.6 °F (35.9 °C) (Axillary)   Resp 22   Ht 157 cm (61.81\")   Wt 60.8 kg (134 lb)   LMP  (LMP Unknown)   SpO2 99%   BMI 24.66 kg/m²      Intake/Output Summary (Last 24 hours) at 06/18/18 1843  Last data filed at 06/18/18 1400   Gross per 24 hour   Intake             1138 ml   Output             2670 ml   Net            -1532 ml     Physical Exam:  General: middle aged F, in bed, ill-appearing, NAD    Head/Neck:  NC/AT, trach tube in place  EENT: eyes open - no conjunctival injection or scleral icterus, patent nares, oral mucosa moist  Lungs:  Normal effort and respiratory rate.  Breath sounds coarse bilaterally. Left thoracostomy tube in place.  Heart: Normal rate.  Regular rhythm. No murmur.   Abdomen: soft, nontender, and non-distended.   Extremities: No edema.  Not moving extremities.  Neurological: Awake, doesn't respond to my exam or questioning.  Psych: Calm, no evidence of discomfort or restlessness    Skin:  Warm, dry and pale.     Results from last 7 days  Lab Units 06/18/18  0610   WBC 10*3/mm3 14.99*   HEMOGLOBIN g/dL 8.4*   HEMATOCRIT % 27.7*   PLATELETS 10*3/mm3 255       Results from last 7 days  Lab Units 06/18/18  0610   SODIUM mmol/L 134   POTASSIUM mmol/L 5.3   CHLORIDE mmol/L 99   CO2 mmol/L 21.0   BUN mg/dL 72*   CREATININE mg/dL 2.23*   CALCIUM mg/dL 8.7   BILIRUBIN mg/dL 0.2*   ALK PHOS U/L 80   ALT (SGPT) U/L 1*   AST (SGOT) U/L 12   GLUCOSE mg/dL 129*     Principal " Problem:    Acute respiratory failure  Active Problems:    CVA (cerebral vascular accident)    Recent Bacteremia, MSSA    Metabolic encephalopathy    Epidural abscess - cervical spine s/p laminectomy and decompression 4/13/18    ESRD (end stage renal disease) on dialysis    Hypothyroidism    Chronic systolic heart failure    Type 2 diabetes mellitus with renal complication    Neuropathy    Atelectasis of left lung, recurrent    Quadriplegia    Pleural effusion on left    Pressure ulcer of coccygeal region    Wound infection, cervical, possible vs CFS leak    Anemia     Symptoms:  Encephalopathy  Debility  Blindness  MSK pain     Plan:  - Ugo quite honestly surprised pt tolerated dialysis today w/o ill effects.  Continues to see chest tube drainage, so wishes to continue with dialysis as long as it is offered at this time.  -Did  family that I felt lung sounds were worse today, more coarse, despite the fact that she just had dialysis and should sound better.  Suspect pulmonary status will gradually worsen, could have aspirated some tube feed.  (Only running at 20cc/hr due to residuals.)  -Reviewed all signs of decline: worse lung exam, poor tolerance of TF, likely worsening of coccyx wound, less interaction overall, and persistent blindness.    -Ugo wishes to continue current care and is just taking that decision day by day.  Palliative team continuing to follow with support and education.      Michelle Robles MD  06/18/18  6:43 PM

## 2018-06-18 NOTE — PLAN OF CARE
Problem: Patient Care Overview  Goal: Plan of Care Review  Outcome: Ongoing (interventions implemented as appropriate)    Goal: Discharge Needs Assessment  Outcome: Ongoing (interventions implemented as appropriate)    Goal: Interprofessional Rounds/Family Conf  Outcome: Ongoing (interventions implemented as appropriate)      Problem: Mobility, Physical Impaired (Adult)  Goal: Enhanced Mobility Skills  Outcome: Ongoing (interventions implemented as appropriate)    Goal: Enhanced Functional Ability  Outcome: Ongoing (interventions implemented as appropriate)      Problem: Hemodialysis (Adult)  Goal: Signs and Symptoms of Listed Potential Problems Will be Absent, Minimized or Managed (Hemodialysis)  Outcome: Ongoing (interventions implemented as appropriate)      Problem: Wound (Includes Pressure Injury) (Adult)  Goal: Signs and Symptoms of Listed Potential Problems Will be Absent, Minimized or Managed (Wound)  Outcome: Ongoing (interventions implemented as appropriate)      Problem: Palliative Care (Adult)  Goal: Identify Related Risk Factors and Signs and Symptoms  Outcome: Ongoing (interventions implemented as appropriate)    Goal: Maximized Comfort  Outcome: Ongoing (interventions implemented as appropriate)    Goal: Enhanced Quality of Life  Outcome: Ongoing (interventions implemented as appropriate)

## 2018-06-18 NOTE — PLAN OF CARE
Problem: Patient Care Overview  Goal: Plan of Care Review  Outcome: Ongoing (interventions implemented as appropriate)      Problem: Mobility, Physical Impaired (Adult)  Goal: Enhanced Mobility Skills  Outcome: Ongoing (interventions implemented as appropriate)    Goal: Enhanced Functional Ability  Outcome: Ongoing (interventions implemented as appropriate)      Problem: Hemodialysis (Adult)  Goal: Signs and Symptoms of Listed Potential Problems Will be Absent, Minimized or Managed (Hemodialysis)  Outcome: Ongoing (interventions implemented as appropriate)      Problem: Wound (Includes Pressure Injury) (Adult)  Goal: Signs and Symptoms of Listed Potential Problems Will be Absent, Minimized or Managed (Wound)  Outcome: Ongoing (interventions implemented as appropriate)      Problem: Palliative Care (Adult)  Goal: Identify Related Risk Factors and Signs and Symptoms  Outcome: Ongoing (interventions implemented as appropriate)    Goal: Maximized Comfort  Outcome: Ongoing (interventions implemented as appropriate)    Goal: Enhanced Quality of Life  Outcome: Ongoing (interventions implemented as appropriate)

## 2018-06-18 NOTE — PLAN OF CARE
Problem: Patient Care Overview  Goal: Interprofessional Rounds/Family Conf  Outcome: Ongoing (interventions implemented as appropriate)  Palliative Team Conference: MENG Corona RN, PN; KEN Robles MD; LAYTON Jain; KIP Rai MDiv; JO ANN Sadler, [facility], [facility]   06/18/18 1412   Interdisciplinary Rounds/Family Conf   Summary Continuing HD for now, done today. No escalation of care. No observable response observed during Palliative  visit. Support and prayer for spouse. Palliative following for continued family support and GOC.       Problem: Palliative Care (Adult)  Intervention: Support/Optimize Psychosocial Response   06/18/18 1412   Coping/Psychosocial Interventions   Spiritual Activities Assistance spiritual support provided  (seen by KIP Rai MDiv)   Psychosocial Support   Family/Support System Care caregiver stress acknowledged;involvement promoted;presence promoted;self-care encouraged;support provided

## 2018-06-18 NOTE — PROGRESS NOTES
"NEUROSURGERY PROGRESS NOTE    Vital Signs  Blood pressure 112/63, pulse 71, temperature 97.4 °F (36.3 °C), temperature source Axillary, resp. rate 21, height 157 cm (61.81\"), weight 60.8 kg (134 lb), SpO2 99 %, not currently breastfeeding.    Interval History:   Patient was sleeping when I came into room. Nurse reports that patient woke up for her earlier when she did mouth care, but otherwise has been sleeping. She seems to be declining.    Patient was on dialysis    ASSESSMENT: Cervical incision with staples    PLAN:   - Staples out at the end of this week      Benita An PA-C  06/18/18  9:25 AM    "

## 2018-06-18 NOTE — NURSING NOTE
Patient being followed by WOC for redness in groin and boggy heels-patient now trending towards comfort measures-Palliative following. WOC will sign off-please consult for any supportive measures.

## 2018-06-18 NOTE — CONSULTS
Adult Nutrition  Assessment/PES    Patient Name:  Lizet Webster  YOB: 1970  MRN: [id number]  Admit Date:  6/8/2018    Assessment Date:  6/18/2018    Comments:  EN support adjusted to Novasource renal to reduce volume and control electrolytes as HD has become difficult for pt to tolerate and  does not wish to w/d dialysis at this time.          Reason for Assessment     Row Name 06/18/18 0353          Reason for Assessment    Reason For Assessment follow-up protocol;TF/PN;per organizational policy   MDR; 30 mins     Diagnosis neurologic conditions;diabetes diagnosis/complications;renal disease;pulmonary disease   Pt s/p cervical wound debridement, removal of hardware and reclosure (6/8), quadriplegia; CRF - vent; pleural effusion w/ CT placement;DM-T2; ESRD -HD; pressure ulcer coccygeal -poa; Hx:trach and PEG     Identified At Risk by Screening Criteria tube feeding or parenteral nutrition;large or nonhealing wound, burn or pressure injury             Nutrition/Diet History     Row Name 06/18/18 2597          Nutrition/Diet History    Factors Affecting Nutritional Intake --   RN reports pt did not tolerate TF over weekend w/ high residuals and distentetion; pt on HD  has BP issues;  very concerned about volume , comfort, stopping HD. Palliative service following.                 Physical Findings     Row Name 06/18/18 2378          Physical Findings    Overall Physical Appearance tetraplegia (quadriplegia);on ventilator support     Tubes PEG     Skin pressure injury;non-healing wound(s)             Estimated/Assessed Needs     Row Name 06/18/18 6515          Calculation Measurements    Weight Used For Calculations 45 kg (99 lb 3.3 oz)   AIBW for quadriplegia        KCAL/KG    25 Kcal/Kg (kcal) 1125     30 Kcal/Kg (kcal) 1350     35 Kcal/Kg (kcal) 1575        Benton-St. Jeor Equation    RMR (Benton-St. Jeor Equation) 1045.25        Fluid Requirements    300-500 ml + UOP                Nutrition Prescription Ordered     Row Name 06/18/18 1752          Nutrition Prescription PO    Current PO Diet NPO        Nutrition Prescription EN    Enteral Route PEG     Product Isosource 1.5 melissa     Modulars Liquid Protein (15 gm/30 mL)     Liquid Protein (15 gm/30 mL) 30 mL/1 packet     Protein Liquid Frequency 2 times a day     TF Delivery Method Continuous     Continuous TF Goal Rate (mL/hr) 45 mL/hr     Continuous TF Goal Volume (mL) 900 mL     Water flush (mL)  15 mL     Water Flush Frequency Per hour             Evaluation of Received Nutrient/Fluid Intake     Row Name 06/18/18 175          Calculation Measurements    Weight Used For Calculations 45 kg (99 lb 3.3 oz)   AIBW for quadriplegia        Nutrient/Fluid Evaluation    Number of Days Evaluated 3 days        Calories Evaluation    Enteral Calories (kcal) 866     Other Calories (kcal) 200     Total Calories (kcal) 1066     % of Kcal Needs 68        Protein Evaluation    Enteral Protein (gm) 39     Other Protein (gm) 30     Total Protein (gm) 69     % of Protein Needs 77        Intake Assessment    Energy/Calorie Requirement Assessment not meeting needs     Protein Requirement Assessment not meeting needs     Tolerance not tolerating        Fluid Intake Evaluation    Enteral (Free Water) Fluid (mL) 449     Free Water Flush Fluid (mL) 360     Total Free Water Intake (mL) 809 mL        Recommended Daily Intake Evaluation    RDI Not Met        EN Evaluation    Number of Days EN Intake Evaluated 3 days     EN Average Volume Delivered (mL/day) 577 mL/day     % Goal Volume  64 %     TF Changes Rate decreased     TF Tolerance Abdominal distention;Poor gastric emptying             Evaluation of Prescribed Nutrient/Fluid Intake     Row Name 06/18/18 175          Calculation Measurements    Weight Used For Calculations 45 kg (99 lb 3.3 oz)   AIBW for quadriplegia           Problem/Interventions:          Problem 2     Row Name 06/18/18 175           Nutrition Diagnoses Problem 2    Problem 2 Inadequate Intake/Infusion     Inadequate Intake Type Enteral     Macronutrient Kcal;Protein     Etiology (related to) MNT for Treatment/Condition     Signs/Symptoms (evidenced by) EN Intake Delivery     Percent (%) of EN goal 64 %                   Intervention Goal     Row Name 06/18/18 1758          Intervention Goal    General Nutrition support treatment;Meet nutritional needs for age/condition;Reduce/improve symptoms     TF/PN Adjust TF/PN;Tolerate TF at goal             Nutrition Intervention     Row Name 06/18/18 1759 06/18/18 1758       Nutrition Intervention    RD/Tech Action  -- Recommend/ordered;Follow Tx progress;Care plan reviewd    Recommended/Ordered EN EN            Nutrition Prescription     Row Name 06/18/18 1759          Nutrition Prescription EN    Enteral Prescription Enteral begin/change     Enteral Route PEG     Product Novasource Renal     Liquid Protein (15 gm/30 mL) 30 mL/1 packet     Protein Liquid Frequency 2 times a day     TF Delivery Method Continuous     Continuous TF Goal Rate (mL/hr) 35 mL/hr     Continuous TF Goal Volume (mL) 700 mL     Water flush (mL)  10 mL     Water Flush Frequency Per hour     New EN Prescription Ordered? Yes   v.o. given per CATHERINE Arroyo to change formula             Education/Evaluation     Row Name 06/18/18 1800          Monitor/Evaluation    Monitor Per protocol;I&O;Pertinent labs;TF delivery/tolerance;Symptoms;Skin status         Electronically signed by:  Saima Nolen RD  06/18/18 6:01 PM

## 2018-06-19 NOTE — PLAN OF CARE
Problem: Patient Care Overview  Goal: Plan of Care Review  Outcome: Ongoing (interventions implemented as appropriate)   06/18/18 2153   Coping/Psychosocial   Plan of Care Reviewed With patient   Plan of Care Review   Progress no change       Problem: Mobility, Physical Impaired (Adult)  Goal: Enhanced Mobility Skills  Outcome: Ongoing (interventions implemented as appropriate)   06/18/18 2153   Mobility, Physical Impaired (Adult)   Enhanced Mobility Skills unable to achieve outcome       Problem: Wound (Includes Pressure Injury) (Adult)  Goal: Signs and Symptoms of Listed Potential Problems Will be Absent, Minimized or Managed (Wound)  Outcome: Ongoing (interventions implemented as appropriate)   06/18/18 2153   Goal/Outcome Evaluation   Problems Assessed (Wound) all   Problems Present (Wound) situational response       Problem: Palliative Care (Adult)  Goal: Identify Related Risk Factors and Signs and Symptoms  Outcome: Ongoing (interventions implemented as appropriate)   06/18/18 2153   Palliative Care (Adult)   Palliative Care: Related Risk Factors end-stage disease;condition is progressive;chronic illness   Palliative Care: Signs and Symptoms hopelessness expressed

## 2018-06-19 NOTE — PROGRESS NOTES
Intensive Care Follow-up      LOS: 11 days     Ms. Lizet Webster, 47 y.o. female is followed for: Acute respiratory failure     Subjective - Interval History     Remains obtunded  Irregular respiratory pattern  No overt distress  Remains on tracheostomy collar    The patient's relevant past medical, surgical and social history were reviewed and updated in Epic as appropriate.     Objective     Infusions:     Medications:    apixaban 2.5 mg Oral Q12H   aspirin 81 mg Oral Daily   atorvastatin 20 mg Oral Nightly   ceFAZolin 2 g Intravenous Q24H   chlorhexidine 15 mL Mouth/Throat Q12H   DULoxetine 20 mg Oral Daily   epoetin mini 10,000 Units Subcutaneous Once per day on Mon Wed Fri   gabapentin 400 mg Oral Nightly   insulin regular 0-14 Units Subcutaneous Q6H   ipratropium-albuterol 3 mL Nebulization 4x Daily - RT   levothyroxine 75 mcg Oral Q AM   lidocaine 1 patch Transdermal Q24H   micafungin (MYCAMINE)  mg Intravenous Q24H   midodrine 5 mg Oral TID AC   nystatin  Topical Q12H   pantoprazole 40 mg Intravenous Q AM   PRO-STAT 1 packet Per G Tube BID   sertraline 100 mg Oral Daily   Valproic Acid 500 mg Per G Tube Q8H     Intake/Output       06/18/18 0700 - 06/19/18 0659 06/19/18 0700 - 06/20/18 0659    Intake (ml) 1547.3 0    Output (ml) 2750 0    Net (ml) -1202.7 0        Vital Sign Min/Max for last 24 hours  Temp  Min: 96.6 °F (35.9 °C)  Max: 98.4 °F (36.9 °C)   BP  Min: 93/56  Max: 122/69   Pulse  Min: 70  Max: 82   Resp  Min: 16  Max: 22   SpO2  Min: 90 %  Max: 100 %   Flow (L/min)  Min: 10  Max: 10        Physical Exam:   GENERAL: Irregular respiratory pattern but no distress   HEENT: Tracheostomy site okay, no adenopathy   LUNGS: Decreased breath sounds bilaterally with scattered rhonchi.  Left chest tube   HEART: Irregular rate and rhythm with distant heart sounds   ABDOMEN: Soft.  PEG site okay   EXTREMITIES: Trace edema.  No cyanosis   NEURO/PSYCH: Obtunded and unresponsive      Results from last 7  days  Lab Units 06/18/18  0610 06/17/18  0726 06/16/18  0927   WBC 10*3/mm3 14.99* 14.92* 12.11*   HEMOGLOBIN g/dL 8.4* 8.1* 7.8*   PLATELETS 10*3/mm3 255 284 247       Results from last 7 days  Lab Units 06/19/18  0406 06/18/18  0610 06/17/18  0726 06/16/18  0927 06/15/18  0337 06/14/18  0442 06/13/18  0429   SODIUM mmol/L 136 134 133 138 134 138 133   POTASSIUM mmol/L 3.6 5.3 4.6 3.8 4.0 3.8 3.3*   CO2 mmol/L 26.0 21.0 22.0 24.0 20.0 23.0 22.0   BUN mg/dL 40* 72* 57* 41* 49* 38* 61*   CREATININE mg/dL 1.34* 2.23* 1.76* 1.36* 2.14* 1.42* 2.03*   MAGNESIUM mg/dL  --  2.3  --   --  2.2  --  2.3   PHOSPHORUS mg/dL 3.4  --   --  3.4  --  2.5 3.0   GLUCOSE mg/dL 124* 129* 112* 85 174* 114* 155*     Estimated Creatinine Clearance: 45.3 mL/min (A) (by C-G formula based on SCr of 1.34 mg/dL (H)).              Lab Results   Component Value Date    LACTATE 0.7 06/11/2018          Images: Most recent chest x-ray revealed some interval improvement effusion/atelectasis    I reviewed the patient's results and images.     Impression      Hospital Problem List     * (Principal)Acute respiratory failure    Recent Bacteremia, MSSA    Metabolic encephalopathy    Epidural abscess - cervical spine s/p laminectomy and decompression 4/13/18    ESRD (end stage renal disease) on dialysis    Hypothyroidism    Chronic systolic heart failure    Type 2 diabetes mellitus with renal complication    Neuropathy    Atelectasis of left lung, recurrent    Overview Deleted 6/8/2018  3:51 AM by LAYTON Souza            Quadriplegia    Pleural effusion on left, s/p pigtail CT, with persistent drainage    Pressure ulcer of coccygeal region    Wound infection, cervical, possible vs CFS leak    CVA (cerebral vascular accident)    Anemia               Plan        Continue current level of care  Discussed with  regarding potential problems continuing dialysis given marginal blood pressure  At this point he appears to continue to want to continue  with dialysis  Continue discussions regarding goals of care     Prognosis appears grim with little likelihood of meaningful recovery      Plan of care and goals reviewed with mulitdisciplinary team at daily rounds   I discussed the patient's findings and my recommendations with family and nursing staff       FABIANA Arroyo MD  Pulmonary and Critical Care Medicine

## 2018-06-19 NOTE — PROGRESS NOTES
Clinical Nutrition   Reason For Visit: MDR, Follow-up protocol, EN    Patient Name: Lizet Webster  YOB: 1970  MRN: [id number]  Date of Encounter: 06/19/18 10:51 AM  Admission date: 6/8/2018        Nutrition Assessment     Hospital Problem List  Principal Problem:    Acute respiratory failure  Active Problems:    Recent Bacteremia, MSSA    Metabolic encephalopathy    Epidural abscess - cervical spine s/p laminectomy and decompression 4/13/18    ESRD (end stage renal disease) on dialysis    Hypothyroidism    Chronic systolic heart failure    Type 2 diabetes mellitus with renal complication    Neuropathy    Atelectasis of left lung, recurrent    Quadriplegia    Pleural effusion on left, s/p pigtail CT, with persistent drainage    Pressure ulcer of coccygeal region    Wound infection, cervical, possible vs CFS leak    CVA (cerebral vascular accident)    Anemia      Other Applicable Diagnosis:  Hx of Severe peripheral neuropathy/wheelchair bound, quadriplegia  Chronic LLE DVT  ESRD- HD MWF     Admitted to OSH on (4/9/18), transferred to [facility] on (4/12/18), d/c'd to [facility] on (5/4/18), and  transferred back to [facility] on (5/8).   S/p recent cervical laminectomy decompression, anterior cervical corpectomy (4/13)  S/p trach/PEG placement (4/24)  S/p multiple bronchoscopies (4/21, 4/24, 4/26, 5/31, 6/7)  Passed SLP MBS at OSH on (6/4)- rec regular texture with thin liquids     A/c resp failure  Chronic trach- trach collar as tolerated  Posterior cervical wound dehiscence  S/p wound debridement and reclosure, removal of hardware (6/8)  L pleural effusion, s/p chest tube placement (6/8)  Aspergillus growing from neck wound culture  Vision changes  Recent MRIs show multiple strokes (6/14), acute ischemic stroke, multiple infarcts  Encephalopathy      Per nephrology (6/18)- NOT SURE HOW MUCH LONGER SHE CAN TOLERATE HD TX'S WITH HEMODYNAMIC INSTABILITY. PROGNOSIS TERRIBLE.  Per nephrology (6/19)- PATIENT'S  NOT READY  "TO STOP DIALYSIS YET.  Palliative following- discussing goals of care with family; poor prognosis.   Per palliative (6/18)-  wishes to continue with dialysis as long as it is offered at this time.     WOCN (6/8)- Pt has unstageable PI (POA) with 100% eschar coccyx. PT Wound Care consulted to assess for debridement. Pt has stage 1 PI (POA) right buttocks. Bilateral groin areas with MASD and yeast.  PT wound care (6/11)- Pt cont to have significant amounts of this adherent slough and eschar covering wound base. PT will cont with debridement.  PT wound care (6/16)- PT initially consulted for debridement, but will d/c at this time due to decline in status and change to comfort measures/AND.       Reported/Observed/Food/Nutrition Related History     EN changed back to NovaSource Renal from IsoSource 1.5 yesterday (6/18). RN reports that pt had bad diarrhea overnight. RD notes that pt was not tolerating Suplena and NovaSource Renal secondary to diarrhea per previous RD notes. Intensivist reports ok with changing EN formula back to IsoSource 1.5 as he and the nephrologist are not concerned about volume difference from 2.0 kcal/ml formula vs. 1.5 kcal/ml formula. NovaSource Renal running at 35 ml/hr this AM with residuals of 110 ml per RN. RD notes that per documentation that pt was having high residuals over the weekend. Per I&O- 500 ml residuals from (6/15-6/16), no other residuals documented. MD reports plan to continue dialysis at this time.     Anthropometrics   Height: 62 in  Weight: 151 lb per bed scale wt from LTACH on (6/7) per medical records. Pt's  reports that bed scale wt of 134 lb on (6/8) is not accurate.    Adjusted IBW: 94-99 lb  BMI: 27.6  BMI classification: Overweight: 25.0-29.9kg/m2    Weight change: Per RD note (6/8)- \"[facility]'s weights have been between 150-165 lb (5/9= 164 lb, 6/7= 151 lb).  reports that pt's wt has been trending down.\"       Date " Weight (kg) Weight (lbs) Weight Method   6/14/2018 60.782 kg 134 lb -   6/14/2018 60.782 kg 134 lb -   6/8/2018 61 kg 134 lb 7.7 oz Bed scale   5/3/2018 78.7 kg 173 lb 8 oz -   4/30/2018 88.5 kg 195 lb 1.7 oz Bed scale   4/29/2018 89.2 kg 196 lb 10.4 oz -   4/27/2018 78.7 kg 173 lb 8 oz -   4/12/2018 65 kg 143 lb 4.8 oz Bed scale       Needs Assessment   Height used: 62 in  Weight used: 151 lb  MSJ= 1285 x1.3= 1670 kcal/day  20-25 kcal/kg= 0674-1443 kcal/day     Protein needs:  1.2-1.5 g/kg=  g pro/day    Labs reviewed   Labs reviewed: Yes    Results from last 7 days  Lab Units 06/19/18  0406 06/18/18  0610 06/17/18  0726   SODIUM mmol/L 136 134 133   POTASSIUM mmol/L 3.6 5.3 4.6   CHLORIDE mmol/L 100 99 102   CO2 mmol/L 26.0 21.0 22.0   BUN mg/dL 40* 72* 57*   CREATININE mg/dL 1.34* 2.23* 1.76*   GLUCOSE mg/dL 124* 129* 112*   CALCIUM mg/dL 8.5* 8.7 8.7       Results from last 7 days  Lab Units 06/19/18  0406 06/18/18  0610 06/16/18  0927 06/15/18  0337 06/14/18  0442 06/13/18  0429   MAGNESIUM mg/dL  --  2.3  --  2.2  --  2.3   PHOSPHORUS mg/dL 3.4  --  3.4  --  2.5 3.0       Results from last 7 days  Lab Units 06/18/18  0610 06/17/18  0726 06/16/18  0927   WBC 10*3/mm3 14.99* 14.92* 12.11*   PREALBUMIN mg/dL 8.5*  --   --    CRP mg/dL 6.89*  --   --      Medications reviewed   Medications reviewed: Yes    Pertinent tests:  SLP (6/13)- Unable to complete FEES this pm 2' pt's ? neuro changes and pending testing.    Intake/Ouptut 24 hrs (7:00AM - 6:59 AM)     Intake & Output (last day)       06/18 0701 - 06/19 0700 06/19 0701 - 06/20 0700    I.V. (mL/kg) 129.3 (2.1)     Other 447     NG/     IV Piggyback 300     Total Intake(mL/kg) 1547.3 (25.4)     Other 2350     Stool 0     Chest Tube 400     Total Output 2750      Net -1202.7            Unmeasured Stool Occurrence 1 x         Current Nutrition Prescription   PO: NPO Diet  EN: NovaSource Renal at 35 ml/hr (goal volume= 700 ml/day) with 1 ProStat 2x/day  and free water at 10 ml/hr via PEG    Evaluation of Received Nutrient/Fluid Intake:  1 Day:  671 ml- reflects delivery of IsoSource 1.5 and NovaSource Renal    Nutrition Diagnosis   6/14  Problem Less than optimal enteral nutrition composition or modality    Etiology EN formula    Signs/Symptoms electrolyte restricted formula not indicated for current clinical status, fiber free formula         6/8  Problem Inadequate energy intake   Etiology Diet order   Signs/Symptoms npo   Status: resolved, EN order in place     Intervention     Goal:   General: Nutrition support treatment, Palliative care  EN/PN: Tolerate EN at goal Adjust PN    Intervention: Follow treatment progress, Care plan reviewed, Nutrition support order placed   -Since pt is going to continue with HD at this time, pt with diarrhea now, reported intolerance of NovaSource Renal/Suplena with diarrhea in the past, and K+ and phos have been WNL since admission- will change to standard calorically dense fiber-containing formula.   -At this point, the most concern is meeting pt's needs and on pt's comfort, and hopefully formula change will assist in decreasing diarrhea, while at the same time meeting pt's needs.   -If pt does not tolerate the rate increase from 35 ml/hr to 45 ml/hr, has residuals >500 ml, HD is stopped, and/or POC changes then may need to change EN formula again.      -Will change EN regimen in Epic:  IsoSource 1.5 at 45 ml/hr (goal volume= 900 ml/day) with 1 ProStat 2x/day and free water at 10 ml/hr via PEG  -Will provide at goal volume:  1550 kcal, 100%  91 g pro, 99%  13 g fiber  51 mEq K+  1018 mg phos  900 ml total water    -Will continue to follow and adjust nutrition support regimen per renal function/EN tolerance/POC/hemodynamic stability/GI status        Monitoring/Evaluation:       Monitoring/Evaluation: Per protocol, I&O, Pertinent labs, EN delivery/tolerance, Weight, Skin status, GI status, Symptoms, POC/GOC, Hemodynamic stability          Will Continue to follow per protocol  Edie Downey, MS RD/LD CNSC  Time Spent: 90 minutes

## 2018-06-19 NOTE — PROGRESS NOTES
"Lizet Webster    Subjective     CC: encephalopathy     History of Present Illness     Lizet Webster is lethargic today and is unable to follow commands. Nursing reports no events overnight.    There have been no other changes reported in the patient's interval history since Dr. Jeffries's last note, yesterday, 6/18/18.    I have reviewed and confirmed the past family, social and medical history as accurate on 6/19/18.     Review of Systems   Unable to perform ROS: Patient unresponsive       Objective     BP 99/54   Pulse 78   Temp 98 °F (36.7 °C) (Axillary)   Resp 18   Ht 157 cm (61.81\")   Wt 60.8 kg (134 lb)   LMP  (LMP Unknown)   SpO2 98%   BMI 24.66 kg/m²     Physical Exam     Neurologic Exam     Mental Status   Level of consciousness: lethargic.  Abnormal comprehension.   Patient raises brows but does not open eyes to voice. When eyes passively opened, does not regard. She is unable to follow commands.      Motor Exam   Overall muscle tone: normal        Assessment/Plan     Lizet Webster is followed with stroke, encephalopathy, seizure, and quadriplegia. She will continue on ASA, Lipitor and Eliquis unchanged. She will also continue on VPA unchanged. She will continue to follow closely with palliative care.  Otherwise, I do not have any new recommendations today.      As part of this visit I reviewed prior lab results and reviewed records from the current hospitalization which is incorporated in the HPI.  "

## 2018-06-19 NOTE — PLAN OF CARE
Problem: Patient Care Overview  Goal: Interprofessional Rounds/Family Conf  Outcome: Ongoing (interventions implemented as appropriate)  Palliative Team Attendance 1300.Michelle Robles MD., Daniel Epps RN PN, Emma ZHANG, Maliha Rai MDIV Padmaja Polanco RN PN, Kelli Sorensen RN , [facility]   06/19/18 1300   Interdisciplinary Rounds/Family Conf   Summary pt opens eyes but less interactive, pt having diarrhea, tube feeding changed. pt is on trach collar 02 and comfortable. continue support for family. continue dialysis, no cpr.  is worried about stopping dialysis.

## 2018-06-20 NOTE — PROGRESS NOTES
Palliative Care Progress Note    Date of Admission: 6/8/2018    Subjective:  Patient is nonverbal.   feels that the patient has been comfortable.  Feels that the patient is stable at this point time.  No current facility-administered medications on file prior to encounter.      Current Outpatient Prescriptions on File Prior to Encounter   Medication Sig Dispense Refill   • epoetin mini (EPOGEN,PROCRIT) 11128 UNIT/ML injection Inject 1 mL under the skin 3 (Three) Times a Week.     • Heparin Sodium, Porcine, (HEPARIN, PORCINE,) 5000 UNIT/ML injection Inject 1 mL under the skin Every 8 (Eight) Hours.     • levothyroxine (SYNTHROID, LEVOTHROID) 75 MCG tablet Take 75 mcg by mouth Daily.     • lidocaine (LIDODERM) 5 % Place 1 patch on the skin Daily. Remove & Discard patch within 12 hours or as directed by MD     • midodrine (PROAMATINE) 5 MG tablet Take 10 mg by mouth Daily.     • sertraline (ZOLOFT) 100 MG tablet Take 100 mg by mouth Daily.     • Amino Acids-Protein Hydrolys (PRO-STAT) liquid 1 packet by Per G Tube route 2 (Two) Times a Day.     • DULoxetine (CYMBALTA) 20 MG capsule Take 20 mg by mouth Daily.     • gabapentin (NEURONTIN) 400 MG capsule Take 400 mg by mouth every night at bedtime.     • insulin regular (humuLIN R,novoLIN R) 100 UNIT/ML injection Inject 0-24 Units under the skin Every 6 (Six) Hours.  12   • insulin regular (humuLIN R,novoLIN R) 100 UNIT/ML injection Inject 7 Units under the skin Every 6 (Six) Hours.  12   • loratadine (CLARITIN) 10 MG tablet Take 10 mg by mouth Daily.     • pantoprazole (PROTONIX) 40 MG EC tablet Take 40 mg by mouth Daily.     • traMADol (ULTRAM) 50 MG tablet Take 50 mg by mouth 2 (Two) Times a Day.          •  acetaminophen **OR** [DISCONTINUED] acetaminophen  •  albumin human  •  ALPRAZolam  •  heparin (porcine)  •  heparin (porcine)  •  hydrALAZINE  •  ipratropium-albuterol  •  labetalol  •  lactated ringers  •  lidocaine PF 1%  •  Morphine  •  naloxone  •   "ondansetron  •  oxyCODONE  •  promethazine **OR** promethazine **OR** promethazine **OR** promethazine  •  sodium chloride  •  sodium chloride  •  sodium chloride  •  traMADol    Objective: /76   Pulse 86   Temp 97 °F (36.1 °C) (Axillary)   Resp 18   Ht 157 cm (61.81\")   Wt 60.8 kg (134 lb)   LMP  (LMP Unknown)   SpO2 99%   BMI 24.66 kg/m²      Intake/Output Summary (Last 24 hours) at 06/20/18 1245  Last data filed at 06/20/18 1210   Gross per 24 hour   Intake           1725.4 ml   Output             2590 ml   Net           -864.6 ml     Physical Exam:      General Appearance:    Non verbal, trach in place   Head:    Trach in place   Eyes:            Lids and lashes normal, conjunctivae and sclerae normal, no   icterus, no pallor, corneas clear, PERRLA   Ears:    Ears appear intact with no abnormalities noted   Throat:   No oral lesions, no thrush, oral mucosa moist   Neck:   No adenopathy, supple, trachea midline, no thyromegaly, no     carotid bruit, no JVD   Back:     No kyphosis present, no scoliosis present, no skin lesions,       erythema or scars, no tenderness to percussion or                   palpation,   range of motion normal   Lungs:     Clear to auscultation,respirations regular, even and                   unlabored    Heart:    Regular rhythm and normal rate, normal S1 and S2, no            murmur, no gallop, no rub, no click   Breast Exam:    Deferred   Abdomen:     Normal bowel sounds, no masses, no organomegaly, soft        non-tender, non-distended, no guarding, no rebound                 tenderness   Genitalia:    Deferred   Extremities:   Moves all extremities well, no edema, no cyanosis, no              redness   Pulses:   Pulses palpable and equal bilaterally   Skin:   No bleeding, bruising or rash   Lymph nodes:   No palpable adenopathy   Neurologic:   Cranial nerves 2 - 12 grossly intact, sensation intact, DTR        present and equal bilaterally       Results from last 7 " days  Lab Units 06/18/18  0610   WBC 10*3/mm3 14.99*   HEMOGLOBIN g/dL 8.4*   HEMATOCRIT % 27.7*   PLATELETS 10*3/mm3 255       Results from last 7 days  Lab Units 06/20/18  0400  06/18/18  0610   SODIUM mmol/L 135  < > 134   POTASSIUM mmol/L 3.9  < > 5.3   CHLORIDE mmol/L 99  < > 99   CO2 mmol/L 25.0  < > 21.0   BUN mg/dL 62*  < > 72*   CREATININE mg/dL 1.76*  < > 2.23*   CALCIUM mg/dL 8.8  < > 8.7   BILIRUBIN mg/dL  --   --  0.2*   ALK PHOS U/L  --   --  80   ALT (SGPT) U/L  --   --  1*   AST (SGOT) U/L  --   --  12   GLUCOSE mg/dL 135*  < > 129*   < > = values in this interval not displayed.    Impression: End-stage renal disease on dialysis  Change in mental status  Dysphagia  Volume overload  Goals of care  Plan:  states that he felt the patient is doing okay and wants to continue to current plan of care for the time being.  I'll continue to follow help support family and any way we can continue to have goals of care discussions as we proceed forward.        Shakir Gleason,   06/20/18  12:45 PM

## 2018-06-20 NOTE — PROGRESS NOTES
Clinical Nutrition Note      Patient Name: Lizet Webster  MRN: [id number]  Admission date: 6/8/2018      Reason for Assessment:  Multidisciplinary Rounds    Additional information obtained during MDR:  RN reports pt's still unresponsive/ non-verbal; may groans w/ pain on occasion; currently receiving HD,  wants to stay the course. TF still has diarrhea/loosely formed stools. ID following.     Current diet: NPO Diet    Diet, Tube Feeding Tube Feeding Formula: Isosource 1.5 (Calorically Dense)      PRO-STAT 1 packet    Pertinent medical data reviewed    Intervention:  Plan of care and goals reviewed    Monitor:  RD to follow per protocol      Saima Nolen RD  06/20/18 5:11 PM  Time: 20min

## 2018-06-20 NOTE — PROGRESS NOTES
Intensive Care Follow-up      LOS: 12 days     Ms. Lizet Webster, 47 y.o. female is followed for: Acute respiratory failure     Subjective - Interval History     Currently on dialysis.  Tolerating okay.  No improvement neurologically.  Unresponsive.  Irregular respiratory pattern but stable off of vent support    Approximately 100 mL out chest tube in the last 2 shifts    The patient's relevant past medical, surgical and social history were reviewed and updated in Epic as appropriate.     Objective     Infusions:     Medications:    apixaban 2.5 mg Oral Q12H   aspirin 81 mg Oral Daily   atorvastatin 20 mg Oral Nightly   cephALEXin 500 mg Per G Tube Q12H   chlorhexidine 15 mL Mouth/Throat Q12H   DULoxetine 20 mg Oral Daily   epoetin mini 10,000 Units Subcutaneous Once per day on Mon Wed Fri   gabapentin 400 mg Oral Nightly   insulin regular 0-14 Units Subcutaneous Q6H   ipratropium-albuterol 3 mL Nebulization 4x Daily - RT   levothyroxine 75 mcg Oral Q AM   lidocaine 1 patch Transdermal Q24H   micafungin (MYCAMINE)  mg Intravenous Q24H   midodrine 5 mg Oral TID AC   nystatin  Topical Q12H   pantoprazole 40 mg Intravenous Q AM   PRO-STAT 1 packet Per G Tube BID   sertraline 100 mg Oral Daily   Valproic Acid 500 mg Per G Tube Q8H     Intake/Output       06/19/18 0700 - 06/20/18 0659 06/20/18 0700 - 06/21/18 0659    Intake (ml) 1406.4 162    Output (ml) 370 40    Net (ml) 1036.4 122        Vital Sign Min/Max for last 24 hours  Temp  Min: 96.5 °F (35.8 °C)  Max: 98.9 °F (37.2 °C)   BP  Min: 93/55  Max: 114/62   Pulse  Min: 75  Max: 82   Resp  Min: 12  Max: 22   SpO2  Min: 95 %  Max: 100 %   No Data Recorded        Physical Exam:   GENERAL: No distress, unresponsive   HEENT: Trach site okay.  Tunneled dialysis catheter site okay   LUNGS: Decreased breath sounds bilaterally without wheezes.  Left chest tube site okay   HEART: Regular rate and rhythm with distant heart sounds   ABDOMEN: Soft.  PEG site  okay   EXTREMITIES: Trace edema.  No cyanosis   NEURO/PSYCH: Unresponsive.  Doesn't follow commands      Results from last 7 days  Lab Units 06/18/18  0610 06/17/18  0726 06/16/18  0927   WBC 10*3/mm3 14.99* 14.92* 12.11*   HEMOGLOBIN g/dL 8.4* 8.1* 7.8*   PLATELETS 10*3/mm3 255 284 247       Results from last 7 days  Lab Units 06/20/18  0400 06/19/18  0406 06/18/18  0610  06/16/18  0927 06/15/18  0337   SODIUM mmol/L 135 136 134  < > 138 134   POTASSIUM mmol/L 3.9 3.6 5.3  < > 3.8 4.0   CO2 mmol/L 25.0 26.0 21.0  < > 24.0 20.0   BUN mg/dL 62* 40* 72*  < > 41* 49*   CREATININE mg/dL 1.76* 1.34* 2.23*  < > 1.36* 2.14*   MAGNESIUM mg/dL  --   --  2.3  --   --  2.2   PHOSPHORUS mg/dL 3.6 3.4  --   --  3.4  --    GLUCOSE mg/dL 135* 124* 129*  < > 85 174*   < > = values in this interval not displayed.  Estimated Creatinine Clearance: 34.5 mL/min (A) (by C-G formula based on SCr of 1.76 mg/dL (H)).              Lab Results   Component Value Date    LACTATE 0.7 06/11/2018          Images: Most recent chest x-ray reveals right greater than left pleural effusion.  No consolidation    I reviewed the patient's results and images.     Impression      Hospital Problem List     * (Principal)Acute respiratory failure    CVA (cerebral vascular accident) - Cortical Blindness    Epidural abscess - cervical spine s/p laminectomy and decompression 4/13/18    Quadriplegia    ESRD (end stage renal disease) on dialysis    Pleural effusion on left, s/p pigtail CT, with persistent drainage    Recent Bacteremia, MSSA    Metabolic encephalopathy    Hypothyroidism    Chronic systolic heart failure    Type 2 diabetes mellitus with renal complication    Neuropathy    Atelectasis of left lung, recurrent    Overview Deleted 6/8/2018  3:51 AM by LAYTON Souza            Pressure ulcer of coccygeal region    Wound infection, cervical, possible vs CFS leak    Anemia               Plan        Continue to work with  regarding goals of care  and decision regarding dialysis  Unfortunately, no neurologic improvement  Continue current level of care for now.  Continue chest tube drainage given moderate output over the last 24 hours     Completing IV antibiotic course per ID and switching to suppressive Keflex.  Also, completing IV micafungin      Plan of care and goals reviewed with mulitdisciplinary team at daily rounds   I discussed the patient's findings and my recommendations with family and nursing staff       FABIANA Arroyo MD  Pulmonary and Critical Care Medicine

## 2018-06-20 NOTE — PROGRESS NOTES
Continued Stay Note  [facility]     Patient Name: Lizet Webster  MRN: [id number]  Today's Date: 6/20/2018    Admit Date: 6/8/2018          Discharge Plan     Row Name 06/20/18 0610       Plan    Plan Poor prognosis    Patient/Family in Agreement with Plan yes    Plan Comments Patient has poor prognosis. Palliative Care following, family not ready to withdraw care.    Final Discharge Disposition Code 30 - still a patient              Discharge Codes    No documentation.       Expected Discharge Date and Time     Expected Discharge Date Expected Discharge Time    Jun 16, 2018             Della Baptiste RN

## 2018-06-20 NOTE — PLAN OF CARE
Problem: Patient Care Overview  Goal: Plan of Care Review  Outcome: Ongoing (interventions implemented as appropriate)   06/20/18 0517   Coping/Psychosocial   Plan of Care Reviewed With patient;spouse   Plan of Care Review   Progress declining   OTHER   Outcome Summary Pt had BM x 2, incontinent, soft/formed stool. Underwent HD w/ 2 L removed. Pain managed with roxicodone, tramadol. Xanax given x 1. BP and HR acceptable. Respiratory demonstrated shallow breaths with 10-12 per minute.  still continues to want to continue HD at this time.       Problem: Mobility, Physical Impaired (Adult)  Goal: Enhanced Mobility Skills  Outcome: Ongoing (interventions implemented as appropriate)    Goal: Enhanced Functional Ability  Outcome: Ongoing (interventions implemented as appropriate)      Problem: Hemodialysis (Adult)  Goal: Signs and Symptoms of Listed Potential Problems Will be Absent, Minimized or Managed (Hemodialysis)  Outcome: Outcome(s) achieved Date Met: 06/20/18

## 2018-06-20 NOTE — PLAN OF CARE
Problem: Patient Care Overview  Goal: Plan of Care Review   06/20/18 0103   Coping/Psychosocial   Plan of Care Reviewed With patient;family   Plan of Care Review   Progress no change   OTHER   Outcome Summary new tube feeding initiated by day shift, advanced to goal, no signs of distress, bath completed, wounds addressed. Patient complains of pain, pain medication administered see eMAR. trach care performed, moderate oozing secretions removed and cleansed. no signs of distress during procedure. will continue to monitor.

## 2018-06-20 NOTE — PLAN OF CARE
Problem: Patient Care Overview  Goal: Interprofessional Rounds/Family Conf  Outcome: Ongoing (interventions implemented as appropriate)  Palliative Team Attendance 1300.Michelle Robles MD., Daniel Mosquera RN [facility], Emma ZHANGW, Maliha Rai MDIV, Kelli Sorensen RN , MANUEL   06/20/18 1300   Interdisciplinary Rounds/Family Conf   Summary continue dialysis and aggressive care. no cpr. ultram prn for pain. pt arouses and states she hurts all over. prn oxycodone given

## 2018-06-20 NOTE — PROGRESS NOTES
"Palliative Care Progress Note    Date of Admission: 6/8/2018    Subjective:   Pt seen with  Ugo at bedside.  Quiter day, not as many visitors.  No significant change.  Did have loose stools overnight so TF changed again.      Objective: /58 (BP Location: Right arm, Patient Position: Lying)   Pulse 75   Temp 97 °F (36.1 °C) (Axillary)   Resp 22   Ht 157 cm (61.81\")   Wt 60.8 kg (134 lb)   LMP  (LMP Unknown)   SpO2 96%   BMI 24.66 kg/m²      Intake/Output Summary (Last 24 hours) at 06/20/18 0542  Last data filed at 06/20/18 0400   Gross per 24 hour   Intake           1171.4 ml   Output              340 ml   Net            831.4 ml     Physical Exam:  General: middle aged F, in bed, ill-appearing, NAD    Head/Neck:  NC/AT, trach tube in place  EENT: eyes open - no conjunctival injection or scleral icterus, patent nares, oral mucosa moist  Lungs:  Normal effort and respiratory rate.  Left thoracostomy tube in place.  Heart: Normal rate.  Regular rhythm. No murmur.   Abdomen: soft, nontender, and non-distended.   Extremities: No edema.  Not moving extremities.  Neurological: Awake, doesn't respond to my exam or questioning.  Doesn't respond to Ugo in my presence either.  Psych: Calm, no evidence of discomfort or restlessness    Skin:  Warm, dry and pale.     Results from last 7 days  Lab Units 06/18/18  0610   WBC 10*3/mm3 14.99*   HEMOGLOBIN g/dL 8.4*   HEMATOCRIT % 27.7*   PLATELETS 10*3/mm3 255       Results from last 7 days  Lab Units 06/20/18  0400  06/18/18  0610   SODIUM mmol/L 135  < > 134   POTASSIUM mmol/L 3.9  < > 5.3   CHLORIDE mmol/L 99  < > 99   CO2 mmol/L 25.0  < > 21.0   BUN mg/dL 62*  < > 72*   CREATININE mg/dL 1.76*  < > 2.23*   CALCIUM mg/dL 8.8  < > 8.7   BILIRUBIN mg/dL  --   --  0.2*   ALK PHOS U/L  --   --  80   ALT (SGPT) U/L  --   --  1*   AST (SGOT) U/L  --   --  12   GLUCOSE mg/dL 135*  < > 129*   < > = values in this interval not displayed.    Principal " Problem:    Acute respiratory failure  Active Problems:    CVA (cerebral vascular accident)    Recent Bacteremia, MSSA    Metabolic encephalopathy    Epidural abscess - cervical spine s/p laminectomy and decompression 4/13/18    ESRD (end stage renal disease) on dialysis    Hypothyroidism    Chronic systolic heart failure    Type 2 diabetes mellitus with renal complication    Neuropathy    Atelectasis of left lung, recurrent    Quadriplegia    Pleural effusion on left    Pressure ulcer of coccygeal region    Wound infection, cervical, possible vs CFS leak    Anemia     Symptoms:  Encephalopathy  Debility  Blindness  MSK pain     Plan:  -Code status no CPR.  Continuing current care with 10L ATC, IV abx, TF, wound care, and dialysis (next planned for 6/20).  -Palliative team continues to follow and offer support and education.  Ugo and rest of family know prognosis is poor, just not ready to withdraw any support at this time.  Taking it day by day.     Michelle Robles MD  6/19/18

## 2018-06-20 NOTE — PROGRESS NOTES
"   LOS: 12 days    Patient Care Team:  Luan Gandara MD as PCP - General (Internal Medicine)    Reason For Visit:  F/U ESRD. SEEN ON DIALYSIS.  Subjective           Review of Systems: UNOBTAINABLE      Objective       apixaban 2.5 mg Oral Q12H   aspirin 81 mg Oral Daily   atorvastatin 20 mg Oral Nightly   ceFAZolin 2 g Intravenous Q24H   chlorhexidine 15 mL Mouth/Throat Q12H   DULoxetine 20 mg Oral Daily   epoetin mini 10,000 Units Subcutaneous Once per day on Mon Wed Fri   gabapentin 400 mg Oral Nightly   insulin regular 0-14 Units Subcutaneous Q6H   ipratropium-albuterol 3 mL Nebulization 4x Daily - RT   levothyroxine 75 mcg Oral Q AM   lidocaine 1 patch Transdermal Q24H   micafungin (MYCAMINE)  mg Intravenous Q24H   midodrine 5 mg Oral TID AC   nystatin  Topical Q12H   pantoprazole 40 mg Intravenous Q AM   PRO-STAT 1 packet Per G Tube BID   sertraline 100 mg Oral Daily   Valproic Acid 500 mg Per G Tube Q8H            Vital Signs:  Blood pressure 110/64, pulse 80, temperature 97 °F (36.1 °C), temperature source Axillary, resp. rate 18, height 157 cm (61.81\"), weight 60.8 kg (134 lb), SpO2 99 %, not currently breastfeeding.    Flowsheet Rows      First Filed Value   Admission Height  157 cm (61.81\") Documented at 06/08/2018 0215   Admission Weight  61 kg (134 lb 7.7 oz) Documented at 06/08/2018 0215          06/19 0701 - 06/20 0700  In: 1406.4 [I.V.:51.4]  Out: 370     Physical Exam:    General Appearance: NAD. AWAKE. NONVERBAL  Eyes: PER, conjunctivae and sclerae normal, no icterus  Lungs: RHONCHI. + TRACH.  Heart/CV: regular rhythm & normal rate, no murmur, no gallop, no rub and no edema  Abdomen: not distended, soft, non-tender, no masses,  bowel sounds present  Skin: No rash, Warm and dry. TUNNELED HD CATH. CLOTTED AVF LUE.    Radiology:            Labs:    Results from last 7 days  Lab Units 06/18/18  0610 06/17/18  0726 06/16/18  0927   WBC 10*3/mm3 14.99* 14.92* 12.11*   HEMOGLOBIN g/dL 8.4* 8.1* 7.8* "   HEMATOCRIT % 27.7* 26.9* 26.0*   PLATELETS 10*3/mm3 255 284 247       Results from last 7 days  Lab Units 06/20/18  0400 06/19/18  0406 06/18/18  0610 06/17/18  0726 06/16/18  0927 06/15/18  0337 06/14/18  0442   SODIUM mmol/L 135 136 134 133 138 134 138   POTASSIUM mmol/L 3.9 3.6 5.3 4.6 3.8 4.0 3.8   CHLORIDE mmol/L 99 100 99 102 102 104 105   CO2 mmol/L 25.0 26.0 21.0 22.0 24.0 20.0 23.0   BUN mg/dL 62* 40* 72* 57* 41* 49* 38*   CREATININE mg/dL 1.76* 1.34* 2.23* 1.76* 1.36* 2.14* 1.42*   CALCIUM mg/dL 8.8 8.5* 8.7 8.7 8.6* 8.7 8.8   PHOSPHORUS mg/dL 3.6 3.4  --   --  3.4  --  2.5   MAGNESIUM mg/dL  --   --  2.3  --   --  2.2  --    ALBUMIN g/dL 3.53 3.71 3.59  --  3.80  --  3.46       Results from last 7 days  Lab Units 06/20/18  0400   GLUCOSE mg/dL 135*         Results from last 7 days  Lab Units 06/18/18  0610   ALK PHOS U/L 80   BILIRUBIN mg/dL 0.2*   ALT (SGPT) U/L 1*   AST (SGOT) U/L 12                 Estimated Creatinine Clearance: 34.5 mL/min (A) (by C-G formula based on SCr of 1.76 mg/dL (H)).      Assessment     Principal Problem:    Acute respiratory failure  Active Problems:    Recent Bacteremia, MSSA    Metabolic encephalopathy    Epidural abscess - cervical spine s/p laminectomy and decompression 4/13/18    ESRD (end stage renal disease) on dialysis    Hypothyroidism    Chronic systolic heart failure    Type 2 diabetes mellitus with renal complication    Neuropathy    Atelectasis of left lung, recurrent    Quadriplegia    Pleural effusion on left, s/p pigtail CT, with persistent drainage    Pressure ulcer of coccygeal region    Wound infection, cervical, possible vs CFS leak    CVA (cerebral vascular accident)    Anemia            Impression: ESRD. ANEMIA. AVF.            Recommendations: HD TODAY.  TRYING TO DECIDE IF TO STOP DIALYSIS.       Chris Oakley MD  06/20/18  8:50 AM

## 2018-06-21 PROBLEM — H47.619 CORTICAL BLINDNESS: Status: RESOLVED | Noted: 2018-01-01 | Resolved: 2018-01-01

## 2018-06-21 PROBLEM — H47.619 CORTICAL BLINDNESS: Status: ACTIVE | Noted: 2018-01-01

## 2018-06-21 NOTE — PROGRESS NOTES
Adult Nutrition  Assessment/PES    Patient Name:  Lizet Webster  YOB: 1970  MRN: [id number]  Admit Date:  6/8/2018    Assessment Date:  6/21/2018    Comments:  Pt tolerating current EN support; palliative service working w/ family on GOC; possibly comfort care.          Reason for Assessment     Row Name 06/21/18 1522          Reason for Assessment    Reason For Assessment follow-up protocol;TF/PN;per organizational policy   MDR; 30 mins     Diagnosis neurologic conditions;diabetes diagnosis/complications;renal disease;pulmonary disease   Pt s/p cervical wound debridement, removal of hardware and reclosure (6/8), quadriplegia; CRF - vent; pleural effusion w/ CT placement;DM-T2; ESRD -HD; pressure ulcer coccygeal -poa; Hx:trach and PEG     Identified At Risk by Screening Criteria tube feeding or parenteral nutrition;large or nonhealing wound, burn or pressure injury             Nutrition/Diet History     Row Name 06/21/18 1523          Nutrition/Diet History    Factors Affecting Nutritional Intake --   RN reports pt tolerating TF; pt unresponsive;  is considering w/d of dialysis and comfort measures               Labs/Tests/Procedures/Meds     Row Name 06/21/18 1524          Labs/Procedures/Meds    Lab Results Reviewed reviewed, pertinent     Lab Results Comments elev gluc, no new labs             Physical Findings     Row Name 06/21/18 1525          Physical Findings    Overall Physical Appearance on oxygen therapy;other (see comments);tetraplegia (quadriplegia)   trached on humidified air     Tubes PEG     Skin pressure injury;non-healing wound(s)               Nutrition Prescription Ordered     Row Name 06/21/18 1531          Nutrition Prescription PO    Current PO Diet NPO        Nutrition Prescription EN    Enteral Route PEG     Product Isosource 1.5 melissa     Modulars Liquid Protein (15 gm/30 mL)     Liquid Protein (15 gm/30 mL) 30 mL/1 packet     Protein Liquid Frequency 2 times a day     TF  Delivery Method Continuous     Continuous TF Goal Rate (mL/hr) 45 mL/hr     Continuous TF Goal Volume (mL) 900 mL     Water flush (mL)  15 mL     Water Flush Frequency Per hour             Evaluation of Received Nutrient/Fluid Intake     Row Name 06/21/18 1531          Nutrient/Fluid Evaluation    Number of Days Evaluated 2 days        Calories Evaluation    Enteral Calories (kcal) 1148     Other Calories (kcal) 200     Total Calories (kcal) 1348     % of Kcal Needs 85        Protein Evaluation    Enteral Protein (gm) 51     Other Protein (gm) 30     Total Protein (gm) 81     % of Protein Needs 90        Intake Assessment    Energy/Calorie Requirement Assessment not meeting needs     Protein Requirement Assessment not meeting needs     Tolerance tolerating        Fluid Intake Evaluation    Enteral (Free Water) Fluid (mL) 595     Free Water Flush Fluid (mL) 427     Total Free Water Intake (mL) 1022 mL        Recommended Daily Intake Evaluation    RDI Not Met        EN Evaluation    Number of Days EN Intake Evaluated 2 days     EN Average Volume Delivered (mL/day) 765 mL/day     % Goal Volume  85 %     TF Tolerance Other (comment)   resolved             Problem/Interventions:          Problem 2     Row Name 06/21/18 1536          Nutrition Diagnoses Problem 2    Problem 2 Inadequate Intake/Infusion     Inadequate Intake Type Enteral     Macronutrient Kcal;Protein     Etiology (related to) MNT for Treatment/Condition     Signs/Symptoms (evidenced by) EN Intake Delivery     Percent (%) of EN goal 85 %                   Intervention Goal     Row Name 06/21/18 1536          Intervention Goal    General Meet nutritional needs for age/condition     TF/PN Deliver (%) goal     Deliver % of Goal 100 %   until palliative and  change GOC to comfort             Nutrition Intervention     Row Name 06/21/18 1537          Nutrition Intervention    RD/Tech Action Follow Tx progress;Care plan reviewd             Nutrition  Prescription     Row Name 06/21/18 1537 06/18/18 1759       Nutrition Prescription EN    Enteral Prescription Continue same protocol Enteral begin/change    Enteral Route  -- PEG    Product  -- Novasource Renal    Liquid Protein (15 gm/30 mL)  -- 30 mL/1 packet    Protein Liquid Frequency  -- 2 times a day    TF Delivery Method  -- Continuous    Continuous TF Goal Rate (mL/hr)  -- 35 mL/hr    Continuous TF Goal Volume (mL)  -- 700 mL    Water flush (mL)   -- 10 mL    Water Flush Frequency  -- Per hour    New EN Prescription Ordered?  -- Yes   v.o. given per CATHERINE Arroyo to change formula            Education/Evaluation     Row Name 06/21/18 1537          Monitor/Evaluation    Monitor Per protocol;I&O;Pertinent labs;TF delivery/tolerance;Symptoms;Skin status         Electronically signed by:  Saima Nolen RD  06/21/18 3:38 PM

## 2018-06-21 NOTE — PROGRESS NOTES
INFECTIOUS DISEASE CONSULT/INITIAL HOSPITAL VISIT    [facility]  1970  [id number]    Date of Consult: 6/21/2018    Admission Date: 6/8/2018      Requesting Provider: Della Ca MD  Evaluating Physician: Jericho Marsh III, MD    Reason for Consultation: MSSA bacteremia, C3-C7 cervical epidural abscess and C5-C6 discitis    History of present illness:    Patient is a 47 y.o. female with h/o T2DM, ESRD/HD, severe peripheral neuropathy/wheelchair bound wjp we saw 4/13/18; for epidural abscess, MSSA bacteremia which was evacuated by neurosurgery in OR with C3-C7 laminectomy, facet screw fixation and posterior lateral fusion of C5-C7, anterior cervical corpectomy C6, abscess drainage and vertebral body replacement.  Patient was treated with cefazolin followed by nafcillin but had continual fevers despite antifungal coverage, line changes.  Patient treated for capnocytophaga pneumonia.  Ultimately had trach/peg and was transferred to LT[facility] in [place] and followed by Dr. Ordoñez.    Patient treated with meropenem at [place], ultimately had seizure and was changed to tigecycline. Patient had new wound in posterior neck with clear drainage  Patient transferred back here to evaluate for CSF leak;  To have surgery today.     remarks that she is intermittently moving both arms.  Per Dr. Jones large left pleural effusion has been treated with a pigtail drain.      Patient is afebrile, hemodynamically stable    Patient awake on vent, voices words on request.  ROS unobtainable.    6/15/18;  Asleep on vent, no movement, in no distress; family meeting for goals of care today;    6/17/18; afebrile hemodynamically stable resting quietly in room; no family currently present ros unobtainable     6/21/18; patient is resting nonresonsive, eyes closed, no active movement;  by bedside; ros unobtainable    Past Medical History:   Diagnosis Date   • CAD (coronary artery disease)     stentsx4 2011   • Diabetes mellitus      ESRD, peripheral neuropathy   • ESRD (end stage renal disease) on dialysis 4/12/2018   • Hx of hepatitis    • Hypothyroidism 4/12/2018   • Systolic heart failure 04/12/2018    EF 40%       Past Surgical History:   Procedure Laterality Date   • ANTERIOR CERVICAL DISCECTOMY W/ FUSION N/A 4/13/2018    Procedure: ANTERIOR CERVICAL CORPECTOMY;  Surgeon: Aryan Reed MD;  Location:  [facility];  Service: Neurosurgery   • ARTERIOVENOUS FISTULA Left    • ARTERIOVENOUS FISTULA/SHUNT SURGERY Left 5/25/2018    Procedure: REMOVAL OF CLOT FROM LEFT ARM GRAFT;  Surgeon: Jeevan Montoya MD;  [facility]:  [facility];  Service: Vascular   • BRONCHOSCOPY N/A 4/21/2018    Procedure: BRONCHOSCOPY AT BEDSIDE;  Surgeon: Della Ca MD;  Location:  [facility];  Service: Pulmonary   • BRONCHOSCOPY N/A 4/24/2018    Procedure: BRONCHOSCOPY AT BEDSIDE;  Surgeon: Nghia Gifford MD;  Location:  [facility];  Service: Pulmonary   • BRONCHOSCOPY N/A 4/26/2018    Procedure: BRONCHOSCOPY AT BEDSIDE;  Surgeon: Denver Capone MD;  Location:  [facility];  Service: Pulmonary   • BRONCHOSCOPY N/A 5/31/2018    Procedure: BRONCHOSCOPY @ BEDSIDE;  Surgeon: Bolivar Mckeon MD;  [facility]:  [facility];  Service: Pulmonary   • BRONCHOSCOPY N/A 6/7/2018    Procedure: BRONCHOSCOPY @ BEDSIDE;  Surgeon: Bolivar Mckeon MD;  [facility]  [facility];  Service: Pulmonary   • CENTRAL VENOUS LINE INSERTION Left 6/2/2018    Procedure: CENTRAL VENOUS LINE INSERTION;  Surgeon: Modesta Ontiveros MD;  [facility]:  [facility];  Service: General   • CERVICAL LAMINECTOMY DECOMPRESSION POSTERIOR N/A 4/13/2018    Procedure: CERVICAL LAMINECTOMY DECOMPRESSION POSTERIOR;  Surgeon: Aryan Reed MD;  [facility]:  [facility];  Service: Neurosurgery   • CERVICAL LAMINECTOMY DECOMPRESSION POSTERIOR N/A 6/8/2018    Procedure: POSTERIOR CERVICAL WOUND DEBRIDEMENT AND CLOSURE WITH HARDWARE REMOVAL;  Surgeon: Aryan Reed MD;  [facility]:  [facility];  Service:  Neurosurgery   • CORONARY ANGIOPLASTY WITH STENT PLACEMENT  2011 4 stents   • HYSTERECTOMY     • INSERTION HEMODIALYSIS CATHETER N/A 5/26/2018    Procedure: HEMODIALYSIS CATHETER INSERTION;  Surgeon: Jeevan Montoya MD;  [facility]  [facility];  Service: General   • INSERTION HEMODIALYSIS CATHETER Left 5/30/2018    Procedure: REPLACEMENT  OF LEFT CHEST WALL HEMODIALYSIS CATHETER;  Surgeon: Jeevan Montoya MD;  [facility]  [facility];  Service: General   • INSERTION HEMODIALYSIS CATHETER Right 6/2/2018    Procedure: HEMODIALYSIS CATHETER INSERTION;  Surgeon: Modesta Ontiveros MD;  [facility]  [facility];  Service: General   • REMOVAL PERITONEAL DIALYSIS CATHETER Left 6/2/2018    Procedure: REMOVAL TUNNELED DIALYSIS CATHETER;  Surgeon: Modesta Ontiveros MD;  [facility]  [facility];  Service: General   • TRACHEOSTOMY AND PEG TUBE INSERTION N/A 4/24/2018    Procedure: TRACHEOSTOMY AND PERCUTANEOUS ENDOSCOPIC GASTROSTOMY TUBE INSERTION;  Surgeon: Denver Alves MD;  [facility]:  [facility];  Service: General       Family History   Problem Relation Age of Onset   • Family history unknown: Yes       Social History     Social History   • Marital status:      Spouse name: N/A   • Number of children: 2   • Years of education: N/A     Occupational History   •  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  Has been in a wheelchair because of severe peripheral neuropathy.        Allergies   Allergen Reactions   • Zosyn [Piperacillin Sod-Tazobactam So] Itching         Medication:    Current Facility-Administered Medications:   •  acetaminophen (TYLENOL) tablet 650 mg, 650 mg, Oral, Q4H PRN **OR** [DISCONTINUED] acetaminophen (TYLENOL) suppository 650 mg, 650 mg, Rectal, Q4H  PRN, Jyothi Carrasco APRN  •  ALPRAZolam (XANAX) tablet 0.5 mg, 0.5 mg, Oral, TID PRN, Narinder Arroyo MD, 0.5 mg at 06/20/18 1210  •  apixaban (ELIQUIS) tablet 2.5 mg, 2.5 mg, Oral, Q12H, Chris Tyler IV, RPH, 2.5 mg at 06/21/18 0832  •  aspirin chewable tablet 81 mg, 81 mg, Oral, Daily, Gene Lopez, APRN, 81 mg at 06/21/18 0832  •  atorvastatin (LIPITOR) tablet 20 mg, 20 mg, Oral, Nightly, Мария Suárez MD, 20 mg at 06/20/18 2036  •  cephALEXin (KEFLEX) 250 MG/5ML suspension 500 mg, 500 mg, Per G Tube, Q12H, Jericho Marsh MD, 500 mg at 06/20/18 2042  •  chlorhexidine (PERIDEX) 0.12 % solution 15 mL, 15 mL, Mouth/Throat, Q12H, Jyothi Carrasco, APRN, 15 mL at 06/21/18 0832  •  DULoxetine (CYMBALTA) DR capsule 20 mg, 20 mg, Oral, Daily, Jyothi Carrasco APRN, 20 mg at 06/21/18 0832  •  epoetin mini (EPOGEN,PROCRIT) injection 10,000 Units, 10,000 Units, Subcutaneous, Once per day on Mon Wed Fri, LAYTON Souza, 10,000 Units at 06/20/18 1210  •  gabapentin (NEURONTIN) capsule 400 mg, 400 mg, Oral, Nightly, Jyothi Carrasco APRN, 400 mg at 06/20/18 2036  •  heparin (porcine) injection 1,000 Units, 1,000 Units, Intracatheter, PRN, Narayan Holguin MD, 1,000 Units at 06/13/18 0945  •  heparin (porcine) injection 1,000 Units, 1,000 Units, Intracatheter, PRN, Chris Oakley MD  •  hydrALAZINE (APRESOLINE) injection 5 mg, 5 mg, Intravenous, Q10 Min PRN, Aguila R Roman, CRNA  •  insulin regular (humuLIN R,novoLIN R) injection 0-14 Units, 0-14 Units, Subcutaneous, Q6H, Jyothi Carrasco, APRN, 5 Units at 06/20/18 2327  •  ipratropium-albuterol (DUO-NEB) nebulizer solution 3 mL, 3 mL, Nebulization, 4x Daily - RT, Ap Wise MD, 3 mL at 06/21/18 0713  •  ipratropium-albuterol (DUO-NEB) nebulizer solution 3 mL, 3 mL, Nebulization, Once PRN, Aguila Roman CRNA  •  labetalol (NORMODYNE,TRANDATE) injection 5 mg, 5 mg, Intravenous, Q5 Min PRN, Aguila Roman CRNA  •  lactated ringers bolus 500 mL,  500 mL, Intravenous, Once PRN, Aguila Roman CRNA  •  levothyroxine (SYNTHROID, LEVOTHROID) tablet 75 mcg, 75 mcg, Oral, Q AM, Jyothi Carrasco, APRN, 75 mcg at 06/21/18 0525  •  lidocaine (LIDODERM) 5 % 1 patch, 1 patch, Transdermal, Q24H, Jyothi Carrasco, APRN, 1 patch at 06/21/18 0832  •  lidocaine PF 1% (XYLOCAINE) injection 0.5 mL, 0.5 mL, Injection, Once PRN, Luis Lawson MD  •  midodrine (PROAMATINE) tablet 5 mg, 5 mg, Oral, TID AC, Gene Lopez, APRN, 5 mg at 06/21/18 0832  •  Morphine PF injection 2 mg, 2 mg, Intravenous, Q4H PRN, Keith Villalta, APRN, 2 mg at 06/20/18 2043  •  naloxone (NARCAN) injection 0.4 mg, 0.4 mg, Intravenous, PRN, Aguila Roman CRNA  •  nystatin (MYCOSTATIN) powder, , Topical, Q12H, Ap Wise MD, 1 application at 06/21/18 0832  •  ondansetron (ZOFRAN) injection 4 mg, 4 mg, Intravenous, Once PRN, Aguila Roman CRNA  •  oxyCODONE (ROXICODONE) 5 MG/5ML solution 5 mg, 5 mg, Per G Tube, Q4H PRN, Denver Capone MD, 5 mg at 06/20/18 1335  •  pantoprazole (PROTONIX) injection 40 mg, 40 mg, Intravenous, Q AM, Eulalio Prajapati, [facility], 40 mg at 06/21/18 0525  •  PRO-STAT 1 packet, 1 packet, Per G Tube, BID, Saima Nolen RD, 1 packet at 06/21/18 0842  •  promethazine (PHENERGAN) injection 6.25 mg, 6.25 mg, Intravenous, Once PRN **OR** promethazine (PHENERGAN) injection 6.25 mg, 6.25 mg, Intramuscular, Once PRN **OR** promethazine (PHENERGAN) suppository 25 mg, 25 mg, Rectal, Once PRN **OR** promethazine (PHENERGAN) tablet 25 mg, 25 mg, Oral, Once PRN, Aguila Roman CRNA  •  sertraline (ZOLOFT) tablet 100 mg, 100 mg, Oral, Daily, LAYTON Souza, 100 mg at 06/21/18 0832  •  sodium chloride 0.9 % flush 1-10 mL, 1-10 mL, Intravenous, PRN, LAYTON Souza  •  sodium chloride 0.9 % flush 1-10 mL, 1-10 mL, Intravenous, PRN, Luis Lawson MD  •  sodium chloride 0.9 % flush 1-10 mL, 1-10 mL, Intravenous, PRN, Aguila Roman CRNA  •  traMADol (ULTRAM) tablet 50 mg,  50 mg, Oral, Q6H PRN, Narinder Arroyo MD, 50 mg at 18 1335  •  Valproic Acid (DEPAKENE) syrup 500 mg, 500 mg, Per G Tube, Q8H, Мария Suárez MD, 500 mg at 18 0525    Antibiotics:  Anti-Infectives     Ordered     Dose/Rate Route Frequency Start Stop    18 1039  cephALEXin (KEFLEX) 250 MG/5ML suspension 500 mg     Ordering Provider:  Jericho Marsh MD    500 mg Per G Tube Every 12 Hours Scheduled 18 1200 18 0859    18 0356  micafungin 100 mg/100 mL 0.9% NS IVPB (mbp)     Narinder Arroyo MD reviewed the order on 18 1038.   Ordering Provider:  Jericho Marsh MD    100 mg  over 60 Minutes Intravenous Every 24 Hours 18 1200 18 1310            Review of Systems:  Unable to obtain secondary to sedation/vent.    Fh/sh unobtainable  Physical Exam:   Vital Signs  Temp (24hrs), Av.4 °F (36.9 °C), Min:97.4 °F (36.3 °C), Max:100.5 °F (38.1 °C)    Temp  Min: 97.4 °F (36.3 °C)  Max: 100.5 °F (38.1 °C)  BP  Min: 104/61  Max: 130/76  Pulse  Min: 82  Max: 99  Resp  Min: 8  Max: 18  SpO2  Min: 92 %  Max: 100 %    GENERAL: sedated on mechanical vent through trach in neck  HEENT: Normocephalic, atraumatic. No conjunctival injection. No icterus. Oral vent          MSK: No joint swelling or erythema  SKIN: Warm and dry without cutaneous eruptions on Inspection/palpation.      Laboratory Data      Results from last 7 days  Lab Units 18  0610 18  0726 18  0927   WBC 10*3/mm3 14.99* 14.92* 12.11*   HEMOGLOBIN g/dL 8.4* 8.1* 7.8*   HEMATOCRIT % 27.7* 26.9* 26.0*   PLATELETS 10*3/mm3 255 284 247       Results from last 7 days  Lab Units 18  0400   SODIUM mmol/L 135   POTASSIUM mmol/L 3.9   CHLORIDE mmol/L 99   CO2 mmol/L 25.0   BUN mg/dL 62*   CREATININE mg/dL 1.76*   GLUCOSE mg/dL 135*   CALCIUM mg/dL 8.8       Results from last 7 days  Lab Units 18  0610   ALK PHOS U/L 80   BILIRUBIN mg/dL 0.2*   ALT (SGPT) U/L 1*   AST (SGOT)  U/L 12           Results from last 7 days  Lab Units 06/18/18  0610   CRP mg/dL 6.89*                 Estimated Creatinine Clearance: 34.5 mL/min (A) (by C-G formula based on SCr of 1.76 mg/dL (H)).      Microbiology:  Blood Culture   Date Value Ref Range Status   04/13/2018 No growth at less than 24 hours  Preliminary   04/13/2018 No growth at less than 24 hours  Preliminary           Neck surgical abscess cx (4/13) pending                 Radiology:  Imaging Results (last 72 hours)     Procedure Component Value Units Date/Time    CT Head Without Contrast [[id number]] Updated:  04/13/18 1220    XR Chest 1 View [[id number]] Collected:  04/13/18 0823     Updated:  04/13/18 0826    Narrative:          EXAMINATION: XR CHEST 1 VW-      INDICATION: et placement; G06.1-Intraspinal abscess and granuloma      COMPARISON: 4/12/2018     FINDINGS: The heart size is normal. There are areas of bibasilar  atelectasis. A central venous catheter is well-positioned. An  endotracheal tube is well-positioned. There has been interval surgery in  the lower cervical spine.           Impression:       Endotracheal tube is well-positioned. There is bibasilar  atelectasis.     This report was finalized on 4/13/2018 8:24 AM by Dr. Anthony Perera MD.       XR Spine Cervical Lateral [[id number]] Collected:  04/13/18 0812     Updated:  04/13/18 0812    Narrative:       EXAMINATION: XR SPINE CERVICAL LATERAL-      INDICATION: ACDF; G06.1-Intraspinal abscess and granuloma.      COMPARISON: None.     FINDINGS: Portable lateral cervical spine reveals a radiopaque marker  seen anteriorly at the C6-C7 disc space.  Please see the procedural  report for full details.       Impression:       Anterior localization of the C5-C6 disc space.     D:  04/13/2018  E:  04/13/2018       XR Spine Cervical Lateral [[id number]] Collected:  04/13/18 0812     Updated:  04/13/18 0812    Narrative:       EXAMINATION: XR SPINE CERVICAL LATERAL-      INDICATION: Cervical  laminectomy decompression posterior with  fusion/ACDF; G06.1-Intraspinal abscess and granuloma.      COMPARISON: 04/13/2018.     FINDINGS: Portable lateral cervical spine reveals anterior fusion of the  C5, C6, and C7 levels. No definite malalignment. Lines and tubes  overlying the soft tissues. Postsurgical changes seen within the soft  tissues.           Impression:       Anterior fusion of C5 through C7.     D:  04/13/2018  E:  04/13/2018          XR Abdomen KUB [[id number]] Collected:  04/13/18 0001     Updated:  04/13/18 0111    Narrative:       EXAM:    XR Abdomen, 1 View    CLINICAL HISTORY:    47 years old, female; Intraspinal abscess and granuloma; Device placement; Gi   device; Nasogastric tube; Additional info: Ng tube placement    TECHNIQUE:    Frontal supine view of the abdomen/pelvis.    COMPARISON:    No relevant prior studies available.    FINDINGS:     Tip of NASOGASTRIC/ENTERIC tube distal to the gastroesophageal junction in   the gastric antrum/pylorus.      Impression:         As above.    THIS DOCUMENT HAS BEEN ELECTRONICALLY SIGNED BY ANDI KHANNA MD    XR Chest 1 View [[id number]] Collected:  04/12/18 2342     Updated:  04/13/18 0110    Narrative:       EXAM:    XR Chest, 1 View    CLINICAL HISTORY:    47 years old, female; Intraspinal abscess and granuloma; Signs and symptoms;   Shortness of breath; Additional info: Metabolic encephalopathy, resp failure    TECHNIQUE:    Frontal view of the chest.    COMPARISON:    No relevant prior studies available.    FINDINGS:    Decreased lung volumes bilaterally, predominantly RIGHT lung base   atelectasis, crowding of the vessels without evidence of consolidation.   Otherwise normal appearing lungs and mediastinum. No effusion or pneumothorax.   Cardiomediastinal silhouette is normal.       LEFT sided CENTRAL VENOUS line with its tip in the SVC.  Stent within the   LEFT axilla/upper arm. Tip of NASOGASTRIC/ENTERIC tube is not visualized and   extends  beyond the gastroesophageal junction into the LEFT upper abdomen.       Impression:         No active lung parenchymal lesion.     THIS DOCUMENT HAS BEEN ELECTRONICALLY SIGNED BY ANDI KHANNA MD            Impression:   chronic respiratory failure on vent  Epidural abscess MSSA 4/13/18  ? CSF leak  ESRD  Left pleural effusion  S/p decompression 4/13/18.  ? Seizure while on meropenem  Sputum cultures growing yeast, mold 5/25/18    Patient looks markedly better than last admission;    Other than risk of fevers; patient should finish 8 week  Course of cefazolin from 4/13/18 (~6-8-18)  Thereafter we ususally offer 3-6 months of oral abx Keflex    Discussed with ;   No expected recovery, in setting of ESRD, quadrilegia, blindness I expect that if we continue tube feeds and dialysis patient will develop decubitus wounds and ultimately die from complications of sepsis from wounds, or pneumonia.    Without quality of life it is entirely reasonable to stop heroic measures which currently includes dialysis.     says he is ready to stop dialysis; I have explained that his wife will drift into a sleep and will ultimately have cardiac arrest.    D/w Dr. Dee costello potential plan in goals of care    D/c abx  Will sign off    Dp/cm; palliative discussion time  30 minutes cumulative

## 2018-06-21 NOTE — PLAN OF CARE
Problem: Patient Care Overview  Goal: Plan of Care Review  Outcome: Ongoing (interventions implemented as appropriate)   06/21/18 0121   Coping/Psychosocial   Plan of Care Reviewed With spouse   Plan of Care Review   Progress declining   OTHER   Outcome Summary During patients bath she desated to 70%, Respiratory called o2 increased and patient recovered with a saturation of 91% in a few minutes. Discussed poor prognosis with  and the fact that she is breathing much more labored then last night and  thinks he will be willing to make patient comfort measures in the next day or so. Spend 20-30 minutes talking and providing emotional support to , will continue to monitor

## 2018-06-21 NOTE — PLAN OF CARE
Problem: Patient Care Overview  Goal: Plan of Care Review  Outcome: Ongoing (interventions implemented as appropriate)      Problem: Wound (Includes Pressure Injury) (Adult)  Goal: Signs and Symptoms of Listed Potential Problems Will be Absent, Minimized or Managed (Wound)  Outcome: Ongoing (interventions implemented as appropriate)      Problem: Palliative Care (Adult)  Goal: Identify Related Risk Factors and Signs and Symptoms  Outcome: Ongoing (interventions implemented as appropriate)

## 2018-06-21 NOTE — PROGRESS NOTES
Palliative Care Progress Note    Date of Admission: 6/8/2018    Subjective:   states that patient has not woke up or interacted with them today.    No current facility-administered medications on file prior to encounter.      Current Outpatient Prescriptions on File Prior to Encounter   Medication Sig Dispense Refill   • epoetin mini (EPOGEN,PROCRIT) 38622 UNIT/ML injection Inject 1 mL under the skin 3 (Three) Times a Week.     • Heparin Sodium, Porcine, (HEPARIN, PORCINE,) 5000 UNIT/ML injection Inject 1 mL under the skin Every 8 (Eight) Hours.     • levothyroxine (SYNTHROID, LEVOTHROID) 75 MCG tablet Take 75 mcg by mouth Daily.     • lidocaine (LIDODERM) 5 % Place 1 patch on the skin Daily. Remove & Discard patch within 12 hours or as directed by MD     • midodrine (PROAMATINE) 5 MG tablet Take 10 mg by mouth Daily.     • sertraline (ZOLOFT) 100 MG tablet Take 100 mg by mouth Daily.     • Amino Acids-Protein Hydrolys (PRO-STAT) liquid 1 packet by Per G Tube route 2 (Two) Times a Day.     • DULoxetine (CYMBALTA) 20 MG capsule Take 20 mg by mouth Daily.     • gabapentin (NEURONTIN) 400 MG capsule Take 400 mg by mouth every night at bedtime.     • insulin regular (humuLIN R,novoLIN R) 100 UNIT/ML injection Inject 0-24 Units under the skin Every 6 (Six) Hours.  12   • insulin regular (humuLIN R,novoLIN R) 100 UNIT/ML injection Inject 7 Units under the skin Every 6 (Six) Hours.  12   • loratadine (CLARITIN) 10 MG tablet Take 10 mg by mouth Daily.     • pantoprazole (PROTONIX) 40 MG EC tablet Take 40 mg by mouth Daily.     • traMADol (ULTRAM) 50 MG tablet Take 50 mg by mouth 2 (Two) Times a Day.          •  acetaminophen **OR** [DISCONTINUED] acetaminophen  •  ALPRAZolam  •  heparin (porcine)  •  heparin (porcine)  •  hydrALAZINE  •  ipratropium-albuterol  •  labetalol  •  lactated ringers  •  lidocaine PF 1%  •  Morphine  •  naloxone  •  ondansetron  •  oxyCODONE  •  promethazine **OR** promethazine **OR**  "promethazine **OR** promethazine  •  sodium chloride  •  sodium chloride  •  sodium chloride  •  traMADol    Objective: /68 (BP Location: Right arm, Patient Position: Lying)   Pulse 90   Temp 98.9 °F (37.2 °C) (Axillary)   Resp 16   Ht 157 cm (61.81\")   Wt 60.8 kg (134 lb)   LMP  (LMP Unknown)   SpO2 98%   BMI 24.66 kg/m²      Intake/Output Summary (Last 24 hours) at 06/21/18 1239  Last data filed at 06/21/18 1200   Gross per 24 hour   Intake             1652 ml   Output              250 ml   Net             1402 ml     Physical Exam:      General Appearance:    Non verbal, trach in place   Head:    Trach in place   Eyes:            Lids and lashes normal, conjunctivae and sclerae normal, no   icterus, no pallor, corneas clear, PERRLA   Ears:    Ears appear intact with no abnormalities noted   Throat:   No oral lesions, no thrush, oral mucosa moist   Neck:   No adenopathy, supple, trachea midline, no thyromegaly, no     carotid bruit, no JVD   Back:     No kyphosis present, no scoliosis present, no skin lesions,       erythema or scars, no tenderness to percussion or                   palpation,   range of motion normal   Lungs:     Clear to auscultation,respirations regular, even and                   unlabored    Heart:    Regular rhythm and normal rate, normal S1 and S2, no            murmur, no gallop, no rub, no click   Breast Exam:    Deferred   Abdomen:     Normal bowel sounds, no masses, no organomegaly, soft        non-tender, non-distended, no guarding, no rebound                 tenderness   Genitalia:    Deferred   Extremities:  edema noted in both extremties   Pulses:   Pulses palpable and equal bilaterally   Skin:   No bleeding, bruising or rash   Lymph nodes:   No palpable adenopathy           Results from last 7 days  Lab Units 06/18/18  0610   WBC 10*3/mm3 14.99*   HEMOGLOBIN g/dL 8.4*   HEMATOCRIT % 27.7*   PLATELETS 10*3/mm3 255       Results from last 7 days  Lab Units " 06/20/18  0400  06/18/18  0610   SODIUM mmol/L 135  < > 134   POTASSIUM mmol/L 3.9  < > 5.3   CHLORIDE mmol/L 99  < > 99   CO2 mmol/L 25.0  < > 21.0   BUN mg/dL 62*  < > 72*   CREATININE mg/dL 1.76*  < > 2.23*   CALCIUM mg/dL 8.8  < > 8.7   BILIRUBIN mg/dL  --   --  0.2*   ALK PHOS U/L  --   --  80   ALT (SGPT) U/L  --   --  1*   AST (SGOT) U/L  --   --  12   GLUCOSE mg/dL 135*  < > 129*   < > = values in this interval not displayed.    Impression: End-stage renal disease on dialysis  Change in mental status  Dysphagia  Volume overload  Goals of care  Plan:  states that he'll manage the family are discussing other options, they're considering whether or not they should stop dialysis.    Offered my support as well as answering any questions that they had.        Shakir Gleason DO  06/21/18  12:39 PM

## 2018-06-21 NOTE — PLAN OF CARE
Problem: Patient Care Overview  Goal: Interprofessional Rounds/Family Conf  Outcome: Ongoing (interventions implemented as appropriate)  Palliative Team Attendance 1300 Shakir Gleason DO., Daniel Hector RN PN, Emma ZHANGW, Maliha Rai MDIV, Kelli Sorensen RN , PN   06/21/18 1300   Interdisciplinary Rounds/Family Conf   Summary pt is resting comfortably.  discussing stopping dialysis and moving towards comfort

## 2018-06-21 NOTE — PROGRESS NOTES
"   LOS: 13 days    Patient Care Team:  Luan Gandara MD as PCP - General (Internal Medicine)    Reason For Visit:  F/U ESRD.    Subjective   No new event, no changes    Review of Systems: UNOBTAINABLE      Objective       apixaban 2.5 mg Oral Q12H   aspirin 81 mg Oral Daily   atorvastatin 20 mg Oral Nightly   chlorhexidine 15 mL Mouth/Throat Q12H   DULoxetine 20 mg Oral Daily   epoetin mini 10,000 Units Subcutaneous Once per day on Mon Wed Fri   gabapentin 400 mg Oral Nightly   insulin regular 0-14 Units Subcutaneous Q6H   ipratropium-albuterol 3 mL Nebulization 4x Daily - RT   levothyroxine 75 mcg Oral Q AM   lidocaine 1 patch Transdermal Q24H   midodrine 5 mg Oral TID AC   nystatin  Topical Q12H   pantoprazole 40 mg Intravenous Q AM   PRO-STAT 1 packet Per G Tube BID   sertraline 100 mg Oral Daily   Valproic Acid 500 mg Per G Tube Q8H            Vital Signs:  Blood pressure 116/75, pulse 94, temperature 98.9 °F (37.2 °C), temperature source Axillary, resp. rate 16, height 157 cm (61.81\"), weight 60.8 kg (134 lb), SpO2 99 %, not currently breastfeeding.    Flowsheet Rows      First Filed Value   Admission Height  157 cm (61.81\") Documented at 06/08/2018 0215   Admission Weight  61 kg (134 lb 7.7 oz) Documented at 06/08/2018 0215          06/20 0701 - 06/21 0700  In: 1489 [I.V.:60]  Out: 2480     Physical Exam:  General Appearance:  No change no obvious distress.  Eyes: PER,    Neck: Supple no JVD.  Trach  Lungs: Few positive rhonchi's, equal chest movement, nonlabored.  Heart: No gallop, murmur, rub, RRR.  Abdomen: Soft, positive bowel sounds, no organomegaly.  Extremities: Trace edema edema, no cyanosis.  Neuro: Not moving any extremities no response to pain   TUNNELED HD CATH. CLOTTED AVF LUE.    Radiology:    Labs:    Results from last 7 days  Lab Units 06/18/18  0610 06/17/18  0726 06/16/18  0927   WBC 10*3/mm3 14.99* 14.92* 12.11*   HEMOGLOBIN g/dL 8.4* 8.1* 7.8*   HEMATOCRIT % 27.7* 26.9* 26.0*   PLATELETS " 10*3/mm3 255 284 247       Results from last 7 days  Lab Units 06/20/18  0400 06/19/18  0406 06/18/18  0610 06/17/18  0726 06/16/18  0927 06/15/18  0337   SODIUM mmol/L 135 136 134 133 138 134   POTASSIUM mmol/L 3.9 3.6 5.3 4.6 3.8 4.0   CHLORIDE mmol/L 99 100 99 102 102 104   CO2 mmol/L 25.0 26.0 21.0 22.0 24.0 20.0   BUN mg/dL 62* 40* 72* 57* 41* 49*   CREATININE mg/dL 1.76* 1.34* 2.23* 1.76* 1.36* 2.14*   CALCIUM mg/dL 8.8 8.5* 8.7 8.7 8.6* 8.7   PHOSPHORUS mg/dL 3.6 3.4  --   --  3.4  --    MAGNESIUM mg/dL  --   --  2.3  --   --  2.2   ALBUMIN g/dL 3.53 3.71 3.59  --  3.80  --        Results from last 7 days  Lab Units 06/20/18  0400   GLUCOSE mg/dL 135*         Results from last 7 days  Lab Units 06/18/18  0610   ALK PHOS U/L 80   BILIRUBIN mg/dL 0.2*   ALT (SGPT) U/L 1*   AST (SGOT) U/L 12                 Estimated Creatinine Clearance: 34.5 mL/min (A) (by C-G formula based on SCr of 1.76 mg/dL (H)).      Assessment     Principal Problem:    Acute respiratory failure  Active Problems:    Recent Bacteremia, MSSA    Metabolic encephalopathy    Epidural abscess - cervical spine s/p laminectomy and decompression 4/13/18    ESRD (end stage renal disease) on dialysis    Hypothyroidism    Chronic systolic heart failure    Type 2 diabetes mellitus with renal complication    Neuropathy    Atelectasis of left lung, recurrent    Quadriplegia    Pleural effusion on left, s/p pigtail CT, with persistent drainage    Pressure ulcer of coccygeal region    Wound infection, cervical, possible vs CFS leak    CVA (cerebral vascular accident) - Cortical Blindness    Anemia      1.  ESRD.  Dialysis Monday Wednesday Friday.   and the mother requesting dialysis to the make a final decision.  2.  Anemia of chronic disease.  3.  CVA unresponsive.  4.  Quadriplegia.           Ruel Hwang MD  06/21/18  1:51 PM

## 2018-06-21 NOTE — PROGRESS NOTES
"Lizet Webster    Subjective     CC: encephalopathy     History of Present Illness     Lizet Webster is unresponsive this morning and is unable to follow commands. Her family reports no events overnight.      There have been no other changes in the patient's interval history since my last note, 6/18/18.     I have reviewed and confirmed the past family, social and medical history as accurate on 6/21/18.     Review of Systems   Unable to perform ROS: Patient unresponsive       Objective     /65   Pulse 99   Temp 100.5 °F (38.1 °C) (Axillary)   Resp 18   Ht 157 cm (61.81\")   Wt 60.8 kg (134 lb)   LMP  (LMP Unknown)   SpO2 98%   BMI 24.66 kg/m²     Physical Exam     Neurologic Exam     Mental Status   Her eyes are passively opened, though she does not regard. She is unable to follow commands.        Assessment/Plan       Lizet Webster is followed with stroke, encephalopathy, seizure, and quadriplegia. She will continue on ASA, Lipitor and Eliquis unchanged. She will also continue on VPA unchanged. She will continue to follow closely with palliative care.  Otherwise, I do not have any new recommendations today. Therefore, we will sign off for now.       As part of this visit I obtained additional history from the family which is incorporated in the HPI and reviewed records from the current hospitalization which is incorporated in the HPI.  "

## 2018-06-21 NOTE — PROGRESS NOTES
"Intensive Care Follow-up     Hospital:  LOS: 13 days   Ms. Lizet Webster, 47 y.o. female is followed for:   Acute respiratory failure        Subjective   Interval History:  The charts been reviewed. The patient remains blind. Hemodynamics remain stable.    The patient's relevant past medical, surgical and social history were reviewed and updated in Epic as appropriate.        Objective     Infusions:     Medications:    apixaban 2.5 mg Oral Q12H   aspirin 81 mg Oral Daily   atorvastatin 20 mg Oral Nightly   chlorhexidine 15 mL Mouth/Throat Q12H   DULoxetine 20 mg Oral Daily   epoetin mini 10,000 Units Subcutaneous Once per day on Mon Wed Fri   gabapentin 400 mg Oral Nightly   insulin regular 0-14 Units Subcutaneous Q6H   ipratropium-albuterol 3 mL Nebulization 4x Daily - RT   levothyroxine 75 mcg Oral Q AM   lidocaine 1 patch Transdermal Q24H   midodrine 5 mg Oral TID AC   nystatin  Topical Q12H   pantoprazole 40 mg Intravenous Q AM   PRO-STAT 1 packet Per G Tube BID   sertraline 100 mg Oral Daily   Valproic Acid 500 mg Per G Tube Q8H       Vital Sign Min/Max for last 24 hours  Temp  Min: 97.4 °F (36.3 °C)  Max: 100.5 °F (38.1 °C)   BP  Min: 104/61  Max: 130/76   Pulse  Min: 82  Max: 99   Resp  Min: 8  Max: 18   SpO2  Min: 92 %  Max: 100 %   Flow (L/min)  Min: 10  Max: 10       Input/Output for last 24 hour shift  06/20 0701 - 06/21 0700  In: 1489 [I.V.:60]  Out: 2480       Objective:  General Appearance:  In no acute distress, ill-appearing, uncomfortable and not in pain.    Vital signs: (most recent): Blood pressure 118/65, pulse 99, temperature 100.5 °F (38.1 °C), temperature source Axillary, resp. rate 18, height 157 cm (61.81\"), weight 60.8 kg (134 lb), SpO2 98 %, not currently breastfeeding.  No fever.    HEENT: (Tracheostomy tube in place; on TCT.  Right tunneled internal jugular dialysis catheter.  Left internal jugular central venous catheter.  )    Lungs:  Normal effort and normal respiratory rate.  She is " not in respiratory distress.  There are decreased breath sounds.  No rales, wheezes or rhonchi.  (Left thoracostomy tube in place with no air leak.)  Heart: Normal rate.  Regular rhythm.  S1 normal and S2 normal.  No murmur, gallop or friction rub.   Chest: Symmetric chest wall expansion.   Abdomen: Abdomen is soft and non-distended.  Bowel sounds are normal.   There is no abdominal tenderness.   There is no mass. There is no splenomegaly. There is no hepatomegaly.   Extremities: Decreased range of motion.  There is no deformity or dependent edema.  (She is not moving any of her extremities.)  Neurological: (The patient does arouse but is not interactive with me.).    Pupils:  Pupils are equal. (Left pupil is 5 mm the right pupil is 2-3 mm. Neither is reactive.).    Skin:  Warm, dry and pale.  No rash or cyanosis.               Results from last 7 days  Lab Units 06/18/18  0610 06/17/18  0726 06/16/18  0927   WBC 10*3/mm3 14.99* 14.92* 12.11*   HEMOGLOBIN g/dL 8.4* 8.1* 7.8*   PLATELETS 10*3/mm3 255 284 247       Results from last 7 days  Lab Units 06/20/18  0400 06/19/18  0406 06/18/18  0610  06/16/18  0927 06/15/18  0337   SODIUM mmol/L 135 136 134  < > 138 134   POTASSIUM mmol/L 3.9 3.6 5.3  < > 3.8 4.0   CO2 mmol/L 25.0 26.0 21.0  < > 24.0 20.0   BUN mg/dL 62* 40* 72*  < > 41* 49*   CREATININE mg/dL 1.76* 1.34* 2.23*  < > 1.36* 2.14*   MAGNESIUM mg/dL  --   --  2.3  --   --  2.2   PHOSPHORUS mg/dL 3.6 3.4  --   --  3.4  --    GLUCOSE mg/dL 135* 124* 129*  < > 85 174*   < > = values in this interval not displayed.  Estimated Creatinine Clearance: 34.5 mL/min (A) (by C-G formula based on SCr of 1.76 mg/dL (H)).            I reviewed the patient's results and images.     Assessment/Plan   Impression      Principal Problem:    Acute respiratory failure  Active Problems:    CVA (cerebral vascular accident) - Cortical Blindness    Recent Bacteremia, MSSA    Metabolic encephalopathy    Epidural abscess - cervical spine  s/p laminectomy and decompression 4/13/18    ESRD (end stage renal disease) on dialysis    Hypothyroidism    Chronic systolic heart failure    Type 2 diabetes mellitus with renal complication    Neuropathy    Atelectasis of left lung, recurrent    Quadriplegia    Pleural effusion on left, s/p pigtail CT, with persistent drainage    Pressure ulcer of coccygeal region    Wound infection, cervical, possible vs CFS leak    Anemia       Plan        Continue on with current support.  There have been some indications of the  is considering discontinuation of hemodialysis and proceeding with full comfort measures.      Plan of care and goals reviewed with mulitdisciplinary team at daily rounds.   I discussed the patient's findings and my recommendations with patient, family and nursing staff         Denver Capone MD, [facility]  Pulmonary and Critical Care Medicine  06/21/18 11:21 AM

## 2018-06-22 NOTE — PLAN OF CARE
Problem: Patient Care Overview  Goal: Interprofessional Rounds/Family Conf  Outcome: Ongoing (interventions implemented as appropriate)  Palliative Team Attendance 1300. John RIVAS, Josephine TRAYLOR, Daniel Petty RN [facility], Radha Barclay MDIV, Kelli Sorensen RN , [facility]   06/22/18 1300   Interdisciplinary Rounds/Family Conf   Summary pt is unreponsive, agonal breathing at times. pt completed final dialysis today. Dialysis, labs, noncomfort meds discontinued. Family wants to continue tube feeds and will think about hospice. Pt is comfort measure, actively dying.

## 2018-06-22 NOTE — PROGRESS NOTES
Palliative Care Progress Note    Date of Admission: 6/8/2018    Subjective:   states that patient has not woke up or interacted with them today.    No current facility-administered medications on file prior to encounter.      Current Outpatient Prescriptions on File Prior to Encounter   Medication Sig Dispense Refill   • epoetin mini (EPOGEN,PROCRIT) 33749 UNIT/ML injection Inject 1 mL under the skin 3 (Three) Times a Week.     • Heparin Sodium, Porcine, (HEPARIN, PORCINE,) 5000 UNIT/ML injection Inject 1 mL under the skin Every 8 (Eight) Hours.     • levothyroxine (SYNTHROID, LEVOTHROID) 75 MCG tablet Take 75 mcg by mouth Daily.     • lidocaine (LIDODERM) 5 % Place 1 patch on the skin Daily. Remove & Discard patch within 12 hours or as directed by MD     • midodrine (PROAMATINE) 5 MG tablet Take 10 mg by mouth Daily.     • sertraline (ZOLOFT) 100 MG tablet Take 100 mg by mouth Daily.     • Amino Acids-Protein Hydrolys (PRO-STAT) liquid 1 packet by Per G Tube route 2 (Two) Times a Day.     • DULoxetine (CYMBALTA) 20 MG capsule Take 20 mg by mouth Daily.     • gabapentin (NEURONTIN) 400 MG capsule Take 400 mg by mouth every night at bedtime.     • insulin regular (humuLIN R,novoLIN R) 100 UNIT/ML injection Inject 0-24 Units under the skin Every 6 (Six) Hours.  12   • insulin regular (humuLIN R,novoLIN R) 100 UNIT/ML injection Inject 7 Units under the skin Every 6 (Six) Hours.  12   • loratadine (CLARITIN) 10 MG tablet Take 10 mg by mouth Daily.     • pantoprazole (PROTONIX) 40 MG EC tablet Take 40 mg by mouth Daily.     • traMADol (ULTRAM) 50 MG tablet Take 50 mg by mouth 2 (Two) Times a Day.          •  acetaminophen **OR** [DISCONTINUED] acetaminophen  •  albumin human  •  albumin human  •  ALPRAZolam  •  heparin (porcine)  •  heparin (porcine)  •  hydrALAZINE  •  ipratropium-albuterol  •  labetalol  •  lactated ringers  •  lidocaine PF 1%  •  Morphine  •  naloxone  •  ondansetron  •  oxyCODONE  •   "promethazine **OR** promethazine **OR** promethazine **OR** promethazine  •  sodium chloride  •  sodium chloride  •  sodium chloride  •  traMADol    Objective: /76   Pulse 93   Temp 97.6 °F (36.4 °C) (Axillary)   Resp 20   Ht 157 cm (61.81\")   Wt 60.8 kg (134 lb)   LMP  (LMP Unknown)   SpO2 99%   BMI 24.66 kg/m²      Intake/Output Summary (Last 24 hours) at 06/22/18 1412  Last data filed at 06/22/18 1200   Gross per 24 hour   Intake             1422 ml   Output             2740 ml   Net            -1318 ml     Physical Exam:      General Appearance:    Non verbal, trach in place   Head:    Trach in place   Eyes:            Lids and lashes normal, conjunctivae and sclerae normal, no   icterus, no pallor, corneas clear, PERRLA   Ears:    Ears appear intact with no abnormalities noted   Throat:   No oral lesions, no thrush, oral mucosa moist   Neck:   No adenopathy, supple, trachea midline, no thyromegaly, no     carotid bruit, no JVD   Back:     No kyphosis present, no scoliosis present, no skin lesions,       erythema or scars, no tenderness to percussion or                   palpation,   range of motion normal   Lungs:     Clear to auscultation,respirations regular, even and                   unlabored    Heart:    Regular rhythm and normal rate, normal S1 and S2, no            murmur, no gallop, no rub, no click   Breast Exam:    Deferred   Abdomen:     Normal bowel sounds, no masses, no organomegaly, soft        non-tender, non-distended, no guarding, no rebound                 tenderness   Genitalia:    Deferred   Extremities:  edema noted in both extremties   Pulses:   Pulses palpable and equal bilaterally   Skin:   No bleeding, bruising or rash   Lymph nodes:   No palpable adenopathy           Results from last 7 days  Lab Units 06/18/18  0610   WBC 10*3/mm3 14.99*   HEMOGLOBIN g/dL 8.4*   HEMATOCRIT % 27.7*   PLATELETS 10*3/mm3 255       Results from last 7 days  Lab Units 06/20/18  0400  " 06/18/18  0610   SODIUM mmol/L 135  < > 134   POTASSIUM mmol/L 3.9  < > 5.3   CHLORIDE mmol/L 99  < > 99   CO2 mmol/L 25.0  < > 21.0   BUN mg/dL 62*  < > 72*   CREATININE mg/dL 1.76*  < > 2.23*   CALCIUM mg/dL 8.8  < > 8.7   BILIRUBIN mg/dL  --   --  0.2*   ALK PHOS U/L  --   --  80   ALT (SGPT) U/L  --   --  1*   AST (SGOT) U/L  --   --  12   GLUCOSE mg/dL 135*  < > 129*   < > = values in this interval not displayed.    Impression: End-stage renal disease on dialysis  Change in mental status  Dysphagia  Volume overload  Goals of care  Plan: Talked to the patient's .  Overall the plan is to start leaning more towards a comfort plan of care.  Today will be the last day of dialysis.   will to continue to feed at this point time, however he is comfortable with stopping none comfort medications as well as stopping lab work.  I did talk to him about hospice but he will start the rest of family before making a decision about hospice.        Shakir Gleason,   06/22/18  2:12 PM

## 2018-06-22 NOTE — PROGRESS NOTES
"   LOS: 14 days    Patient Care Team:  Luan Gandara MD as PCP - General (Internal Medicine)    Reason For Visit:  F/U ESRD.    Subjective   No new event, no changes  Dialysis in progress  Review of Systems: UNOBTAINABLE      Objective       apixaban 2.5 mg Oral Q12H   aspirin 81 mg Oral Daily   atorvastatin 20 mg Oral Nightly   chlorhexidine 15 mL Mouth/Throat Q12H   DULoxetine 20 mg Oral Daily   epoetin mini 10,000 Units Subcutaneous Once per day on Mon Wed Fri   gabapentin 400 mg Oral Nightly   insulin regular 0-14 Units Subcutaneous Q6H   ipratropium-albuterol 3 mL Nebulization 4x Daily - RT   levothyroxine 75 mcg Oral Q AM   lidocaine 1 patch Transdermal Q24H   midodrine 5 mg Oral TID AC   nystatin  Topical Q12H   pantoprazole 40 mg Intravenous Q AM   PRO-STAT 1 packet Per G Tube BID   sertraline 100 mg Oral Daily   Valproic Acid 500 mg Per G Tube Q8H            Vital Signs:  Blood pressure 116/68, pulse 86, temperature 98.2 °F (36.8 °C), temperature source Oral, resp. rate 20, height 157 cm (61.81\"), weight 60.8 kg (134 lb), SpO2 97 %, not currently breastfeeding.    Flowsheet Rows      First Filed Value   Admission Height  157 cm (61.81\") Documented at 06/08/2018 0215   Admission Weight  61 kg (134 lb 7.7 oz) Documented at 06/08/2018 0215          06/21 0701 - 06/22 0700  In: 1594   Out: 140     Physical Exam:  No changes from yesterday.  General Appearance:  No change no obvious distress.  Eyes: PER,    Neck: Supple no JVD.  Trach  Lungs: Few positive rhonchi's, equal chest movement, nonlabored.  Heart: No gallop, murmur, rub, RRR.  Abdomen: Soft, positive bowel sounds, no organomegaly.  Extremities: Trace edema edema, no cyanosis.  Neuro: Not moving any extremities no response to pain   TUNNELED HD CATH. CLOTTED AVF LUE.    Radiology:    Labs:    Results from last 7 days  Lab Units 06/18/18  0610 06/17/18  0726 06/16/18  0927   WBC 10*3/mm3 14.99* 14.92* 12.11*   HEMOGLOBIN g/dL 8.4* 8.1* 7.8*   HEMATOCRIT % " 27.7* 26.9* 26.0*   PLATELETS 10*3/mm3 255 284 247       Results from last 7 days  Lab Units 06/20/18  0400 06/19/18  0406 06/18/18  0610 06/17/18  0726 06/16/18  0927   SODIUM mmol/L 135 136 134 133 138   POTASSIUM mmol/L 3.9 3.6 5.3 4.6 3.8   CHLORIDE mmol/L 99 100 99 102 102   CO2 mmol/L 25.0 26.0 21.0 22.0 24.0   BUN mg/dL 62* 40* 72* 57* 41*   CREATININE mg/dL 1.76* 1.34* 2.23* 1.76* 1.36*   CALCIUM mg/dL 8.8 8.5* 8.7 8.7 8.6*   PHOSPHORUS mg/dL 3.6 3.4  --   --  3.4   MAGNESIUM mg/dL  --   --  2.3  --   --    ALBUMIN g/dL 3.53 3.71 3.59  --  3.80       Results from last 7 days  Lab Units 06/20/18  0400   GLUCOSE mg/dL 135*         Results from last 7 days  Lab Units 06/18/18  0610   ALK PHOS U/L 80   BILIRUBIN mg/dL 0.2*   ALT (SGPT) U/L 1*   AST (SGOT) U/L 12        Assessment      1.  ESRD.  Dialysis Monday Wednesday Friday.   and the mother requesting dialysis to the make a final decision.  2.  Anemia of chronic disease.  3.  CVA unresponsive.  4.  Quadriplegia.  Family will make a final decision regards to further dialysis.  Patient is tolerating dialysis at this time electrolytes and volume acceptable.         Ruel Hwang MD  06/22/18  10:36 AM

## 2018-06-22 NOTE — PROGRESS NOTES
"Intensive Care Follow-up     Hospital:  LOS: 14 days   Ms. Lizet Webster, 47 y.o. female is followed for:   Acute respiratory failure        Subjective   Interval History:  Age and is currently on hemodialysis. I was told by nursing that the patient's family would likely plan to stop this after today. Her  is not available to speak at this point.    The patient's relevant past medical, surgical and social history were reviewed and updated in Epic as appropriate.        Objective     Infusions:     Medications:    apixaban 2.5 mg Oral Q12H   aspirin 81 mg Oral Daily   atorvastatin 20 mg Oral Nightly   chlorhexidine 15 mL Mouth/Throat Q12H   DULoxetine 20 mg Oral Daily   epoetin mini 10,000 Units Subcutaneous Once per day on Mon Wed Fri   gabapentin 400 mg Oral Nightly   insulin regular 0-14 Units Subcutaneous Q6H   ipratropium-albuterol 3 mL Nebulization 4x Daily - RT   levothyroxine 75 mcg Oral Q AM   lidocaine 1 patch Transdermal Q24H   midodrine 5 mg Oral TID AC   nystatin  Topical Q12H   pantoprazole 40 mg Intravenous Q AM   PRO-STAT 1 packet Per G Tube BID   sertraline 100 mg Oral Daily   Valproic Acid 500 mg Per G Tube Q8H       Vital Sign Min/Max for last 24 hours  Temp  Min: 97.3 °F (36.3 °C)  Max: 98.9 °F (37.2 °C)   BP  Min: 90/71  Max: 124/69   Pulse  Min: 80  Max: 99   Resp  Min: 16  Max: 24   SpO2  Min: 87 %  Max: 99 %   Flow (L/min)  Min: 10  Max: 10       Input/Output for last 24 hour shift  06/21 0701 - 06/22 0700  In: 1594   Out: 140       Objective:  General Appearance:  In no acute distress, ill-appearing, uncomfortable and not in pain.    Vital signs: (most recent): Blood pressure 118/70, pulse 82, temperature 98.6 °F (37 °C), temperature source Oral, resp. rate 18, height 157 cm (61.81\"), weight 60.8 kg (134 lb), SpO2 97 %, not currently breastfeeding.  No fever.    HEENT: (Tracheostomy tube in place; on TCT.  Right tunneled internal jugular dialysis catheter.  Left internal jugular central " venous catheter.  )    Lungs:  Normal effort and normal respiratory rate.  She is not in respiratory distress.  There are decreased breath sounds.  No rales, wheezes or rhonchi.  (Left thoracostomy tube in place with no air leak.)  Heart: Normal rate.  Regular rhythm.  S1 normal and S2 normal.  No murmur, gallop or friction rub.   Chest: Symmetric chest wall expansion.   Abdomen: Abdomen is soft and non-distended.  Bowel sounds are normal.   There is no abdominal tenderness.   There is no mass. There is no splenomegaly. There is no hepatomegaly.   Extremities: Decreased range of motion.  There is no deformity or dependent edema.  (She is not moving any of her extremities.)  Neurological: (The patient does arouse but is not interactive with me.).    Pupils:  Pupils are equal. (Pupils nonreactive.).    Skin:  Warm, dry and pale.  No rash or cyanosis.               Results from last 7 days  Lab Units 06/18/18  0610 06/17/18  0726 06/16/18  0927   WBC 10*3/mm3 14.99* 14.92* 12.11*   HEMOGLOBIN g/dL 8.4* 8.1* 7.8*   PLATELETS 10*3/mm3 255 284 247       Results from last 7 days  Lab Units 06/20/18  0400 06/19/18  0406 06/18/18  0610  06/16/18  0927   SODIUM mmol/L 135 136 134  < > 138   POTASSIUM mmol/L 3.9 3.6 5.3  < > 3.8   CO2 mmol/L 25.0 26.0 21.0  < > 24.0   BUN mg/dL 62* 40* 72*  < > 41*   CREATININE mg/dL 1.76* 1.34* 2.23*  < > 1.36*   MAGNESIUM mg/dL  --   --  2.3  --   --    PHOSPHORUS mg/dL 3.6 3.4  --   --  3.4   GLUCOSE mg/dL 135* 124* 129*  < > 85   < > = values in this interval not displayed.  Estimated Creatinine Clearance: 34.5 mL/min (A) (by C-G formula based on SCr of 1.76 mg/dL (H)).            I reviewed the patient's results and images.     Assessment/Plan   Impression      Principal Problem:    Acute respiratory failure  Active Problems:    CVA (cerebral vascular accident) - Cortical Blindness    Recent Bacteremia, MSSA    Metabolic encephalopathy    Epidural abscess - cervical spine s/p laminectomy  and decompression 4/13/18    ESRD (end stage renal disease) on dialysis    Hypothyroidism    Chronic systolic heart failure    Type 2 diabetes mellitus with renal complication    Neuropathy    Atelectasis of left lung, recurrent    Quadriplegia    Pleural effusion on left, s/p pigtail CT, with persistent drainage    Pressure ulcer of coccygeal region    Wound infection, cervical, possible vs CFS leak    Anemia       Plan        I will discuss with the patient's  when he is available whether or not they would like to feedings to continue.  At this point, options for care are limited and her prognosis is very poor.  I would recommend comfort measures and discontinuation of hemodialysis.  We will continue current support per family.    Plan of care and goals reviewed with mulitdisciplinary team at daily rounds.   I discussed the patient's findings and my recommendations with nursing staff         Denver Capone MD, [facility]  Pulmonary and Critical Care Medicine  06/22/18 11:29 AM

## 2018-06-22 NOTE — PROGRESS NOTES
"NEUROSURGERY PROGRESS NOTE    Vital Signs  Blood pressure 102/63, pulse 81, temperature 98.2 °F (36.8 °C), temperature source Oral, resp. rate 20, height 157 cm (61.81\"), weight 60.8 kg (134 lb), SpO2 97 %, not currently breastfeeding.    Interval History:   Patient is transitioning to full palliative care. Per nurse today is the last dialysis she will be receiving.     I met the  outside the unit and he was slightly anxious about turning the patient to remove her sutures. Nurse stated that  requested we did not move or roll her today while she was on dialysis per 's wishes.       ASSESSMENT: Cervical incision with staples; quadriplegia; palliative care only     PLAN:   - I have instructed nurse she can take staples out or I will come back and remove them on Monday per families wish not to roll patient today.   - Nurse is in agreement with plan     Benita An PA-C  06/22/18  9:45 AM    "

## 2018-06-22 NOTE — SIGNIFICANT NOTE
06/22/18 0845   SLP Deferred Reason   SLP Deferred Reason (Pt has been on hold; pending comfort diet evaluation. Spoke to RN who reported pt no longer appropriate for SLP evaluation. SLP will sign-off @ this time. Thank you. )

## 2018-06-22 NOTE — PROGRESS NOTES
Continued Stay Note  [facility]     Patient Name: Lizet Webster  MRN: [id number]  Today's Date: 6/22/2018    Admit Date: 6/8/2018          Discharge Plan     Row Name 06/22/18 0848       Plan    Plan Poor Prognosis    Patient/Family in Agreement with Plan yes    Plan Comments Palliative Care following. Poor Prognosis.              Discharge Codes    No documentation.       Expected Discharge Date and Time     Expected Discharge Date Expected Discharge Time    Jun 26, 2018             Della Baptiste RN

## 2018-06-22 NOTE — PLAN OF CARE
Problem: Dying Patient, Actively (Adult)  Goal: Identify Related Risk Factors and Signs and Symptoms  Outcome: Ongoing (interventions implemented as appropriate)   06/22/18 1300   Dying Patient, Actively (Adult)   Related Risk Factors (Actively Dying Patient) level of consciousness;disease progression;age/developmental level   Signs and Symptoms (Actively Dying Patient) profound weakness;swallowing difficulty;vital sign changes;skin color changes;mottling;drowsiness for extended period;changes in bladder elimination pattern;changes in bowel elimination pattern;total dependency;awareness reduced     Goal: Comfort/Pain Control  Outcome: Ongoing (interventions implemented as appropriate)    Goal: Peace/Preservation of Dignity During the Dying Process  Outcome: Ongoing (interventions implemented as appropriate)

## 2018-06-22 NOTE — PLAN OF CARE
Problem: Patient Care Overview  Goal: Plan of Care Review  Outcome: Ongoing (interventions implemented as appropriate)      Problem: Mobility, Physical Impaired (Adult)  Goal: Enhanced Mobility Skills  Outcome: Ongoing (interventions implemented as appropriate)    Goal: Enhanced Functional Ability  Outcome: Ongoing (interventions implemented as appropriate)      Problem: Wound (Includes Pressure Injury) (Adult)  Goal: Signs and Symptoms of Listed Potential Problems Will be Absent, Minimized or Managed (Wound)  Outcome: Ongoing (interventions implemented as appropriate)      Problem: Palliative Care (Adult)  Goal: Identify Related Risk Factors and Signs and Symptoms  Outcome: Outcome(s) achieved Date Met: 06/22/18    Goal: Maximized Comfort  Outcome: Ongoing (interventions implemented as appropriate)

## 2018-06-22 NOTE — PROGRESS NOTES
Adult Nutrition  Assessment/PES    Patient Name:  Lizet Webster  YOB: 1970  MRN: [id number]  Admit Date:  6/8/2018    Assessment Date:  6/22/2018    Comments:  IF HD is discontinued after today, recommend dc of nutrition support.          Reason for Assessment     Row Name 06/22/18 1143          Reason for Assessment    Reason For Assessment follow-up protocol;TF/PN;per organizational policy   MDR; 30 mins     Diagnosis neurologic conditions;diabetes diagnosis/complications;renal disease;pulmonary disease   Pt s/p cervical wound debridement, removal of hardware and reclosure (6/8), quadriplegia; CRF - vent; pleural effusion w/ CT placement;DM-T2; ESRD -HD; pressure ulcer coccygeal -poa; Hx:trach and PEG     Identified At Risk by Screening Criteria tube feeding or parenteral nutrition;large or nonhealing wound, burn or pressure injury             Nutrition/Diet History     Row Name 06/22/18 1143          Nutrition/Diet History    Factors Affecting Nutritional Intake inability to feed self;difficulty/impaired swallowing   RN reports  has stopped abx, also stated  today is the last dialysis; worried that she will get puffy w/o dialysis ; now considering stopping TF  but wants to speak w hayden MACIAS.               Labs/Tests/Procedures/Meds     Row Name 06/22/18 1146          Labs/Procedures/Meds    Lab Results Comments no new BMP/CMP               Estimated/Assessed Needs     Row Name 06/22/18 1146          Calculation Measurements    Weight Used For Calculations 45 kg (99 lb 3.3 oz)        KCAL/KG    25 Kcal/Kg (kcal) 1125     30 Kcal/Kg (kcal) 1350     35 Kcal/Kg (kcal) 1575        Nor[place]-St. Jeor Equation    RMR (Norlina-St. Jeor Equation) 1045.25        Fluid Requirements                  Nutrition Prescription Ordered     Row Name 06/22/18 1146          Nutrition Prescription PO    Current PO Diet NPO        Nutrition Prescription EN    Enteral Route PEG     Product Isosource 1.5 melissa     Modulars  Liquid Protein (15 gm/30 mL)     Liquid Protein (15 gm/30 mL) 30 mL/1 packet     Protein Liquid Frequency 2 times a day     TF Delivery Method Continuous     Continuous TF Goal Rate (mL/hr) 45 mL/hr     Continuous TF Goal Volume (mL) 900 mL     Water flush (mL)  15 mL     Water Flush Frequency Per hour             Evaluation of Received Nutrient/Fluid Intake     Row Name 06/22/18 1146          Calculation Measurements    Weight Used For Calculations 45 kg (99 lb 3.3 oz)        Nutrient/Fluid Evaluation    Number of Days Evaluated 1 day        Calories Evaluation    Enteral Calories (kcal) 1766     Other Calories (kcal) 200     Total Calories (kcal) 1966     % of Kcal Needs 125        Protein Evaluation    Enteral Protein (gm) 79     Other Protein (gm) 30     Total Protein (gm) 109     % of Protein Needs 121        Intake Assessment    Energy/Calorie Requirement Assessment exceeds needs     Protein Requirement Assessment exceeds needs     Tolerance tolerating        Fluid Intake Evaluation    Enteral (Free Water) Fluid (mL) 916     Free Water Flush Fluid (mL) 417     Total Free Water Intake (mL) 1333 mL        Recommended Daily Intake Evaluation    RDI Met        EN Evaluation    Number of Days EN Intake Evaluated 1 day     EN Average Volume Delivered (mL/day) 1177 mL/day     % Goal Volume  130 %     TF Changes --   DC - pending w/ GOC             Evaluation of Prescribed Nutrient/Fluid Intake     Row Name 06/22/18 1146          Calculation Measurements    Weight Used For Calculations 45 kg (99 lb 3.3 oz)           Problem/Interventions:          Problem 2     Row Name 06/22/18 1150          Nutrition Diagnoses Problem 2    Problem 2 Excessive Nutrient Intake     Inadequate Intake Type Enteral     Macronutrient Kcal;Protein     Etiology (related to) MNT for Treatment/Condition     Signs/Symptoms (evidenced by) EN Intake Delivery     Percent (%) of EN goal 130 %                   Intervention Goal     Row Name  06/22/18 1150          Intervention Goal    General Meet nutritional needs for age/condition;Palliative Care;Hospice Care   Care w/d partially initiated by  - no abx, no further HD             Nutrition Intervention     Row Name 06/22/18 1151          Nutrition Intervention    RD/Tech Action Follow Tx progress;Care plan reviewd             Nutrition Prescription     Row Name 06/22/18 1151 06/21/18 1537       Nutrition Prescription EN    Enteral Prescription Discontinue enteral feeding   recommend dc of EN support 3w/ dc of HD support Continue same protocol    Row Name 06/18/18 1759                  Education/Evaluation     Row Name 06/22/18 1152          Monitor/Evaluation    Monitor Per protocol;Symptoms;I&O;Pertinent labs;Skin status;TF delivery/tolerance         Electronically signed by:  Saima Nolen RD  06/22/18 11:52 AM

## 2018-06-23 NOTE — PLAN OF CARE
Problem: Patient Care Overview  Goal: Plan of Care Review  Outcome: Ongoing (interventions implemented as appropriate)      Problem: Mobility, Physical Impaired (Adult)  Goal: Enhanced Mobility Skills  Outcome: Ongoing (interventions implemented as appropriate)    Goal: Enhanced Functional Ability  Outcome: Ongoing (interventions implemented as appropriate)      Problem: Wound (Includes Pressure Injury) (Adult)  Goal: Signs and Symptoms of Listed Potential Problems Will be Absent, Minimized or Managed (Wound)  Outcome: Ongoing (interventions implemented as appropriate)      Problem: Palliative Care (Adult)  Goal: Maximized Comfort  Outcome: Ongoing (interventions implemented as appropriate)    Goal: Enhanced Quality of Life  Outcome: Ongoing (interventions implemented as appropriate)      Problem: Dying Patient, Actively (Adult)  Goal: Identify Related Risk Factors and Signs and Symptoms  Outcome: Outcome(s) achieved Date Met: 06/23/18    Goal: Comfort/Pain Control  Outcome: Ongoing (interventions implemented as appropriate)    Goal: Peace/Preservation of Dignity During the Dying Process  Outcome: Ongoing (interventions implemented as appropriate)

## 2018-06-23 NOTE — PROGRESS NOTES
"   LOS: 15 days    Patient Care Team:  Luan Gandara MD as PCP - General (Internal Medicine)    Reason For Visit:  F/U ESRD.    Subjective   Family had made the decision comfort care only  Review of Systems: UNOBTAINABLE      Objective       chlorhexidine 15 mL Mouth/Throat Q12H   ipratropium-albuterol 3 mL Nebulization 4x Daily - RT   lidocaine 1 patch Transdermal Q24H   nystatin  Topical Q12H   Valproic Acid 500 mg Per G Tube Q8H            Vital Signs:  Blood pressure 120/71, pulse 84, temperature 98.4 °F (36.9 °C), temperature source Axillary, resp. rate 22, height 157 cm (61.81\"), weight 60.8 kg (134 lb), SpO2 97 %, not currently breastfeeding.    Flowsheet Rows      First Filed Value   Admission Height  157 cm (61.81\") Documented at 06/08/2018 0215   Admission Weight  61 kg (134 lb 7.7 oz) Documented at 06/08/2018 0215          06/22 0701 - 06/23 0700  In: 849   Out: 3180     Physical Exam:  No changes from yesterday.  General Appearance:  No change no obvious distress.She looks comfortable       Radiology:    Labs:    Results from last 7 days  Lab Units 06/18/18  0610 06/17/18  0726   WBC 10*3/mm3 14.99* 14.92*   HEMOGLOBIN g/dL 8.4* 8.1*   HEMATOCRIT % 27.7* 26.9*   PLATELETS 10*3/mm3 255 284       Results from last 7 days  Lab Units 06/20/18  0400 06/19/18  0406 06/18/18  0610 06/17/18  0726   SODIUM mmol/L 135 136 134 133   POTASSIUM mmol/L 3.9 3.6 5.3 4.6   CHLORIDE mmol/L 99 100 99 102   CO2 mmol/L 25.0 26.0 21.0 22.0   BUN mg/dL 62* 40* 72* 57*   CREATININE mg/dL 1.76* 1.34* 2.23* 1.76*   CALCIUM mg/dL 8.8 8.5* 8.7 8.7   PHOSPHORUS mg/dL 3.6 3.4  --   --    MAGNESIUM mg/dL  --   --  2.3  --    ALBUMIN g/dL 3.53 3.71 3.59  --        Results from last 7 days  Lab Units 06/20/18  0400   GLUCOSE mg/dL 135*         Results from last 7 days  Lab Units 06/18/18  0610   ALK PHOS U/L 80   BILIRUBIN mg/dL 0.2*   ALT (SGPT) U/L 1*   AST (SGOT) U/L 12        Assessment      1.  ESRD.     2.  Anemia of chronic " disease.  3.  CVA unresponsive.  4.  Quadriplegia.  Family has made the decision for comfort care only for dialysis.  I will sign off         Ruel Hwang MD  06/23/18  9:32 AM

## 2018-06-23 NOTE — PROGRESS NOTES
INTENSIVIST / PULMONARY FOLLOW UP NOTE     Hospital:  LOS: 15 days   Ms. Lizet Webster, 47 y.o. female is followed for:   Principal Problem:    Acute respiratory failure  Active Problems:    Recent Bacteremia, MSSA    Metabolic encephalopathy    Epidural abscess - cervical spine s/p laminectomy and decompression 4/13/18    ESRD (end stage renal disease) on dialysis    Hypothyroidism    Chronic systolic heart failure    Type 2 diabetes mellitus with renal complication    Neuropathy    Atelectasis of left lung, recurrent    Quadriplegia    Pleural effusion on left, s/p pigtail CT, with persistent drainage    Pressure ulcer of coccygeal region    Wound infection, cervical, possible vs CFS leak    CVA (cerebral vascular accident) - Cortical Blindness    Anemia          SUBJECTIVE   Appears comfortable    The patient's relevant past medical, surgical, family, and social history were reviewed    Allergies and medications were reviewed    ROS:  Per subjective, all other systems were reviewed and were negative        OBJECTIVE     Vital Sign Min/Max for last 24 hours:  Temp  Min: 98.4 °F (36.9 °C)  Max: 99.3 °F (37.4 °C)   BP  Min: 113/73  Max: 130/75   Pulse  Min: 79  Max: 95   Resp  Min: 12  Max: 24   SpO2  Min: 96 %  Max: 100 %   No Data Recorded     Physical Exam:  deferred    Telemetry:              Hemodynamics:   CVP:     PAP:     PAOP:     CO:     CI:     SVI:     SVR:       SpO2: 100 % SpO2  Min: 96 %  Max: 100 %   Device:      Flow Rate:   No Data Recorded     Mechanical Ventilator Settings:       Vt (Set, L): 0.4 L    Resp Rate (Set): 10 Resp Rate (Observed) Vent: 16   FiO2 (%): 24 %    PEEP/CPAP (cm H2O): 5 cm H20 Plateau Pressure (cm H2O): 15 cm H2O    Minute Ventilation (L/min) (Obs): 6.76 L/min    I:E Ratio (Obs): 1:4.50     Intake/Ouptut 24 hrs (7:00AM - 6:59 AM)  Intake & Output (last 3 days)       06/20 0701 - 06/21 0700 06/21 0701 - 06/22 0700 06/22 0701 - 06/23 0700 06/23 0701 - 06/24 0700    I.V. (mL/kg)  60 (1)       Other 321 417 292 0    NG/GT 1108 1177 557 0    IV Piggyback        Total Intake(mL/kg) 1489 (24.5) 1594 (26.2) 849 (14) 0 (0)    Emesis/NG output        Other 2330  2740     Stool 0 0      Chest Tube 150 140 440     Total Output 2480 140 3180      Net -991 +1454 -2331 0            Unmeasured Stool Occurrence 3 x 1 x  1 x          Lines, Drains & Airways    Active LDAs     Name:   Placement date:   Placement time:   Site:   Days:    CVC Triple Lumen 06/02/18 Tunneled Left Internal jugular  06/02/18    1335    Internal jugular    20    Chest Tube 1 Left  06/08/18    1100        15    Gastrostomy/Enterostomy Percutaneous endoscopic gastrostomy (PEG) 20 Fr.  04/24/18    1400        59    Surgical Airway Shiley Cuffed 6  06/08/18    0322    6    15    Hemodialysis Cath Double                              Hematology:    Results from last 7 days  Lab Units 06/18/18  0610 06/17/18  0726   WBC 10*3/mm3 14.99* 14.92*   HEMOGLOBIN g/dL 8.4* 8.1*   HEMATOCRIT % 27.7* 26.9*   PLATELETS 10*3/mm3 255 284     Electrolytes, Magnesium and Phosphorus:    Results from last 7 days  Lab Units 06/20/18  0400 06/19/18  0406 06/18/18  0610 06/17/18  0726   SODIUM mmol/L 135 136 134 133   CHLORIDE mmol/L 99 100 99 102   POTASSIUM mmol/L 3.9 3.6 5.3 4.6   CO2 mmol/L 25.0 26.0 21.0 22.0   MAGNESIUM mg/dL  --   --  2.3  --    PHOSPHORUS mg/dL 3.6 3.4  --   --      Renal:    Results from last 7 days  Lab Units 06/20/18  0400 06/19/18  0406 06/18/18  0610 06/17/18  0726   CREATININE mg/dL 1.76* 1.34* 2.23* 1.76*   BUN mg/dL 62* 40* 72* 57*     Estimated Creatinine Clearance: 34.5 mL/min (A) (by C-G formula based on SCr of 1.76 mg/dL (H)).  Hepatic:    Results from last 7 days  Lab Units 06/18/18  0610   ALK PHOS U/L 80   BILIRUBIN mg/dL 0.2*   ALT (SGPT) U/L 1*   AST (SGOT) U/L 12     Arterial Blood Gases:              Lab Results   Component Value Date    LACTATE 0.7 06/11/2018       Relevant imaging studies and labs from 06/23/18  were reviewed and interpreted by me    Medications (drips):         chlorhexidine 15 mL Mouth/Throat Q12H   ipratropium-albuterol 3 mL Nebulization 4x Daily - RT   lidocaine 1 patch Transdermal Q24H   nystatin  Topical Q12H   Valproic Acid 500 mg Per G Tube Q8H       Assessment/Plan   IMPRESSION / PLAN     Inpatient Problem List:  47 y.o.female:  Hospital Problem List     * (Principal)Acute respiratory failure    Recent Bacteremia, MSSA    Metabolic encephalopathy    Epidural abscess - cervical spine s/p laminectomy and decompression 4/13/18    ESRD (end stage renal disease) on dialysis    Hypothyroidism    Chronic systolic heart failure    Type 2 diabetes mellitus with renal complication    Neuropathy    Atelectasis of left lung, recurrent    Overview Deleted 6/8/2018  3:51 AM by LAYTON Souza            Quadriplegia    Pleural effusion on left, s/p pigtail CT, with persistent drainage    Pressure ulcer of coccygeal region    Wound infection, cervical, possible vs CFS leak    CVA (cerebral vascular accident) - Cortical Blindness    Anemia               Plan:  Comfort measures         Hipolito Arroyo MD  Intensive Care Medicine  06/23/18 12:21 PM

## 2018-06-23 NOTE — PLAN OF CARE
Problem: Patient Care Overview  Goal: Interprofessional Rounds/Family Conf  Outcome: Ongoing (interventions implemented as appropriate)   06/23/18 1645   Interdisciplinary Rounds/Family Conf   Summary Patient us unresponsive,  not in room. Dr. Hipolito Arroyo states she is now comfort care and placed order for Hospice.   Participants nursing;physician

## 2018-06-23 NOTE — PLAN OF CARE
Problem: Patient Care Overview  Goal: Plan of Care Review  Outcome: Ongoing (interventions implemented as appropriate)      Problem: Wound (Includes Pressure Injury) (Adult)  Goal: Signs and Symptoms of Listed Potential Problems Will be Absent, Minimized or Managed (Wound)  Outcome: Ongoing (interventions implemented as appropriate)      Problem: Dying Patient, Actively (Adult)  Goal: Comfort/Pain Control  Outcome: Ongoing (interventions implemented as appropriate)    Goal: Peace/Preservation of Dignity During the Dying Process  Outcome: Ongoing (interventions implemented as appropriate)

## 2018-06-24 NOTE — PROGRESS NOTES
INTENSIVIST / PULMONARY FOLLOW UP NOTE     Hospital:  LOS: 16 days   Ms. Lizet Webster, 47 y.o. female is followed for:   Principal Problem:    Acute respiratory failure  Active Problems:    Recent Bacteremia, MSSA    Metabolic encephalopathy    Epidural abscess - cervical spine s/p laminectomy and decompression 4/13/18    ESRD (end stage renal disease) on dialysis    Hypothyroidism    Chronic systolic heart failure    Type 2 diabetes mellitus with renal complication    Neuropathy    Atelectasis of left lung, recurrent    Quadriplegia    Pleural effusion on left, s/p pigtail CT, with persistent drainage    Pressure ulcer of coccygeal region    Wound infection, cervical, possible vs CFS leak    CVA (cerebral vascular accident) - Cortical Blindness    Anemia          SUBJECTIVE   Appears comfortable    The patient's relevant past medical, surgical, family, and social history were reviewed    Allergies and medications were reviewed    ROS:  Per subjective, all other systems were reviewed and were negative        OBJECTIVE     Vital Sign Min/Max for last 24 hours:  Temp  Min: 98.1 °F (36.7 °C)  Max: 98.1 °F (36.7 °C)   No Data Recorded   Pulse  Min: 64  Max: 82   Resp  Min: 18  Max: 20   SpO2  Min: 88 %  Max: 100 %   No Data Recorded     Physical Exam:  deferred    Telemetry:              Hemodynamics:   CVP:     PAP:     PAOP:     CO:     CI:     SVI:     SVR:       SpO2: 100 % SpO2  Min: 88 %  Max: 100 %   Device:      Flow Rate:   No Data Recorded     Mechanical Ventilator Settings:       Vt (Set, L): 0.4 L    Resp Rate (Set): 10 Resp Rate (Observed) Vent: 16   FiO2 (%): 24 %    PEEP/CPAP (cm H2O): 5 cm H20 Plateau Pressure (cm H2O): 15 cm H2O    Minute Ventilation (L/min) (Obs): 6.76 L/min    I:E Ratio (Obs): 1:4.50     Intake/Ouptut 24 hrs (7:00AM - 6:59 AM)  Intake & Output (last 3 days)       06/21 0701 - 06/22 0700 06/22 0701 - 06/23 0700 06/23 0701 - 06/24 0700 06/24 0701 - 06/25 0700    I.V. (mL/kg)        Other  417 292 0     NG/GT 1177 557 0     Total Intake(mL/kg) 1594 (26.2) 849 (14) 0 (0)     Other  2740      Stool 0       Chest Tube 140 440  30    Total Output 140 3180   30    Net +1454 -2331 0 -30            Unmeasured Stool Occurrence 1 x  1 x           Lines, Drains & Airways    Active LDAs     Name:   Placement date:   Placement time:   Site:   Days:    CVC Triple Lumen 06/02/18 Tunneled Left Internal jugular  06/02/18    1335    Internal jugular    20    Chest Tube 1 Left  06/08/18    1100        15    Gastrostomy/Enterostomy Percutaneous endoscopic gastrostomy (PEG) 20 Fr.  04/24/18    1400        59    Surgical Airway Shiley Cuffed 6  06/08/18    0322    6    15    Hemodialysis Cath Double                              Hematology:    Results from last 7 days  Lab Units 06/18/18  0610   WBC 10*3/mm3 14.99*   HEMOGLOBIN g/dL 8.4*   HEMATOCRIT % 27.7*   PLATELETS 10*3/mm3 255     Electrolytes, Magnesium and Phosphorus:    Results from last 7 days  Lab Units 06/20/18  0400 06/19/18  0406 06/18/18  0610   SODIUM mmol/L 135 136 134   CHLORIDE mmol/L 99 100 99   POTASSIUM mmol/L 3.9 3.6 5.3   CO2 mmol/L 25.0 26.0 21.0   MAGNESIUM mg/dL  --   --  2.3   PHOSPHORUS mg/dL 3.6 3.4  --      Renal:    Results from last 7 days  Lab Units 06/20/18  0400 06/19/18  0406 06/18/18  0610   CREATININE mg/dL 1.76* 1.34* 2.23*   BUN mg/dL 62* 40* 72*     Estimated Creatinine Clearance: 34.5 mL/min (A) (by C-G formula based on SCr of 1.76 mg/dL (H)).  Hepatic:    Results from last 7 days  Lab Units 06/18/18  0610   ALK PHOS U/L 80   BILIRUBIN mg/dL 0.2*   ALT (SGPT) U/L 1*   AST (SGOT) U/L 12     Arterial Blood Gases:              Lab Results   Component Value Date    LACTATE 0.7 06/11/2018       Relevant imaging studies and labs from 06/24/18 were reviewed and interpreted by me    Medications (drips):         chlorhexidine 15 mL Mouth/Throat Q12H   ipratropium-albuterol 3 mL Nebulization 4x Daily - RT   lidocaine 1 patch Transdermal  Q24H   nystatin  Topical Q12H   Valproic Acid 500 mg Per G Tube Q8H       Assessment/Plan   IMPRESSION / PLAN     Inpatient Problem List:  47 y.o.female:  Hospital Problem List     * (Principal)Acute respiratory failure    Recent Bacteremia, MSSA    Metabolic encephalopathy    Epidural abscess - cervical spine s/p laminectomy and decompression 4/13/18    ESRD (end stage renal disease) on dialysis    Hypothyroidism    Chronic systolic heart failure    Type 2 diabetes mellitus with renal complication    Neuropathy    Atelectasis of left lung, recurrent    Overview Deleted 6/8/2018  3:51 AM by LAYTON Souza            Quadriplegia    Pleural effusion on left, s/p pigtail CT, with persistent drainage    Pressure ulcer of coccygeal region    Wound infection, cervical, possible vs CFS leak    CVA (cerebral vascular accident) - Cortical Blindness    Anemia               Plan:  Comfort measures         Hipolito Arroyo MD  Intensive Care Medicine  06/24/18 9:42 AM

## 2018-06-24 NOTE — PLAN OF CARE
Problem: Palliative Care (Adult)  Goal: Enhanced Quality of Life  Outcome: Ongoing (interventions implemented as appropriate)   06/23/18 0341   Palliative Care (Adult)   Enhanced Quality of Life making progress toward outcome       Problem: Dying Patient, Actively (Adult)  Goal: Comfort/Pain Control  Outcome: Ongoing (interventions implemented as appropriate)   06/24/18 0724   Dying Patient, Actively (Adult)   Comfort/Pain Control making progress toward outcome

## 2018-06-24 NOTE — PLAN OF CARE
Problem: Patient Care Overview  Goal: Plan of Care Review  Outcome: Ongoing (interventions implemented as appropriate)      Problem: Mobility, Physical Impaired (Adult)  Goal: Enhanced Mobility Skills  Outcome: Ongoing (interventions implemented as appropriate)      Problem: Wound (Includes Pressure Injury) (Adult)  Goal: Signs and Symptoms of Listed Potential Problems Will be Absent, Minimized or Managed (Wound)  Outcome: Ongoing (interventions implemented as appropriate)      Problem: Palliative Care (Adult)  Goal: Maximized Comfort  Outcome: Ongoing (interventions implemented as appropriate)      Problem: Dying Patient, Actively (Adult)  Goal: Comfort/Pain Control  Outcome: Ongoing (interventions implemented as appropriate)    Goal: Peace/Preservation of Dignity During the Dying Process  Outcome: Ongoing (interventions implemented as appropriate)

## 2018-06-24 NOTE — PROGRESS NOTES
Palliative Care Progress Note    Date of Admission: 6/8/2018    Subjective:   states that patient has not woke up or interacted with them today.    No current facility-administered medications on file prior to encounter.      Current Outpatient Prescriptions on File Prior to Encounter   Medication Sig Dispense Refill   • epoetin mini (EPOGEN,PROCRIT) 67150 UNIT/ML injection Inject 1 mL under the skin 3 (Three) Times a Week.     • Heparin Sodium, Porcine, (HEPARIN, PORCINE,) 5000 UNIT/ML injection Inject 1 mL under the skin Every 8 (Eight) Hours.     • levothyroxine (SYNTHROID, LEVOTHROID) 75 MCG tablet Take 75 mcg by mouth Daily.     • lidocaine (LIDODERM) 5 % Place 1 patch on the skin Daily. Remove & Discard patch within 12 hours or as directed by MD     • midodrine (PROAMATINE) 5 MG tablet Take 10 mg by mouth Daily.     • sertraline (ZOLOFT) 100 MG tablet Take 100 mg by mouth Daily.     • Amino Acids-Protein Hydrolys (PRO-STAT) liquid 1 packet by Per G Tube route 2 (Two) Times a Day.     • DULoxetine (CYMBALTA) 20 MG capsule Take 20 mg by mouth Daily.     • gabapentin (NEURONTIN) 400 MG capsule Take 400 mg by mouth every night at bedtime.     • insulin regular (humuLIN R,novoLIN R) 100 UNIT/ML injection Inject 0-24 Units under the skin Every 6 (Six) Hours.  12   • insulin regular (humuLIN R,novoLIN R) 100 UNIT/ML injection Inject 7 Units under the skin Every 6 (Six) Hours.  12   • loratadine (CLARITIN) 10 MG tablet Take 10 mg by mouth Daily.     • pantoprazole (PROTONIX) 40 MG EC tablet Take 40 mg by mouth Daily.     • traMADol (ULTRAM) 50 MG tablet Take 50 mg by mouth 2 (Two) Times a Day.          •  acetaminophen **OR** [DISCONTINUED] acetaminophen  •  ALPRAZolam  •  heparin (porcine)  •  heparin (porcine)  •  ipratropium-albuterol  •  lactated ringers  •  lidocaine PF 1%  •  Morphine  •  naloxone  •  ondansetron  •  oxyCODONE  •  promethazine **OR** promethazine **OR** promethazine **OR** promethazine  •  " sodium chloride  •  sodium chloride  •  sodium chloride  •  traMADol    Objective: /71 (BP Location: Right arm, Patient Position: Lying)   Pulse 64   Temp 98.1 °F (36.7 °C) (Axillary)   Resp 19   Ht 157 cm (61.81\")   Wt 60.8 kg (134 lb)   LMP  (LMP Unknown)   SpO2 100%   BMI 24.66 kg/m²      Intake/Output Summary (Last 24 hours) at 06/24/18 1013  Last data filed at 06/24/18 0800   Gross per 24 hour   Intake                0 ml   Output               30 ml   Net              -30 ml     Physical Exam:      General Appearance:    Non verbal, trach in place   Head:    Trach in place   Eyes:            Lids and lashes normal, conjunctivae and sclerae normal, no   icterus, no pallor, corneas clear, PERRLA   Ears:    Ears appear intact with no abnormalities noted   Throat:   No oral lesions, no thrush, oral mucosa moist   Neck:   No adenopathy, supple, trachea midline, no thyromegaly, no     carotid bruit, no JVD   Back:     No kyphosis present, no scoliosis present, no skin lesions,       erythema or scars, no tenderness to percussion or                   palpation,   range of motion normal   Lungs:     Clear to auscultation,respirations regular, even and                   unlabored    Heart:    Regular rhythm and normal rate, normal S1 and S2, no            murmur, no gallop, no rub, no click   Breast Exam:    Deferred   Abdomen:     Normal bowel sounds, no masses, no organomegaly, soft        non-tender, non-distended, no guarding, no rebound                 tenderness   Genitalia:    Deferred   Extremities:  edema noted in both extremties   Pulses:   Pulses palpable and equal bilaterally   Skin:   No bleeding, bruising or rash   Lymph nodes:   No palpable adenopathy           Results from last 7 days  Lab Units 06/18/18  0610   WBC 10*3/mm3 14.99*   HEMOGLOBIN g/dL 8.4*   HEMATOCRIT % 27.7*   PLATELETS 10*3/mm3 255       Results from last 7 days  Lab Units 06/20/18  0400  06/18/18  0610   SODIUM mmol/L " 135  < > 134   POTASSIUM mmol/L 3.9  < > 5.3   CHLORIDE mmol/L 99  < > 99   CO2 mmol/L 25.0  < > 21.0   BUN mg/dL 62*  < > 72*   CREATININE mg/dL 1.76*  < > 2.23*   CALCIUM mg/dL 8.8  < > 8.7   BILIRUBIN mg/dL  --   --  0.2*   ALK PHOS U/L  --   --  80   ALT (SGPT) U/L  --   --  1*   AST (SGOT) U/L  --   --  12   GLUCOSE mg/dL 135*  < > 129*   < > = values in this interval not displayed.    Impression: End-stage renal disease on dialysis  Change in mental status  Dysphagia  Volume overload  Goals of care  Plan: I did talk to the patient's  who is at bedside.  We did briefly talk about hospice, however he states that he does not want to get hospice involved at this point time.      Shakir Gleason,   06/24/18  10:13 AM

## 2018-06-24 NOTE — PLAN OF CARE
Problem: Patient Care Overview  Goal: Interprofessional Rounds/Family Conf  Outcome: Ongoing (interventions implemented as appropriate)   06/24/18 1304   Interdisciplinary Rounds/Family Conf   Summary Patient eyes open and looking up,  has made her comfort care, she is on a trach collar, has chest tube inplace, dialysis has stopped, patient is uresponsive. Dr. Gleason spoke with  and he states he is not ready for hospice at this time. Palliative will continue to follow for support.

## 2018-06-25 NOTE — PROGRESS NOTES
Clinical Nutrition Note      Patient Name: Lizet Webster  MRN: [id number]  Admission date: 6/8/2018      Reason for Assessment:  Multidisciplinary Rounds    Additional information obtained during MDR:  RN reports pt unresponsive, palliative service following; pt to transfer to hospice care. RN reports pt had tubeffeding turned off three days ago.    Current diet: NPO Diet    Pertinent medical data reviewed    Intervention:  EN order set dc'd  Plan of care and goals reviewed    Monitor:  RD to follow per protocol      Saima Nolen RD  06/25/18 11:04 AM  Time: 20min

## 2018-06-25 NOTE — DISCHARGE PLACEMENT REQUEST
"Tammy Yun  (47 y.o. Female)     Date of Birth Social Security Number Address Home Phone MRN    1970  [id number] [phone] [id number]    Judaism Marital Status          Orthodoxy        Admission Date Admission Type Admitting Provider Attending Provider Department, Room/Bed    6/8/18 Urgent Ap Wise MD Villaran, Yuri, MD [facility] 2B ICU, N225/1    Discharge Date Discharge Disposition Discharge Destination                       Attending Provider:  Ap Wise MD    Allergies:  Zosyn [Piperacillin Sod-tazobactam So]    Isolation:  None   Infection:  None   Code Status:  No CPR    Ht:  157 cm (61.81\")   Wt:  60.8 kg (134 lb)    Admission Cmt:  None   Principal Problem:  Acute respiratory failure [J96.00]                 Active Insurance as of 6/8/2018     Primary Coverage     Payor Plan Insurance Group Employer/Plan Group    MEDICARE MEDICARE A & B      Payor Plan Address Payor Plan Phone Number Effective From Effective To    [address] [phone] 6/1/2014     [facility] [address]       Subscriber Name Subscriber Birth Date Member ID       TAMMY YUN 1970 [id number]           Secondary Coverage     Payor Plan Insurance Group Employer/Plan Group    [place]      Payor Plan Address Payor Plan Phone Number Effective From Effective To    [address] [phone] 4/12/2018     [place] [address]       Subscriber Name Subscriber Birth Date Member ID       TAMMY YUN 1970 [id number]                 Emergency Contacts      (Rel.) Home Phone Work Phone Mobile Phone    Ugo Yun (Spouse) [phone] -- --            Emergency Contact Information     Name Relation Home Work Mobile    Ugo Yun Spouse [phone]            Insurance Information                MEDICARE/MEDICARE A & B Phone: [phone]    Subscriber: Tammy Yun Subscriber#: [id number]    Group#:  Precert#:         [place] " [place] Phone: [phone]    Subscriber: Lizet Webster Subscriber#: [id number]    Group#:  Precert#:              History & Physical      Della Ca MD at 6/8/2018  3:14 AM            CRITICAL CARE ADMISSION NOTE    Chief Complaint     Acute respiratory failure, possible wound infection    History of Present Illness     Patient is a 47 y.o. female with long-standing diabetes mellitus complicated by severe peripheral neuropathy and end-stage renal disease on hemodialysis for the last year. On April 6 she went to the emergency room complaining of neck and left arm pain. CT scan of the head was done and was negative. She was given a muscle relaxant. She had a low-grade fever at that time. The next 2 days she felt okay. She returned to the emergency room on April 9 with persistent neck pain and was discharged on prednisone. Then on April 10 she could not talk and was confused. Her  called her primary care physician and took her in to the office. She reviewed her records and noted that she had methicillin sensitive staph aureus bacteremia from her April 9 emergency room visit and directly admitted her to the hospital. She had an MRI of the brain that was normal and an MRI of the cervical spine that revealed a C5 6 osteomyelitis, discitis and a large anterior epidural mass with cord compression. Echocardiogram revealed no heart valve vegetations with an ejection fraction of 40%. She was transported to [facility] for definitive surgery because the neurosurgeons in [place] felt the case was too complicated. Upon arrival to [facility] she was somnolent and not moving her extremities.      The patient underwent a posterior cervical laminectomy C3-7 with facet screw fixation and posterior lateral fusion C5-C7, anterior cervical corpectomy C6, and excision of epidural abscess per Dr Reed. Post-operatively she was brought to the ICU, intubated and on mechanical ventilation. Post  operative LP on 4/13 was clear with elevated protein of 288, WBC 43, normal glucose. Infectious disease was consulted  and she was started on Nafcillin and Vancomycin. Nephrology was consulted and she was continued on her regular Monday, Wednesday, Friday schedule for hemodialysis. BY 4/14 she was waking up and able to nod head in answer to yes/no questions. Lower extremity spasms were noted and she was started on Baclofen.  MSSA bacteremia was present in OSH cultures and cefazolin was continued but vancomycin was discontinued. She was started on enteral nutrition on 4/16.      She developed fevers up to 103 degrees and micafungin and levaquin were added to antibiotic regimen. Transcutaneous echo showed mild MR/TR but no vegetations and NICHOLAS also showed no vegetations with EF 45%.  On 4/19 she had no gag reflex and no voluntary movement to command and Dr. Alves was consulted for trach and PEG placement. On 4/24 a #8XLT tracheostomy was placed along with a 20 Czech PEG.      She had multiple bronchoscopies (4/21, 4/24, 4/26) due to opacification of the left lung and mucous plugging was found on each.  By 4/20 she continued to have no improvement in quadriplegia. On 4/21 gentamycin was added due to persistent fevers and was stopped on 5/1. Central line was changed from right IJ to left IJ and tip was cultured but no growth was noted. Fevers were suspected to be related to drugs. On 4/29 CXR showed a large left pleural effusion and a chest tube was placed with blood tinged cloudy fluid return with negative cultures. Chest tube was able to be discontinued on 5/3. She developed transaminitis (, AST 3796) consistent with shock liver on 4/29, by 5/3 liver labs were much improved, with ALT 88, AST 71. Response to vent weaning were mixed, however on 5/3 she was able to tolerate PS.      Physical therapy had been working on PROM of upper and lower extremities with some movement of upper extremities but no sensation. By  "5/4/18 she was felt to have reached maximum benefit from hospitalization and was [facility] in [place], [place].  reports that she was on TC.  She passed her swallow evaluation.  She had some movement of her UEs.  She was talking with  A PMV.      During her admission at [facility] in [place] she continued to receive her scheduled hemodialysis, however her coarse was complicated by a malfunctioning AVF, placement of atunneled dialysis catheter which malfunctioned and was replaced with a left subclavian tunneled dialysis catheter on 5/30. Unfortunately they had continued complications with the catheter and on 6/2 it was removed.  Insertion of a right IJ tunneled dialysis catheter and a left IJ triple lumen catheter was done on 6/2 as well. She also underwent AVF declotting. Her last dialysis was 6/5.      The patient also had continued respiratory issues and a bronchoscopy was performed on 5/31 and 6/7 with cultures pending. She had recurrent left sided atelectasis with left main bronchus plugging.  She also had a reported seizure on 6/7 with concern for possible aspiration and was placed back on the ventilator.   Her  reports that she required ventilator after she received ativan.  He notes that she is very sensitive to sedation medication.      On 6/1 \"part of laminectomy wound opened\" and it was reported that is began to drain clear liquid concerning for a CSF leak and was empirically placed on Merrem which was later switched to tigecycline.  CT scan of head and neck was done. Dr Reed was called and felt that it was not an infection but may be a CSF leak and the patient was accepted for transfer.     Problem List, Surgical History, Family, Social History, and ROS     Patient Active Problem List   Diagnosis   • Recent Bacteremia, MSSA   • Metabolic encephalopathy   • Epidural abscess - cervical spine s/p laminectomy and decompression 4/13/18   • ESRD (end stage renal disease) on dialysis "   • Hypothyroidism   • Chronic systolic heart failure   • Type 2 diabetes mellitus with renal complication   • Neuropathy   • Acute respiratory failure   • Atelectasis of left lung, recurrent   • Quadriplegia   • Pleural effusion on left   • Pressure ulcer of coccygeal region   • Wound infection, cervical, possible vs CFS leak     Past Surgical History:   Procedure Laterality Date   • ANTERIOR CERVICAL DISCECTOMY W/ FUSION N/A 4/13/2018    Procedure: ANTERIOR CERVICAL CORPECTOMY;  Surgeon: Aryan Reed MD;  Location:  [facility];  Service: Neurosurgery   • ARTERIOVENOUS FISTULA Left    • ARTERIOVENOUS FISTULA/SHUNT SURGERY Left 5/25/2018    Procedure: REMOVAL OF CLOT FROM LEFT ARM GRAFT;  Surgeon: Jeevan Montoya MD;  Location:  [facility];  Service: Vascular   • BRONCHOSCOPY N/A 4/21/2018    Procedure: BRONCHOSCOPY AT BEDSIDE;  Surgeon: Della Ca MD;  Location:  [facility];  Service: Pulmonary   • BRONCHOSCOPY N/A 4/24/2018    Procedure: BRONCHOSCOPY AT BEDSIDE;  Surgeon: Nghia Gifford MD;  Location:  [facility];  Service: Pulmonary   • BRONCHOSCOPY N/A 4/26/2018    Procedure: BRONCHOSCOPY AT BEDSIDE;  Surgeon: Denver Capone MD;  [facility]:  [facility];  Service: Pulmonary   • BRONCHOSCOPY N/A 5/31/2018    Procedure: BRONCHOSCOPY @ BEDSIDE;  Surgeon: Bolivar Mckeon MD;  [facility]:  [facility];  Service: Pulmonary   • CENTRAL VENOUS LINE INSERTION Left 6/2/2018    Procedure: CENTRAL VENOUS LINE INSERTION;  Surgeon: Modesta Ontiveros MD;  [facility]:  [facility];  Service: General   • CERVICAL LAMINECTOMY DECOMPRESSION POSTERIOR N/A 4/13/2018    Procedure: CERVICAL LAMINECTOMY DECOMPRESSION POSTERIOR;  Surgeon: Aryan Reed MD;  Location:  [facility];  Service: Neurosurgery   • CORONARY ANGIOPLASTY WITH STENT PLACEMENT  2011    4 stents   • HYSTERECTOMY     • INSERTION HEMODIALYSIS CATHETER N/A 5/26/2018    Procedure: HEMODIALYSIS CATHETER INSERTION;  Surgeon: Jeevan Montoya MD;   [facility]:  [facility];  Service: General   • INSERTION HEMODIALYSIS CATHETER Left 5/30/2018    Procedure: REPLACEMENT  OF LEFT CHEST WALL HEMODIALYSIS CATHETER;  Surgeon: Jeevan Montoya MD;  [facility]  [facility];  Service: General   • INSERTION HEMODIALYSIS CATHETER Right 6/2/2018    Procedure: HEMODIALYSIS CATHETER INSERTION;  Surgeon: Modesta Ontiveros MD;  [facility];  Service: General   • REMOVAL PERITONEAL DIALYSIS CATHETER Left 6/2/2018    Procedure: REMOVAL TUNNELED DIALYSIS CATHETER;  Surgeon: Modesta Ontiveros MD;  [facility];  Service: General   • TRACHEOSTOMY AND PEG TUBE INSERTION N/A 4/24/2018    Procedure: TRACHEOSTOMY AND PERCUTANEOUS ENDOSCOPIC GASTROSTOMY TUBE INSERTION;  Surgeon: Denver Alves MD;  Location:  [facility];  Service: General       Allergies   Allergen Reactions   • Zosyn [Piperacillin Sod-Tazobactam So] Itching     Current Facility-Administered Medications on File Prior to Encounter   Medication   • [COMPLETED] sodium chloride 0.9 % infusion     Current Outpatient Prescriptions on File Prior to Encounter   Medication Sig   • Amino Acids-Protein Hydrolys (PRO-STAT) liquid 1 packet by Per G Tube route 2 (Two) Times a Day.   • DULoxetine (CYMBALTA) 20 MG capsule Take 20 mg by mouth Daily.   • epoetin mini (EPOGEN,PROCRIT) 55576 UNIT/ML injection Inject 1 mL under the skin 3 (Three) Times a Week.   • gabapentin (NEURONTIN) 400 MG capsule Take 400 mg by mouth every night at bedtime.   • Heparin Sodium, Porcine, (HEPARIN, PORCINE,) 5000 UNIT/ML injection Inject 1 mL under the skin Every 8 (Eight) Hours.   • insulin regular (humuLIN R,novoLIN R) 100 UNIT/ML injection Inject 0-24 Units under the skin Every 6 (Six) Hours.   • insulin regular (humuLIN R,novoLIN R) 100 UNIT/ML injection Inject 7 Units under the skin Every 6 (Six) Hours.   • levothyroxine (SYNTHROID, LEVOTHROID) 75 MCG tablet Take 75 mcg by mouth Daily.   • lidocaine (LIDODERM) 5 % Place 1 patch on the skin  "Daily. Remove & Discard patch within 12 hours or as directed by MD   • loratadine (CLARITIN) 10 MG tablet Take 10 mg by mouth Daily.   • midodrine (PROAMATINE) 2.5 MG tablet Take 2.5 mg by mouth 2 (Two) Times a Day.   • pantoprazole (PROTONIX) 40 MG EC tablet Take 40 mg by mouth Daily.   • sertraline (ZOLOFT) 100 MG tablet Take 100 mg by mouth Daily.   • sodium chloride 0.9 % solution 250 mL with nafcillin 2 g reconstituted solution 6 g Infuse 6 g into a venous catheter Every 12 (Twelve) Hours for 82 doses.   • traMADol (ULTRAM) 50 MG tablet Take 50 mg by mouth 2 (Two) Times a Day.   • [DISCONTINUED] piperacillin-tazobactam (ZOSYN) 2-0.25 GM/50ML IVPB Infuse 50 mL into a venous catheter Every 8 (Eight) Hours for 39 doses.     MEDICATION LIST AND ALLERGIES REVIEWED.    Family History   Problem Relation Age of Onset   • Family history unknown: Yes     Social History   Substance Use Topics   • Smoking status: Never Smoker   • Smokeless tobacco: Never Used   • Alcohol use No     Social History     Social History Narrative    Disabled since started dialysis 1 year ago.  Has been in a wheelchair because of severe peripheral neuropathy.      FAMILY AND SOCIAL HISTORY REVIEWED.    Review of Systems  ALL OTHER SYSTEMS REVIEWED AND ARE NEGATIVE.  Patient on ventilator, sedated  Physical Exam and Clinical Information   /86   Pulse 87   Temp 97.7 °F (36.5 °C) (Axillary)   Resp 18   Ht 157 cm (61.81\")   Wt 61 kg (134 lb 7.7 oz)   LMP  (LMP Unknown)   SpO2 100%   BMI 24.75 kg/m²    Physical Exam:   GENERAL: well developed WW   HEENT: Pupils small equal reactive. Tracheostomy   LUNGS: breath sounds bilateral, clear anteriorly   HEART: regular rhythm. Normal S1S2, no murmer   ABDOMEN: PEG. Bowel sounds present, soft   EXTREMITIES: LUE surgical wound intact over AVF, trace edema   NEURO/PSYCH: sedates   SKIN: CERVICAL INCISION SIZE OF AN ERASER, TUNNELING. THE WOUND WAS PACKED WITH 1/2 INCH GAUZE; COCCYX WOUND DRY, " ESCHAR, UNSTAGABLE.      Results from last 7 days  Lab Units 06/07/18  0311 06/06/18  0218 06/04/18  0247   WBC 10*3/mm3 9.88 8.33 13.13*   HEMOGLOBIN g/dL 7.9* 8.0* 7.7*   PLATELETS 10*3/mm3 304 282 302       Results from last 7 days  Lab Units 06/07/18  0311 06/06/18  0218 06/05/18  0106   SODIUM mmol/L 136 139 137   POTASSIUM mmol/L 3.4* 3.4* 4.5   CO2 mmol/L 24.7 28.8 24.3   BUN mg/dL 46* 20 31*   CREATININE mg/dL 1.99* 1.45* 2.32*   MAGNESIUM mg/dL 2.3  --   --    PHOSPHORUS mg/dL 2.7  --   --    GLUCOSE mg/dL 316* 240* 258*     Estimated Creatinine Clearance: 30.5 mL/min (A) (by C-G formula based on SCr of 1.99 mg/dL (H)).        Results from last 7 days  Lab Units 06/08/18  0349   PH, ARTERIAL pH units 7.497*   PCO2, ARTERIAL mm Hg 30.7*   PO2 ART mm Hg 114.0*     Lab Results   Component Value Date    LACTATE 0.5 05/25/2018        IMAGES:     CT HEAD WITHOUT CONTRAST:  FINDINGS:     Moderate generalized cerebral atrophy.  No acute intracranial abnormality.  No midline shift or mass effect.  No hydrocephalus or intracranial hemorrhage.  There is no CT evidence of acute vascular territory infarct.  Bone windows show no acute osseous abnormality.     IMPRESSION:  1. Moderate atrophy for patient age.  2. No CT evidence of acute intracranial abnormality.    CT SOFT TISSUE NECK WO CONTRAST:  FINDINGS:      Large soft tissue defect posterior midline neck at the level of  patient's cervical fusion and posterior decompression which corresponds  to location of previous incision and drainage for reported history of  epidural abscess. No fluid collections seen within the incisional  defect. Packing material is noted. No large amounts of fluid are  identified in the surrounding soft tissues. No significant edema of the  subcutaneous tissues identified.     No bony stenosis. Streak artifact limits fine assessment of the central  canal. Epidural fluid collection cannot be excluded on this exam.     Tracheostomy tube is  noted. The prevertebral soft tissues are within  normal limits. Left pleural effusion and left lung airspace disease.     IMPRESSION:  1. Large soft tissue defect posterior midline neck at level of previous  surgery corresponding to area of incision and drainage. No significant  accumulation fluid or debris identified.  2. No significant edema of the adjacent soft tissues.  3. Post surgical changes from cervical disc fusion and posterior  decompression C5-C7.  4. Left pleural effusion and left airspace disease.    I reviewed the patient's results and images.     Assesment     Hospital Problem List     * (Principal)Acute respiratory failure    Metabolic encephalopathy    Wound infection, cervical, possible vs CFS leak    Recent Bacteremia, MSSA    Epidural abscess - cervical spine s/p laminectomy and decompression 4/13/18    ESRD (end stage renal disease) on dialysis    Chronic systolic heart failure    Type 2 diabetes mellitus with renal complication    Neuropathy    Atelectasis of left lung    Quadriplegia    Pleural effusion on left    Pressure ulcer of coccygeal region    Hypothyroidism          Plan/Recommendations     ICU admission  Mechanical ventilation  Pan culture, wound culture  Merrem, Micafungin pending cultures  Neurosurgery consult in am, Dr Reed  Nephrology consult in am for HD  CT Chest for pleural effusion, recurrent left atelectasis  Possible pleural drain tomorrow  Follow up cultures  SS insulin coverage  Nutrition panel   Initiate eternal feeding today  Heparin for DVT prophylaxis  PPI for GI prophylaxis      Critical Care time spent in direct patient care: *65 minutes (excluding procedure time, if applicable) including high complexity decision making to assess, manipulate, and support vital organ system failure in this individual who has impairment of one or more vital organ systems such that there is a high probability of imminent or life threatening deterioration in the patient’s  condition.    LAYTON Alas  Pulmonary and Critical Care Medicine  06/08/18 4:06 AM     I reviewed her chart, interviewed her , did the above physical examination, reviewed her x-ray and lab data and modified the above note to reflect my findings    Della Ca MD, FACCP  Pulmonary and Critical Care    CC: Luan Gandara MD        Electronically signed by Della Ca MD at 6/8/2018  4:31 AM

## 2018-06-25 NOTE — DISCHARGE SUMMARY
Date of Discharge:  6/25/2018    Discharge Diagnosis:  Principal Problem:    Acute respiratory failure  Active Problems:    Recent Bacteremia, MSSA    Metabolic encephalopathy    Epidural abscess - cervical spine s/p laminectomy and decompression 4/13/18    ESRD (end stage renal disease) on dialysis    Hypothyroidism    Chronic systolic heart failure    Type 2 diabetes mellitus with renal complication    Neuropathy    Atelectasis of left lung, recurrent    Quadriplegia    Pleural effusion on left, s/p pigtail CT, with persistent drainage    Pressure ulcer of coccygeal region    Wound infection, cervical, possible vs CFS leak    CVA (cerebral vascular accident) - Cortical Blindness    Anemia    Presenting Problem/History of Present Illness  Wound infection [T14.8XXA, L08.9]  Wound infection [T14.8XXA, L08.9]     Patient is a 47 y.o. female with long-standing diabetes mellitus complicated by severe peripheral neuropathy and end-stage renal disease on hemodialysis for the last year. On April 6 she went to the emergency room complaining of neck and left arm pain. CT scan of the head was done and was negative. She was given a muscle relaxant. She had a low-grade fever at that time. The next 2 days she felt okay. She returned to the emergency room on April 9 with persistent neck pain and was discharged on prednisone. Then on April 10 she could not talk and was confused. Her  called her primary care physician and took her in to the office. She reviewed her records and noted that she had methicillin sensitive staph aureus bacteremia from her April 9 emergency room visit and directly admitted her to the hospital. She had an MRI of the brain that was normal and an MRI of the cervical spine that revealed a C5 6 osteomyelitis, discitis and a large anterior epidural mass with cord compression. Echocardiogram revealed no heart valve vegetations with an ejection fraction of 40%. She was transported to [facility]  [place] for definitive surgery because the neurosurgeons in [place] felt the case was too complicated. Upon arrival to [facility] she was somnolent and not moving her extremities.      The patient underwent a posterior cervical laminectomy C3-7 with facet screw fixation and posterior lateral fusion C5-C7, anterior cervical corpectomy C6, and excision of epidural abscess per Dr Reed. Post-operatively she was brought to the ICU, intubated and on mechanical ventilation. Post operative LP on 4/13 was clear with elevated protein of 288, WBC 43, normal glucose. Infectious disease was consulted  and she was started on Nafcillin and Vancomycin. Nephrology was consulted and she was continued on her regular Monday, Wednesday, Friday schedule for hemodialysis. BY 4/14 she was waking up and able to nod head in answer to yes/no questions. Lower extremity spasms were noted and she was started on Baclofen.  MSSA bacteremia was present in OSH cultures and cefazolin was continued but vancomycin was discontinued. She was started on enteral nutrition on 4/16.      She developed fevers up to 103 degrees and micafungin and levaquin were added to antibiotic regimen. Transcutaneous echo showed mild MR/TR but no vegetations and NICHOLAS also showed no vegetations with EF 45%.  On 4/19 she had no gag reflex and no voluntary movement to command and Dr. Alves was consulted for trach and PEG placement. On 4/24 a #8XLT tracheostomy was placed along with a 20 Wolof PEG.      She had multiple bronchoscopies (4/21, 4/24, 4/26) due to opacification of the left lung and mucous plugging was found on each.  By 4/20 she continued to have no improvement in quadriplegia. On 4/21 gentamycin was added due to persistent fevers and was stopped on 5/1. Central line was changed from right IJ to left IJ and tip was cultured but no growth was noted. Fevers were suspected to be related to drugs. On 4/29 CXR showed a large left pleural effusion and a chest tube was placed  "with blood tinged cloudy fluid return with negative cultures. Chest tube was able to be discontinued on 5/3. She developed transaminitis (, AST 3796) consistent with shock liver on 4/29, by 5/3 liver labs were much improved, with ALT 88, AST 71. Response to vent weaning were mixed, however on 5/3 she was able to tolerate PS.      Physical therapy had been working on PROM of upper and lower extremities with some movement of upper extremities but no sensation. By 5/4/18 she was felt to have reached maximum benefit from hospitalization and was transfered to [facility] in [place], [place].  reports that she was on TC.  She passed her swallow evaluation.  She had some movement of her UEs.  She was talking with  A PMV.      During her admission at [facility] in [place] she continued to receive her scheduled hemodialysis, however her course was complicated by a malfunctioning AVF, placement of atunneled dialysis catheter which malfunctioned and was replaced with a left subclavian tunneled dialysis catheter on 5/30. Unfortunately they had continued complications with the catheter and on 6/2 it was removed.  Insertion of a right IJ tunneled dialysis catheter and a left IJ triple lumen catheter was done on 6/2 as well. She also underwent AVF declotting. Her last dialysis was 6/5.      The patient also had continued respiratory issues and a bronchoscopy was performed on 5/31 and 6/7 with cultures pending. She had recurrent left sided atelectasis with left main bronchus plugging.  She also had a reported seizure on 6/7 with concern for possible aspiration and was placed back on the ventilator.  Her  reports that she required ventilator after she received ativan.  He notes that she is very sensitive to sedation medication.      On 6/1 \"part of laminectomy wound opened\" and it was reported that is began to drain clear liquid concerning for a CSF leak and was empirically placed on Merrem which was later " switched to tigecycline.  CT scan of head and neck was done. Dr Reed was called and felt that it was not an infection but may be a CSF leak and the patient was accepted for transfer.     Hospital Course  Patient is a 47 y.o. female presented with acute respiratory failure and possible wound infection as discussed in history of present illness.  She was admitted to ICU and started on mechanical ventilation, meropenem, micafungin, and neurosurgery along with nephrology were asked to see the patient in consult.  Infectious disease was also asked to see the patient.  Neurosurgery discuss plans for exploration of surgical wound with the plan to attempt trach collar trials after surgery.  She underwent wound debridement with removal of facet screws and closure.  By hospital day #1 the patient was noted to have approximately 1100 mL chest tube drainage.  She was doing well on trach collar trials during the day and requested use of [place] valve.  Cultures were followed and cefazolin was initiated by infectious disease.  Dialysis was planned for 6/12/18.  Hospital day #2 the patient was noted to have bladder distention with I/O catheterization and 1150 mL of urine was drained.  She had previously been and uric.  Tracheostomy collar during the day with vent support at night continued.  We'll day #4 neck wound culture grew Aspergillus which was being treated by infectious disease.  She remained off the ventilator over 24 hours with hemodialysis planned for the following day.  On hospital day #5 the patient reported difficulty with her vision.  She was noted to have pupillary reflexes though they were minimally reactive.  RI was obtained which showed acute infarct and neurosurgery/neurology was notified.  EEG was ordered.  MRI/MRA suspicious for embolic process per neurology report.  EEG was positive for sharp slow waves and Depacon was initiated.  Anticoagulation was also recommended and initiated per neurology and  "neurosurgery.  By hospital day #7 the patient remained in ICU without improvement in her vision, and overall worsening of condition.  Palliative medicine was asked to see the patient in consult as the patient expressed her wishes that she was \"not interested\" and further invasive therapies.  Palliative care consult led to a DNR/AND status and comfort measures approach.  Antibiotics, hemodialysis, and trach collar trials continued.  Hospital day #9 the patient was noted to remained afebrile, blind, and was minimally responsive.  The patient overall status continued to decline, and at hospital day 11 she remained obtunded and was noted to have a regular respiratory pattern.  Palliative medicine continued to follow and the patient remained on hemodialysis, and antimicrobials.  By hospital day #15 patient's  decided to pursue full comfort measures.  Hemodialysis was discontinued.  Hospice was offered and ultimately accepted by the patient's .  Comfort measures/medications were ordered and all other extraneous treatments and medications were discontinued.  Today, 6/25/18 the patient was discharged to hospice, inpatient, for further end-of-life care.    Procedures Performed  Procedure(s):  POSTERIOR CERVICAL WOUND DEBRIDEMENT AND CLOSURE WITH HARDWARE REMOVAL  Chest tube placement       Consults:   Consults     Date and Time Order Name Status Description    6/15/2018 0912 Inpatient Palliative Care MD Consult Completed     6/13/2018 1405 Inpatient Neurology Consult Completed     6/8/2018 1236 Inpatient Infectious Diseases Consult Completed     6/8/2018 0342 Inpatient Nephrology Consult Completed     6/8/2018 0342 Inpatient Neurosurgery Consult Completed         Condition on Discharge:  Stable hemodynamics    Vital Signs  Heart Rate:  [63-86] 66  Resp:  [12-18] 18  BP: (73)/(55) 73/55    Physical Exam:     General Appearance:    Non verbal, trach in place   Head:    Trach in place   Eyes:            Lids and " lashes normal, conjunctivae and sclerae normal, no   icterus, no pallor, corneas clear, PERRLA   Ears:    Ears appear intact with no abnormalities noted   Throat:   No oral lesions, no thrush, oral mucosa moist   Neck:   No adenopathy, supple, trachea midline, no thyromegaly, no     carotid bruit, no JVD   Back:     No kyphosis present, no scoliosis present, no skin lesions,       erythema or scars, no tenderness to percussion or                   palpation,   range of motion normal   Lungs:     Clear to auscultation,respirations regular, even and                   unlabored    Heart:    Regular rhythm and normal rate, normal S1 and S2, no            murmur, no gallop, no rub, no click   Breast Exam:    Deferred   Abdomen:     Normal bowel sounds, no masses, no organomegaly, soft        non-tender, non-distended, no guarding, no rebound                 tenderness   Genitalia:    Deferred   Extremities:  edema noted in both extremties   Pulses:   Pulses palpable and equal bilaterally   Skin:   No bleeding, bruising or rash   Lymph nodes:   No palpable adenopathy       Discharge Disposition  Hospice/Medical Facility ([facility])    Discharge MedicationsPer Hospice    Discharge Diet:   NPO    Activity at Discharge:   Bedrest    Follow-up Appointments  No future appointments.      Test Results Pending at Discharge   Order Current Status    AFB Culture - Body Fluid, Pleural Cavity Preliminary result    Fungus Culture - Body Fluid, Pleural Cavity Preliminary result           LAYTON Elam  Pulmonary and Critical Care Medicine  06/25/18  4:10 PM    Time: Discharge 45 min

## 2018-06-25 NOTE — PROGRESS NOTES
Clinical Nutrition Note      Patient Name: Lizet Webster  MRN: [id number]  Admission date: 6/8/2018      Reason for Assessment:  Multidisciplinary Rounds    Additional information obtained during MDR:    Current diet: NPO Diet    Supplement:    Patient isn't on Tube Feeding      Patient doesn't have any tube feeding modular orders    Pertinent medical data reviewed    Intervention:  Plan of care and goals reviewed    Monitor:  RD to follow per protocol      Saima Nolen RD  06/25/18 10:59 AM  Time: 20min     Clinical Nutrition Note      Patient Name: Lizet Webster  MRN: [id number]  Admission date: 6/8/2018      Reason for Assessment:  Multidisciplinary Rounds    Additional information obtained during MDR: RN reports pt not responsive; palliative care following; pt to transfer to hospice service. RN reports EN support discontinued 3 days ago.    Current diet: NPO Diet    Pertinent medical data reviewed    Intervention:  EN support order set dc'd  Plan of care and goals reviewed    Monitor:  RD to follow per protocol      Saima Nolen RD  06/25/18 10:59 AM  Time: 20min

## 2018-06-25 NOTE — PROGRESS NOTES
Continued Stay Note  [facility]     Patient Name: Lizet Webster  MRN: [id number]  Today's Date: 6/25/2018    Admit Date: 6/8/2018          Discharge Plan     Row Name 06/25/18 1142       Plan    Plan Poor prognosis.    Plan Comments In Pt. Hospice following.              Discharge Codes    No documentation.       Expected Discharge Date and Time     Expected Discharge Date Expected Discharge Time    Jun 26, 2018             Ronald Jansen RN

## 2018-06-25 NOTE — PROGRESS NOTES
INTENSIVIST / PULMONARY FOLLOW UP NOTE     Hospital:  LOS: 17 days   Ms. Lizet Webster, 47 y.o. female is followed for:   Principal Problem:    Acute respiratory failure  Active Problems:    Recent Bacteremia, MSSA    Metabolic encephalopathy    Epidural abscess - cervical spine s/p laminectomy and decompression 4/13/18    ESRD (end stage renal disease) on dialysis    Hypothyroidism    Chronic systolic heart failure    Type 2 diabetes mellitus with renal complication    Neuropathy    Atelectasis of left lung, recurrent    Quadriplegia    Pleural effusion on left, s/p pigtail CT, with persistent drainage    Pressure ulcer of coccygeal region    Wound infection, cervical, possible vs CFS leak    CVA (cerebral vascular accident) - Cortical Blindness    Anemia          SUBJECTIVE   Appears comfortable    The patient's relevant past medical, surgical, family, and social history were reviewed    Allergies and medications were reviewed    ROS:  Per subjective, all other systems were reviewed and were negative        OBJECTIVE     Vital Sign Min/Max for last 24 hours:  No Data Recorded   BP  Min: 73/55  Max: 73/55   Pulse  Min: 63  Max: 86   Resp  Min: 12  Max: 18   SpO2  Min: 91 %  Max: 99 %   No Data Recorded     Physical Exam:  deferred    Telemetry:              Hemodynamics:   CVP:     PAP:     PAOP:     CO:     CI:     SVI:     SVR:       SpO2: 95 % SpO2  Min: 91 %  Max: 99 %   Device:      Flow Rate:   No Data Recorded     Mechanical Ventilator Settings:       Vt (Set, L): 0.4 L    Resp Rate (Set): 10 Resp Rate (Observed) Vent: 16   FiO2 (%): 24 %    PEEP/CPAP (cm H2O): 5 cm H20 Plateau Pressure (cm H2O): 15 cm H2O    Minute Ventilation (L/min) (Obs): 6.76 L/min    I:E Ratio (Obs): 1:4.50     Intake/Ouptut 24 hrs (7:00AM - 6:59 AM)  Intake & Output (last 3 days)       06/22 0701 - 06/23 0700 06/23 0701 - 06/24 0700 06/24 0701 - 06/25 0700 06/25 0701 - 06/26 0700    Other 292 0 60     NG/ 0 120     Total  Intake(mL/kg) 849 (14) 0 (0) 180 (3)     Other 2740       Stool   0     Chest Tube 440  310     Total Output 3180   310      Net -2331 0 -130              Unmeasured Stool Occurrence  1 x 1 x           Lines, Drains & Airways    Active LDAs     Name:   Placement date:   Placement time:   Site:   Days:    CVC Triple Lumen 06/02/18 Tunneled Left Internal jugular  06/02/18    1335    Internal jugular    20    Chest Tube 1 Left  06/08/18    1100        15    Gastrostomy/Enterostomy Percutaneous endoscopic gastrostomy (PEG) 20 Fr.  04/24/18    1400        59    Surgical Airway Shiley Cuffed 6  06/08/18    0322    6    15    Hemodialysis Cath Double                              Hematology:        Invalid input(s): NEUTOPHILPCT,  EOSPCT  Electrolytes, Magnesium and Phosphorus:    Results from last 7 days  Lab Units 06/20/18  0400 06/19/18  0406   SODIUM mmol/L 135 136   CHLORIDE mmol/L 99 100   POTASSIUM mmol/L 3.9 3.6   CO2 mmol/L 25.0 26.0   PHOSPHORUS mg/dL 3.6 3.4     Renal:    Results from last 7 days  Lab Units 06/20/18  0400 06/19/18  0406   CREATININE mg/dL 1.76* 1.34*   BUN mg/dL 62* 40*     Estimated Creatinine Clearance: 34.5 mL/min (A) (by C-G formula based on SCr of 1.76 mg/dL (H)).  Hepatic:      Arterial Blood Gases:              Lab Results   Component Value Date    LACTATE 0.7 06/11/2018       Relevant imaging studies and labs from 06/25/18 were reviewed and interpreted by me    Medications (drips):         chlorhexidine 15 mL Mouth/Throat Q12H   ipratropium-albuterol 3 mL Nebulization 4x Daily - RT   lidocaine 1 patch Transdermal Q24H   nystatin  Topical Q12H   Valproic Acid 500 mg Per G Tube Q8H       Assessment/Plan   IMPRESSION / PLAN     Inpatient Problem List:  47 y.o.female:  Hospital Problem List     * (Principal)Acute respiratory failure    Recent Bacteremia, MSSA    Metabolic encephalopathy    Epidural abscess - cervical spine s/p laminectomy and decompression 4/13/18    ESRD (end stage renal  disease) on dialysis    Hypothyroidism    Chronic systolic heart failure    Type 2 diabetes mellitus with renal complication    Neuropathy    Atelectasis of left lung, recurrent    Overview Deleted 6/8/2018  3:51 AM by LAYTON Souza            Quadriplegia    Pleural effusion on left, s/p pigtail CT, with persistent drainage    Pressure ulcer of coccygeal region    Wound infection, cervical, possible vs CFS leak    CVA (cerebral vascular accident) - Cortical Blindness    Anemia               Plan:  Comfort measures only, palliative following, inpatient hospice consult    D/w family         Hipolito Arroyo MD  Intensive Care Medicine  06/25/18 10:14 AM

## 2018-06-25 NOTE — PLAN OF CARE
Problem: Patient Care Overview  Goal: Plan of Care Review  Outcome: Ongoing (interventions implemented as appropriate)      Problem: Mobility, Physical Impaired (Adult)  Goal: Enhanced Mobility Skills  Outcome: Ongoing (interventions implemented as appropriate)    Goal: Enhanced Functional Ability  Outcome: Ongoing (interventions implemented as appropriate)      Problem: Wound (Includes Pressure Injury) (Adult)  Goal: Signs and Symptoms of Listed Potential Problems Will be Absent, Minimized or Managed (Wound)  Outcome: Ongoing (interventions implemented as appropriate)      Problem: Palliative Care (Adult)  Goal: Maximized Comfort  Outcome: Ongoing (interventions implemented as appropriate)    Goal: Enhanced Quality of Life  Outcome: Ongoing (interventions implemented as appropriate)      Problem: Dying Patient, Actively (Adult)  Goal: Comfort/Pain Control  Outcome: Ongoing (interventions implemented as appropriate)

## 2018-07-02 LAB
BACTERIA SPEC AEROBE CULT: NORMAL
FUNGUS SPEC CULT: NORMAL
FUNGUS SPEC FUNGUS STN: NORMAL

## 2018-07-07 LAB
BACTERIA SPEC AEROBE CULT: NORMAL
FUNGUS SPEC CULT: NORMAL
FUNGUS SPEC CULT: NORMAL
FUNGUS SPEC FUNGUS STN: NORMAL

## 2018-07-11 LAB — GLUCOSE BLDC GLUCOMTR-MCNC: 170 MG/DL (ref 70–130)

## 2018-07-13 LAB
ACID FAST STN SPEC: NEGATIVE
BACTERIA SPEC AEROBE CULT: NEGATIVE
SPECIMEN PREPARATION: NORMAL

## 2018-07-20 LAB
FUNGUS WND CULT: NORMAL
MYCOBACTERIUM SPEC CULT: NORMAL
NIGHT BLUE STAIN TISS: NORMAL

## 2022-11-15 NOTE — PROGRESS NOTES
From: Madison Zamorano  Sent: 11/14/2022 3:18 PM CST  To: [facility] Msg Pool  Subject: urine test    If I see a nephrologist then maybe I don't need an echo or stress test right away.   Intensive Care Follow-up      LOS: 2 days     Ms. Lizet Webster, 47 y.o. female is followed for: Acute respiratory failure     Subjective - Interval History     No problems overnight  Had bladder distention last night and she was I/O cathetered and found to have 1150 mL of urine  Previously has been anuric  Had 210 mL out chest tube yesterday    The patient's relevant past medical, surgical and social history were reviewed and updated in Epic as appropriate.     Objective     Infusions:     Medications:    ceFAZolin 2 g Intravenous Q24H   chlorhexidine 15 mL Mouth/Throat Q12H   DULoxetine 20 mg Oral Daily   epoetin mini 10,000 Units Subcutaneous Once per day on Mon Wed Fri   gabapentin 400 mg Oral Nightly   heparin (porcine) 5,000 Units Subcutaneous Q8H   insulin regular 0-14 Units Subcutaneous Q6H   ipratropium-albuterol 3 mL Nebulization 4x Daily - RT   levothyroxine 75 mcg Oral Q AM   lidocaine 1 patch Transdermal Q24H   micafungin (MYCAMINE)  mg Intravenous Q24H   midodrine 2.5 mg Oral BID   nystatin  Topical Q12H   pantoprazole 40 mg Intravenous Q AM   sertraline 100 mg Oral Daily     Intake/Output       06/09/18 0700 - 06/10/18 0659 06/10/18 0700 - 06/11/18 0659    Intake (ml) 2299.6 152    Output (ml) 1410 0    Net (ml) 889.6 152        Vital Sign Min/Max for last 24 hours  Temp  Min: 97.4 °F (36.3 °C)  Max: 98.9 °F (37.2 °C)   BP  Min: 80/53  Max: 117/73   Pulse  Min: 78  Max: 98   Resp  Min: 14  Max: 28   SpO2  Min: 94 %  Max: 100 %   No Data Recorded        Physical Exam:   GENERAL: Awake, no distress   HEENT: Cervical drain in place.  Trach site looks good.   LUNGS: Decreased breath sounds bilaterally with basilar crackles.  Left chest tube without air leak   HEART: Regular tachycardia without murmurs   ABDOMEN: Soft.  PEG site looks good.  Nontender   EXTREMITIES: Trace edema, no cyanosis   NEURO/PSYCH: Awake, alert, persistent incomplete quadriplegia      Results from last 7 days  Lab Units  06/10/18  0449 06/09/18  0753 06/08/18  0420   WBC 10*3/mm3 8.24 8.18 10.30   HEMOGLOBIN g/dL 7.3* 7.0* 8.3*   PLATELETS 10*3/mm3 192 194 266       Results from last 7 days  Lab Units 06/10/18  0449 06/09/18  0753 06/08/18  0420 06/07/18  0311   SODIUM mmol/L 138 140 139 136   POTASSIUM mmol/L 4.3 3.2* 4.5 3.4*   CO2 mmol/L 21.0 23.0 22.0 24.7   BUN mg/dL 43* 31* 59* 46*   CREATININE mg/dL 1.79* 1.33* 2.25* 1.99*   MAGNESIUM mg/dL  --  2.1 2.3  2.3 2.3   PHOSPHORUS mg/dL  --  2.5 3.6  3.6 2.7   GLUCOSE mg/dL 114* 175* 86 316*     Estimated Creatinine Clearance: 34 mL/min (A) (by C-G formula based on SCr of 1.79 mg/dL (H)).      Results from last 7 days  Lab Units 06/08/18  0420   HEMOGLOBIN A1C % 4.90         Results from last 7 days  Lab Units 06/10/18  0351   PH, ARTERIAL pH units 7.420   PCO2, ARTERIAL mm Hg 33.3*   PO2 ART mm Hg 98.9     Lab Results   Component Value Date    LACTATE 1.1 06/08/2018          Images: Most recent chest x-ray clear without consolidation or effusions.  Good positioning of left-sided chest tube    I reviewed the patient's results and images.     Impression      Hospital Problem List     * (Principal)Acute respiratory failure    Recent Bacteremia, MSSA    Metabolic encephalopathy    Epidural abscess - cervical spine s/p laminectomy and decompression 4/13/18    ESRD (end stage renal disease) on dialysis    Hypothyroidism    Chronic systolic heart failure    Type 2 diabetes mellitus with renal complication    Neuropathy    Atelectasis of left lung, recurrent    Overview Deleted 6/8/2018  3:51 AM by LAYTON Souza            Quadriplegia    Pleural effusion on left    Pressure ulcer of coccygeal region    Wound infection, cervical, possible vs CFS leak               Plan        Continue chest tube until output decreases  Bladder scan daily  Monitor renal function  Continue tracheostomy collar during the day and vent support at night for the time being.  Transition to tracheostomy  collar 24 hours a day in the next several days  Dysphagia reevaluation Monday  Add low-dose benzodiazepine for anxiety     Plan of care and goals reviewed with mulitdisciplinary team at daily rounds   I discussed the patient's findings and my recommendations with patient, family and nursing staff     Critical Care time spent in direct patient care: 35 minutes (excluding procedure time, if applicable) including high complexity decision making to assess, manipulate, and support vital organ system failure in this individual who has impairment of one or more vital organ systems such that there is a high probability of imminent or life threatening deterioration in the patient’s condition.    FABIANA Arroyo MD  Pulmonary and Critical Care Medicine      [General Appearance - Well Developed] : well developed [Normal Appearance] : normal appearance [Well Groomed] : well groomed [General Appearance - Well Nourished] : well nourished [No Deformities] : no deformities [Normal Conjunctiva] : the conjunctiva exhibited no abnormalities [General Appearance - In No Acute Distress] : no acute distress [Normal Oral Mucosa] : normal oral mucosa [Eyelids - No Xanthelasma] : the eyelids demonstrated no xanthelasmas [No Oral Pallor] : no oral pallor [Normal Jugular Venous A Waves Present] : normal jugular venous A waves present [No Oral Cyanosis] : no oral cyanosis [Normal Jugular Venous V Waves Present] : normal jugular venous V waves present [No Jugular Venous Castorena A Waves] : no jugular venous castorena A waves [Respiration, Rhythm And Depth] : normal respiratory rhythm and effort [Auscultation Breath Sounds / Voice Sounds] : lungs were clear to auscultation bilaterally [Exaggerated Use Of Accessory Muscles For Inspiration] : no accessory muscle use [Heart Sounds] : normal S1 and S2 [Heart Rate And Rhythm] : heart rate and rhythm were normal [Abdomen Tenderness] : non-tender [Murmurs] : no murmurs present [Abdomen Soft] : soft [Abnormal Walk] : normal gait [Abdomen Mass (___ Cm)] : no abdominal mass palpated [Cyanosis, Localized] : no localized cyanosis [Nail Clubbing] : no clubbing of the fingernails [Petechial Hemorrhages (___cm)] : no petechial hemorrhages [Skin Color & Pigmentation] : normal skin color and pigmentation [Skin Lesions] : no skin lesions [No Venous Stasis] : no venous stasis [] : no rash [No Skin Ulcers] : no skin ulcer [Oriented To Time, Place, And Person] : oriented to person, place, and time [No Xanthoma] : no  xanthoma was observed [Mood] : the mood was normal [Affect] : the affect was normal [No Anxiety] : not feeling anxious

## 2023-03-21 NOTE — PROGRESS NOTES
"   LOS: 11 days    Patient Care Team:  Luan Gandara MD as PCP - General (Internal Medicine)    Reason For Visit:  F/U ESRD.  Subjective           Review of Systems: UNOBTAINABLE.      Objective       apixaban 2.5 mg Oral Q12H   aspirin 81 mg Oral Daily   atorvastatin 20 mg Oral Nightly   ceFAZolin 2 g Intravenous Q24H   chlorhexidine 15 mL Mouth/Throat Q12H   DULoxetine 20 mg Oral Daily   epoetin mini 10,000 Units Subcutaneous Once per day on Mon Wed Fri   gabapentin 400 mg Oral Nightly   insulin regular 0-14 Units Subcutaneous Q6H   ipratropium-albuterol 3 mL Nebulization 4x Daily - RT   levothyroxine 75 mcg Oral Q AM   lidocaine 1 patch Transdermal Q24H   micafungin (MYCAMINE)  mg Intravenous Q24H   midodrine 5 mg Oral TID AC   nystatin  Topical Q12H   pantoprazole 40 mg Intravenous Q AM   PRO-STAT 1 packet Per G Tube BID   sertraline 100 mg Oral Daily   Valproic Acid 500 mg Per G Tube Q8H            Vital Signs:  Blood pressure 107/61, pulse 82, temperature 98 °F (36.7 °C), temperature source Axillary, resp. rate 18, height 157 cm (61.81\"), weight 60.8 kg (134 lb), SpO2 98 %, not currently breastfeeding.    Flowsheet Rows      First Filed Value   Admission Height  157 cm (61.81\") Documented at 06/08/2018 0215   Admission Weight  61 kg (134 lb 7.7 oz) Documented at 06/08/2018 0215          06/18 0701 - 06/19 0700  In: 1547.3 [I.V.:129.3]  Out: 2750     Physical Exam:    General Appearance: LETHARGIC.  Eyes: PER, conjunctivae and sclerae normal, no icterus  Lungs: + RHONCHI.  Heart/CV: regular rhythm & normal rate, no murmur, no gallop, no rub and no edema  Abdomen: not distended, soft, non-tender, no masses,  bowel sounds present  Skin: No rash, Warm and dry. TUNNELED HD CATH.    Radiology:            Labs:    Results from last 7 days  Lab Units 06/18/18  0610 06/17/18  0726 06/16/18  0927   WBC 10*3/mm3 14.99* 14.92* 12.11*   HEMOGLOBIN g/dL 8.4* 8.1* 7.8*   HEMATOCRIT % 27.7* 26.9* 26.0*   PLATELETS " 10*3/mm3 255 284 247       Results from last 7 days  Lab Units 06/19/18  0406 06/18/18  0610 06/17/18  0726 06/16/18  0927 06/15/18  0337 06/14/18  0442 06/13/18  0429   SODIUM mmol/L 136 134 133 138 134 138 133   POTASSIUM mmol/L 3.6 5.3 4.6 3.8 4.0 3.8 3.3*   CHLORIDE mmol/L 100 99 102 102 104 105 100   CO2 mmol/L 26.0 21.0 22.0 24.0 20.0 23.0 22.0   BUN mg/dL 40* 72* 57* 41* 49* 38* 61*   CREATININE mg/dL 1.34* 2.23* 1.76* 1.36* 2.14* 1.42* 2.03*   CALCIUM mg/dL 8.5* 8.7 8.7 8.6* 8.7 8.8 8.9   PHOSPHORUS mg/dL 3.4  --   --  3.4  --  2.5 3.0   MAGNESIUM mg/dL  --  2.3  --   --  2.2  --  2.3   ALBUMIN g/dL 3.71 3.59  --  3.80  --  3.46 3.71       Results from last 7 days  Lab Units 06/19/18  0406   GLUCOSE mg/dL 124*         Results from last 7 days  Lab Units 06/18/18  0610   ALK PHOS U/L 80   BILIRUBIN mg/dL 0.2*   ALT (SGPT) U/L 1*   AST (SGOT) U/L 12                 Estimated Creatinine Clearance: 45.3 mL/min (A) (by C-G formula based on SCr of 1.34 mg/dL (H)).      Assessment     Principal Problem:    Acute respiratory failure  Active Problems:    Recent Bacteremia, MSSA    Metabolic encephalopathy    Epidural abscess - cervical spine s/p laminectomy and decompression 4/13/18    ESRD (end stage renal disease) on dialysis    Hypothyroidism    Chronic systolic heart failure    Type 2 diabetes mellitus with renal complication    Neuropathy    Atelectasis of left lung, recurrent    Quadriplegia    Pleural effusion on left, s/p pigtail CT, with persistent drainage    Pressure ulcer of coccygeal region    Wound infection, cervical, possible vs CFS leak    CVA (cerebral vascular accident)    Anemia            Impression: ESRD. ANEMIA. CVA.            Recommendations: HD 6/20/18. PATIENT'S  NOT READY TO STOP DIALYSIS YET.      Chris Oakley MD  06/19/18  10:01 AM         Anesthesia Volume In Cc: 5

## (undated) DEVICE — INSTRUMENT 6956011 2.9MM DRL BIT STRLE

## (undated) DEVICE — SPNG GZ WOVN 4X4IN 12PLY 10/BX STRL

## (undated) DEVICE — SENSR O2 OXIMAX FNGR A/ 18IN NONSTR

## (undated) DEVICE — NEURO SPONGES: Brand: DEROYAL

## (undated) DEVICE — CULT ANAERB

## (undated) DEVICE — KT CVR ULTRASND PROB PULL UP LXF 5X8

## (undated) DEVICE — MAGNETIC DRAPE: Brand: DEVON

## (undated) DEVICE — SYR SLP TP 10ML DISP

## (undated) DEVICE — FOGARTY ARTERIAL EMBOLECTOMY CATHETER 4F 40CM: Brand: FOGARTY

## (undated) DEVICE — OIL CARTRIDGE: Brand: CORE, MAESTRO

## (undated) DEVICE — ALLEVYN TRACHEOSTOMY 3.5X3.5" CTN 10: Brand: ALLEVYN TRACHEOSTOMY

## (undated) DEVICE — ANTIBACTERIAL UNDYED BRAIDED (POLYGLACTIN 910), SYNTHETIC ABSORBABLE SUTURE: Brand: COATED VICRYL

## (undated) DEVICE — 2963 MEDIPORE SOFT CLOTH TAPE 3 IN X 10 YD 12 RLS/CS: Brand: 3M™ MEDIPORE™

## (undated) DEVICE — JP PERF DRN SIL FLT 7MM FULL: Brand: CARDINAL HEALTH

## (undated) DEVICE — SYR LL TP 10ML STRL

## (undated) DEVICE — RESUSCITATOR,MANUAL,ADLT,MASK,TUBE RES: Brand: MEDLINE INDUSTRIES, INC.

## (undated) DEVICE — ENCORE® LATEX MICRO SIZE 8, STERILE LATEX POWDER-FREE SURGICAL GLOVE: Brand: ENCORE

## (undated) DEVICE — SPEC CONT WIDE MOUTH 3OZ 400/CS 20 CS/SK: Brand: MEDEGEN MEDICAL PRODUCTS, LLC

## (undated) DEVICE — ULTRACLEAN ACCESSORY ELECTRODE, 1 INCH COATED NEEDLE WITH EXTENDED INSULATION: Brand: ULTRACLEAN

## (undated) DEVICE — EYE SPEAR / FINE DISSECTOR: Brand: DEROYAL

## (undated) DEVICE — GOWN,PREVENTION PLUS,XXLARGE,STERILE: Brand: MEDLINE

## (undated) DEVICE — SYR LUERLOK 30CC

## (undated) DEVICE — 3M™ WARMING BLANKET, LOWER BODY, 10 PER CASE, 42568: Brand: BAIR HUGGER™

## (undated) DEVICE — PK NEURO DISC 10

## (undated) DEVICE — TRAP,MUCUS SPECIMEN,40CC: Brand: MEDLINE

## (undated) DEVICE — DRSNG WND BORDR/ADHS NONADHR/GZ LF 2X2IN STRL

## (undated) DEVICE — THE BITE BLOCK MAXI, LATEX FREE STRAP IS USED TO PROTECT THE ENDOSCOPE INSERTION TUBE FROM BEING BITTEN BY THE PATIENT.

## (undated) DEVICE — BNDG ADHS CURAD FLX/FABRC 2X4IN STRL LF

## (undated) DEVICE — HDRST POSTIN FM CRDL TRACH SLOT 9X8X4IN

## (undated) DEVICE — ENCORE® LATEX MICRO SIZE 7, STERILE LATEX POWDER-FREE SURGICAL GLOVE: Brand: ENCORE

## (undated) DEVICE — APPL CHLORAPREP W/TINT 26ML ORNG

## (undated) DEVICE — Device

## (undated) DEVICE — CATH IV SFTY 18GA 10 1 1/4IN

## (undated) DEVICE — PROXIMATE RH ROTATING HEAD SKIN STAPLERS (35 WIDE) CONTAINS 35 STAINLESS STEEL STAPLES: Brand: PROXIMATE

## (undated) DEVICE — MEDI-VAC YANKAUER SUCTION HANDLE W/BULBOUS TIP: Brand: CARDINAL HEALTH

## (undated) DEVICE — TUBING, SUCTION, 1/4" X 20', STRAIGHT: Brand: MEDLINE INDUSTRIES, INC.

## (undated) DEVICE — INSTRUMENT 7756010 2.4MM DRILL BIT

## (undated) DEVICE — PK BASIC 70

## (undated) DEVICE — KT CATH TAL PALINDROME SAPPHIRE 14.5F23CM

## (undated) DEVICE — SINGLE USE BIOPSY VALVE MAJ-210: Brand: SINGLE USE BIOPSY VALVE (STERILE)

## (undated) DEVICE — 1.7MM PRECISION NEURO (MATCH HEAD)

## (undated) DEVICE — SKIN AFFIX SURG ADHESIVE 72/CS 0.55ML: Brand: MEDLINE

## (undated) DEVICE — GLOW 'N TELL 30CM TAPE (20 STRIPS): Brand: VASCUTAPE RADIOPAQUE TAPE

## (undated) DEVICE — TAPE MICROFM 2IN LF

## (undated) DEVICE — DRILL BIT 7080513 STERILE 13MM

## (undated) DEVICE — DRAPE,T,LAPARO,TRANS,STERILE: Brand: MEDLINE

## (undated) DEVICE — BIOPATCH™ ANTIMICROBIAL DRESSING WITH CHLORHEXIDINE GLUCONATE IS A HYDROPHILLIC POLYURETHANE ABSORPTIVE FOAM WITH CHLORHEXIDINE GLUCONATE (CHG) WHICH INHIBITS BACTERIAL GROWTH UNDER THE DRESSING. THE DRESSING IS INTENDED TO BE USED TO ABSORB EXUDATE, COVER A WOUND CAUSED BY VASCULAR AND NONVASCULAR PERCUTANEOUS MEDICAL DEVICES DURING SURGERY, AS WELL AS REDUCE LOCAL INFECTION AND COLONIZATION OF MICROORGANISMS.: Brand: BIOPATCH

## (undated) DEVICE — MAYFIELD® DISPOSABLE ADULT SKULL PIN (PLASTIC BASE): Brand: MAYFIELD®

## (undated) DEVICE — CONN IV MAXPLUS NDLESS ACC

## (undated) DEVICE — TUBING, SUCTION, 3/16" X 6', STRAIGHT: Brand: MEDLINE

## (undated) DEVICE — 10 FR. PTFE PEEL-APART PERCUTANEOUS INTRODUCER KIT: Brand: PEEL-APART PERCUTANEOUS INTRODUCER KIT

## (undated) DEVICE — DIFFUSER: Brand: CORE, MAESTRO

## (undated) DEVICE — DISPOSABLE BIPOLAR FORCEPS 7 3/4" (19.7CM) SCOVILLE BAYONET, INSULATED, 1.5MM TIP AND 12 FT. (3.6M) CABLE: Brand: KIRWAN

## (undated) DEVICE — BANDAGE,GAUZE,BULKEE II,4.5"X4.1YD,STRL: Brand: MEDLINE

## (undated) DEVICE — CVR HNDL LT CAMERA STRYKER

## (undated) DEVICE — FRCP BX RADJAW4 PULM WO NDL STD1.8X2 100

## (undated) DEVICE — SOL LR 1000ML

## (undated) DEVICE — SINGLE USE SUCTION VALVE MAJ-209: Brand: SINGLE USE SUCTION VALVE (STERILE)

## (undated) DEVICE — KT CATH HEMO CANNON2 PLS LNG TRM 15F 13IN

## (undated) DEVICE — SUT VIC 4/0 P3 18IN J494G

## (undated) DEVICE — DRP C/ARM W/BAND W/CLIPS 41X74IN

## (undated) DEVICE — SYR TB SAFETYLOK LL 1CC 25G 5/8IN

## (undated) DEVICE — MARKR SKIN W/RULR AND LBL

## (undated) DEVICE — NDL SPINE 20G 3 1/2 YEL STRL 1P/U

## (undated) DEVICE — GLW STD STR 3CM .035X150CM

## (undated) DEVICE — Device: Brand: MEDEX

## (undated) DEVICE — COVER,LIGHT HANDLE,FLX,1/PK: Brand: MEDLINE INDUSTRIES, INC.

## (undated) DEVICE — SUT VIC 2/0 CT2 27IN J269H

## (undated) DEVICE — SUTURE BOOT: Brand: DEROYAL

## (undated) DEVICE — SIDESTREAM™ HIGH-EFFICIENCY NEBULIZER: Brand: AIRLIFE™

## (undated) DEVICE — 3M™ STERI-STRIP™ REINFORCED ADHESIVE SKIN CLOSURES, R1547, 1/2 IN X 4 IN (12 MM X 100 MM), 6 STRIPS/ENVELOPE: Brand: 3M™ STERI-STRIP™

## (undated) DEVICE — GLV SURG SENSICARE MICRO PF LF 7 STRL

## (undated) DEVICE — HOLDER: Brand: DEROYAL

## (undated) DEVICE — ELECTRD BLD EDGE/INSUL1P 2.4X5.1MM STRL

## (undated) DEVICE — PERCUTANEOUS ENDOSCOPIC GASTROSTOMY KIT: Brand: ENDOVIVE SAFETY PEG KIT

## (undated) DEVICE — INTENDED FOR TISSUE SEPARATION, AND OTHER PROCEDURES THAT REQUIRE A SHARP SURGICAL BLADE TO PUNCTURE OR CUT.: Brand: BARD-PARKER ® STAINLESS STEEL BLADES

## (undated) DEVICE — GLV SURG GRN DERMASSURE LF PF 7.5

## (undated) DEVICE — SYR LUERLOK 5CC

## (undated) DEVICE — TUBING,OXYGEN,CRUSH RES,7',CLEAR,UC: Brand: MEDLINE INDUSTRIES, INC.

## (undated) DEVICE — DECANT BG O JET

## (undated) DEVICE — DRAPE,EXTREMITY,89X128,STERILE: Brand: MEDLINE

## (undated) DEVICE — SUT VIC 3/0 SH 27IN J416H

## (undated) DEVICE — BIT DRL VERTEX NAV 2.4MM

## (undated) DEVICE — KT PERC INTRO PEELPRO PTFE STD TRWY 8F 14

## (undated) DEVICE — GOWN,REINF,POLY,ECL,PP SLV,XL: Brand: MEDLINE

## (undated) DEVICE — ADAPT SWVL FIBROPTIC BRONCH

## (undated) DEVICE — INSTRUMENT 6956013 3.5MM DRL BIT STRLE

## (undated) DEVICE — COL CULT SYS

## (undated) DEVICE — TOWEL,OR,DSP,ST,WHITE,DLX,XR,4/PK,20PK/C: Brand: MEDLINE

## (undated) DEVICE — SUT GORE TT9 CV6 30IN 6M02A

## (undated) DEVICE — KT CATH HEMO CANNON2 PLS LNG TRM 15F 11IN

## (undated) DEVICE — SHEET,DRAPE,53X77,STERILE: Brand: MEDLINE

## (undated) DEVICE — SYR SLPTP 20CC

## (undated) DEVICE — MEDI-VAC NON-CONDUCTIVE SUCTION TUBING: Brand: CARDINAL HEALTH

## (undated) DEVICE — SUT MNCRYL PLS ANTIB UD 4/0 PS2 18IN

## (undated) DEVICE — SOL NACL 0.9PCT 1000ML

## (undated) DEVICE — SPNG GZ 2S 2X2 8PLY STRL PK/2

## (undated) DEVICE — BNDG ADHS FABRC CURITY 2X3.75IN LF

## (undated) DEVICE — 3.0MM PRECISION NEURO (MATCH HEAD)

## (undated) DEVICE — LUER ACTIVATED VALVE FOR IV ACCESS: Brand: CLEARLINK

## (undated) DEVICE — ADHS LIQ MASTISOL 2/3ML

## (undated) DEVICE — BIT NAV2076 VERT STERILE DRILL BIT 2.9MM: Brand: VERTEX® RECONSTRUCTION SYSTEM

## (undated) DEVICE — SUT SILK 2/0 TIES 18IN A185H

## (undated) DEVICE — SUT SILK 3/0 SH CR8 18IN C013D

## (undated) DEVICE — GLV SURG SENSICARE MICRO PF LF 7.5 STRL

## (undated) DEVICE — SYS SKIN CLS DERMABOND PRINEO W/22CM MESH TP

## (undated) DEVICE — ST PRIM GRVTY NDLESS 3 INJ PORT 105IN

## (undated) DEVICE — ENCORE® LATEX MICRO SIZE 7.5, STERILE LATEX POWDER-FREE SURGICAL GLOVE: Brand: ENCORE

## (undated) DEVICE — DRSNG TELFA PAD NONADH STR 1S 3X8IN

## (undated) DEVICE — KT CATH CV 3L PLUSGARD PRESS/INJ 7F20 LF

## (undated) DEVICE — SUT PROLN 2/0 PC3 8833H

## (undated) DEVICE — BOWL UTIL STRL 32OZ

## (undated) DEVICE — ELECTRD BLD EXT EDGE 1P COAT 4IN STRL

## (undated) DEVICE — DRSNG WND OPTIFOAM FEN LF 3X3IN STRL

## (undated) DEVICE — PK MINOR SPLT 10

## (undated) DEVICE — CONMED DISPOSABLE BIPOLAR CABLE, 10' (3.05M): Brand: CONMED

## (undated) DEVICE — IRRIGATOR BULB ASEPTO 60CC STRL

## (undated) DEVICE — APPL COTN TP PLSTC 6IN STRL LF PK/2

## (undated) DEVICE — POOLE SUCTION INSTRUMENT WITH REMOVABLE SHEATH: Brand: POOLE

## (undated) DEVICE — SWAB OPTHALMIC VISI COTN PK/10

## (undated) DEVICE — CONN STD FOR/O2 TBG

## (undated) DEVICE — SUT ETHLN 2/0 PS 18IN 585H

## (undated) DEVICE — SUT SILK 2/0 FS 18IN 685H

## (undated) DEVICE — SUT MNCRYL 4/0 PS2 18 IN

## (undated) DEVICE — DRSNG SURESITE WNDW 4X4.5

## (undated) DEVICE — INTENDED FOR TISSUE SEPARATION, AND OTHER PROCEDURES THAT REQUIRE A SHARP SURGICAL BLADE TO PUNCTURE OR CUT.: Brand: BARD-PARKER ® CARBON RIB-BACK BLADES

## (undated) DEVICE — SAFESECURE,SECUREMENT,FOLEY CATH,STERILE: Brand: MEDLINE

## (undated) DEVICE — DRAINBAG,ANTI-REFLUX TOWER,L/F,2000ML,LL: Brand: MEDLINE

## (undated) DEVICE — ST EXT MAXPLUS PRESS RATED 7IN

## (undated) DEVICE — ENCORE® LATEX MICRO SIZE 6.5, STERILE LATEX POWDER-FREE SURGICAL GLOVE: Brand: ENCORE

## (undated) DEVICE — SUT ETHLN 3/0 FS1 30IN 669H

## (undated) DEVICE — 3M™ DURAPORE™ SURGICAL TAPE 1538-3, 3 INCH X 10 YARD (7,5CM X 9,1M), 4 ROLLS/BOX: Brand: 3M™ DURAPORE™

## (undated) DEVICE — SYR LUER SLPTP 50ML

## (undated) DEVICE — DRAPE,HAND,STERILE: Brand: MEDLINE

## (undated) DEVICE — DISPOSABLE IRRIGATION BIPOLAR CORD, M1000 TYPE: Brand: KIRWAN

## (undated) DEVICE — TOWEL,OR,DSP,ST,BLUE,STD,4/PK,20PK/CS: Brand: MEDLINE

## (undated) DEVICE — TRY DRSNG CENTRL LN UC44 FCP

## (undated) DEVICE — INSTRUMENT 875-078 14MM DISTRACTOR PINS

## (undated) DEVICE — JACKSON-PRATT 100CC BULB RESERVOIR: Brand: CARDINAL HEALTH

## (undated) DEVICE — SYR LUERLOK 50ML

## (undated) DEVICE — PAD GRND REM POLYHESIVE A/ DISP

## (undated) DEVICE — ELECTRODE,ECG,FOAM, 3/PK: Brand: MEDLINE

## (undated) DEVICE — APPL CHLORAPREP W/TINT 26ML BLU

## (undated) DEVICE — DRESSING,OPTIFOAM,NON-ADHESIVE,4X4: Brand: MEDLINE

## (undated) DEVICE — NDL HYPO ECLPS SFTY 18G 1 1/2IN

## (undated) DEVICE — VESSEL LOOPS X-RAY DETECTABLE: Brand: DEROYAL

## (undated) DEVICE — SUT PROLN 4/0 RB1 D/A 36IN 8557H

## (undated) DEVICE — SOL IRR NACL 0.9PCT BT 1000ML

## (undated) DEVICE — SUT ETHLN 2/0 FSLX 30IN 1674H